# Patient Record
Sex: FEMALE | Race: BLACK OR AFRICAN AMERICAN | NOT HISPANIC OR LATINO | Employment: OTHER | URBAN - METROPOLITAN AREA
[De-identification: names, ages, dates, MRNs, and addresses within clinical notes are randomized per-mention and may not be internally consistent; named-entity substitution may affect disease eponyms.]

---

## 2017-05-08 ENCOUNTER — HOSPITAL ENCOUNTER (EMERGENCY)
Facility: HOSPITAL | Age: 57
Discharge: HOME | End: 2017-05-08
Attending: EMERGENCY MEDICINE
Payer: MEDICARE

## 2017-05-08 VITALS
WEIGHT: 152 LBS | RESPIRATION RATE: 18 BRPM | HEART RATE: 80 BPM | TEMPERATURE: 98 F | OXYGEN SATURATION: 100 % | SYSTOLIC BLOOD PRESSURE: 145 MMHG | BODY MASS INDEX: 23.86 KG/M2 | DIASTOLIC BLOOD PRESSURE: 79 MMHG | HEIGHT: 67 IN

## 2017-05-08 DIAGNOSIS — H61.21 HEARING LOSS DUE TO CERUMEN IMPACTION, RIGHT: Primary | ICD-10-CM

## 2017-05-08 PROCEDURE — 99283 EMERGENCY DEPT VISIT LOW MDM: CPT | Mod: 25

## 2017-05-08 PROCEDURE — 69210 REMOVE IMPACTED EAR WAX UNI: CPT | Mod: RT

## 2017-05-08 PROCEDURE — 25000003 PHARM REV CODE 250: Performed by: EMERGENCY MEDICINE

## 2017-05-08 RX ORDER — DOCUSATE SODIUM 50 MG/5ML
50 LIQUID ORAL
Status: COMPLETED | OUTPATIENT
Start: 2017-05-08 | End: 2017-05-08

## 2017-05-08 RX ADMIN — DOCUSATE SODIUM 50 MG: 50 LIQUID ORAL at 01:05

## 2017-05-08 NOTE — ED AVS SNAPSHOT
[facility]  [place] [address]               Jossie Crowley   2017 12:59 PM   ED    Description:  Female : 1960   Department:  [facility]           Your Care was Coordinated By:     Provider Role From To    Ricardo Perales Jr., MD Attending Provider 17 0832 --      Reason for Visit     Otalgia           Diagnoses this Visit        Comments    Hearing loss due to cerumen impaction, right    -  Primary       ED Disposition     None           To Do List           Follow-up Information     Follow up with Jordyn Owen MD. Schedule an appointment as soon as possible for a visit in 1 week.    Specialty:  Hematology and Oncology    Why:  Please follow-up the PCP this week.  Return for new or worsening symptoms such as fever.  Please use mineral oil 1-2 times daily in your ear for 10 minutes and then flush with bulb syringe.    Contact information:    [address]  [place] [address]        [facility]     [facility] -  Assistance  Unless otherwise directed by your provider, please contact [facility], our nurse care line that is available for  assistance.     Registered nurses in the [facility] provide: appointment scheduling, clinical advisement, health education, and other advisory services.  Call: [phone] (toll free)               Medications           Message regarding Medications     Verify the changes and/or additions to your medication regime listed below are the same as discussed with your clinician today.  If any of these changes or additions are incorrect, please notify your healthcare provider.        These medications were administered today        Dose Freq    docusate 50 mg/5 mL liquid 50 mg 50 mg ED 1 Time    Sig: Place 5 mLs (50 mg total) in ear(s) ED 1 Time.    Class: Normal    Route: Otic      STOP taking these medications     aspirin (ECOTRIN) 81 MG EC  "tablet Take 81 mg by mouth once daily.             Verify that the below list of medications is an accurate representation of the medications you are currently taking.  If none reported, the list may be blank. If incorrect, please contact your healthcare provider. Carry this list with you in case of emergency.           Current Medications     albuterol 90 mcg/actuation HFAA Inhale 2 puffs into the lungs every 4 (four) hours as needed (shortness of breath).    divalproex (DEPAKOTE) 250 MG 24 hr tablet Take 250 mg by mouth once daily. Take 2 tabs in morning and 3 in evening     docusate sodium (COLACE) 100 MG capsule Take 100 mg by mouth. Once daily     predniSONE (DELTASONE) 10 MG tablet 4 tabs po x 2 days, 2tabs po x 2days, 1tab  Po x 3 days and stop    trazodone (DESYREL) 150 MG tablet Take 150 mg by mouth every evening.            Clinical Reference Information           Your Vitals Were     BP Pulse Temp Resp Height Weight    145/79 (BP Location: Right arm, Patient Position: Sitting, BP Method: Automatic) 80 97.9 °F (36.6 °C) (Oral) 18 5' 7" (1.702 m) 68.9 kg (152 lb)    SpO2 BMI             100% 23.81 kg/m2         Allergies as of 5/8/2017     No Known Allergies      Immunizations Administered on Date of Encounter - 5/8/2017     None      ED Micro, Lab, POCT     None      ED Imaging Orders     None        Discharge Instructions         Earwax Removal    The ear canal makes earwax from the canals lining. The ears make wax to lubricate and protect the ear canal. The ear canal is the tube that connects the middle ear to the outside of the ear. The wax protects the ear from bacteria, infection, and damage from water or trauma.  The wax that forms in the canal naturally moves toward the outside of the ear and falls out. In some cases, the ear may make too much wax. If the wax causes problems or keeps the healthcare provider from seeing into the ear, the extra wax may be removed.  Too much wax can affect your " hearing. It can cause itching. In rare cases, it can be painful. Earwax should not be removed unless it is causing a problem. You should not stick objects into your ear to remove wax unless told to do so by your healthcare provider.  Healthcare providers can remove earwax safely. It is important to stay still during the procedure to avoid damage to the ear canal. But removing earwax generally doesnt hurt. You will not usually need anesthesia or pain medicine when the provider removes the earwax.  A number of conditions lead to earwax buildup. These include some skin problems, a narrow ear canal, or ears that make too much earwax. Using cotton swabs in the canal pushes earwax deeper into the ear and contributes to the buildup of earwax.  Home care  · The healthcare provider may recommend mineral oil or an over-the-counter eardrop to use at home to soften the earwax. Use these products only if the provider recommends them. Use these products only if the provider recommends them. Carefully follow the instructions given.  · Dont use mineral oil or OTC eardrops if you might have an ear infection or a ruptured eardrum. Tell your healthcare provider right away if you have diabetes or an immune disorder.  · Dont use cotton swabs in your ears. Cotton swabs may push wax deeper into the ear canal or damage the eardrum. Use cotton gauze or a wet washcloth  to gently remove wax on the outside of the ear and around the opening to the ear canal.  · Don't use any probing device or object such as cotton-tipped swabs or john pins to clean the inside of your ears.  · Dont use ear candles to clean your ears. Candling can be dangerous. It can burn the ear canal. It can also make the condition worse instead of better.  · Dont use cold water to rinse the ear. This will make you dizzy. If your provider tells you to rinse your ear, use only warm water or follow his or her instructions.  · Check the ear for signs of infection or  irritation listed below under When to seek medical advice.  Steps for using eardrops  1. Warm the medicine bottle by rubbing it between your hands for a few minutes.  2. Lie down on your side, with the affected ear up.  3. Place the recommended number of drops in the ear. Wet a cotton ball with the medicine. Gently put the cotton ball into the ear opening.  Follow-up care  Follow up with your healthcare provider, or as directed.  When to seek medical advice  Call the provider right away if you have:  · Ear pain that gets worse  · Fever of 100.4F°F (38°C) or higher, or as directed by your healthcare provider  · Worsening wax buildup  · Severe pain, dizziness, or nausea  · Bleeding from the ear  · Hearing problems  · Signs of irritation from the eardrops, such as burning, stinging, or swelling and tenderness  · Foul-smelling fluid draining from the ear  · Swelling, redness, or tenderness of the outer ear  · Headache, neck pain, or stiff neck  Date Last Reviewed: 3/22/2015  © 0913-6000 [employer]. [address] [place] [address]. All rights reserved. This information is not intended as a substitute for professional medical care. Always follow your healthcare professional's instructions.          [facility] Sign-Up     Activating your [facility] account is as easy as 1-2-3!     1) Visit [email or web address], select Sign Up Now, enter this activation code and your date of birth, then select Next.  [email or web address]  Expires: 6/22/2017  2:58 PM      2) Create a username and password to use when you visit [facility] in the future and select a security question in case you lose your password and select Next.    3) Enter your e-mail address and click Sign Up!    Additional Information  If you have questions, please e-mail [email or web address] or call [phone] to talk to our [facility] staff. Remember, [facility] is NOT to be used for urgent needs. For medical emergencies, dial 911.         Smoking  Cessation     If you would like to quit smoking:   You may be eligible for free services if you are a [place] resident and started smoking cigarettes before September 1, 1988.  Call the Smoking Cessation Trust (SCT) toll free at (680) 878-6840 or (416) 086-8232.   Call 1-800-QUIT-NOW if you do not meet the above criteria.   Contact us via email: [email or web address]   View our website for more information: [email or web address]         [facility] complies with applicable Federal civil rights laws and does not discriminate on the basis of race, color, national origin, age, disability, or sex.        Language Assistance Services     ATTENTION: Language assistance services are available, free of charge. Please call [phone].      ATENCIÓN: Si habla español, tiene a tobar disposición servicios gratuitos de asistencia lingüística. Llame al [phone].     CHÚ Ý: N?u b?n nói Ti?ng Vi?t, có các d?ch v? h? tr? ngôn ng? mi?n phí dành cho b?n. G?i s? [phone].

## 2017-05-08 NOTE — DISCHARGE INSTRUCTIONS
Earwax Removal    The ear canal makes earwax from the canals lining. The ears make wax to lubricate and protect the ear canal. The ear canal is the tube that connects the middle ear to the outside of the ear. The wax protects the ear from bacteria, infection, and damage from water or trauma.  The wax that forms in the canal naturally moves toward the outside of the ear and falls out. In some cases, the ear may make too much wax. If the wax causes problems or keeps the healthcare provider from seeing into the ear, the extra wax may be removed.  Too much wax can affect your hearing. It can cause itching. In rare cases, it can be painful. Earwax should not be removed unless it is causing a problem. You should not stick objects into your ear to remove wax unless told to do so by your healthcare provider.  Healthcare providers can remove earwax safely. It is important to stay still during the procedure to avoid damage to the ear canal. But removing earwax generally doesnt hurt. You will not usually need anesthesia or pain medicine when the provider removes the earwax.  A number of conditions lead to earwax buildup. These include some skin problems, a narrow ear canal, or ears that make too much earwax. Using cotton swabs in the canal pushes earwax deeper into the ear and contributes to the buildup of earwax.  Home care  · The healthcare provider may recommend mineral oil or an over-the-counter eardrop to use at home to soften the earwax. Use these products only if the provider recommends them. Use these products only if the provider recommends them. Carefully follow the instructions given.  · Dont use mineral oil or OTC eardrops if you might have an ear infection or a ruptured eardrum. Tell your healthcare provider right away if you have diabetes or an immune disorder.  · Dont use cotton swabs in your ears. Cotton swabs may push wax deeper into the ear canal or damage the eardrum. Use cotton gauze or a wet  washcloth  to gently remove wax on the outside of the ear and around the opening to the ear canal.  · Don't use any probing device or object such as cotton-tipped swabs or john pins to clean the inside of your ears.  · Dont use ear candles to clean your ears. Candling can be dangerous. It can burn the ear canal. It can also make the condition worse instead of better.  · Dont use cold water to rinse the ear. This will make you dizzy. If your provider tells you to rinse your ear, use only warm water or follow his or her instructions.  · Check the ear for signs of infection or irritation listed below under When to seek medical advice.  Steps for using eardrops  1. Warm the medicine bottle by rubbing it between your hands for a few minutes.  2. Lie down on your side, with the affected ear up.  3. Place the recommended number of drops in the ear. Wet a cotton ball with the medicine. Gently put the cotton ball into the ear opening.  Follow-up care  Follow up with your healthcare provider, or as directed.  When to seek medical advice  Call the provider right away if you have:  · Ear pain that gets worse  · Fever of 100.4F°F (38°C) or higher, or as directed by your healthcare provider  · Worsening wax buildup  · Severe pain, dizziness, or nausea  · Bleeding from the ear  · Hearing problems  · Signs of irritation from the eardrops, such as burning, stinging, or swelling and tenderness  · Foul-smelling fluid draining from the ear  · Swelling, redness, or tenderness of the outer ear  · Headache, neck pain, or stiff neck  Date Last Reviewed: 3/22/2015  © 5637-3601 [employer]. [address] [place] [address], [place] [address]. All rights reserved. This information is not intended as a substitute for professional medical care. Always follow your healthcare professional's instructions.

## 2017-05-08 NOTE — ED PROVIDER NOTES
"Encounter Date: 5/8/2017       History     Chief Complaint   Patient presents with    Otalgia     pt states " I cant hear out my right ear" times 1 month     Review of patient's allergies indicates:  No Known Allergies  HPI Comments: Chief complaint: Decreased hearing in right ear    History of present illness: 56-year-old female presents with several weeks of inability to hear out of her right ear.  She denies any tinnitus or difficulty with balance or ataxia.  No history of prior symptoms.  No trauma.  No fevers or chills.  No other symptoms.    Past Medical History:   Diagnosis Date    Breast cancer     Hyperlipidemia     Schizophrenia     Thyroid disease     thyroid surgery     Past Surgical History:   Procedure Laterality Date    BREAST BIOPSY      BREAST LUMPECTOMY      BREAST SURGERY      THYROIDECTOMY  2005     History reviewed. No pertinent family history.  Social History   Substance Use Topics    Smoking status: Current Every Day Smoker     Packs/day: 0.50     Years: 37.00     Types: Cigarettes    Smokeless tobacco: Former User     Quit date: 12/27/2014    Alcohol use No     Review of Systems   Constitutional: Negative for fever.   HENT: Negative for ear discharge, ear pain, sore throat, tinnitus and trouble swallowing.    Eyes: Negative for pain and redness.   Respiratory: Negative for shortness of breath.    Cardiovascular: Negative for chest pain.   Gastrointestinal: Negative for nausea.   Genitourinary: Negative for dysuria.   Musculoskeletal: Negative for back pain.   Skin: Negative for rash.   Neurological: Negative for dizziness, weakness and light-headedness.   Hematological: Does not bruise/bleed easily.       Physical Exam   Initial Vitals   BP Pulse Resp Temp SpO2   05/08/17 1210 05/08/17 1210 05/08/17 1210 05/08/17 1210 05/08/17 1210   125/74 99 19 98.3 °F (36.8 °C) 100 %     Physical Exam    Nursing note and vitals reviewed.  Constitutional: She appears well-developed and " well-nourished. She is not diaphoretic. No distress.   HENT:   Head: Normocephalic and atraumatic.   Left Ear: External ear normal.   Nose: Nose normal.   Mouth/Throat: Oropharynx is clear and moist.   Right external ear canal reveals significant cerumen impaction.   Eyes: Conjunctivae and EOM are normal. Pupils are equal, round, and reactive to light. Right eye exhibits no discharge. Left eye exhibits no discharge. No scleral icterus.   Neck: Normal range of motion. Neck supple.   Cardiovascular: Normal rate, regular rhythm, normal heart sounds and intact distal pulses.   No murmur heard.  Pulmonary/Chest: Breath sounds normal. No respiratory distress. She has no wheezes. She has no rhonchi. She has no rales.   Musculoskeletal: Normal range of motion. She exhibits no edema or tenderness.   Neurological: She is alert and oriented to person, place, and time. She has normal strength. No cranial nerve deficit or sensory deficit.   Skin: Skin is warm and dry. No rash noted. No erythema. No pallor.   Psychiatric: She has a normal mood and affect. Her behavior is normal. Judgment and thought content normal.         ED Course   Ear Wax Removal  Date/Time: 5/8/2017 3:02 PM  Performed by: HAILEE STALLWORTH JR  Authorized by: HAILEE STALLWORTH JR     Anesthesia:  Local Anesthetic: none   Ceruminolytics applied prior to the procedure.  Medication Used: Other (colace x 2).  Location details: right ear  Procedure type: curette and irrigation  Cerumen Removal Results: Cerumen partially removed.  Patient tolerance: Patient tolerated the procedure well with no immediate complications        Labs Reviewed - No data to display          Medical Decision Making:   ED Management:   This is the emergent evaluation of a 56-year-old female presents forright-sided hearing loss for the past 3 weeks.  Differential diagnoses included: Cerumen impaction, other conductive hearing loss problem, sensorineural hearing loss.  Evaluation of the  patient's right ear canal reveals significant cerumen impaction.  This was removed with a combination of curette as well as to infusions of Colace to dissolve cerumen followed by irrigation.  Able to see tympanic membranes.  Patient is hearing much better.  Unable to completely remove cerumen at this time.  I've advised the patient to continue using mineral oil with bulb syringe before flushing.  Patient was advised to return for any new or worsening symptoms such as tinnitus or ataxia.                     ED Course     Clinical Impression:   Cerumen impaction, right ear          Ricardo Perales Jr., MD  05/08/17 1698

## 2017-05-08 NOTE — ED TRIAGE NOTES
Patient comes to the ER with decreased hearing in right ear. Patient states its been going on for a month now.

## 2017-12-01 ENCOUNTER — HOSPITAL ENCOUNTER (INPATIENT)
Facility: HOSPITAL | Age: 57
LOS: 14 days | Discharge: HOME | DRG: 871 | End: 2017-12-15
Attending: EMERGENCY MEDICINE | Admitting: HOSPITALIST
Payer: MEDICARE

## 2017-12-01 DIAGNOSIS — R50.9 ACUTE FEBRILE ILLNESS: Primary | ICD-10-CM

## 2017-12-01 DIAGNOSIS — A41.9 SEPSIS: ICD-10-CM

## 2017-12-01 DIAGNOSIS — J18.9 PNEUMONIA OF RIGHT UPPER LOBE DUE TO INFECTIOUS ORGANISM: ICD-10-CM

## 2017-12-01 DIAGNOSIS — R00.0 TACHYCARDIA: ICD-10-CM

## 2017-12-01 DIAGNOSIS — R05.9 COUGH: ICD-10-CM

## 2017-12-01 DIAGNOSIS — I50.9 CHF (CONGESTIVE HEART FAILURE): ICD-10-CM

## 2017-12-01 DIAGNOSIS — J80 ARDS (ADULT RESPIRATORY DISTRESS SYNDROME): ICD-10-CM

## 2017-12-01 LAB
ALBUMIN SERPL BCP-MCNC: 3.7 G/DL
ALP SERPL-CCNC: 55 U/L
ALT SERPL W/O P-5'-P-CCNC: 22 U/L
AMPHET+METHAMPHET UR QL: NEGATIVE
ANION GAP SERPL CALC-SCNC: 12 MMOL/L
AST SERPL-CCNC: 33 U/L
BACTERIA #/AREA URNS HPF: NORMAL /HPF
BARBITURATES UR QL SCN>200 NG/ML: NEGATIVE
BASOPHILS # BLD AUTO: 0.01 K/UL
BASOPHILS NFR BLD: 0.1 %
BENZODIAZ UR QL SCN>200 NG/ML: NEGATIVE
BILIRUB SERPL-MCNC: 0.2 MG/DL
BILIRUB UR QL STRIP: NEGATIVE
BNP SERPL-MCNC: 24 PG/ML
BUN SERPL-MCNC: 12 MG/DL
BZE UR QL SCN: NEGATIVE
CALCIUM SERPL-MCNC: 9.7 MG/DL
CANNABINOIDS UR QL SCN: NEGATIVE
CHLORIDE SERPL-SCNC: 104 MMOL/L
CLARITY UR: ABNORMAL
CO2 SERPL-SCNC: 23 MMOL/L
COLOR UR: ABNORMAL
CREAT SERPL-MCNC: 0.8 MG/DL
CREAT UR-MCNC: 201.7 MG/DL
DIFFERENTIAL METHOD: ABNORMAL
EOSINOPHIL # BLD AUTO: 0 K/UL
EOSINOPHIL NFR BLD: 0 %
ERYTHROCYTE [DISTWIDTH] IN BLOOD BY AUTOMATED COUNT: 15.6 %
EST. GFR  (AFRICAN AMERICAN): >60 ML/MIN/1.73 M^2
EST. GFR  (NON AFRICAN AMERICAN): >60 ML/MIN/1.73 M^2
FLUAV AG SPEC QL IA: NEGATIVE
FLUBV AG SPEC QL IA: NEGATIVE
GLUCOSE SERPL-MCNC: 107 MG/DL (ref 70–110)
GLUCOSE SERPL-MCNC: 115 MG/DL
GLUCOSE UR QL STRIP: NEGATIVE
HCT VFR BLD AUTO: 36.2 %
HGB BLD-MCNC: 12.7 G/DL
HGB UR QL STRIP: NEGATIVE
KETONES UR QL STRIP: ABNORMAL
LACTATE SERPL-SCNC: 0.9 MMOL/L
LACTATE SERPL-SCNC: 1.1 MMOL/L
LEUKOCYTE ESTERASE UR QL STRIP: ABNORMAL
LIPASE SERPL-CCNC: 17 U/L
LYMPHOCYTES # BLD AUTO: 1.3 K/UL
LYMPHOCYTES NFR BLD: 13.9 %
MAGNESIUM SERPL-MCNC: 1.9 MG/DL
MCH RBC QN AUTO: 29.5 PG
MCHC RBC AUTO-ENTMCNC: 35.1 G/DL
MCV RBC AUTO: 84 FL
METHADONE UR QL SCN>300 NG/ML: NEGATIVE
MICROSCOPIC COMMENT: NORMAL
MONOCYTES # BLD AUTO: 1.5 K/UL
MONOCYTES NFR BLD: 16 %
NEUTROPHILS # BLD AUTO: 6.7 K/UL
NEUTROPHILS NFR BLD: 70 %
NITRITE UR QL STRIP: NEGATIVE
OPIATES UR QL SCN: NEGATIVE
PCP UR QL SCN>25 NG/ML: NEGATIVE
PH UR STRIP: 6 [PH] (ref 5–8)
PHOSPHATE SERPL-MCNC: 3 MG/DL
PLATELET # BLD AUTO: 192 K/UL
PMV BLD AUTO: 10.2 FL
POCT GLUCOSE: 107 MG/DL (ref 70–110)
POTASSIUM SERPL-SCNC: 4 MMOL/L
PROT SERPL-MCNC: 7.7 G/DL
PROT UR QL STRIP: NEGATIVE
RBC # BLD AUTO: 4.3 M/UL
RBC #/AREA URNS HPF: 3 /HPF (ref 0–4)
SODIUM SERPL-SCNC: 139 MMOL/L
SP GR UR STRIP: 1.02 (ref 1–1.03)
SPECIMEN SOURCE: NORMAL
SQUAMOUS #/AREA URNS HPF: 1 /HPF
TOXICOLOGY INFORMATION: NORMAL
TROPONIN I SERPL DL<=0.01 NG/ML-MCNC: 0.03 NG/ML
TROPONIN I SERPL DL<=0.01 NG/ML-MCNC: 0.04 NG/ML
URN SPEC COLLECT METH UR: ABNORMAL
UROBILINOGEN UR STRIP-ACNC: NEGATIVE EU/DL
VALPROATE SERPL-MCNC: 74.3 UG/ML
WBC # BLD AUTO: 9.5 K/UL
WBC #/AREA URNS HPF: 2 /HPF (ref 0–5)

## 2017-12-01 PROCEDURE — 27100107 HC POCKET PEAK FLOW METER

## 2017-12-01 PROCEDURE — 83690 ASSAY OF LIPASE: CPT

## 2017-12-01 PROCEDURE — 99285 EMERGENCY DEPT VISIT HI MDM: CPT | Mod: 25

## 2017-12-01 PROCEDURE — 85025 COMPLETE CBC W/AUTO DIFF WBC: CPT

## 2017-12-01 PROCEDURE — 93005 ELECTROCARDIOGRAM TRACING: CPT

## 2017-12-01 PROCEDURE — 87086 URINE CULTURE/COLONY COUNT: CPT

## 2017-12-01 PROCEDURE — 96367 TX/PROPH/DG ADDL SEQ IV INF: CPT

## 2017-12-01 PROCEDURE — 99900035 HC TECH TIME PER 15 MIN (STAT)

## 2017-12-01 PROCEDURE — 25500020 PHARM REV CODE 255: Performed by: EMERGENCY MEDICINE

## 2017-12-01 PROCEDURE — 63600175 PHARM REV CODE 636 W HCPCS: Performed by: EMERGENCY MEDICINE

## 2017-12-01 PROCEDURE — 80307 DRUG TEST PRSMV CHEM ANLYZR: CPT

## 2017-12-01 PROCEDURE — 25000003 PHARM REV CODE 250: Performed by: EMERGENCY MEDICINE

## 2017-12-01 PROCEDURE — 94640 AIRWAY INHALATION TREATMENT: CPT

## 2017-12-01 PROCEDURE — 21400001 HC TELEMETRY ROOM

## 2017-12-01 PROCEDURE — 84100 ASSAY OF PHOSPHORUS: CPT

## 2017-12-01 PROCEDURE — 83735 ASSAY OF MAGNESIUM: CPT

## 2017-12-01 PROCEDURE — 96365 THER/PROPH/DIAG IV INF INIT: CPT | Mod: 59

## 2017-12-01 PROCEDURE — 84484 ASSAY OF TROPONIN QUANT: CPT

## 2017-12-01 PROCEDURE — 96366 THER/PROPH/DIAG IV INF ADDON: CPT

## 2017-12-01 PROCEDURE — 93010 ELECTROCARDIOGRAM REPORT: CPT | Mod: ,,, | Performed by: INTERNAL MEDICINE

## 2017-12-01 PROCEDURE — 83605 ASSAY OF LACTIC ACID: CPT | Mod: 91

## 2017-12-01 PROCEDURE — 80164 ASSAY DIPROPYLACETIC ACD TOT: CPT

## 2017-12-01 PROCEDURE — 25000242 PHARM REV CODE 250 ALT 637 W/ HCPCS: Performed by: EMERGENCY MEDICINE

## 2017-12-01 PROCEDURE — 83880 ASSAY OF NATRIURETIC PEPTIDE: CPT

## 2017-12-01 PROCEDURE — 94761 N-INVAS EAR/PLS OXIMETRY MLT: CPT

## 2017-12-01 PROCEDURE — 80053 COMPREHEN METABOLIC PANEL: CPT

## 2017-12-01 PROCEDURE — 87040 BLOOD CULTURE FOR BACTERIA: CPT

## 2017-12-01 PROCEDURE — 87400 INFLUENZA A/B EACH AG IA: CPT

## 2017-12-01 PROCEDURE — 81000 URINALYSIS NONAUTO W/SCOPE: CPT

## 2017-12-01 PROCEDURE — 87449 NOS EACH ORGANISM AG IA: CPT

## 2017-12-01 PROCEDURE — 96361 HYDRATE IV INFUSION ADD-ON: CPT

## 2017-12-01 RX ORDER — SODIUM CHLORIDE 9 MG/ML
INJECTION, SOLUTION INTRAVENOUS CONTINUOUS
Status: DISCONTINUED | OUTPATIENT
Start: 2017-12-01 | End: 2017-12-02

## 2017-12-01 RX ORDER — CIPROFLOXACIN 2 MG/ML
400 INJECTION, SOLUTION INTRAVENOUS
Status: DISCONTINUED | OUTPATIENT
Start: 2017-12-01 | End: 2017-12-06

## 2017-12-01 RX ORDER — IPRATROPIUM BROMIDE 0.5 MG/2.5ML
0.5 SOLUTION RESPIRATORY (INHALATION)
Status: COMPLETED | OUTPATIENT
Start: 2017-12-01 | End: 2017-12-01

## 2017-12-01 RX ORDER — LEVALBUTEROL 1.25 MG/.5ML
1.25 SOLUTION, CONCENTRATE RESPIRATORY (INHALATION)
Status: COMPLETED | OUTPATIENT
Start: 2017-12-01 | End: 2017-12-01

## 2017-12-01 RX ORDER — ALBUTEROL SULFATE 2.5 MG/.5ML
2.5 SOLUTION RESPIRATORY (INHALATION)
Status: DISCONTINUED | OUTPATIENT
Start: 2017-12-02 | End: 2017-12-02

## 2017-12-01 RX ORDER — DOCUSATE SODIUM 100 MG/1
100 CAPSULE, LIQUID FILLED ORAL DAILY
Status: DISCONTINUED | OUTPATIENT
Start: 2017-12-02 | End: 2017-12-03

## 2017-12-01 RX ORDER — ACETAMINOPHEN 500 MG
1000 TABLET ORAL
Status: COMPLETED | OUTPATIENT
Start: 2017-12-01 | End: 2017-12-01

## 2017-12-01 RX ORDER — ACETAMINOPHEN 325 MG/1
650 TABLET ORAL EVERY 6 HOURS PRN
Status: DISCONTINUED | OUTPATIENT
Start: 2017-12-01 | End: 2017-12-04

## 2017-12-01 RX ORDER — ONDANSETRON 2 MG/ML
8 INJECTION INTRAMUSCULAR; INTRAVENOUS EVERY 8 HOURS PRN
Status: DISCONTINUED | OUTPATIENT
Start: 2017-12-01 | End: 2017-12-15 | Stop reason: HOSPADM

## 2017-12-01 RX ORDER — DIVALPROEX SODIUM 250 MG/1
250 TABLET, FILM COATED, EXTENDED RELEASE ORAL DAILY
Status: DISCONTINUED | OUTPATIENT
Start: 2017-12-02 | End: 2017-12-02

## 2017-12-01 RX ORDER — SODIUM CHLORIDE 0.9 % (FLUSH) 0.9 %
3 SYRINGE (ML) INJECTION EVERY 6 HOURS PRN
Status: DISCONTINUED | OUTPATIENT
Start: 2017-12-01 | End: 2017-12-15 | Stop reason: HOSPADM

## 2017-12-01 RX ADMIN — SODIUM CHLORIDE 1000 ML: 0.9 INJECTION, SOLUTION INTRAVENOUS at 02:12

## 2017-12-01 RX ADMIN — LEVALBUTEROL 1.25 MG: 1.25 SOLUTION, CONCENTRATE RESPIRATORY (INHALATION) at 07:12

## 2017-12-01 RX ADMIN — IPRATROPIUM BROMIDE 0.5 MG: 0.5 SOLUTION RESPIRATORY (INHALATION) at 07:12

## 2017-12-01 RX ADMIN — IOHEXOL 70 ML: 350 INJECTION, SOLUTION INTRAVENOUS at 05:12

## 2017-12-01 RX ADMIN — VANCOMYCIN HYDROCHLORIDE 1000 MG: 1 INJECTION, POWDER, LYOPHILIZED, FOR SOLUTION INTRAVENOUS at 07:12

## 2017-12-01 RX ADMIN — CIPROFLOXACIN 400 MG: 2 INJECTION, SOLUTION INTRAVENOUS at 06:12

## 2017-12-01 RX ADMIN — IPRATROPIUM BROMIDE 0.5 MG: 0.5 SOLUTION RESPIRATORY (INHALATION) at 02:12

## 2017-12-01 RX ADMIN — ACETAMINOPHEN 1000 MG: 500 TABLET ORAL at 02:12

## 2017-12-01 RX ADMIN — SODIUM CHLORIDE: 9 INJECTION, SOLUTION INTRAVENOUS at 07:12

## 2017-12-01 RX ADMIN — LEVALBUTEROL 1.25 MG: 1.25 SOLUTION, CONCENTRATE RESPIRATORY (INHALATION) at 02:12

## 2017-12-01 RX ADMIN — SODIUM CHLORIDE: 0.9 INJECTION, SOLUTION INTRAVENOUS at 10:12

## 2017-12-01 NOTE — ED PROVIDER NOTES
"Encounter Date: 12/1/2017    SCRIBE #1 NOTE: I, Asher Small, am scribing for, and in the presence of,  Sunny Nails MD. I have scribed the following portions of the note - Other sections scribed: HPI and ROS.       History     Chief Complaint   Patient presents with    Altered Mental Status     family reports AMS today, "shes just doing things out of the ordinary and coughing a lot"     CC: Altered Mental Status     HPI: This 57 y.o F smoker with breast cancer, HLD, schizophrenia and thyroid disease presents to the ED accompanied by her daughter for emergent evaluation of altered mental status which began today. The pt's daughter suspects she may have taken more Depakote than Rx. Additionally, the pt reports cough and SOB x1 week. The pt states "I've been taking a lot of Goody's (powder)" which she suspects made her nauseous. The pt denies fever, headache, emesis, abdominal pain, Hx of COPD, emphysema and cardiac disease.      The history is provided by the patient and a relative. No  was used.     Review of patient's allergies indicates:  No Known Allergies  Past Medical History:   Diagnosis Date    Breast cancer     Hyperlipidemia     Schizophrenia     Thyroid disease     thyroid surgery     Past Surgical History:   Procedure Laterality Date    BREAST BIOPSY      BREAST LUMPECTOMY      BREAST SURGERY      THYROIDECTOMY  2005     History reviewed. No pertinent family history.  Social History   Substance Use Topics    Smoking status: Current Every Day Smoker     Packs/day: 0.50     Years: 37.00     Types: Cigarettes    Smokeless tobacco: Former User     Quit date: 12/27/2014    Alcohol use No     Review of Systems   Constitutional: Negative for fever.   HENT: Negative for sore throat.    Respiratory: Positive for cough and shortness of breath.    Cardiovascular: Negative for chest pain.   Gastrointestinal: Positive for nausea. Negative for abdominal pain and vomiting. "   Genitourinary: Negative for dysuria.   Musculoskeletal: Negative for back pain.   Skin: Negative for rash.   Neurological: Negative for weakness and headaches.   Hematological: Does not bruise/bleed easily.   Psychiatric/Behavioral:        (+) Altered Mental Status       Physical Exam     Initial Vitals [12/01/17 1332]   BP Pulse Resp Temp SpO2   138/79 (!) 145 20 (!) 101.4 °F (38.6 °C) 100 %      MAP       98.67         Physical Exam    Nursing note and vitals reviewed.  Constitutional: She appears well-developed and well-nourished. She is not diaphoretic.   Uncomfortable appearing   HENT:   Head: Normocephalic and atraumatic.   Dry mucosal membranes.   Eyes: Conjunctivae are normal. No scleral icterus.   Neck: Normal range of motion. Neck supple.   Cardiovascular:   Tachycardic, regular rhythm   Pulmonary/Chest: No stridor.   Tachypneic - Diminished breath sounds bilaterally and faint expiratory wheezing.   Abdominal: Soft. There is no tenderness.   Musculoskeletal: Normal range of motion. She exhibits no edema or tenderness.   Lymphadenopathy:     She has no cervical adenopathy.   Neurological: She is alert and oriented to person, place, and time. She has normal strength.   Skin: Skin is warm and dry. No rash noted.   Psychiatric: She has a normal mood and affect. Her behavior is normal. Thought content normal.   (baseline)         ED Course   Critical Care  Date/Time: 12/1/2017 4:09 PM  Performed by: JENNIFER WELLS.  Authorized by: JENNIFER WELLS   Direct patient critical care time: 20 minutes  Additional history critical care time: 10 minutes  Ordering / reviewing critical care time: 10 minutes  Documentation critical care time: 10 minutes  Consulting other physicians critical care time: 5 minutes  Total critical care time (exclusive of procedural time) : 55 minutes  Critical care time was exclusive of separately billable procedures and treating other patients and teaching time.  Critical care was  necessary to treat or prevent imminent or life-threatening deterioration of the following conditions: sepsis.  Critical care was time spent personally by me on the following activities: development of treatment plan with patient or surrogate, evaluation of patient's response to treatment, examination of patient, obtaining history from patient or surrogate, ordering and performing treatments and interventions, ordering and review of laboratory studies, ordering and review of radiographic studies, pulse oximetry, re-evaluation of patient's condition and review of old charts.        Labs Reviewed   URINALYSIS - Abnormal; Notable for the following:        Result Value    Appearance, UA Cloudy (*)     Ketones, UA Trace (*)     Leukocytes, UA Trace (*)     All other components within normal limits   CBC W/ AUTO DIFFERENTIAL - Abnormal; Notable for the following:     Hematocrit 36.2 (*)     RDW 15.6 (*)     Mono # 1.5 (*)     Lymph% 13.9 (*)     Mono% 16.0 (*)     All other components within normal limits   COMPREHENSIVE METABOLIC PANEL - Abnormal; Notable for the following:     Glucose 115 (*)     All other components within normal limits   TROPONIN I - Abnormal; Notable for the following:     Troponin I 0.032 (*)     All other components within normal limits   CULTURE, BLOOD   CULTURE, BLOOD   CULTURE, URINE   B-TYPE NATRIURETIC PEPTIDE   LACTIC ACID, PLASMA   MAGNESIUM   PHOSPHORUS   LIPASE   URINALYSIS MICROSCOPIC   DRUG SCREEN PANEL, URINE EMERGENCY   VALPROIC ACID   INFLUENZA A AND B ANTIGEN   LACTIC ACID, PLASMA   TROPONIN I   POCT GLUCOSE     EKG Readings: (Independently Interpreted)   Initial Reading: No STEMI.   - sinus tachycardia, rate 135, LAFB, nonspecific ST/T-wave abnormalities.    X-Rays:   Independently Interpreted Readings:   Chest X-Ray: No acute abnormalities.  Normal heart size. Discoid atelectasis or scarring noted in the left midlung zone. There is no consolidation, effusion, or pneumothorax.   Cardiac silhouette is unremarkable.      Medical Decision Making:   History:   I obtained history from: someone other than patient.  Old Medical Records: I decided to obtain old medical records.  Old Records Summarized: records from clinic visits and other records.  Independently Interpreted Test(s):   I have ordered and independently interpreted X-rays - see prior notes.  I have ordered and independently interpreted EKG Reading(s) - see prior notes  Clinical Tests:   Lab Tests: Ordered and Reviewed  Radiological Study: Ordered and Reviewed  Medical Tests: Ordered and Reviewed      Additional Medical Decision Making:   Emergent evaluation a 57-year-old female with multiple comorbidities including schizophrenia, dyslipidemia, and hypertension as well as a past history of thrombocytopenia, bilateral pneumonia resulting in respiratory failure as well as a baseline troponin elevation who presents the emergency department for evaluation of worsening dyspnea, productive cough and fever for the past week - see hpi for additional details.  Initial temp 101.7°F.  She is normotensive with heart rate of 145 and SPO2 of 94% 2 L nasal cannula.  On exam, breath sounds are diminished with faint expiratory wheezing.  Aside from tachycardia, cardiac exam benign.  Abdomen is soft and nontender.  She is currently alert and oriented x3 - no evidence of clinical intoxication.  Neuro exam without focal motor and/or sensory deficits.   Although she has a history of paranoid schizophrenia, no evidence of acute psychosis at this time.  EKG demonstrates sinus tachycardia otherwise without evidence of acute ischemia or dysrhythmia.  Chest x-ray normal.  Basic labs unremarkable.  She's been pancultured and has been given IV fluids, 2 L normal saline IV bolus, along with Tylenol.  CTA chest reveals multiple bilateral lung opacities concerning for possible pneumonia.      - Findings at this time are most consistent with sepsis secondary to  suspected pneumonia.  She has been pancultured and started on broad-spectrum IV antibiotics.  She is to be admitted to medicine for further evaluation and management including continued cardiopulmonary support including scheduled albuterol/Atrovent nebs, serial cardiac enzymes and IV antibiotics.                Scribe Attestation:   Scribe #1: I performed the above scribed service and the documentation accurately describes the services I performed. I attest to the accuracy of the note.    Attending Attestation:           Physician Attestation for Scribe:  Physician Attestation Statement for Scribe #1: I, Sunny Nails MD, reviewed documentation, as scribed by Asher Small in my presence, and it is both accurate and complete.                 ED Course      Clinical Impression:   The primary encounter diagnosis was Acute febrile illness. Diagnoses of Tachycardia, Cough, Pneumonia of right upper lobe due to infectious organism, and Sepsis were also pertinent to this visit.    Disposition:   Disposition: Admitted  Condition: Fair                        Sunny Nails MD  12/01/17 1952       Sunny Nails MD  12/01/17 1954

## 2017-12-01 NOTE — ED TRIAGE NOTES
Arrived via personal transportation. Altered mental status. Daughter reports pt is not herself. Reports pt may have taken too many of her medications for schizophrenia.Also states pt was unable toa nswer questions. Cough. Pt oriented x 3 but lethargic and slow to respond

## 2017-12-02 PROBLEM — R50.9 ACUTE FEBRILE ILLNESS: Status: ACTIVE | Noted: 2017-12-02

## 2017-12-02 LAB
ALBUMIN SERPL BCP-MCNC: 3.3 G/DL
ALLENS TEST: ABNORMAL
ALP SERPL-CCNC: 52 U/L
ALT SERPL W/O P-5'-P-CCNC: 19 U/L
ANION GAP SERPL CALC-SCNC: 10 MMOL/L
AST SERPL-CCNC: 41 U/L
BASOPHILS # BLD AUTO: 0.01 K/UL
BASOPHILS NFR BLD: 0.2 %
BILIRUB SERPL-MCNC: 0.3 MG/DL
BUN SERPL-MCNC: 9 MG/DL
CALCIUM SERPL-MCNC: 9 MG/DL
CHLORIDE SERPL-SCNC: 106 MMOL/L
CO2 SERPL-SCNC: 22 MMOL/L
CREAT SERPL-MCNC: 0.7 MG/DL
DELSYS: ABNORMAL
DIFFERENTIAL METHOD: ABNORMAL
EOSINOPHIL # BLD AUTO: 0 K/UL
EOSINOPHIL NFR BLD: 0 %
ERYTHROCYTE [DISTWIDTH] IN BLOOD BY AUTOMATED COUNT: 15.7 %
EST. GFR  (AFRICAN AMERICAN): >60 ML/MIN/1.73 M^2
EST. GFR  (NON AFRICAN AMERICAN): >60 ML/MIN/1.73 M^2
GLUCOSE SERPL-MCNC: 119 MG/DL
HCO3 UR-SCNC: 23.4 MMOL/L (ref 24–28)
HCT VFR BLD AUTO: 34.5 %
HGB BLD-MCNC: 11.9 G/DL
HIV1+2 IGG SERPL QL IA.RAPID: NEGATIVE
LYMPHOCYTES # BLD AUTO: 0.5 K/UL
LYMPHOCYTES NFR BLD: 8.1 %
MAGNESIUM SERPL-MCNC: 1.5 MG/DL
MCH RBC QN AUTO: 29 PG
MCHC RBC AUTO-ENTMCNC: 34.5 G/DL
MCV RBC AUTO: 84 FL
MONOCYTES # BLD AUTO: 0.5 K/UL
MONOCYTES NFR BLD: 7.4 %
NEUTROPHILS # BLD AUTO: 5.6 K/UL
NEUTROPHILS NFR BLD: 84.3 %
PCO2 BLDA: 35.8 MMHG (ref 35–45)
PH SMN: 7.42 [PH] (ref 7.35–7.45)
PHOSPHATE SERPL-MCNC: 1.7 MG/DL
PLATELET # BLD AUTO: 182 K/UL
PMV BLD AUTO: 10.4 FL
PO2 BLDA: 46 MMHG (ref 80–100)
POC BE: -1 MMOL/L
POC SATURATED O2: 83 % (ref 95–100)
POC TCO2: 25 MMOL/L (ref 23–27)
POCT GLUCOSE: 130 MG/DL (ref 70–110)
POTASSIUM SERPL-SCNC: 3.5 MMOL/L
PROT SERPL-MCNC: 6.9 G/DL
RBC # BLD AUTO: 4.11 M/UL
SAMPLE: ABNORMAL
SITE: ABNORMAL
SODIUM SERPL-SCNC: 138 MMOL/L
TROPONIN I SERPL DL<=0.01 NG/ML-MCNC: 0.06 NG/ML
WBC # BLD AUTO: 6.64 K/UL

## 2017-12-02 PROCEDURE — 85025 COMPLETE CBC W/AUTO DIFF WBC: CPT

## 2017-12-02 PROCEDURE — 25000003 PHARM REV CODE 250: Performed by: INTERNAL MEDICINE

## 2017-12-02 PROCEDURE — 83735 ASSAY OF MAGNESIUM: CPT

## 2017-12-02 PROCEDURE — 63600175 PHARM REV CODE 636 W HCPCS: Performed by: EMERGENCY MEDICINE

## 2017-12-02 PROCEDURE — 63600175 PHARM REV CODE 636 W HCPCS: Performed by: HOSPITALIST

## 2017-12-02 PROCEDURE — 94640 AIRWAY INHALATION TREATMENT: CPT

## 2017-12-02 PROCEDURE — 94761 N-INVAS EAR/PLS OXIMETRY MLT: CPT

## 2017-12-02 PROCEDURE — 36600 WITHDRAWAL OF ARTERIAL BLOOD: CPT

## 2017-12-02 PROCEDURE — 25000003 PHARM REV CODE 250: Performed by: EMERGENCY MEDICINE

## 2017-12-02 PROCEDURE — 84100 ASSAY OF PHOSPHORUS: CPT

## 2017-12-02 PROCEDURE — 63600175 PHARM REV CODE 636 W HCPCS: Performed by: INTERNAL MEDICINE

## 2017-12-02 PROCEDURE — 27100171 HC OXYGEN HIGH FLOW UP TO 24 HOURS

## 2017-12-02 PROCEDURE — 25000003 PHARM REV CODE 250: Performed by: HOSPITALIST

## 2017-12-02 PROCEDURE — 20000000 HC ICU ROOM

## 2017-12-02 PROCEDURE — 87632 RESP VIRUS 6-11 TARGETS: CPT

## 2017-12-02 PROCEDURE — 36415 COLL VENOUS BLD VENIPUNCTURE: CPT

## 2017-12-02 PROCEDURE — 84484 ASSAY OF TROPONIN QUANT: CPT

## 2017-12-02 PROCEDURE — 94660 CPAP INITIATION&MGMT: CPT

## 2017-12-02 PROCEDURE — 86703 HIV-1/HIV-2 1 RESULT ANTBDY: CPT

## 2017-12-02 PROCEDURE — 82803 BLOOD GASES ANY COMBINATION: CPT

## 2017-12-02 PROCEDURE — 25000242 PHARM REV CODE 250 ALT 637 W/ HCPCS: Performed by: EMERGENCY MEDICINE

## 2017-12-02 PROCEDURE — 25000242 PHARM REV CODE 250 ALT 637 W/ HCPCS: Performed by: INTERNAL MEDICINE

## 2017-12-02 PROCEDURE — 27000221 HC OXYGEN, UP TO 24 HOURS

## 2017-12-02 PROCEDURE — 27000190 HC CPAP FULL FACE MASK W/VALVE

## 2017-12-02 PROCEDURE — 80053 COMPREHEN METABOLIC PANEL: CPT

## 2017-12-02 PROCEDURE — 99900035 HC TECH TIME PER 15 MIN (STAT)

## 2017-12-02 RX ORDER — LEVALBUTEROL 1.25 MG/.5ML
1.25 SOLUTION, CONCENTRATE RESPIRATORY (INHALATION) EVERY 8 HOURS
Status: DISCONTINUED | OUTPATIENT
Start: 2017-12-02 | End: 2017-12-02

## 2017-12-02 RX ORDER — METOPROLOL TARTRATE 1 MG/ML
5 INJECTION, SOLUTION INTRAVENOUS ONCE
Status: COMPLETED | OUTPATIENT
Start: 2017-12-02 | End: 2017-12-02

## 2017-12-02 RX ORDER — ACETAMINOPHEN 650 MG/1
650 SUPPOSITORY RECTAL EVERY 6 HOURS PRN
Status: DISCONTINUED | OUTPATIENT
Start: 2017-12-02 | End: 2017-12-13

## 2017-12-02 RX ORDER — BENZONATATE 100 MG/1
100 CAPSULE ORAL 3 TIMES DAILY PRN
Status: DISCONTINUED | OUTPATIENT
Start: 2017-12-02 | End: 2017-12-15 | Stop reason: HOSPADM

## 2017-12-02 RX ORDER — MAGNESIUM SULFATE HEPTAHYDRATE 40 MG/ML
2 INJECTION, SOLUTION INTRAVENOUS ONCE
Status: COMPLETED | OUTPATIENT
Start: 2017-12-02 | End: 2017-12-02

## 2017-12-02 RX ORDER — TRAZODONE HYDROCHLORIDE 50 MG/1
150 TABLET ORAL NIGHTLY
Status: DISCONTINUED | OUTPATIENT
Start: 2017-12-02 | End: 2017-12-02

## 2017-12-02 RX ORDER — METHYLPREDNISOLONE SOD SUCC 125 MG
125 VIAL (EA) INJECTION EVERY 8 HOURS
Status: DISCONTINUED | OUTPATIENT
Start: 2017-12-02 | End: 2017-12-03

## 2017-12-02 RX ORDER — SODIUM CHLORIDE 9 MG/ML
INJECTION, SOLUTION INTRAVENOUS CONTINUOUS
Status: DISCONTINUED | OUTPATIENT
Start: 2017-12-02 | End: 2017-12-03

## 2017-12-02 RX ORDER — LEVALBUTEROL 1.25 MG/.5ML
1.25 SOLUTION, CONCENTRATE RESPIRATORY (INHALATION) EVERY 6 HOURS
Status: DISCONTINUED | OUTPATIENT
Start: 2017-12-02 | End: 2017-12-15 | Stop reason: HOSPADM

## 2017-12-02 RX ORDER — ENOXAPARIN SODIUM 100 MG/ML
40 INJECTION SUBCUTANEOUS EVERY 24 HOURS
Status: DISCONTINUED | OUTPATIENT
Start: 2017-12-02 | End: 2017-12-15 | Stop reason: HOSPADM

## 2017-12-02 RX ORDER — SODIUM CHLORIDE 9 MG/ML
INJECTION, SOLUTION INTRAVENOUS ONCE
Status: COMPLETED | OUTPATIENT
Start: 2017-12-02 | End: 2017-12-02

## 2017-12-02 RX ADMIN — SODIUM CHLORIDE: 0.9 INJECTION, SOLUTION INTRAVENOUS at 04:12

## 2017-12-02 RX ADMIN — VANCOMYCIN HYDROCHLORIDE 1000 MG: 1 INJECTION, POWDER, LYOPHILIZED, FOR SOLUTION INTRAVENOUS at 08:12

## 2017-12-02 RX ADMIN — PIPERACILLIN AND TAZOBACTAM 4.5 G: 4; .5 INJECTION, POWDER, LYOPHILIZED, FOR SOLUTION INTRAVENOUS; PARENTERAL at 01:12

## 2017-12-02 RX ADMIN — PIPERACILLIN AND TAZOBACTAM 4.5 G: 4; .5 INJECTION, POWDER, LYOPHILIZED, FOR SOLUTION INTRAVENOUS; PARENTERAL at 09:12

## 2017-12-02 RX ADMIN — METHYLPREDNISOLONE SODIUM SUCCINATE 125 MG: 125 INJECTION, POWDER, FOR SOLUTION INTRAMUSCULAR; INTRAVENOUS at 09:12

## 2017-12-02 RX ADMIN — DOCUSATE SODIUM 100 MG: 100 CAPSULE, LIQUID FILLED ORAL at 08:12

## 2017-12-02 RX ADMIN — PIPERACILLIN AND TAZOBACTAM 4.5 G: 4; .5 INJECTION, POWDER, LYOPHILIZED, FOR SOLUTION INTRAVENOUS; PARENTERAL at 05:12

## 2017-12-02 RX ADMIN — SODIUM CHLORIDE: 0.9 INJECTION, SOLUTION INTRAVENOUS at 03:12

## 2017-12-02 RX ADMIN — ALBUTEROL SULFATE 2.5 MG: 2.5 SOLUTION RESPIRATORY (INHALATION) at 07:12

## 2017-12-02 RX ADMIN — DIVALPROEX SODIUM 250 MG: 250 TABLET, EXTENDED RELEASE ORAL at 08:12

## 2017-12-02 RX ADMIN — ALBUTEROL SULFATE 2.5 MG: 2.5 SOLUTION RESPIRATORY (INHALATION) at 12:12

## 2017-12-02 RX ADMIN — POTASSIUM PHOSPHATE, MONOBASIC AND POTASSIUM PHOSPHATE, DIBASIC 30 MMOL: 224; 236 INJECTION, SOLUTION INTRAVENOUS at 11:12

## 2017-12-02 RX ADMIN — MAGNESIUM SULFATE IN WATER 2 G: 40 INJECTION, SOLUTION INTRAVENOUS at 10:12

## 2017-12-02 RX ADMIN — BENZONATATE 100 MG: 100 CAPSULE ORAL at 01:12

## 2017-12-02 RX ADMIN — CIPROFLOXACIN 400 MG: 2 INJECTION, SOLUTION INTRAVENOUS at 06:12

## 2017-12-02 RX ADMIN — METHYLPREDNISOLONE SODIUM SUCCINATE 125 MG: 125 INJECTION, POWDER, FOR SOLUTION INTRAMUSCULAR; INTRAVENOUS at 04:12

## 2017-12-02 RX ADMIN — METOROPROLOL TARTRATE 5 MG: 5 INJECTION, SOLUTION INTRAVENOUS at 01:12

## 2017-12-02 RX ADMIN — ENOXAPARIN SODIUM 40 MG: 100 INJECTION SUBCUTANEOUS at 04:12

## 2017-12-02 RX ADMIN — ACETAMINOPHEN 650 MG: 325 TABLET ORAL at 05:12

## 2017-12-02 RX ADMIN — ACETAMINOPHEN 650 MG: 650 SUPPOSITORY RECTAL at 03:12

## 2017-12-02 RX ADMIN — VANCOMYCIN HYDROCHLORIDE 1000 MG: 1 INJECTION, POWDER, LYOPHILIZED, FOR SOLUTION INTRAVENOUS at 07:12

## 2017-12-02 RX ADMIN — ONDANSETRON 8 MG: 2 INJECTION INTRAMUSCULAR; INTRAVENOUS at 01:12

## 2017-12-02 RX ADMIN — LEVALBUTEROL 1.25 MG: 1.25 SOLUTION, CONCENTRATE RESPIRATORY (INHALATION) at 08:12

## 2017-12-02 RX ADMIN — TRAZODONE HYDROCHLORIDE 150 MG: 50 TABLET ORAL at 02:12

## 2017-12-02 NOTE — ASSESSMENT & PLAN NOTE
Likely secondary to strain with sepsis and not ACS, but will trend out markers and monitor on telemetry.

## 2017-12-02 NOTE — NURSING
Tolerating breakfast meal. Pleasant. Denies pain when asked. No adverse side effects to antibiotic therapy. Bed alarm on call light within reach. Head of bed elevated. Room air sats % noted on continuous pulse ox monitor.

## 2017-12-02 NOTE — ASSESSMENT & PLAN NOTE
Continue home medication regimen; patient's baseline unclear at this time. She is overall calm and can cooperate but affect is blunted.

## 2017-12-02 NOTE — NURSING
1200- patient ate her lunch meal tolerating well. Denies nausea symptoms. Iv fluids infusing without adverse side effects. Head of bed elevated and side rails up x 2 bed alarm is on bedside commode also provided. Continue to monitor.

## 2017-12-02 NOTE — SUBJECTIVE & OBJECTIVE
Past Medical History:   Diagnosis Date    Breast cancer     Hyperlipidemia     Schizophrenia     Thyroid disease     thyroid surgery       Past Surgical History:   Procedure Laterality Date    BREAST BIOPSY      BREAST LUMPECTOMY      BREAST SURGERY      THYROIDECTOMY  2005       Review of patient's allergies indicates:  No Known Allergies    No current facility-administered medications on file prior to encounter.      Current Outpatient Prescriptions on File Prior to Encounter   Medication Sig    divalproex (DEPAKOTE) 250 MG 24 hr tablet Take 250 mg by mouth once daily. Take 2 tabs in morning and 3 in evening     albuterol 90 mcg/actuation HFAA Inhale 2 puffs into the lungs every 4 (four) hours as needed (shortness of breath).    docusate sodium (COLACE) 100 MG capsule Take 100 mg by mouth. Once daily     predniSONE (DELTASONE) 10 MG tablet 4 tabs po x 2 days, 2tabs po x 2days, 1tab  Po x 3 days and stop    trazodone (DESYREL) 150 MG tablet Take 150 mg by mouth every evening.      Family History     None        Social History Main Topics    Smoking status: Current Every Day Smoker     Packs/day: 0.50     Years: 37.00     Types: Cigarettes    Smokeless tobacco: Former User     Quit date: 12/27/2014    Alcohol use No    Drug use: No    Sexual activity: Not on file     Review of Systems   Unable to perform ROS: Psychiatric disorder     Objective:     Vital Signs (Most Recent):  Temp: 98.4 °F (36.9 °C) (12/01/17 2339)  Pulse: (!) 127 (12/01/17 2339)  Resp: (!) 22 (12/01/17 2339)  BP: (!) 168/72 (12/01/17 2339)  SpO2: (!) 91 % (12/01/17 2339) Vital Signs (24h Range):  Temp:  [98.1 °F (36.7 °C)-101.4 °F (38.6 °C)] 98.4 °F (36.9 °C)  Pulse:  [] 127  Resp:  [16-23] 22  SpO2:  [91 %-100 %] 91 %  BP: (123-169)/(64-92) 168/72     Weight: 71.4 kg (157 lb 6.5 oz)  Body mass index is 24.65 kg/m².    Physical Exam   Constitutional: She appears well-developed. No distress.   HENT:   Head: Normocephalic and  atraumatic.   Eyes: Pupils are equal, round, and reactive to light.   Neck: Normal range of motion. Neck supple.   Cardiovascular:   Tachycardic   Pulmonary/Chest:   Course bilateral breath sounds with diffuse rhonchi and mild expiratory wheeze, diminished.   Abdominal: Soft. Bowel sounds are normal.   Musculoskeletal: Normal range of motion. She exhibits no edema.   Neurological: She is alert.   Uncooperative with exam, but does follow some simple commands and does not appear to have a focal deficit.   Skin: Skin is warm and dry.   Psychiatric: Her affect is blunt. Her speech is delayed. She is slowed and withdrawn. Cognition and memory are impaired. She is noncommunicative.   Cannot assess thought content or judgment given patient will not answer at times She is inattentive.         CRANIAL NERVES     CN III, IV, VI   Pupils are equal, round, and reactive to light.       Significant Labs: All pertinent labs within the past 24 hours have been reviewed.    Significant Imaging: I have reviewed and interpreted all pertinent imaging results/findings within the past 24 hours.

## 2017-12-02 NOTE — PLAN OF CARE
Problem: Fall Risk (Adult)  Goal: Absence of Falls  Patient will demonstrate the desired outcomes by discharge/transition of care.   Outcome: Ongoing (interventions implemented as appropriate)   12/02/17 0443   Fall Risk (Adult)   Absence of Falls making progress toward outcome       Problem: Patient Care Overview  Goal: Plan of Care Review  Outcome: Ongoing (interventions implemented as appropriate)   12/02/17 0443   Coping/Psychosocial   Plan Of Care Reviewed With patient       Problem: Infection, Risk/Actual (Adult)  Goal: Infection Prevention/Resolution  Patient will demonstrate the desired outcomes by discharge/transition of care.  Outcome: Ongoing (interventions implemented as appropriate)   12/02/17 0443   Infection, Risk/Actual (Adult)   Infection Prevention/Resolution making progress toward outcome       Problem: Hyperglycemia, Persistent (Adult)  Goal: Signs and Symptoms of Listed Potential Problems Will be Absent, Minimized or Managed (Hyperglycemia, Persistent)  Signs and symptoms of listed potential problems will be absent, minimized or managed by discharge/transition of care (reference Hyperglycemia, Persistent (Adult) CPG).  Outcome: Ongoing (interventions implemented as appropriate)   12/02/17 0443   Hyperglycemia, Persistent   Problems Assessed (Hyperglycemia) hyperglycemia   Problems Present (Hyperglycemia) hyperglycemia

## 2017-12-02 NOTE — PLAN OF CARE
Reviewed H and P by my colleague and agree with A and P. Patient presenting with sepsis and pneumonia. On broad spectrum Abx. ID consulted. Blood cultures negative. Replaced Mag and Phos. All labs/vitals reviewed.

## 2017-12-02 NOTE — ASSESSMENT & PLAN NOTE
Smoking cessation counseling will need to be addressed prior to discharge. Pt's mental state does not allow for education/discussion at this time.

## 2017-12-02 NOTE — ASSESSMENT & PLAN NOTE
CTA remarkable for diffuse bilateral patchy airspace opacities treated with antibiotics as discussed above. Will consult ID given the unusual appearance of findings and for further fine tuning of antibiotics regimen.

## 2017-12-02 NOTE — ASSESSMENT & PLAN NOTE
Secondary to sepsis and expected physiologic response, responded to IVF and antibiotics; monitor on telemetry.

## 2017-12-02 NOTE — NURSING
Paged dr chairez he called right back informed of patients sustained sinus tachycardia in 140's . New order received to given lopressor 5 mg ivsp x one dose.

## 2017-12-02 NOTE — NURSING
0720-bedside rounding report received from aiden collins rn on patients progress and updated hand off report sheet given to me. Denies pain continuous pulse ox in use. Head of bed elevated. Bed alarm on call light within reach bedside commode within reach.     0725-assessment completed and plan of care reviewed with the patient. Denies pain resting in bed. Turns self in bed. Pulse ox in use . Bed alarm on call light within reach head of bed elevated. Side rails up x 2. No acute distress.

## 2017-12-02 NOTE — CARE UPDATE
Patient transferred to ICU secondary to worsening respiratory status.  Patient currently on broad spectrum ABx's.  Very similar presentation in 2015.  She presented with altered mental status probably secondary to polypharmacy.  Treated at that time for possible aspiration pneumonia.  Hold Trazodone and Depakote.  Continue ABx's.  Emphysema on CT.  Will add IV Solumedrol.  Will place on Bipap and monitor.

## 2017-12-02 NOTE — PLAN OF CARE
Problem: Fall Risk (Adult)  Goal: Identify Related Risk Factors and Signs and Symptoms  Related risk factors and signs and symptoms are identified upon initiation of Human Response Clinical Practice Guideline (CPG)   Outcome: Ongoing (interventions implemented as appropriate)  Fall prevention interventions on-going.    Problem: Pressure Ulcer Risk (Charlie Scale) (Adult,Obstetrics,Pediatric)  Goal: Identify Related Risk Factors and Signs and Symptoms  Related risk factors and signs and symptoms are identified upon initiation of Human Response Clinical Practice Guideline (CPG)   Outcome: Ongoing (interventions implemented as appropriate)  Pressure ulcer prevention interventions on-going.    Problem: Pneumonia (Adult)  Goal: Signs and Symptoms of Listed Potential Problems Will be Absent, Minimized or Managed (Pneumonia)  Signs and symptoms of listed potential problems will be absent, minimized or managed by discharge/transition of care (reference Pneumonia (Adult) CPG).  Outcome: Ongoing (interventions implemented as appropriate)  1500 Received from tele unit in respiratory distress, tachycardic, febrile.  Moaning, lethargic on 100 NRB.  Dr. Rodriguez updated, orders received.   Abg's done.  Tylenol given along with fluid bolus.  1552  Placed on BIPAP 100%, 10/5 per RT.  1615  More responsive.  Reoriented.  NS @ 150cc/hr.  Abx.

## 2017-12-02 NOTE — NURSING
1450- called dr chairez back informed him patients condition is deteriorating quickly . New orders received to transfer to ICU . Spoke to mckenna troncoso informed of new orders.     1454- called a verbal report to vidhya forman rn on patients condition and admit diagnosis . Sister remains at bedside and updated as well. Patient transported to icu level of care at the time of this note. All personnel belongings given to patients sister to take home. Heart rate sustaining in 130's after lopressor ivsp given and nurse vidhya informed of this and patient unable to maintain her oxygen saturation levels as observed on continuous pulse ox device coloring dusky.

## 2017-12-02 NOTE — ASSESSMENT & PLAN NOTE
Pt met criteria for sepsis with fever, tachycardia and infection source of multilobar pneumonia. Pt was treated empirically with Zosyn/Cipro/Vancomycin and IVF, and cultures were drawn. Will give NEBS and O2 as needed and f/u on studies.

## 2017-12-02 NOTE — ASSESSMENT & PLAN NOTE
No acute issues; s/p right breast lumpectomy followed by chemotherapy in 2005. Will need to refer to Hem/Onc as outpatient for follow up.

## 2017-12-02 NOTE — CONSULTS
.ID CONSULT     FULL CONSULT DICTATED#069045    IMPRESSION;   1) Bilateral PNA.  2) Hx of breast CA.  3) Schizophrenia.    RECOMMENDATIONS;   1) Cont with the pip-tazo/cipro/vanc.  2) Will adjust as cx's finalize.  3) Will check;  - HIV.  - Urine antigens for Legionella and Strep.  - Procalcitonin  - Flu.  - Resp panel PCR.  4) We will cont to follow.    ADDENDUM;  - I could not find the urine antigen for Pneumococcus and a rapid flu.

## 2017-12-02 NOTE — H&P
"[facility] Medicine  History & Physical    Patient Name: Jossie Crowley  MRN: [id number]  Admission Date: 12/1/2017  Attending Physician: Barbara Marshall MD   Primary Care Provider: Jordyn Owen MD         Patient information was obtained from patient, past medical records and ER records.     Subjective:     Principal Problem:Sepsis    Chief Complaint:   Chief Complaint   Patient presents with    Altered Mental Status     family reports AMS today, "shes just doing things out of the ordinary and coughing a lot"        HPI: 56 y/o female with schizophrenia, HLP, hypothyroid, and h/o breast CA who was brought in by daughter for change in mental status. Daughter reported that she suspected the patient took more Depakote than normal. Pt reported to the ER physician that she has been taking Goody's powder and it made her nauseous. She stated that she has been more SOB and had coughing for about a week. This was mainly reported to the ER physician. During my interview, patient only answered few questions, and then was mostly silent and did not want to engage in conversation. All she answered to me was that she had a cough and SOB, and did not answer any other questions. Rest of the hx was via review of medical records and no family was available at the bedside. In the ER, patient had pulse of 145 on presentation and fever of 101.4F. Workup with CTA of chest was negative for PE but was remarkable for diffuse opacities. She treated with IVF and antibiotics, and cultures were drawn.    Past Medical History:   Diagnosis Date    Breast cancer     Hyperlipidemia     Schizophrenia     Thyroid disease     thyroid surgery       Past Surgical History:   Procedure Laterality Date    BREAST BIOPSY      BREAST LUMPECTOMY      BREAST SURGERY      THYROIDECTOMY  2005       Review of patient's allergies indicates:  No Known Allergies    No current facility-administered medications on file prior to " encounter.      Current Outpatient Prescriptions on File Prior to Encounter   Medication Sig    divalproex (DEPAKOTE) 250 MG 24 hr tablet Take 250 mg by mouth once daily. Take 2 tabs in morning and 3 in evening     albuterol 90 mcg/actuation HFAA Inhale 2 puffs into the lungs every 4 (four) hours as needed (shortness of breath).    docusate sodium (COLACE) 100 MG capsule Take 100 mg by mouth. Once daily     predniSONE (DELTASONE) 10 MG tablet 4 tabs po x 2 days, 2tabs po x 2days, 1tab  Po x 3 days and stop    trazodone (DESYREL) 150 MG tablet Take 150 mg by mouth every evening.      Family History     None        Social History Main Topics    Smoking status: Current Every Day Smoker     Packs/day: 0.50     Years: 37.00     Types: Cigarettes    Smokeless tobacco: Former User     Quit date: 12/27/2014    Alcohol use No    Drug use: No    Sexual activity: Not on file     Review of Systems   Unable to perform ROS: Psychiatric disorder     Objective:     Vital Signs (Most Recent):  Temp: 98.4 °F (36.9 °C) (12/01/17 2339)  Pulse: (!) 127 (12/01/17 2339)  Resp: (!) 22 (12/01/17 2339)  BP: (!) 168/72 (12/01/17 2339)  SpO2: (!) 91 % (12/01/17 2339) Vital Signs (24h Range):  Temp:  [98.1 °F (36.7 °C)-101.4 °F (38.6 °C)] 98.4 °F (36.9 °C)  Pulse:  [] 127  Resp:  [16-23] 22  SpO2:  [91 %-100 %] 91 %  BP: (123-169)/(64-92) 168/72     Weight: 71.4 kg (157 lb 6.5 oz)  Body mass index is 24.65 kg/m².    Physical Exam   Constitutional: She appears well-developed. No distress.   HENT:   Head: Normocephalic and atraumatic.   Eyes: Pupils are equal, round, and reactive to light.   Neck: Normal range of motion. Neck supple.   Cardiovascular:   Tachycardic   Pulmonary/Chest:   Course bilateral breath sounds with diffuse rhonchi and mild expiratory wheeze, diminished.   Abdominal: Soft. Bowel sounds are normal.   Musculoskeletal: Normal range of motion. She exhibits no edema.   Neurological: She is alert.   Uncooperative  with exam, but does follow some simple commands and does not appear to have a focal deficit.   Skin: Skin is warm and dry.   Psychiatric: Her affect is blunt. Her speech is delayed. She is slowed and withdrawn. Cognition and memory are impaired. She is noncommunicative.   Cannot assess thought content or judgment given patient will not answer at times She is inattentive.         CRANIAL NERVES     CN III, IV, VI   Pupils are equal, round, and reactive to light.       Significant Labs: All pertinent labs within the past 24 hours have been reviewed.    Significant Imaging: I have reviewed and interpreted all pertinent imaging results/findings within the past 24 hours.    Assessment/Plan:     * Sepsis    Pt met criteria for sepsis with fever, tachycardia and infection source of multilobar pneumonia. Pt was treated empirically with Zosyn/Cipro/Vancomycin and IVF, and cultures were drawn. Will give NEBS and O2 as needed and f/u on studies.         Pneumonia    CTA remarkable for diffuse bilateral patchy airspace opacities treated with antibiotics as discussed above. Will consult ID given the unusual appearance of findings and for further fine tuning of antibiotics regimen.        Tachycardia    Secondary to sepsis and expected physiologic response, responded to IVF and antibiotics; monitor on telemetry.        Schizophrenia    Continue home medication regimen; patient's baseline unclear at this time. She is overall calm and can cooperate but affect is blunted.        Troponin level elevated    Likely secondary to strain with sepsis and not ACS, but will trend out markers and monitor on telemetry.          History of breast cancer in female    No acute issues; s/p right breast lumpectomy followed by chemotherapy in 2005. Will need to refer to Hem/Onc as outpatient for follow up.        Tobacco abuse    Smoking cessation counseling will need to be addressed prior to discharge. Pt's mental state does not allow for  education/discussion at this time.          VTE Risk Mitigation         Ordered     Medium Risk of VTE  Once      12/01/17 2146     Place sequential compression device  Until discontinued      12/01/17 2146     Place OMAR hose  Until discontinued      12/01/17 2146             Barbara Marshall MD  Department of Hospital Medicine   [facility]

## 2017-12-02 NOTE — PLAN OF CARE
Problem: Pressure Ulcer Risk (Charlie Scale) (Adult,Obstetrics,Pediatric)  Intervention: Turn/Reposition Often   12/02/17 1630   Skin Interventions   Pressure Reduction Techniques frequent weight shift encouraged         Comments: No skin breakdown

## 2017-12-02 NOTE — PLAN OF CARE
12/02/17 1504   Discharge Assessment   Assessment Type Discharge Planning Assessment   Confirmed/corrected address and phone number on facesheet? Yes   Assessment information obtained from? Medical Record     Discharge assessment attempted, but patient was moved to ICU due to decline.

## 2017-12-02 NOTE — NURSING
Lopressor ivsp 5 mg given over 5 minutes heart rate slowly decreasing but still elevated. Patient with hacking spastic cough unable to control it. Placed on non- rebreather mask patient unable to maintain her own oxygen saturation levels. Paged dr chairez informed of patients condition and that I placed her on nonrebreather to maintain her saturation levels.  new orders received to give tessalon perles for cough and start bolus of normal saline then given continuous fluid of normal saline at 150 ml per hour . Patients sister at bedside. Patient quiet and unable to stop coughing its quit frequent and at times she chokes and gasp.

## 2017-12-02 NOTE — HPI
56 y/o female with schizophrenia, HLP, hypothyroid, and h/o breast CA who was brought in by daughter for change in mental status. Daughter reported that she suspected the patient took more Depakote than normal. Pt reported to the ER physician that she has been taking Goody's powder and it made her nauseous. She stated that she has been more SOB and had coughing for about a week. This was mainly reported to the ER physician. During my interview, patient only answered few questions, and then was mostly silent and did not want to engage in conversation. All she answered to me was that she had a cough and SOB, and did not answer any other questions. Rest of the hx was via review of medical records and no family was available at the bedside. In the ER, patient had pulse of 145 on presentation and fever of 101.4F. Workup with CTA of chest was negative for PE but was remarkable for diffuse opacities. She treated with IVF and antibiotics, and cultures were drawn.

## 2017-12-03 PROBLEM — T50.901A ACCIDENTAL DRUG OVERDOSE: Status: ACTIVE | Noted: 2017-12-03

## 2017-12-03 LAB
ALBUMIN SERPL BCP-MCNC: 2.5 G/DL
ALP SERPL-CCNC: 57 U/L
ALT SERPL W/O P-5'-P-CCNC: 19 U/L
ANION GAP SERPL CALC-SCNC: 10 MMOL/L
AST SERPL-CCNC: 109 U/L
BACTERIA UR CULT: NORMAL
BASOPHILS # BLD AUTO: 0.01 K/UL
BASOPHILS NFR BLD: 0.1 %
BILIRUB SERPL-MCNC: 0.3 MG/DL
BUN SERPL-MCNC: 7 MG/DL
CALCIUM SERPL-MCNC: 8.6 MG/DL
CHLORIDE SERPL-SCNC: 107 MMOL/L
CO2 SERPL-SCNC: 22 MMOL/L
CREAT SERPL-MCNC: 0.7 MG/DL
DIFFERENTIAL METHOD: ABNORMAL
EOSINOPHIL # BLD AUTO: 0 K/UL
EOSINOPHIL NFR BLD: 0 %
ERYTHROCYTE [DISTWIDTH] IN BLOOD BY AUTOMATED COUNT: 15.7 %
EST. GFR  (AFRICAN AMERICAN): >60 ML/MIN/1.73 M^2
EST. GFR  (NON AFRICAN AMERICAN): >60 ML/MIN/1.73 M^2
GLUCOSE SERPL-MCNC: 175 MG/DL
HCT VFR BLD AUTO: 38.3 %
HGB BLD-MCNC: 13.2 G/DL
LYMPHOCYTES # BLD AUTO: 0.6 K/UL
LYMPHOCYTES NFR BLD: 7.3 %
MAGNESIUM SERPL-MCNC: 2 MG/DL
MCH RBC QN AUTO: 28.9 PG
MCHC RBC AUTO-ENTMCNC: 34.5 G/DL
MCV RBC AUTO: 84 FL
MONOCYTES # BLD AUTO: 0.3 K/UL
MONOCYTES NFR BLD: 3 %
NEUTROPHILS # BLD AUTO: 7.6 K/UL
NEUTROPHILS NFR BLD: 89.6 %
PHOSPHATE SERPL-MCNC: 3.2 MG/DL
PLATELET # BLD AUTO: 171 K/UL
PMV BLD AUTO: 10.7 FL
POTASSIUM SERPL-SCNC: 4.4 MMOL/L
PROCALCITONIN SERPL IA-MCNC: 18.64 NG/ML
PROT SERPL-MCNC: 6.3 G/DL
RBC # BLD AUTO: 4.57 M/UL
SODIUM SERPL-SCNC: 139 MMOL/L
VANCOMYCIN TROUGH SERPL-MCNC: 4.5 UG/ML
WBC # BLD AUTO: 8.46 K/UL

## 2017-12-03 PROCEDURE — 84100 ASSAY OF PHOSPHORUS: CPT

## 2017-12-03 PROCEDURE — 20000000 HC ICU ROOM

## 2017-12-03 PROCEDURE — 36415 COLL VENOUS BLD VENIPUNCTURE: CPT

## 2017-12-03 PROCEDURE — 83735 ASSAY OF MAGNESIUM: CPT

## 2017-12-03 PROCEDURE — 85025 COMPLETE CBC W/AUTO DIFF WBC: CPT

## 2017-12-03 PROCEDURE — 63600175 PHARM REV CODE 636 W HCPCS: Performed by: HOSPITALIST

## 2017-12-03 PROCEDURE — 63600175 PHARM REV CODE 636 W HCPCS: Performed by: INTERNAL MEDICINE

## 2017-12-03 PROCEDURE — 25000003 PHARM REV CODE 250: Performed by: INTERNAL MEDICINE

## 2017-12-03 PROCEDURE — 94640 AIRWAY INHALATION TREATMENT: CPT

## 2017-12-03 PROCEDURE — 84145 PROCALCITONIN (PCT): CPT

## 2017-12-03 PROCEDURE — 80202 ASSAY OF VANCOMYCIN: CPT

## 2017-12-03 PROCEDURE — 25000242 PHARM REV CODE 250 ALT 637 W/ HCPCS: Performed by: INTERNAL MEDICINE

## 2017-12-03 PROCEDURE — 27000221 HC OXYGEN, UP TO 24 HOURS

## 2017-12-03 PROCEDURE — 25000003 PHARM REV CODE 250: Performed by: EMERGENCY MEDICINE

## 2017-12-03 PROCEDURE — 99900035 HC TECH TIME PER 15 MIN (STAT)

## 2017-12-03 PROCEDURE — 80053 COMPREHEN METABOLIC PANEL: CPT

## 2017-12-03 PROCEDURE — 63600175 PHARM REV CODE 636 W HCPCS: Performed by: EMERGENCY MEDICINE

## 2017-12-03 PROCEDURE — 94660 CPAP INITIATION&MGMT: CPT

## 2017-12-03 RX ORDER — METHYLPREDNISOLONE SOD SUCC 125 MG
80 VIAL (EA) INJECTION EVERY 8 HOURS
Status: DISCONTINUED | OUTPATIENT
Start: 2017-12-03 | End: 2017-12-05

## 2017-12-03 RX ORDER — FUROSEMIDE 10 MG/ML
40 INJECTION INTRAMUSCULAR; INTRAVENOUS ONCE
Status: COMPLETED | OUTPATIENT
Start: 2017-12-03 | End: 2017-12-03

## 2017-12-03 RX ADMIN — METHYLPREDNISOLONE SODIUM SUCCINATE 80 MG: 125 INJECTION, POWDER, FOR SOLUTION INTRAMUSCULAR; INTRAVENOUS at 02:12

## 2017-12-03 RX ADMIN — PIPERACILLIN AND TAZOBACTAM 4.5 G: 4; .5 INJECTION, POWDER, LYOPHILIZED, FOR SOLUTION INTRAVENOUS; PARENTERAL at 02:12

## 2017-12-03 RX ADMIN — LEVALBUTEROL 1.25 MG: 1.25 SOLUTION, CONCENTRATE RESPIRATORY (INHALATION) at 12:12

## 2017-12-03 RX ADMIN — LEVALBUTEROL 1.25 MG: 1.25 SOLUTION, CONCENTRATE RESPIRATORY (INHALATION) at 07:12

## 2017-12-03 RX ADMIN — FUROSEMIDE 40 MG: 10 INJECTION, SOLUTION INTRAMUSCULAR; INTRAVENOUS at 10:12

## 2017-12-03 RX ADMIN — LEVALBUTEROL 1.25 MG: 1.25 SOLUTION, CONCENTRATE RESPIRATORY (INHALATION) at 01:12

## 2017-12-03 RX ADMIN — CIPROFLOXACIN 400 MG: 2 INJECTION, SOLUTION INTRAVENOUS at 06:12

## 2017-12-03 RX ADMIN — PIPERACILLIN AND TAZOBACTAM 4.5 G: 4; .5 INJECTION, POWDER, LYOPHILIZED, FOR SOLUTION INTRAVENOUS; PARENTERAL at 05:12

## 2017-12-03 RX ADMIN — CIPROFLOXACIN 400 MG: 2 INJECTION, SOLUTION INTRAVENOUS at 05:12

## 2017-12-03 RX ADMIN — VANCOMYCIN HYDROCHLORIDE 1500 MG: 1 INJECTION, POWDER, LYOPHILIZED, FOR SOLUTION INTRAVENOUS at 07:12

## 2017-12-03 RX ADMIN — METHYLPREDNISOLONE SODIUM SUCCINATE 80 MG: 125 INJECTION, POWDER, FOR SOLUTION INTRAMUSCULAR; INTRAVENOUS at 09:12

## 2017-12-03 RX ADMIN — VANCOMYCIN HYDROCHLORIDE 1000 MG: 1 INJECTION, POWDER, LYOPHILIZED, FOR SOLUTION INTRAVENOUS at 08:12

## 2017-12-03 RX ADMIN — ACETAMINOPHEN 650 MG: 325 TABLET ORAL at 10:12

## 2017-12-03 RX ADMIN — ENOXAPARIN SODIUM 40 MG: 100 INJECTION SUBCUTANEOUS at 06:12

## 2017-12-03 RX ADMIN — PIPERACILLIN AND TAZOBACTAM 4.5 G: 4; .5 INJECTION, POWDER, LYOPHILIZED, FOR SOLUTION INTRAVENOUS; PARENTERAL at 09:12

## 2017-12-03 RX ADMIN — METHYLPREDNISOLONE SODIUM SUCCINATE 125 MG: 125 INJECTION, POWDER, FOR SOLUTION INTRAMUSCULAR; INTRAVENOUS at 05:12

## 2017-12-03 RX ADMIN — SODIUM CHLORIDE: 0.9 INJECTION, SOLUTION INTRAVENOUS at 05:12

## 2017-12-03 NOTE — PLAN OF CARE
Problem: Fall Risk (Adult)  Goal: Identify Related Risk Factors and Signs and Symptoms  Related risk factors and signs and symptoms are identified upon initiation of Human Response Clinical Practice Guideline (CPG)   Outcome: Ongoing (interventions implemented as appropriate)  Fall prevention interventions on-going.    Problem: Infection, Risk/Actual (Adult)  Goal: Identify Related Risk Factors and Signs and Symptoms  Related risk factors and signs and symptoms are identified upon initiation of Human Response Clinical Practice Guideline (CPG)   Outcome: Ongoing (interventions implemented as appropriate)  Low grade temps.  On Antibiotics.    Problem: Pressure Ulcer Risk (Charlie Scale) (Adult,Obstetrics,Pediatric)  Goal: Identify Related Risk Factors and Signs and Symptoms  Related risk factors and signs and symptoms are identified upon initiation of Human Response Clinical Practice Guideline (CPG)   Outcome: Ongoing (interventions implemented as appropriate)  Pressure ulcer prevention interventions on-going.    Problem: Pneumonia (Adult)  Goal: Signs and Symptoms of Listed Potential Problems Will be Absent, Minimized or Managed (Pneumonia)  Signs and symptoms of listed potential problems will be absent, minimized or managed by discharge/transition of care (reference Pneumonia (Adult) CPG).   Outcome: Ongoing (interventions implemented as appropriate)  Intermittent periods of SOB especially with exertion. BIPAP settings adjusted accordingly 80%, 15/8.  Respiratory treatments & Abx.

## 2017-12-03 NOTE — CONSULTS
REQUESTING PHYSICIAN:  Ryne Rodriguez M.D.    REASON FOR CONSULT:  Bilateral pneumonia.    HISTORY OF PRESENT ILLNESS:  This is a 57-year-old female has been seen by the   ID team previously here in the hospital, admitted yesterday to the ER by her   family who state that she has been having some acute mental status changes and   doing things out of the ordinary as well as coughing a lot.  The patient has   baseline history of previous breast cancer and according to the ER notes, the   patient had been taking possibly more Depakote than necessary or that she was   supposed to.  She has been taking lot of Goody's powder as well.  Evaluation in   the ER revealed a fever of 101.4 as well as a CT scan that showed no evidence of   a central pulmonary embolism, but there was diffuse bilateral patchy airspace   opacities.  The patient was started on broad-spectrum antibiotics.    Consequently, we have been asked to see the patient for further antibiotic   recommendations.    REVIEW OF SYSTEMS:  Unable to be done given the patient's clinical condition.    PAST MEDICAL HISTORY:  Breast cancer, hyperlipidemia, schizophrenia, thyroid   disease.    PAST SURGICAL HISTORY:  Breast biopsy, breast lumpectomy, breast surgery, and   thyroidectomy.    ALLERGIES:  No known drug allergies.    FAMILY HISTORY:  Unknown.    SOCIAL HISTORY:  She is an everyday smoker.  No alcohol or drugs noted.    MEDICATIONS:  At this time, the patient is currently on Cipro, levalbuterol,   methylprednisolone, piperacillin and tazobactam, and vancomycin.    PHYSICAL EXAMINATION:  GENERAL:  This is a chronically ill-appearing female.  VITAL SIGNS:  Temperature of 38.4, pulse 124, respiratory rate 36, blood   pressure 159/70.  HEENT:  Oral mucosa moist.  No thrush or leukoplakia.  NECK:  Supple.  There is no lymphadenopathy.  LUNGS:  Rhonchorous and wheezy bilaterally.  HEART:  Regular.  Positive S1, S2, tachycardic.  ABDOMEN:  Soft,  nontender.  EXTREMITIES:  No edema.  CARDIOVASCULAR:  2+ radial pulse bilaterally.  Cap refill less than 2 seconds.    No carotid bruits.  NEUROLOGIC:  She is acutely ill at this time and it is difficult to determine.  SKIN:  There is no lesion.    LABORATORY DATA:  Most recent set of labs show a white blood cell count 6.64,   hemoglobin 11.9 and platelets of 182, BUN 9, creatinine 0.7, bicarbonate 22.  UA   essentially negative.    ABG just recently done shows a pH of 7.4/35.8/pCO2 of 46.    MICROBIOLOGICAL DATA:  At this time, her blood cultures and UA are currently   negative.    IMAGING:  Her imaging was reviewed in the narrative.    IMPRESSION:  This is a 57-year-old female with bilateral pneumonia in the   setting with a history of breast cancer, schizophrenia.    RECOMMENDATIONS:  At this time, we will continue with  piperacillin and   tazobactam, Cipro and vancomycin and will adjust as cultures finalize.    Meanwhile, we will check an HIV, urine antigens for Legionella and strep,   procalcitonin, flu and respiratory panel for PCR.    In the meanwhile, we will continue to follow with you.    Thank you kindly for allowing me to participate in care of this patient.      PUSHPA  dd: 12/02/2017 15:59:12 (CST)  td: 12/03/2017 02:20:35 (CST)  Doc ID   #[id number]  Job ID #[id number]    CC: Christen Mata M.D.

## 2017-12-03 NOTE — PROGRESS NOTES
.  Progress Note  Infectious Disease    Admit Date: 12/1/2017   LOS: 2 days     SUBJECTIVE:     Follow-up For:   Bilateral PNA.    Antibiotics     Start     Stop Route Frequency Ordered    12/03/17 1945  vancomycin (VANCOCIN) 1,500 mg in dextrose 5 % 250 mL IVPB      -- IV Every 12 hours (non-standard times) 12/03/17 1033    12/02/17 0600  piperacillin-tazobactam 4.5 g in sodium chloride 0.9% 100 mL IVPB (ready to mix system)      -- IV Every 8 hours (non-standard times) 12/01/17 2146    12/01/17 1832  ciprofloxacin (CIPRO)400mg/200ml D5W IVPB 400 mg      -- IV Every 12 hours (non-standard times) 12/01/17 1832              OBJECTIVE:     Vital Signs (Most Recent)  Temp: 99 °F (37.2 °C) (12/03/17 1130)  Pulse: (!) 121 (12/03/17 1403)  Resp: (!) 40 (12/03/17 1403)  BP: (!) 144/69 (12/03/17 1402)  SpO2: (!) 94 % (12/03/17 1403)    Temperature Range Min/Max (Last 24H):  Temp:  [98.3 °F (36.8 °C)-102.9 °F (39.4 °C)]     I & O (Last 24H):  Intake/Output Summary (Last 24 hours) at 12/03/17 1515  Last data filed at 12/03/17 1400   Gross per 24 hour   Intake             5250 ml   Output             3605 ml   Net             1645 ml       Lines/Drains:       Peripheral IV - Single Lumen 12/02/17 1520 Right Hand (Active)   Site Assessment Clean;Dry;Intact;No redness;No swelling 12/3/2017 11:00 AM   Line Status Infusing 12/3/2017 11:00 AM   Dressing Status Clean;Dry;Intact 12/3/2017 11:00 AM   Dressing Change Due 12/06/17 12/3/2017  3:01 AM   Site Change Due 12/06/17 12/3/2017  7:00 AM   Reason Not Rotated Not due 12/3/2017 11:00 AM            Peripheral IV - Single Lumen 12/02/17 1600 Left Forearm (Active)   Site Assessment Clean;Dry;Intact;No redness;No swelling 12/3/2017 11:00 AM   Line Status Infusing 12/3/2017 11:00 AM   Dressing Status Clean;Dry;Intact 12/3/2017 11:00 AM   Dressing Change Due 12/06/17 12/3/2017  3:01 AM   Site Change Due 12/06/17 12/3/2017  7:00 AM   Reason Not Rotated Not due 12/3/2017 11:00 AM        Laboratory:  Recent Results (from the past 24 hour(s))   ISTAT PROCEDURE    Collection Time: 12/02/17  3:36 PM   Result Value Ref Range    POC PH 7.425 7.35 - 7.45    POC PCO2 35.8 35 - 45 mmHg    POC PO2 46 (LL) 80 - 100 mmHg    POC HCO3 23.4 (L) 24 - 28 mmol/L    POC BE -1 -2 to 2 mmol/L    POC SATURATED O2 83 (L) 95 - 100 %    POC TCO2 25 23 - 27 mmol/L    Sample ARTERIAL     Site LR     Allens Test Pass     DelSys NRB    Procalcitonin    Collection Time: 12/03/17  2:24 AM   Result Value Ref Range    Procalcitonin 18.64 (H) <0.25 ng/mL   Comprehensive Metabolic Panel (CMP)    Collection Time: 12/03/17  2:24 AM   Result Value Ref Range    Sodium 139 136 - 145 mmol/L    Potassium 4.4 3.5 - 5.1 mmol/L    Chloride 107 95 - 110 mmol/L    CO2 22 (L) 23 - 29 mmol/L    Glucose 175 (H) 70 - 110 mg/dL    BUN, Bld 7 6 - 20 mg/dL    Creatinine 0.7 0.5 - 1.4 mg/dL    Calcium 8.6 (L) 8.7 - 10.5 mg/dL    Total Protein 6.3 6.0 - 8.4 g/dL    Albumin 2.5 (L) 3.5 - 5.2 g/dL    Total Bilirubin 0.3 0.1 - 1.0 mg/dL    Alkaline Phosphatase 57 55 - 135 U/L     (H) 10 - 40 U/L    ALT 19 10 - 44 U/L    Anion Gap 10 8 - 16 mmol/L    eGFR if African American >60 >60 mL/min/1.73 m^2    eGFR if non African American >60 >60 mL/min/1.73 m^2   CBC auto differential    Collection Time: 12/03/17  2:24 AM   Result Value Ref Range    WBC 8.46 3.90 - 12.70 K/uL    RBC 4.57 4.00 - 5.40 M/uL    Hemoglobin 13.2 12.0 - 16.0 g/dL    Hematocrit 38.3 37.0 - 48.5 %    MCV 84 82 - 98 fL    MCH 28.9 27.0 - 31.0 pg    MCHC 34.5 32.0 - 36.0 g/dL    RDW 15.7 (H) 11.5 - 14.5 %    Platelets 171 150 - 350 K/uL    MPV 10.7 9.2 - 12.9 fL    Gran # 7.6 1.8 - 7.7 K/uL    Lymph # 0.6 (L) 1.0 - 4.8 K/uL    Mono # 0.3 0.3 - 1.0 K/uL    Eos # 0.0 0.0 - 0.5 K/uL    Baso # 0.01 0.00 - 0.20 K/uL    Gran% 89.6 (H) 38.0 - 73.0 %    Lymph% 7.3 (L) 18.0 - 48.0 %    Mono% 3.0 (L) 4.0 - 15.0 %    Eosinophil% 0.0 0.0 - 8.0 %    Basophil% 0.1 0.0 - 1.9 %    Differential  Method Automated    Magnesium    Collection Time: 12/03/17  2:24 AM   Result Value Ref Range    Magnesium 2.0 1.6 - 2.6 mg/dL   Phosphorus    Collection Time: 12/03/17  2:24 AM   Result Value Ref Range    Phosphorus 3.2 2.7 - 4.5 mg/dL   VANCOMYCIN, TROUGH before 4th dose    Collection Time: 12/03/17  7:31 AM   Result Value Ref Range    Vancomycin-Trough 4.5 (L) 10.0 - 22.0 ug/mL       Micro:  Microbiology Results (last 7 days)     Procedure Component Value Units Date/Time    Blood culture x two cultures. Draw prior to antibiotics. [[id number]] Collected:  12/01/17 1412    Order Status:  Completed Specimen:  Blood from Peripheral, Antecubital, Right Updated:  12/03/17 1503     Blood Culture, Routine No Growth to date     Blood Culture, Routine No Growth to date     Blood Culture, Routine No Growth to date    Narrative:       Draw one set from central line if present, draw second  culture from another site.    Blood culture x two cultures. Draw prior to antibiotics. [[id number]] Collected:  12/01/17 1418    Order Status:  Completed Specimen:  Blood from Peripheral, Hand, Left Updated:  12/03/17 1503     Blood Culture, Routine No Growth to date     Blood Culture, Routine No Growth to date     Blood Culture, Routine No Growth to date    Narrative:       Draw one set from central line if present, draw second  culture from another site.    Urine culture [[id number]] Collected:  12/01/17 1336    Order Status:  Completed Specimen:  Urine from Urine, Clean Catch Updated:  12/03/17 0822     Urine Culture, Routine No significant growth    Respiratory Viral Panel by PCR [facility] ( ); Nasal Swab [[id number]] Collected:  12/02/17 1845    Order Status:  Sent Specimen:  Respiratory Updated:  12/02/17 1851              ASSESSMENT/PLAN:     Active Hospital Problems    Diagnosis  POA    *Sepsis [A41.9]  Yes    Acute febrile illness [R50.9]  Yes    Tachycardia [R00.0]  Yes    Tobacco abuse [Z72.0]  Yes     Chronic    History of breast  cancer in female [Z85.3]  Not Applicable     Chronic     S/P right breast lumpectomy and chemo,2005      Schizophrenia [F20.9]  Yes     Chronic    Troponin level elevated [R74.8]  Yes    Pneumonia [J18.9]  Yes      Resolved Hospital Problems    Diagnosis Date Resolved POA   No resolved problems to display.           Physical Exam:  Gen: ill appearing  Pul: rhonci  Cardio:  +S1+S2  Abd: Soft, NT  Ext: No edema  Skin: No active lesions        IMPRESSION;   1) Bilateral PNA.  2) Hx of breast CA.  3) Schizophrenia.     RECOMMENDATIONS;   1) Cont with the pip-tazo/cipro/vanc.  2) Will adjust as cx's finalize.  3) HIV; negative.  4) Urine antigens for Legionella and Strep; pending.   5) Procalcitonin; strongly positive.   6) Flu; negative.   7) Resp panel PCR; pending.  8) Cx's and imaging reviewed.  9) We will cont to follow.

## 2017-12-03 NOTE — ASSESSMENT & PLAN NOTE
CTA remarkable for diffuse bilateral patchy airspace opacities treated with antibiotics as discussed above. I am concerned she aspirated. ID consulted given the unusual appearance of findings and for further fine tuning of antibiotics regimen. Is to continue current broad regimen.

## 2017-12-03 NOTE — CARE UPDATE
Pt on continuous bipap 10/5 breathing elevatined to upper 20's low 30's.  bipap site on the face is without bruise

## 2017-12-03 NOTE — SUBJECTIVE & OBJECTIVE
Interval History: failed high flow NC. Remains on BiPAP. Tachycardic when with dyspnea.     Review of Systems   Constitutional: Negative.    Respiratory: Positive for shortness of breath.    Cardiovascular: Negative.    Gastrointestinal: Negative.    Genitourinary: Negative.    Musculoskeletal: Negative.    Skin: Negative.    Neurological: Negative.    Psychiatric/Behavioral: Negative.      Objective:     Vital Signs (Most Recent):  Temp: 99 °F (37.2 °C) (12/03/17 1130)  Pulse: (!) 120 (12/03/17 1525)  Resp: (!) 40 (12/03/17 1525)  BP: (!) 147/73 (12/03/17 1502)  SpO2: 96 % (12/03/17 1525) Vital Signs (24h Range):  Temp:  [98.3 °F (36.8 °C)-102.9 °F (39.4 °C)] 99 °F (37.2 °C)  Pulse:  [] 120  Resp:  [28-59] 40  SpO2:  [91 %-100 %] 96 %  BP: (127-174)/(62-83) 147/73     Weight: 70.3 kg (154 lb 15.7 oz)  Body mass index is 24.27 kg/m².    Intake/Output Summary (Last 24 hours) at 12/03/17 1538  Last data filed at 12/03/17 1500   Gross per 24 hour   Intake             4310 ml   Output             3805 ml   Net              505 ml      Physical Exam   Constitutional: She is oriented to person, place, and time. She appears well-developed. No distress.   Eyes: Conjunctivae and EOM are normal.   Neck: Normal range of motion. No thyromegaly present.   Cardiovascular: Normal heart sounds and intact distal pulses.    Sinus tach   Pulmonary/Chest: Effort normal. She has rales (faint).   On BiPAP   Abdominal: Soft. Bowel sounds are normal.   Musculoskeletal: Normal range of motion. She exhibits no edema.   Neurological: She is alert and oriented to person, place, and time.   Skin: Skin is warm and dry. She is not diaphoretic.   Psychiatric: She has a normal mood and affect. Her behavior is normal. Judgment and thought content normal.   Nursing note and vitals reviewed.      Significant Labs: All pertinent labs within the past 24 hours have been reviewed.    Significant Imaging: I have reviewed all pertinent imaging  results/findings within the past 24 hours.  I have reviewed and interpreted all pertinent imaging results/findings within the past 24 hours.

## 2017-12-03 NOTE — PLAN OF CARE
Problem: Patient Care Overview  Goal: Plan of Care Review  Outcome: Ongoing (interventions implemented as appropriate)  Pt VSS, NAD. Pt tolerating BIPAP well, RR in high 20s, 02 sats high 90s. Attempting to wean. NS infusing at 150 ml/h4. Pt AAOx4. Afebrile. WBC normal this AM. Antibiotics given. Pt with 2 BM. No injuries noted. No new skin breakdown. Pt turns self.

## 2017-12-03 NOTE — ASSESSMENT & PLAN NOTE
Pt met criteria for sepsis with fever, tachycardia and infection source of multilobar pneumonia. Pt was treated empirically with Zosyn/Cipro/Vancomycin and IVF, and cultures were drawn. Will give NEBS and O2 via BiPAP. Increase IPAP and EPAP and maintain FiO2 60-70% for goal > 92%. Stop fluids. Trial of lasix. Goal is to maintain negative fluids balance

## 2017-12-03 NOTE — PLAN OF CARE
SW met with pt at bedside. SW explained role in . SW inquired about HELP AT HOME. Pt stated that pt will have help at home with Sister Ld. SW reviewed HELP AT HOME and DISCHARGE PLANNING brochure of questions to think about while in the hospital. Pt voiced understanding. SW inquired about what pt feels she are RESPONSIBLE for to MANAGE HEALTH AT HOME. Pt stated going to MD appointments and medications. SW voiced that taking medications at the appropriate time is important with managing care. SW informed pt that a DISCHARGE EDUCATION SHEET will be provided during stay. Pt voiced understanding.       [facility] [address], [place] - [address]  [address] [place] [address]  Phone: [phone] Fax: [phone]       12/03/17 1539   Discharge Assessment   Assessment Type Discharge Planning Assessment   Confirmed/corrected address and phone number on facesheet? Yes   Assessment information obtained from? Patient   Prior to hospitilization cognitive status: Alert/Oriented   Prior to hospitalization functional status: Independent   Current cognitive status: Alert/Oriented   Current Functional Status: Independent   Lives With sibling(s)  (Sister Ld)   Able to Return to Prior Arrangements yes   Is patient able to care for self after discharge? Unable to determine at this time (comments)   Patient's perception of discharge disposition home or selfcare   Readmission Within The Last 30 Days no previous admission in last 30 days   Equipment Currently Used at Home none   Do you have any problems affording any of your prescribed medications? No   Is the patient taking medications as prescribed? yes   Does the patient have transportation home? Yes   Transportation Available family or friend will provide   Discharge Plan A Other  (TBD)   Discharge Plan B Home with family   Does the patient have transportation to healthcare appointments? Yes

## 2017-12-03 NOTE — PROGRESS NOTES
[facility] Hospital Medicine  Progress Note    Patient Name: Jossie Crowley  MRN: [id number]  Patient Class: IP- Inpatient   Admission Date: 12/1/2017  Length of Stay: 2 days  Attending Physician: Estephanie Murguia MD  Primary Care Provider: Jordyn Owen MD        Subjective:     Principal Problem:ARDS (adult respiratory distress syndrome)    HPI:  56 y/o female with schizophrenia, HLP, hypothyroid, and h/o breast CA who was brought in by daughter for change in mental status. Daughter reported that she suspected the patient took more Depakote than normal. Pt reported to the ER physician that she has been taking Goody's powder and it made her nauseous. She stated that she has been more SOB and had coughing for about a week. This was mainly reported to the ER physician. During my interview, patient only answered few questions, and then was mostly silent and did not want to engage in conversation. All she answered to me was that she had a cough and SOB, and did not answer any other questions. Rest of the hx was via review of medical records and no family was available at the bedside. In the ER, patient had pulse of 145 on presentation and fever of 101.4F. Workup with CTA of chest was negative for PE but was remarkable for diffuse opacities. She treated with IVF and antibiotics, and cultures were drawn.    Hospital Course:  No notes on file    Interval History: failed high flow NC. Remains on BiPAP. Tachycardic when with dyspnea.     Review of Systems   Constitutional: Negative.    Respiratory: Positive for shortness of breath.    Cardiovascular: Negative.    Gastrointestinal: Negative.    Genitourinary: Negative.    Musculoskeletal: Negative.    Skin: Negative.    Neurological: Negative.    Psychiatric/Behavioral: Negative.      Objective:     Vital Signs (Most Recent):  Temp: 99 °F (37.2 °C) (12/03/17 1130)  Pulse: (!) 120 (12/03/17 1525)  Resp: (!) 40 (12/03/17 1525)  BP: (!) 147/73 (12/03/17  1502)  SpO2: 96 % (12/03/17 1525) Vital Signs (24h Range):  Temp:  [98.3 °F (36.8 °C)-102.9 °F (39.4 °C)] 99 °F (37.2 °C)  Pulse:  [] 120  Resp:  [28-59] 40  SpO2:  [91 %-100 %] 96 %  BP: (127-174)/(62-83) 147/73     Weight: 70.3 kg (154 lb 15.7 oz)  Body mass index is 24.27 kg/m².    Intake/Output Summary (Last 24 hours) at 12/03/17 1538  Last data filed at 12/03/17 1500   Gross per 24 hour   Intake             4310 ml   Output             3805 ml   Net              505 ml      Physical Exam   Constitutional: She is oriented to person, place, and time. She appears well-developed. No distress.   Eyes: Conjunctivae and EOM are normal.   Neck: Normal range of motion. No thyromegaly present.   Cardiovascular: Normal heart sounds and intact distal pulses.    Sinus tach   Pulmonary/Chest: Effort normal. She has rales (faint).   On BiPAP   Abdominal: Soft. Bowel sounds are normal.   Musculoskeletal: Normal range of motion. She exhibits no edema.   Neurological: She is alert and oriented to person, place, and time.   Skin: Skin is warm and dry. She is not diaphoretic.   Psychiatric: She has a normal mood and affect. Her behavior is normal. Judgment and thought content normal.   Nursing note and vitals reviewed.      Significant Labs: All pertinent labs within the past 24 hours have been reviewed.    Significant Imaging: I have reviewed all pertinent imaging results/findings within the past 24 hours.  I have reviewed and interpreted all pertinent imaging results/findings within the past 24 hours.    Assessment/Plan:      * ARDS (adult respiratory distress syndrome)    Pt met criteria for sepsis with fever, tachycardia and infection source of multilobar pneumonia. Pt was treated empirically with Zosyn/Cipro/Vancomycin and IVF, and cultures were drawn. Will give NEBS and O2 via BiPAP. Increase IPAP and EPAP and maintain FiO2 60-70% for goal > 92%. Stop fluids. Trial of lasix. Goal is to maintain negative fluids  balance        Aspiration pneumonia    CTA remarkable for diffuse bilateral patchy airspace opacities treated with antibiotics as discussed above. I am concerned she aspirated. ID consulted given the unusual appearance of findings and for further fine tuning of antibiotics regimen. Is to continue current broad regimen.           Accidental drug overdose    Suspected over use of depakote.           Acute febrile illness              Tachycardia    Secondary to sepsis, hypoxia and expected physiologic response        Schizophrenia    Continue home medication regimen; patient's baseline unclear at this time. She is overall calm and can cooperate but affect is blunted.        Troponin level elevated    Likely secondary to strain with sepsis and not ACS, but will trend out markers and monitor on telemetry.          History of breast cancer in female    No acute issues; s/p right breast lumpectomy followed by chemotherapy in 2005. Will need to refer to Hem/Onc as outpatient for follow up.        Tobacco abuse    Smoking cessation counseling will need to be addressed prior to discharge. Pt's mental state does not allow for education/discussion at this time.          VTE Risk Mitigation         Ordered     enoxaparin injection 40 mg  Daily     Route:  Subcutaneous        12/02/17 0213     Medium Risk of VTE  Once      12/01/17 2146     Place sequential compression device  Until discontinued      12/01/17 2146     Place OMAR hose  Until discontinued      12/01/17 2146          Critical care time spent on the evaluation and treatment of severe organ dysfunction, review of pertinent labs and imaging studies, discussions with consulting providers and discussions with patient/family: > 45 minutes.    Estephanie Bartlett MD  Department of Hospital Medicine   [facility]

## 2017-12-04 LAB
ALBUMIN SERPL BCP-MCNC: 2.4 G/DL
ALP SERPL-CCNC: 63 U/L
ALT SERPL W/O P-5'-P-CCNC: 30 U/L
ANION GAP SERPL CALC-SCNC: 11 MMOL/L
AST SERPL-CCNC: 79 U/L
BASOPHILS # BLD AUTO: 0.03 K/UL
BASOPHILS NFR BLD: 0.3 %
BILIRUB SERPL-MCNC: 0.3 MG/DL
BUN SERPL-MCNC: 12 MG/DL
CALCIUM SERPL-MCNC: 8.4 MG/DL
CHLORIDE SERPL-SCNC: 105 MMOL/L
CO2 SERPL-SCNC: 26 MMOL/L
CREAT SERPL-MCNC: 0.8 MG/DL
DIFFERENTIAL METHOD: ABNORMAL
EOSINOPHIL # BLD AUTO: 0 K/UL
EOSINOPHIL NFR BLD: 0.3 %
ERYTHROCYTE [DISTWIDTH] IN BLOOD BY AUTOMATED COUNT: 15.8 %
EST. GFR  (AFRICAN AMERICAN): >60 ML/MIN/1.73 M^2
EST. GFR  (NON AFRICAN AMERICAN): >60 ML/MIN/1.73 M^2
GLUCOSE SERPL-MCNC: 146 MG/DL
HCT VFR BLD AUTO: 32.6 %
HGB BLD-MCNC: 11.3 G/DL
LYMPHOCYTES # BLD AUTO: 0.5 K/UL
LYMPHOCYTES NFR BLD: 6 %
MAGNESIUM SERPL-MCNC: 2.1 MG/DL
MCH RBC QN AUTO: 28.5 PG
MCHC RBC AUTO-ENTMCNC: 34.7 G/DL
MCV RBC AUTO: 82 FL
MONOCYTES # BLD AUTO: 0.4 K/UL
MONOCYTES NFR BLD: 4 %
NEUTROPHILS # BLD AUTO: 8 K/UL
NEUTROPHILS NFR BLD: 89.4 %
PHOSPHATE SERPL-MCNC: 3.1 MG/DL
PLATELET # BLD AUTO: 171 K/UL
PMV BLD AUTO: 11 FL
POTASSIUM SERPL-SCNC: 4 MMOL/L
PROT SERPL-MCNC: 5.4 G/DL
RBC # BLD AUTO: 3.96 M/UL
SODIUM SERPL-SCNC: 142 MMOL/L
WBC # BLD AUTO: 8.93 K/UL

## 2017-12-04 PROCEDURE — 63600175 PHARM REV CODE 636 W HCPCS: Performed by: EMERGENCY MEDICINE

## 2017-12-04 PROCEDURE — 83735 ASSAY OF MAGNESIUM: CPT

## 2017-12-04 PROCEDURE — 25000003 PHARM REV CODE 250: Performed by: INTERNAL MEDICINE

## 2017-12-04 PROCEDURE — 80053 COMPREHEN METABOLIC PANEL: CPT

## 2017-12-04 PROCEDURE — 25000003 PHARM REV CODE 250: Performed by: EMERGENCY MEDICINE

## 2017-12-04 PROCEDURE — 63600175 PHARM REV CODE 636 W HCPCS: Performed by: INTERNAL MEDICINE

## 2017-12-04 PROCEDURE — 84100 ASSAY OF PHOSPHORUS: CPT

## 2017-12-04 PROCEDURE — 20000000 HC ICU ROOM

## 2017-12-04 PROCEDURE — 99900035 HC TECH TIME PER 15 MIN (STAT)

## 2017-12-04 PROCEDURE — 94761 N-INVAS EAR/PLS OXIMETRY MLT: CPT

## 2017-12-04 PROCEDURE — 63600175 PHARM REV CODE 636 W HCPCS: Performed by: HOSPITALIST

## 2017-12-04 PROCEDURE — 94660 CPAP INITIATION&MGMT: CPT

## 2017-12-04 PROCEDURE — 36415 COLL VENOUS BLD VENIPUNCTURE: CPT

## 2017-12-04 PROCEDURE — 94640 AIRWAY INHALATION TREATMENT: CPT

## 2017-12-04 PROCEDURE — 25000242 PHARM REV CODE 250 ALT 637 W/ HCPCS: Performed by: INTERNAL MEDICINE

## 2017-12-04 PROCEDURE — 87040 BLOOD CULTURE FOR BACTERIA: CPT | Mod: 59

## 2017-12-04 PROCEDURE — 85025 COMPLETE CBC W/AUTO DIFF WBC: CPT

## 2017-12-04 RX ORDER — ACETAMINOPHEN 325 MG/1
650 TABLET ORAL EVERY 6 HOURS PRN
Status: DISCONTINUED | OUTPATIENT
Start: 2017-12-04 | End: 2017-12-13

## 2017-12-04 RX ORDER — FUROSEMIDE 10 MG/ML
40 INJECTION INTRAMUSCULAR; INTRAVENOUS ONCE
Status: COMPLETED | OUTPATIENT
Start: 2017-12-04 | End: 2017-12-04

## 2017-12-04 RX ADMIN — VANCOMYCIN HYDROCHLORIDE 1500 MG: 1 INJECTION, POWDER, LYOPHILIZED, FOR SOLUTION INTRAVENOUS at 07:12

## 2017-12-04 RX ADMIN — FUROSEMIDE 40 MG: 10 INJECTION, SOLUTION INTRAMUSCULAR; INTRAVENOUS at 08:12

## 2017-12-04 RX ADMIN — LEVALBUTEROL 1.25 MG: 1.25 SOLUTION, CONCENTRATE RESPIRATORY (INHALATION) at 12:12

## 2017-12-04 RX ADMIN — METHYLPREDNISOLONE SODIUM SUCCINATE 80 MG: 125 INJECTION, POWDER, FOR SOLUTION INTRAMUSCULAR; INTRAVENOUS at 01:12

## 2017-12-04 RX ADMIN — PIPERACILLIN AND TAZOBACTAM 4.5 G: 4; .5 INJECTION, POWDER, LYOPHILIZED, FOR SOLUTION INTRAVENOUS; PARENTERAL at 05:12

## 2017-12-04 RX ADMIN — LEVALBUTEROL 1.25 MG: 1.25 SOLUTION, CONCENTRATE RESPIRATORY (INHALATION) at 07:12

## 2017-12-04 RX ADMIN — METHYLPREDNISOLONE SODIUM SUCCINATE 80 MG: 125 INJECTION, POWDER, FOR SOLUTION INTRAMUSCULAR; INTRAVENOUS at 09:12

## 2017-12-04 RX ADMIN — ACETAMINOPHEN 650 MG: 325 TABLET ORAL at 10:12

## 2017-12-04 RX ADMIN — METHYLPREDNISOLONE SODIUM SUCCINATE 80 MG: 125 INJECTION, POWDER, FOR SOLUTION INTRAMUSCULAR; INTRAVENOUS at 06:12

## 2017-12-04 RX ADMIN — ACETAMINOPHEN 650 MG: 325 TABLET ORAL at 12:12

## 2017-12-04 RX ADMIN — PIPERACILLIN AND TAZOBACTAM 4.5 G: 4; .5 INJECTION, POWDER, LYOPHILIZED, FOR SOLUTION INTRAVENOUS; PARENTERAL at 01:12

## 2017-12-04 RX ADMIN — PIPERACILLIN AND TAZOBACTAM 4.5 G: 4; .5 INJECTION, POWDER, LYOPHILIZED, FOR SOLUTION INTRAVENOUS; PARENTERAL at 09:12

## 2017-12-04 RX ADMIN — CIPROFLOXACIN 400 MG: 2 INJECTION, SOLUTION INTRAVENOUS at 05:12

## 2017-12-04 RX ADMIN — LEVALBUTEROL 1.25 MG: 1.25 SOLUTION, CONCENTRATE RESPIRATORY (INHALATION) at 02:12

## 2017-12-04 RX ADMIN — ACETAMINOPHEN 650 MG: 325 TABLET ORAL at 07:12

## 2017-12-04 RX ADMIN — ENOXAPARIN SODIUM 40 MG: 100 INJECTION SUBCUTANEOUS at 05:12

## 2017-12-04 NOTE — PROGRESS NOTES
[facility] Hospital Medicine  Progress Note    Patient Name: Jossie Crowley  MRN: [id number]  Patient Class: IP- Inpatient   Admission Date: 12/1/2017  Length of Stay: 3 days  Attending Physician: Estephanie Mugruia MD  Primary Care Provider: Jordyn Owen MD        Subjective:     Principal Problem:ARDS (adult respiratory distress syndrome)    HPI:  58 y/o female with schizophrenia, HLP, hypothyroid, and h/o breast CA who was brought in by daughter for change in mental status. Daughter reported that she suspected the patient took more Depakote than normal. Pt reported to the ER physician that she has been taking Goody's powder and it made her nauseous. She stated that she has been more SOB and had coughing for about a week. This was mainly reported to the ER physician. During my interview, patient only answered few questions, and then was mostly silent and did not want to engage in conversation. All she answered to me was that she had a cough and SOB, and did not answer any other questions. Rest of the hx was via review of medical records and no family was available at the bedside. In the ER, patient had pulse of 145 on presentation and fever of 101.4F. Workup with CTA of chest was negative for PE but was remarkable for diffuse opacities. She treated with IVF and antibiotics, and cultures were drawn.    Hospital Course:  Ms Crowley presented with unintentional overdose with depakote. This is a recurrent issue. Workup significant for aspiration pneumonia that progressed to ARDS. Provided with broad spectrum antibiotics, IVFs and supplemental O2. Upgraded to ICU for increased O2 requirements and placed on BiPAP. Abx and BiPAP continued. Added duo-nebs, diuresis and solumedrol IV.      Interval History: requiring less FiO2 but still significant. Hypoxic on high flow. Good UOP with IV lasix    Review of Systems   Constitutional: Negative.    Respiratory: Positive for shortness of breath.    Cardiovascular:  Negative.    Gastrointestinal: Negative.    Genitourinary: Negative.    Musculoskeletal: Negative.    Skin: Negative.    Neurological: Negative.    Psychiatric/Behavioral: Negative.      Objective:     Vital Signs (Most Recent):  Temp: 98.7 °F (37.1 °C) (12/04/17 1615)  Pulse: (!) 119 (12/04/17 1700)  Resp: (!) 33 (12/04/17 1700)  BP: (!) 169/72 (12/04/17 1700)  SpO2: 98 % (12/04/17 1700) Vital Signs (24h Range):  Temp:  [98.2 °F (36.8 °C)-102.8 °F (39.3 °C)] 98.7 °F (37.1 °C)  Pulse:  [] 119  Resp:  [24-57] 33  SpO2:  [78 %-99 %] 98 %  BP: (124-170)/(60-79) 169/72     Weight: 67.8 kg (149 lb 7.6 oz)  Body mass index is 23.41 kg/m².    Intake/Output Summary (Last 24 hours) at 12/04/17 1736  Last data filed at 12/04/17 1700   Gross per 24 hour   Intake             1730 ml   Output             2201 ml   Net             -471 ml      Physical Exam   Constitutional: She is oriented to person, place, and time. She appears well-developed. No distress.   Eyes: Conjunctivae and EOM are normal.   Neck: Normal range of motion. No thyromegaly present.   Cardiovascular: Normal heart sounds and intact distal pulses.    Sinus tach   Pulmonary/Chest: Effort normal. She has rales (faint).   On BiPAP   Abdominal: Soft. Bowel sounds are normal.   Musculoskeletal: Normal range of motion. She exhibits no edema.   Neurological: She is alert and oriented to person, place, and time.   Skin: Skin is warm and dry. She is not diaphoretic.   Psychiatric: She has a normal mood and affect. Her behavior is normal. Judgment and thought content normal.   Nursing note and vitals reviewed.      Significant Labs: All pertinent labs within the past 24 hours have been reviewed.    Significant Imaging: I have reviewed all pertinent imaging results/findings within the past 24 hours.  I have reviewed and interpreted all pertinent imaging results/findings within the past 24 hours.    Assessment/Plan:      * ARDS (adult respiratory distress syndrome)     Pt met criteria for sepsis with fever, tachycardia and infection source of multilobar pneumonia. Pt was treated empirically with Zosyn/Cipro/Vancomycin and IVF, and cultures were drawn. Will give NEBS and O2 via BiPAP. Increased IPAP and EPAP and slowly weaning FiO2 for goal > 92%. Stopped fluids and diuresing. Goal is to maintain negative fluids balance        Aspiration pneumonia    CTA remarkable for diffuse bilateral patchy airspace opacities treated with antibiotics as discussed above. I am concerned she aspirated. ID consulted given the unusual appearance of findings and for further fine tuning of antibiotics regimen. Is to continue current broad regimen.           Accidental drug overdose    Suspected over use of depakote.           Acute febrile illness              Tachycardia    Secondary to sepsis, hypoxia and expected physiologic response        Schizophrenia    Continue home medication regimen; patient's baseline unclear at this time. She is overall calm and can cooperate but affect is blunted.        Troponin level elevated    Likely secondary to strain with sepsis and not ACS, but will trend out markers and monitor on telemetry.          History of breast cancer in female    No acute issues; s/p right breast lumpectomy followed by chemotherapy in 2005. Will need to refer to Hem/Onc as outpatient for follow up.        Tobacco abuse    Smoking cessation counseling will need to be addressed prior to discharge. Pt's mental state does not allow for education/discussion at this time.          VTE Risk Mitigation         Ordered     enoxaparin injection 40 mg  Daily     Route:  Subcutaneous        12/02/17 0213     Medium Risk of VTE  Once      12/01/17 2146     Place sequential compression device  Until discontinued      12/01/17 2146     Place OMAR hose  Until discontinued      12/01/17 2146          Critical care time spent on the evaluation and treatment of severe organ dysfunction, review of pertinent  labs and imaging studies, discussions with consulting providers and discussions with patient/family: > 45 minutes.    Estephanie Bartlett MD  Department of Hospital Medicine   [facility]

## 2017-12-04 NOTE — HOSPITAL COURSE
Ms Crowley presented with possible unintentional overdose with depakote. This is a recurrent issue. Workup significant for presumed aspiration pneumonia that progressed to ARDS. Provided with broad spectrum antibiotics, IVFs and supplemental O2. Upgraded to ICU for increased O2 requirements and placed on BiPAP. She was not improving and duo-nebs and solumedrol IV were added. Difficult to wean O2. CXR with possible edema. Tried diuresis with no improvement. Echo w/ EF 55%, +DD. BNP not elevated. Speech evaluated and has high concern for aspiration and recommended MBSS. She continued to require to much supplemental O2 to have MBSS and CXR remained unchanged. Pulmonology consulted.  Noted to be flu A positive. ID started Tamiflu 12/6.  Able to wean to high flow NC. Passed speech bedside swallow 12/8. Depakote held as potentially associated with ARDS. Appreciate psych recs. Haldol and Cogentin added back. Continued to wean oxygen. Restarted depakote at a lower dose.  Patient slowly improved during hospital stay.  She completed course of ABx's for aspiration pneumonia and course of Tamiflu for Influenza.  She was able to be weaned from Bipap to high flow NC to low dose NC.  She is currently doing well on RA.  Afebrile and hemodynamically stable.  Patient will be discharged home.

## 2017-12-04 NOTE — PLAN OF CARE
Problem: Patient Care Overview  Goal: Plan of Care Review  Outcome: Ongoing (interventions implemented as appropriate)  Pt VSS, NAD. Pt tolerating BIPAP well, RR in high 20s, 02 sats high 90s. Attempting to wean. Pt AAOx4. Tylenol given for fever now afebrile. WBC normal this AM. Antibiotics given. No injuries noted. No new skin breakdown. Pt turns self.

## 2017-12-04 NOTE — ASSESSMENT & PLAN NOTE
Pt met criteria for sepsis with fever, tachycardia and infection source of multilobar pneumonia. Pt was treated empirically with Zosyn/Cipro/Vancomycin and IVF, and cultures were drawn. Will give NEBS and O2 via BiPAP. Increased IPAP and EPAP and slowly weaning FiO2 for goal > 92%. Stopped fluids and diuresing. Goal is to maintain negative fluids balance

## 2017-12-04 NOTE — SUBJECTIVE & OBJECTIVE
Interval History: requiring less FiO2 but still significant. Hypoxic on high flow. Good UOP with IV lasix    Review of Systems   Constitutional: Negative.    Respiratory: Positive for shortness of breath.    Cardiovascular: Negative.    Gastrointestinal: Negative.    Genitourinary: Negative.    Musculoskeletal: Negative.    Skin: Negative.    Neurological: Negative.    Psychiatric/Behavioral: Negative.      Objective:     Vital Signs (Most Recent):  Temp: 98.7 °F (37.1 °C) (12/04/17 1615)  Pulse: (!) 119 (12/04/17 1700)  Resp: (!) 33 (12/04/17 1700)  BP: (!) 169/72 (12/04/17 1700)  SpO2: 98 % (12/04/17 1700) Vital Signs (24h Range):  Temp:  [98.2 °F (36.8 °C)-102.8 °F (39.3 °C)] 98.7 °F (37.1 °C)  Pulse:  [] 119  Resp:  [24-57] 33  SpO2:  [78 %-99 %] 98 %  BP: (124-170)/(60-79) 169/72     Weight: 67.8 kg (149 lb 7.6 oz)  Body mass index is 23.41 kg/m².    Intake/Output Summary (Last 24 hours) at 12/04/17 1736  Last data filed at 12/04/17 1700   Gross per 24 hour   Intake             1730 ml   Output             2201 ml   Net             -471 ml      Physical Exam   Constitutional: She is oriented to person, place, and time. She appears well-developed. No distress.   Eyes: Conjunctivae and EOM are normal.   Neck: Normal range of motion. No thyromegaly present.   Cardiovascular: Normal heart sounds and intact distal pulses.    Sinus tach   Pulmonary/Chest: Effort normal. She has rales (faint).   On BiPAP   Abdominal: Soft. Bowel sounds are normal.   Musculoskeletal: Normal range of motion. She exhibits no edema.   Neurological: She is alert and oriented to person, place, and time.   Skin: Skin is warm and dry. She is not diaphoretic.   Psychiatric: She has a normal mood and affect. Her behavior is normal. Judgment and thought content normal.   Nursing note and vitals reviewed.      Significant Labs: All pertinent labs within the past 24 hours have been reviewed.    Significant Imaging: I have reviewed all  pertinent imaging results/findings within the past 24 hours.  I have reviewed and interpreted all pertinent imaging results/findings within the past 24 hours.

## 2017-12-04 NOTE — PLAN OF CARE
Problem: Patient Care Overview  Goal: Plan of Care Review  Outcome: Ongoing (interventions implemented as appropriate)  Plan of care reviewed. No falls/injuries this shift. Skin intact. Diet adjusted to npo currently. Remains on bipap, unable to wean further. Temp max today 102.8 ax. Blood cultures repeated and U/s lower ext for dvt completed.

## 2017-12-04 NOTE — PROGRESS NOTES
Pt currently on documented settings, without distress resting and stable.  RT will continue to monitor.

## 2017-12-04 NOTE — PROGRESS NOTES
1040 pt's temp 100.6 Ax. Informed MD Bartlett, of temp last pm 100.3, this am 99.8, and current temp 100.6. No new orders monitor.  1145 temp 101.5 ax notified md Bartlett. New orders received. Ok to give tylenol dose now, md aware of tylenol at 0716.  1244 notified MD Mata of temp's last night and this am, with tmax 101.6 currently. Medications zosyn,vanc,and cipro. WBC this am 8.93. Md will come to see pt.  1330 Pt with temp 102.8 ax. MD Bartlett notified. New orders received.  1435 Md Bartlett updated on u/s of lower ext. No new orders. Temp improving 101.3 ax.

## 2017-12-05 LAB
ALLENS TEST: ABNORMAL
DELSYS: ABNORMAL
EP: 8
ERYTHROCYTE [SEDIMENTATION RATE] IN BLOOD BY WESTERGREN METHOD: 18 MM/H
FIO2: 70
HCO3 UR-SCNC: 33.5 MMOL/L (ref 24–28)
IP: 15
MAGNESIUM SERPL-MCNC: 2 MG/DL
MIN VOL: 13
MODE: ABNORMAL
PCO2 BLDA: 52.5 MMHG (ref 35–45)
PH SMN: 7.41 [PH] (ref 7.35–7.45)
PHOSPHATE SERPL-MCNC: 4 MG/DL
PO2 BLDA: 103 MMHG (ref 80–100)
POC BE: 8 MMOL/L
POC SATURATED O2: 98 % (ref 95–100)
POC TCO2: 35 MMOL/L (ref 23–27)
SAMPLE: ABNORMAL
SITE: ABNORMAL
SP02: 100
SPONT RATE: 19
VANCOMYCIN TROUGH SERPL-MCNC: 15.3 UG/ML

## 2017-12-05 PROCEDURE — 94640 AIRWAY INHALATION TREATMENT: CPT

## 2017-12-05 PROCEDURE — 63600175 PHARM REV CODE 636 W HCPCS: Performed by: INTERNAL MEDICINE

## 2017-12-05 PROCEDURE — 20000000 HC ICU ROOM

## 2017-12-05 PROCEDURE — 36415 COLL VENOUS BLD VENIPUNCTURE: CPT

## 2017-12-05 PROCEDURE — 25000242 PHARM REV CODE 250 ALT 637 W/ HCPCS: Performed by: INTERNAL MEDICINE

## 2017-12-05 PROCEDURE — 25000003 PHARM REV CODE 250: Performed by: INTERNAL MEDICINE

## 2017-12-05 PROCEDURE — 83735 ASSAY OF MAGNESIUM: CPT

## 2017-12-05 PROCEDURE — 63600175 PHARM REV CODE 636 W HCPCS: Performed by: HOSPITALIST

## 2017-12-05 PROCEDURE — 94660 CPAP INITIATION&MGMT: CPT

## 2017-12-05 PROCEDURE — G8997 SWALLOW GOAL STATUS: HCPCS | Mod: CJ

## 2017-12-05 PROCEDURE — 92610 EVALUATE SWALLOWING FUNCTION: CPT

## 2017-12-05 PROCEDURE — 27000221 HC OXYGEN, UP TO 24 HOURS

## 2017-12-05 PROCEDURE — 94761 N-INVAS EAR/PLS OXIMETRY MLT: CPT

## 2017-12-05 PROCEDURE — 80202 ASSAY OF VANCOMYCIN: CPT

## 2017-12-05 PROCEDURE — 63600175 PHARM REV CODE 636 W HCPCS: Performed by: EMERGENCY MEDICINE

## 2017-12-05 PROCEDURE — 84100 ASSAY OF PHOSPHORUS: CPT

## 2017-12-05 PROCEDURE — 99900035 HC TECH TIME PER 15 MIN (STAT)

## 2017-12-05 PROCEDURE — G8996 SWALLOW CURRENT STATUS: HCPCS | Mod: CM

## 2017-12-05 RX ORDER — METHYLPREDNISOLONE SOD SUCC 125 MG
40 VIAL (EA) INJECTION EVERY 8 HOURS
Status: DISCONTINUED | OUTPATIENT
Start: 2017-12-05 | End: 2017-12-08

## 2017-12-05 RX ORDER — LOPERAMIDE HYDROCHLORIDE 2 MG/1
2 CAPSULE ORAL 3 TIMES DAILY PRN
Status: DISCONTINUED | OUTPATIENT
Start: 2017-12-05 | End: 2017-12-15 | Stop reason: HOSPADM

## 2017-12-05 RX ORDER — FUROSEMIDE 10 MG/ML
20 INJECTION INTRAMUSCULAR; INTRAVENOUS ONCE
Status: COMPLETED | OUTPATIENT
Start: 2017-12-05 | End: 2017-12-05

## 2017-12-05 RX ADMIN — LEVALBUTEROL 1.25 MG: 1.25 SOLUTION, CONCENTRATE RESPIRATORY (INHALATION) at 07:12

## 2017-12-05 RX ADMIN — ENOXAPARIN SODIUM 40 MG: 100 INJECTION SUBCUTANEOUS at 05:12

## 2017-12-05 RX ADMIN — CIPROFLOXACIN 400 MG: 2 INJECTION, SOLUTION INTRAVENOUS at 05:12

## 2017-12-05 RX ADMIN — LEVALBUTEROL 1.25 MG: 1.25 SOLUTION, CONCENTRATE RESPIRATORY (INHALATION) at 01:12

## 2017-12-05 RX ADMIN — PIPERACILLIN AND TAZOBACTAM 4.5 G: 4; .5 INJECTION, POWDER, LYOPHILIZED, FOR SOLUTION INTRAVENOUS; PARENTERAL at 02:12

## 2017-12-05 RX ADMIN — ACETAMINOPHEN 650 MG: 325 TABLET ORAL at 10:12

## 2017-12-05 RX ADMIN — VANCOMYCIN HYDROCHLORIDE 1500 MG: 1 INJECTION, POWDER, LYOPHILIZED, FOR SOLUTION INTRAVENOUS at 07:12

## 2017-12-05 RX ADMIN — METHYLPREDNISOLONE SODIUM SUCCINATE 40 MG: 125 INJECTION, POWDER, FOR SOLUTION INTRAMUSCULAR; INTRAVENOUS at 10:12

## 2017-12-05 RX ADMIN — PIPERACILLIN AND TAZOBACTAM 4.5 G: 4; .5 INJECTION, POWDER, LYOPHILIZED, FOR SOLUTION INTRAVENOUS; PARENTERAL at 10:12

## 2017-12-05 RX ADMIN — LEVALBUTEROL 1.25 MG: 1.25 SOLUTION, CONCENTRATE RESPIRATORY (INHALATION) at 08:12

## 2017-12-05 RX ADMIN — VANCOMYCIN HYDROCHLORIDE 1500 MG: 1 INJECTION, POWDER, LYOPHILIZED, FOR SOLUTION INTRAVENOUS at 10:12

## 2017-12-05 RX ADMIN — METHYLPREDNISOLONE SODIUM SUCCINATE 80 MG: 125 INJECTION, POWDER, FOR SOLUTION INTRAMUSCULAR; INTRAVENOUS at 05:12

## 2017-12-05 RX ADMIN — PIPERACILLIN AND TAZOBACTAM 4.5 G: 4; .5 INJECTION, POWDER, LYOPHILIZED, FOR SOLUTION INTRAVENOUS; PARENTERAL at 05:12

## 2017-12-05 RX ADMIN — FUROSEMIDE 20 MG: 10 INJECTION, SOLUTION INTRAMUSCULAR; INTRAVENOUS at 05:12

## 2017-12-05 RX ADMIN — METHYLPREDNISOLONE SODIUM SUCCINATE 80 MG: 125 INJECTION, POWDER, FOR SOLUTION INTRAMUSCULAR; INTRAVENOUS at 02:12

## 2017-12-05 NOTE — ASSESSMENT & PLAN NOTE
CTA remarkable for diffuse bilateral patchy airspace opacities treated with antibiotics as discussed above. I am concerned she aspirated. ID consulted given the unusual appearance of findings and for further fine tuning of antibiotics regimen. Continue current broad regimen. BCx and RCx NGTD.

## 2017-12-05 NOTE — ASSESSMENT & PLAN NOTE
Suspected over use of depakote. Unintentional. This was the reason for her similar presentation one year ago.

## 2017-12-05 NOTE — ASSESSMENT & PLAN NOTE
Likely secondary to strain with sepsis and not ACS, but will trend out markers and monitor on telemetry. Echo.

## 2017-12-05 NOTE — PROGRESS NOTES
[facility] Hospital Medicine  Progress Note    Patient Name: Jossie Crowley  MRN: [id number]  Patient Class: IP- Inpatient   Admission Date: 12/1/2017  Length of Stay: 4 days  Attending Physician: Andra Cifuentes MD  Primary Care Provider: Jordyn Owen MD        Subjective:     Principal Problem:ARDS (adult respiratory distress syndrome)    HPI:  58 y/o female with schizophrenia, HLP, hypothyroid, and h/o breast CA who was brought in by daughter for change in mental status. Daughter reported that she suspected the patient took more Depakote than normal. Pt reported to the ER physician that she has been taking Goody's powder and it made her nauseous. She stated that she has been more SOB and had coughing for about a week. This was mainly reported to the ER physician. During my interview, patient only answered few questions, and then was mostly silent and did not want to engage in conversation. All she answered to me was that she had a cough and SOB, and did not answer any other questions. Rest of the hx was via review of medical records and no family was available at the bedside. In the ER, patient had pulse of 145 on presentation and fever of 101.4F. Workup with CTA of chest was negative for PE but was remarkable for diffuse opacities. She treated with IVF and antibiotics, and cultures were drawn.    Hospital Course:  Ms Crowley presented with unintentional overdose with depakote. This is a recurrent issue. Workup significant for aspiration pneumonia that progressed to ARDS. Provided with broad spectrum antibiotics, IVFs and supplemental O2. Upgraded to ICU for increased O2 requirements and placed on BiPAP. Abx and BiPAP continued. Added duo-nebs, diuresis and solumedrol IV. Difficult to wean O2. CXR with edema. Check echo and repeat CXR.    Interval History: Unable to wean off BiPAP. Hypoxia with HF.     Review of Systems   Constitutional: Negative.    Respiratory: Positive for shortness of  breath. Negative for wheezing.    Cardiovascular: Negative.    Gastrointestinal: Negative.    Genitourinary: Negative.    Musculoskeletal: Negative.    Skin: Negative.    Neurological: Negative.    Psychiatric/Behavioral: Negative.      Objective:     Vital Signs (Most Recent):  Temp: 98.5 °F (36.9 °C) (12/05/17 1514)  Pulse: 103 (12/05/17 1506)  Resp: (!) 29 (12/05/17 1506)  BP: (!) 148/72 (12/05/17 1500)  SpO2: (!) 89 % (12/05/17 1506) Vital Signs (24h Range):  Temp:  [97.5 °F (36.4 °C)-100.3 °F (37.9 °C)] 98.5 °F (36.9 °C)  Pulse:  [] 103  Resp:  [0-49] 29  SpO2:  [89 %-100 %] 89 %  BP: (130-169)/() 148/72     Weight: 64.2 kg (141 lb 8.6 oz)  Body mass index is 22.17 kg/m².    Intake/Output Summary (Last 24 hours) at 12/05/17 1635  Last data filed at 12/05/17 1427   Gross per 24 hour   Intake             1200 ml   Output             1175 ml   Net               25 ml      Physical Exam   Constitutional: She is oriented to person, place, and time. She appears well-developed. No distress.   Eyes: Conjunctivae and EOM are normal.   Neck: Normal range of motion. No thyromegaly present.   Cardiovascular: Normal heart sounds and intact distal pulses.    Sinus tach   Pulmonary/Chest: Effort normal. She has rales (faint).   On BiPAP   Abdominal: Soft. Bowel sounds are normal.   Musculoskeletal: Normal range of motion. She exhibits no edema.   Neurological: She is alert and oriented to person, place, and time.   Skin: Skin is warm and dry. She is not diaphoretic.   Psychiatric: She has a normal mood and affect. Her behavior is normal. Judgment and thought content normal.   Nursing note and vitals reviewed.      Significant Labs: All pertinent labs within the past 24 hours have been reviewed.    Significant Imaging: I have reviewed and interpreted all pertinent imaging results/findings within the past 24 hours.    Assessment/Plan:      * ARDS (adult respiratory distress syndrome)    Pt met criteria for sepsis  with fever, tachycardia and infection source of multilobar pneumonia. Pt was treated empirically with Zosyn/Cipro/Vancomycin and IVF, and cultures were drawn. Will give NEBS and O2 via BiPAP. Increased IPAP and EPAP and slowly weaning FiO2 for goal > 92%. Stopped fluids 12/3 and diuresing. Goal is to maintain negative fluids balance.        Aspiration pneumonia    CTA remarkable for diffuse bilateral patchy airspace opacities treated with antibiotics as discussed above. I am concerned she aspirated. ID consulted given the unusual appearance of findings and for further fine tuning of antibiotics regimen. Continue current broad regimen. BCx and RCx NGTD.        Acute febrile illness    As above        Accidental drug overdose    Suspected over use of depakote. Unintentional. This was the reason for her similar presentation one year ago.         Schizophrenia    Continue home medication regimen.        Tachycardia    Secondary to sepsis, hypoxia and expected physiologic response        Troponin level elevated    Likely secondary to strain with sepsis and not ACS, but will trend out markers and monitor on telemetry. Echo.        History of breast cancer in female    No acute issues; s/p right breast lumpectomy followed by chemotherapy in 2005. Will need to refer to Hem/Onc as outpatient for follow up.        Tobacco abuse    Smoking cessation counseling will be addressed prior to discharge.           VTE Risk Mitigation         Ordered     enoxaparin injection 40 mg  Daily     Route:  Subcutaneous        12/02/17 0213     Medium Risk of VTE  Once      12/01/17 2146     Place sequential compression device  Until discontinued      12/01/17 2146     Place OMAR hose  Until discontinued      12/01/17 2146          Critical care time spent on the evaluation and treatment of severe organ dysfunction, review of pertinent labs and imaging studies, discussions with consulting providers and discussions with patient/family: 45  minutes.    Andra Cifuentes MD  Department of Hospital Medicine   [facility]

## 2017-12-05 NOTE — PLAN OF CARE
Problem: SLP Goal  Goal: SLP Goal  Short Term Goals:  1. Pt will participate in BSS to determine least restrictive diet.  2. Pt will participate in MBSS to objectively r/o aspiration and to determine least restrictive po diet.  Outcome: Ongoing (interventions implemented as appropriate)  12/5: Pt participated in BSS this PM. Pt appeared impulsive during session c/b grabbing at cup and requesting further trials of liquids despite SLP educating pt that she is not safe at this time. Pt demonstrated immediate coughing and choking s/p swallow thin liquids via cup x1 and wet gurgly voice s/p swallow puree textures Pt appeared to tolerate thin liquids via cup sips x3. However, SLP cannot objectively r/o silent aspiration. Per MD Mata's note, pt positive for aspiration pneumonia. SLP recs: con't NPO until pt participates in MBSS. Pt would benefit from MBSS to objectively r/o aspiration and to determine least restrictive po diet. SLP reported recs/results to ALEX Frye.  YAZAN Simon., CF-SLP  Speech-Language Pathologist

## 2017-12-05 NOTE — PROGRESS NOTES
Progress Note  Infectious Disease    Admit Date: 12/1/2017   LOS: 3 days     SUBJECTIVE:     Follow-up For:  Aspiration pneumonia    Antibiotics     Start     Stop Route Frequency Ordered    12/03/17 1945  vancomycin (VANCOCIN) 1,500 mg in dextrose 5 % 250 mL IVPB      -- IV Every 12 hours (non-standard times) 12/03/17 1033    12/02/17 0600  piperacillin-tazobactam 4.5 g in sodium chloride 0.9% 100 mL IVPB (ready to mix system)      -- IV Every 8 hours (non-standard times) 12/01/17 2146    12/01/17 1832  ciprofloxacin (CIPRO)400mg/200ml D5W IVPB 400 mg      -- IV Every 12 hours (non-standard times) 12/01/17 1832          Review of Systems:  Review of systems not obtained due to patient factors on bipap.    OBJECTIVE:     Vital Signs (Most Recent)  Temp: 98.7 °F (37.1 °C) (12/04/17 1615)  Pulse: (!) 111 (12/04/17 1815)  Resp: (!) 32 (12/04/17 1800)  BP: (!) 161/77 (12/04/17 1800)  SpO2: 96 % (12/04/17 1800)   heent bipap  Tachycardic  tachypneic  Coarse bibasilar creps  abdo soft  No peripheral edema    Temperature Range Min/Max (Last 24H):  Temp:  [98.2 °F (36.8 °C)-102.8 °F (39.3 °C)]     I & O (Last 24H):  Intake/Output Summary (Last 24 hours) at 12/04/17 1838  Last data filed at 12/04/17 1800   Gross per 24 hour   Intake             1550 ml   Output             2141 ml   Net             -591 ml       Physical Exam:  General: well developed, well nourished  Eyes: conjunctivae/corneas clear. PERRL..  HENT: Head:normocephalic, atraumatic. Ears:not examined. Nose: Nares normal. Septum midline. Mucosa normal. No drainage or sinus tenderness., no discharge. Throat: lips, mucosa, and tongue normal; teeth and gums normal and no throat erythema.  Neck: supple, symmetrical, trachea midline, no JVD and thyroid not enlarged, symmetric, no tenderness/mass/nodules  Lungs:  labored breathing, diminished breath sounds bilaterally and rales bilaterally  Cardiovascular: Heart: regular rate and rhythm, S1, S2 normal, no murmur,  click, rub or gallop. Chest Wall: no tenderness. Extremities: no cyanosis or edema, or clubbing. Pulses: 2+ and symmetric.  Abdomen/Rectal: Abdomen: soft, non-tender non-distented; bowel sounds normal; no masses,  no organomegaly. Rectal: not examined  Skin: Skin color, texture, turgor normal. No rashes or lesions  Musculoskeletal:no clubbing, cyanosis    Lines/Drains:       Peripheral IV - Single Lumen 12/02/17 1600 Left Forearm (Active)   Site Assessment Clean;Dry;Intact;No redness;No swelling 12/4/2017  3:30 PM   Line Status Blood return noted;Flushed;Infusing 12/4/2017  3:30 PM   Dressing Status Clean;Intact;Dry 12/4/2017  3:30 PM   Dressing Change Due 12/06/17 12/4/2017  3:30 PM   Site Change Due 12/06/17 12/4/2017  7:52 AM   Reason Not Rotated Not due 12/4/2017  3:30 PM            Peripheral IV - Single Lumen 12/03/17 2018 Left Forearm (Active)   Site Assessment Clean;Dry;Intact;No redness;No swelling 12/4/2017  3:30 PM   Line Status Blood return noted;Flushed;Saline locked 12/4/2017  3:30 PM   Dressing Status Clean;Dry;Intact 12/4/2017  3:30 PM   Dressing Change Due 12/06/17 12/4/2017  3:30 PM   Site Change Due 12/06/17 12/4/2017  7:52 AM   Reason Not Rotated Not due 12/4/2017  3:30 PM       Laboratory:  CBC    Recent Labs  Lab 12/04/17  0204   WBC 8.93   RBC 3.96*   HGB 11.3*   HCT 32.6*      MCV 82   MCH 28.5   MCHC 34.7     BMP    Recent Labs  Lab 12/04/17  0204      K 4.0      CO2 26   BUN 12   CREATININE 0.8   CALCIUM 8.4*     Microbiology Results (last 7 days)     Procedure Component Value Units Date/Time    Blood culture [[id number]] Collected:  12/04/17 1610    Order Status:  Sent Specimen:  Blood Updated:  12/04/17 0516    Narrative:       Collection has been rescheduled by CMO at 12/4/2017 14:21 Reason:   Patient unavailable  Collection has been rescheduled by CMO at 12/4/2017 14:21 Reason:   Patient unavailable    Blood culture [[id number]] Collected:  12/04/17 1610    Order  Status:  Sent Specimen:  Blood Updated:  12/04/17 1724    Narrative:       Collection has been rescheduled by CMO at 12/4/2017 14:21 Reason:   Patient unavailable  Collection has been rescheduled by CMO at 12/4/2017 14:21 Reason:   Patient unavailable    Blood culture x two cultures. Draw prior to antibiotics. [[id number]] Collected:  12/01/17 1412    Order Status:  Completed Specimen:  Blood from Peripheral, Antecubital, Right Updated:  12/04/17 1503     Blood Culture, Routine No Growth to date     Blood Culture, Routine No Growth to date     Blood Culture, Routine No Growth to date     Blood Culture, Routine No Growth to date    Narrative:       Draw one set from central line if present, draw second  culture from another site.    Blood culture x two cultures. Draw prior to antibiotics. [[id number]] Collected:  12/01/17 1418    Order Status:  Completed Specimen:  Blood from Peripheral, Hand, Left Updated:  12/04/17 1503     Blood Culture, Routine No Growth to date     Blood Culture, Routine No Growth to date     Blood Culture, Routine No Growth to date     Blood Culture, Routine No Growth to date    Narrative:       Draw one set from central line if present, draw second  culture from another site.    Urine culture [[id number]] Collected:  12/01/17 1336    Order Status:  Completed Specimen:  Urine from Urine, Clean Catch Updated:  12/03/17 0822     Urine Culture, Routine No significant growth    Respiratory Viral Panel by PCR [facility] ( ); Nasal Swab [[id number]] Collected:  12/02/17 1845    Order Status:  Sent Specimen:  Respiratory Updated:  12/02/17 1851          Recent Labs  Lab 12/01/17  1336   COLORU Sri   SPECGRAV 1.025   PHUR 6.0   PROTEINUA Negative   BACTERIA Occasional   NITRITE Negative   LEUKOCYTESUR Trace*   UROBILINOGEN Negative       Diagnostic Results:  Labs: Reviewed  X-Ray: Reviewed  CT: Reviewed    ASSESSMENT/PLAN:     Active Hospital Problems    Diagnosis  POA    *ARDS (adult respiratory distress  syndrome) [J80]  Yes    Accidental drug overdose [T50.901A]  Yes    Acute febrile illness [R50.9]  Yes    Tachycardia [R00.0]  Yes    Tobacco abuse [Z72.0]  Yes     Chronic    History of breast cancer in female [Z85.3]  Not Applicable     Chronic     S/P right breast lumpectomy and chemo,2005      Schizophrenia [F20.9]  Yes     Chronic    Troponin level elevated [R74.8]  Yes    Aspiration pneumonia [J69.0]  Yes      Resolved Hospital Problems    Diagnosis Date Resolved POA   No resolved problems to display.       1. Aspiration pneumonia  2. Schizophrenia  3. ards  continue empiric abx- no sputum available  hiv negative

## 2017-12-05 NOTE — PT/OT/SLP EVAL
"Speech Language Pathology Evaluation  Bedside Swallow    Patient Name:  Jossie Crowley   MRN:  [id number]  Admitting Diagnosis: ARDS (adult respiratory distress syndrome)    Recommendations:                 General Recommendations:  Dysphagia therapy and Modified barium swallow study  Diet recommendations:  NPO, NPO   Aspiration Precautions: Strict aspiration precautions   General Precautions: Standard, aspiration, fall  Communication strategies:  none    History:     Past Medical History:   Diagnosis Date    Breast cancer     Hyperlipidemia     Schizophrenia     Thyroid disease     thyroid surgery       Past Surgical History:   Procedure Laterality Date    BREAST BIOPSY      BREAST LUMPECTOMY      BREAST SURGERY      THYROIDECTOMY         Social History: Patient lives with sister.    Prior Intubation HX:  N/A    Modified Barium Swallow: N/A    Chest X-Rays:   Results from 17: There is trace pleural thickening bilaterally.  No definite pleural effusion is identified.  There is no evidence of a pneumothorax.  There is no evidence of pneumomediastinum.  There are patchy groundglass airspace opacities in the right upper lobe, right middle lobe, and right lower lobe.  Similar patchy airspace opacities identified in the left upper lobe and also in the left lower lobe.  There is suggestion of honeycombing in the left lung base.    Prior diet: Per pt, dental soft textures/thin liquids.      Subjective   SLP consulted for clinical swallow eval. SLP confirmed with RN prior to entry.   Pt was awake, alert and oriented to name and . Pt appeared impulsive during session c/b grabbing at cup and requesting further trials of liquids despite SLP educating pt that she is not safe at this time.     Patient goals: "Gimme the cup. Gimme the water" per pt     Objective:     Oral Musculature Evaluation  · Oral Musculature: general weakness  · Dentition: scattered dentition  · Secretion Management: coughing on " secretions  · Mucosal Quality: dry, sticky  · Mandibular Strength and Mobility:  (general weakness)  · Oral Labial Strength and Mobility: WFL  · Lingual Strength and Mobility:  (reduced strength)  · Buccal Strength and Mobility: WFL  · Volitional Swallow:  (timely upon palpation)  · Voice Prior to PO Intake:  (low volume, clear)    Bedside Swallow Eval:   Pt participated in BSS this PM. Pt demonstrated immediate coughing and choking s/p swallow thin liquids via cup x1 and wet gurgly voice s/p swallow puree textures Pt appeared to tolerate thin liquids via cup sips x3. However, SLP cannot objectively r/o silent aspiration. Per MD Mata's note, pt positive for aspiration pneumonia.     Consistencies Assessed:  · Thin liquids via cup sips x4  · Puree tsp x2  · Soft solids (softened josh cracker) x1     Oral Phase:   · WFL    Pharyngeal Phase:   · coughing/choking  · decreased hyolaryngeal excursion to palpation  · wet vocal quality after swallow    Compensatory Strategies  · Multiple swallows-- cued by SLP  · Volitional cough/throat clear    Treatment: SLP educated pt on role of SLP, BSS, diet recs/swallow precautions and POC. Pt acknowledged and confirmed understanding. However, SLP requires reinforcement.        Assessment:     Jossie Crowley is a 57 y.o. female with an SLP diagnosis of Dysphagia.  She presents with pharyngeal dysphagia c/b immediate coughing/choking s/p swallow thin liquids. SLP also cannot objectively r/o silent aspiration at bedside. Pt presents with aspiration pneumonia, per MD Mata.   SLP recs: con't NPO until pt participates in MBSS. Pt would benefit from MBSS to objectively r/o aspiration and to determine least restrictive po diet. SLP reported recs/results to ALEX Frye.    Goals:    SLP Goals        Problem: SLP Goal    Goal Priority Disciplines Outcome   SLP Goal     SLP Ongoing (interventions implemented as appropriate)   Description:  Short Term Goals:  1. Pt will participate in BSS to  determine least restrictive diet.  2. Pt will participate in MBSS to objectively r/o aspiration and to determine least restrictive po diet.                    Plan:     · Patient to be seen:  3 x/week   · Plan of Care expires:  01/04/18  · Plan of Care reviewed with:  patient (ALEX Marti)   · SLP Follow-Up:  Yes (12/6/17)       Discharge recommendations:   (TBD)   Barriers to Discharge:  None    Time Tracking:     SLP Treatment Date:   12/05/17  Speech Start Time:  1319  Speech Stop Time:  1329     Speech Total Time (min):  10 min    Billable Minutes: Eval Swallow and Oral Function 10      YAZAN Simon., CF-SLP  Speech-Language Pathologist   12/5/2017

## 2017-12-05 NOTE — ASSESSMENT & PLAN NOTE
Pt met criteria for sepsis with fever, tachycardia and infection source of multilobar pneumonia. Pt was treated empirically with Zosyn/Cipro/Vancomycin and IVF, and cultures were drawn. Will give NEBS and O2 via BiPAP. Increased IPAP and EPAP and slowly weaning FiO2 for goal > 92%. Stopped fluids 12/3 and diuresing. Goal is to maintain negative fluids balance.

## 2017-12-05 NOTE — SUBJECTIVE & OBJECTIVE
Interval History: Unable to wean off BiPAP. Hypoxia with HF.     Review of Systems   Constitutional: Negative.    Respiratory: Positive for shortness of breath. Negative for wheezing.    Cardiovascular: Negative.    Gastrointestinal: Negative.    Genitourinary: Negative.    Musculoskeletal: Negative.    Skin: Negative.    Neurological: Negative.    Psychiatric/Behavioral: Negative.      Objective:     Vital Signs (Most Recent):  Temp: 98.5 °F (36.9 °C) (12/05/17 1514)  Pulse: 103 (12/05/17 1506)  Resp: (!) 29 (12/05/17 1506)  BP: (!) 148/72 (12/05/17 1500)  SpO2: (!) 89 % (12/05/17 1506) Vital Signs (24h Range):  Temp:  [97.5 °F (36.4 °C)-100.3 °F (37.9 °C)] 98.5 °F (36.9 °C)  Pulse:  [] 103  Resp:  [0-49] 29  SpO2:  [89 %-100 %] 89 %  BP: (130-169)/() 148/72     Weight: 64.2 kg (141 lb 8.6 oz)  Body mass index is 22.17 kg/m².    Intake/Output Summary (Last 24 hours) at 12/05/17 1635  Last data filed at 12/05/17 1427   Gross per 24 hour   Intake             1200 ml   Output             1175 ml   Net               25 ml      Physical Exam   Constitutional: She is oriented to person, place, and time. She appears well-developed. No distress.   Eyes: Conjunctivae and EOM are normal.   Neck: Normal range of motion. No thyromegaly present.   Cardiovascular: Normal heart sounds and intact distal pulses.    Sinus tach   Pulmonary/Chest: Effort normal. She has rales (faint).   On BiPAP   Abdominal: Soft. Bowel sounds are normal.   Musculoskeletal: Normal range of motion. She exhibits no edema.   Neurological: She is alert and oriented to person, place, and time.   Skin: Skin is warm and dry. She is not diaphoretic.   Psychiatric: She has a normal mood and affect. Her behavior is normal. Judgment and thought content normal.   Nursing note and vitals reviewed.      Significant Labs: All pertinent labs within the past 24 hours have been reviewed.    Significant Imaging: I have reviewed and interpreted all pertinent  imaging results/findings within the past 24 hours.

## 2017-12-06 PROBLEM — J10.1 INFLUENZA A (H1N1): Status: ACTIVE | Noted: 2017-12-06

## 2017-12-06 LAB
ANION GAP SERPL CALC-SCNC: 9 MMOL/L
BACTERIA BLD CULT: NORMAL
BACTERIA BLD CULT: NORMAL
BASOPHILS # BLD AUTO: ABNORMAL K/UL
BASOPHILS NFR BLD: 0 %
BNP SERPL-MCNC: 26 PG/ML
BUN SERPL-MCNC: 24 MG/DL
CALCIUM SERPL-MCNC: 9 MG/DL
CHLORIDE SERPL-SCNC: 104 MMOL/L
CK SERPL-CCNC: 248 U/L
CO2 SERPL-SCNC: 31 MMOL/L
CREAT SERPL-MCNC: 1.2 MG/DL
DIASTOLIC DYSFUNCTION: NO
DIFFERENTIAL METHOD: ABNORMAL
ENTEROVIRUS: NOT DETECTED
EOSINOPHIL # BLD AUTO: ABNORMAL K/UL
EOSINOPHIL NFR BLD: 0 %
ERYTHROCYTE [DISTWIDTH] IN BLOOD BY AUTOMATED COUNT: 15.6 %
ERYTHROCYTE [SEDIMENTATION RATE] IN BLOOD BY WESTERGREN METHOD: 45 MM/HR
EST. GFR  (AFRICAN AMERICAN): 58 ML/MIN/1.73 M^2
EST. GFR  (NON AFRICAN AMERICAN): 50 ML/MIN/1.73 M^2
ESTIMATED PA SYSTOLIC PRESSURE: 29.01
GLOBAL PERICARDIAL EFFUSION: NORMAL
GLUCOSE SERPL-MCNC: 155 MG/DL
HCT VFR BLD AUTO: 32.9 %
HGB BLD-MCNC: 11.5 G/DL
HUMAN BOCAVIRUS: NOT DETECTED
HUMAN CORONAVIRUS, COMMON COLD VIRUS: NOT DETECTED
INFLUENZA A - H1N1-09: POSITIVE
L PNEUMO AG UR QL IA: NOT DETECTED
LYMPHOCYTES # BLD AUTO: ABNORMAL K/UL
LYMPHOCYTES NFR BLD: 27 %
MAGNESIUM SERPL-MCNC: 2.2 MG/DL
MCH RBC QN AUTO: 28.5 PG
MCHC RBC AUTO-ENTMCNC: 35 G/DL
MCV RBC AUTO: 81 FL
METAMYELOCYTES NFR BLD MANUAL: 1 %
MITRAL VALVE REGURGITATION: NORMAL
MONOCYTES # BLD AUTO: ABNORMAL K/UL
MONOCYTES NFR BLD: 3 %
MYELOCYTES NFR BLD MANUAL: 1 %
NEUTROPHILS NFR BLD: 52 %
NEUTS BAND NFR BLD MANUAL: 16 %
PARAINFLUENZA: NOT DETECTED
PLATELET # BLD AUTO: 172 K/UL
PLATELET BLD QL SMEAR: ABNORMAL
PMV BLD AUTO: 11.3 FL
POTASSIUM SERPL-SCNC: 3.5 MMOL/L
RBC # BLD AUTO: 4.04 M/UL
RETIRED EF AND QEF - SEE NOTES: 55 (ref 55–65)
RVP - ADENOVIRUS: NOT DETECTED
RVP - HUMAN METAPNEUMOVIRUS (HMPV): NOT DETECTED
RVP - INFLUENZA A: NOT DETECTED
RVP - INFLUENZA B: NOT DETECTED
RVP - RESPIRATORY SYNCTIAL VIRUS (RSV) A: NOT DETECTED
RVP - RESPIRATORY VIRAL PANEL, SOURCE: ABNORMAL
RVP - RHINOVIRUS: NOT DETECTED
SODIUM SERPL-SCNC: 144 MMOL/L
TRICUSPID VALVE REGURGITATION: NORMAL
WBC # BLD AUTO: 2.44 K/UL

## 2017-12-06 PROCEDURE — 63600175 PHARM REV CODE 636 W HCPCS: Performed by: EMERGENCY MEDICINE

## 2017-12-06 PROCEDURE — 86038 ANTINUCLEAR ANTIBODIES: CPT

## 2017-12-06 PROCEDURE — 27100092 HC HIGH FLOW DELIVERY CANNULA

## 2017-12-06 PROCEDURE — 63600175 PHARM REV CODE 636 W HCPCS: Performed by: INTERNAL MEDICINE

## 2017-12-06 PROCEDURE — 36415 COLL VENOUS BLD VENIPUNCTURE: CPT

## 2017-12-06 PROCEDURE — 99900035 HC TECH TIME PER 15 MIN (STAT)

## 2017-12-06 PROCEDURE — 94761 N-INVAS EAR/PLS OXIMETRY MLT: CPT

## 2017-12-06 PROCEDURE — 80048 BASIC METABOLIC PNL TOTAL CA: CPT

## 2017-12-06 PROCEDURE — 83735 ASSAY OF MAGNESIUM: CPT

## 2017-12-06 PROCEDURE — 85007 BL SMEAR W/DIFF WBC COUNT: CPT

## 2017-12-06 PROCEDURE — 94640 AIRWAY INHALATION TREATMENT: CPT

## 2017-12-06 PROCEDURE — 25000003 PHARM REV CODE 250: Performed by: INTERNAL MEDICINE

## 2017-12-06 PROCEDURE — 86331 IMMUNODIFFUSION OUCHTERLONY: CPT | Mod: 91

## 2017-12-06 PROCEDURE — 27000221 HC OXYGEN, UP TO 24 HOURS

## 2017-12-06 PROCEDURE — 82550 ASSAY OF CK (CPK): CPT

## 2017-12-06 PROCEDURE — 86431 RHEUMATOID FACTOR QUANT: CPT

## 2017-12-06 PROCEDURE — 93306 TTE W/DOPPLER COMPLETE: CPT

## 2017-12-06 PROCEDURE — 99223 1ST HOSP IP/OBS HIGH 75: CPT | Mod: ,,, | Performed by: INTERNAL MEDICINE

## 2017-12-06 PROCEDURE — 85027 COMPLETE CBC AUTOMATED: CPT

## 2017-12-06 PROCEDURE — 83880 ASSAY OF NATRIURETIC PEPTIDE: CPT

## 2017-12-06 PROCEDURE — 25000242 PHARM REV CODE 250 ALT 637 W/ HCPCS: Performed by: INTERNAL MEDICINE

## 2017-12-06 PROCEDURE — 93306 TTE W/DOPPLER COMPLETE: CPT | Mod: 26,,, | Performed by: INTERNAL MEDICINE

## 2017-12-06 PROCEDURE — 63600175 PHARM REV CODE 636 W HCPCS: Performed by: HOSPITALIST

## 2017-12-06 PROCEDURE — 85651 RBC SED RATE NONAUTOMATED: CPT

## 2017-12-06 PROCEDURE — 27100171 HC OXYGEN HIGH FLOW UP TO 24 HOURS

## 2017-12-06 PROCEDURE — 94660 CPAP INITIATION&MGMT: CPT

## 2017-12-06 PROCEDURE — 20000000 HC ICU ROOM

## 2017-12-06 RX ORDER — OSELTAMIVIR PHOSPHATE 75 MG/1
75 CAPSULE ORAL 2 TIMES DAILY
Status: COMPLETED | OUTPATIENT
Start: 2017-12-06 | End: 2017-12-11

## 2017-12-06 RX ADMIN — LEVALBUTEROL 1.25 MG: 1.25 SOLUTION, CONCENTRATE RESPIRATORY (INHALATION) at 07:12

## 2017-12-06 RX ADMIN — ENOXAPARIN SODIUM 40 MG: 100 INJECTION SUBCUTANEOUS at 04:12

## 2017-12-06 RX ADMIN — PIPERACILLIN AND TAZOBACTAM 4.5 G: 4; .5 INJECTION, POWDER, LYOPHILIZED, FOR SOLUTION INTRAVENOUS; PARENTERAL at 01:12

## 2017-12-06 RX ADMIN — LEVALBUTEROL 1.25 MG: 1.25 SOLUTION, CONCENTRATE RESPIRATORY (INHALATION) at 01:12

## 2017-12-06 RX ADMIN — VANCOMYCIN HYDROCHLORIDE 1250 MG: 1 INJECTION, POWDER, LYOPHILIZED, FOR SOLUTION INTRAVENOUS at 08:12

## 2017-12-06 RX ADMIN — METHYLPREDNISOLONE SODIUM SUCCINATE 40 MG: 125 INJECTION, POWDER, FOR SOLUTION INTRAMUSCULAR; INTRAVENOUS at 09:12

## 2017-12-06 RX ADMIN — OSELTAMIVIR PHOSPHATE 75 MG: 75 CAPSULE ORAL at 09:12

## 2017-12-06 RX ADMIN — PIPERACILLIN AND TAZOBACTAM 4.5 G: 4; .5 INJECTION, POWDER, LYOPHILIZED, FOR SOLUTION INTRAVENOUS; PARENTERAL at 09:12

## 2017-12-06 RX ADMIN — VANCOMYCIN HYDROCHLORIDE 1500 MG: 1 INJECTION, POWDER, LYOPHILIZED, FOR SOLUTION INTRAVENOUS at 07:12

## 2017-12-06 RX ADMIN — CIPROFLOXACIN 400 MG: 2 INJECTION, SOLUTION INTRAVENOUS at 05:12

## 2017-12-06 RX ADMIN — ACETAMINOPHEN 650 MG: 325 TABLET ORAL at 08:12

## 2017-12-06 RX ADMIN — METHYLPREDNISOLONE SODIUM SUCCINATE 40 MG: 125 INJECTION, POWDER, FOR SOLUTION INTRAMUSCULAR; INTRAVENOUS at 01:12

## 2017-12-06 RX ADMIN — METHYLPREDNISOLONE SODIUM SUCCINATE 40 MG: 125 INJECTION, POWDER, FOR SOLUTION INTRAMUSCULAR; INTRAVENOUS at 05:12

## 2017-12-06 RX ADMIN — PIPERACILLIN AND TAZOBACTAM 4.5 G: 4; .5 INJECTION, POWDER, LYOPHILIZED, FOR SOLUTION INTRAVENOUS; PARENTERAL at 05:12

## 2017-12-06 NOTE — CONSULTS
[facility]  Pulmonology  Consult Note    Patient Name: Jossie Crowley  MRN: [id number]  Admission Date: 12/1/2017  Hospital Length of Stay: 5 days  Code Status: Full Code  Attending Physician: Andra Cifuentes MD  Primary Care Provider: Jordyn Owen MD   Principal Problem: ARDS (adult respiratory distress syndrome)    Inpatient consult to Pulmonology  Consult performed by: MIKAYLA DOWNING  Consult ordered by: ANDRA CIFUENTES        Subjective:     HPI:  Patient is 57 y.o. female  has a past medical history of Breast cancer; Hyperlipidemia; Schizophrenia; and Thyroid disease. presented to [facility] on 12/2/17 for sob x 1 week.   Patient was admitted to ICU for hypoxic respiratory failure.  Patient was treated with steroid and antibiotic.  In addition, patient was diuresed.  Pulmonary was consulted for persistent hypoxemia.  Patient is currently on bipap.  Labs are significant for leukopenia, elevated procalcitonin and positive influenza nasal swab.      Currently, patient denied fever/chills.  No chest pain.  Patient is comfortable on bipap.  No coughing or wheezing.      Past Medical History:   Diagnosis Date    Breast cancer     Hyperlipidemia     Schizophrenia     Thyroid disease     thyroid surgery       Past Surgical History:   Procedure Laterality Date    BREAST BIOPSY      BREAST LUMPECTOMY      BREAST SURGERY      THYROIDECTOMY  2005       Review of patient's allergies indicates:  No Known Allergies    Family History     None        Social History Main Topics    Smoking status: Current Every Day Smoker     Packs/day: 0.50     Years: 37.00     Types: Cigarettes    Smokeless tobacco: Former User     Quit date: 12/27/2014    Alcohol use No    Drug use: No    Sexual activity: Not on file         Review of Systems   Constitutional: Negative for activity change, chills and fever.   HENT: Negative for congestion, ear discharge, ear pain, sore throat and trouble swallowing.     Eyes: Negative for pain, discharge, itching and visual disturbance.   Respiratory:        Per hpi   Cardiovascular: Negative for chest pain and palpitations.   Gastrointestinal: Negative for abdominal distention and abdominal pain.   Genitourinary: Negative for decreased urine volume, difficulty urinating, dyspareunia, dysuria and vaginal bleeding.   Musculoskeletal: Negative for arthralgias, back pain and gait problem.   Neurological: Negative for dizziness, facial asymmetry, light-headedness, numbness and headaches.   Psychiatric/Behavioral: Negative for agitation, behavioral problems and confusion.     Objective:     Vital Signs (Most Recent):  Temp: 97 °F (36.1 °C) (12/06/17 0701)  Pulse: 69 (12/06/17 1000)  Resp: (!) 26 (12/06/17 1000)  BP: (!) 144/72 (12/06/17 0900)  SpO2: 97 % (12/06/17 1000) Vital Signs (24h Range):  Temp:  [97 °F (36.1 °C)-98.5 °F (36.9 °C)] 97 °F (36.1 °C)  Pulse:  [] 69  Resp:  [18-51] 26  SpO2:  [78 %-100 %] 97 %  BP: (121-183)/(64-88) 144/72     Weight: 63.7 kg (140 lb 6.9 oz)  Body mass index is 21.99 kg/m².      Intake/Output Summary (Last 24 hours) at 12/06/17 1113  Last data filed at 12/06/17 0800   Gross per 24 hour   Intake             1200 ml   Output             1890 ml   Net             -690 ml       Physical Exam   Constitutional: She is oriented to person, place, and time. She appears well-developed. No distress.   HENT:   Head: Normocephalic.   Nose: Nose normal.   Eyes: Conjunctivae and EOM are normal. Pupils are equal, round, and reactive to light.   Neck: Normal range of motion. Neck supple.   Cardiovascular: Normal rate, regular rhythm and normal heart sounds.    Pulmonary/Chest: Effort normal and breath sounds normal. No respiratory distress. She has no wheezes.   Abdominal: Soft. Bowel sounds are normal. She exhibits no distension.   Musculoskeletal: Normal range of motion. She exhibits no edema.   Neurological: She is alert and oriented to person, place, and  time.   Skin: Skin is warm and dry.   Psychiatric: She has a normal mood and affect.       Vents:  Oxygen Concentration (%): 70 (12/06/17 0753)    Lines/Drains/Airways     Drain                 Urethral Catheter 12/02/17 1545 Straight-tip 16 Fr. 3 days          Peripheral Intravenous Line                 Peripheral IV - Single Lumen 12/02/17 1600 Left Forearm 3 days         Peripheral IV - Single Lumen 12/03/17 2018 Left Forearm 2 days                Significant Labs:    CBC/Anemia Profile:    Recent Labs  Lab 12/06/17  0203   WBC 2.44*   HGB 11.5*   HCT 32.9*      MCV 81*   RDW 15.6*        Chemistries:    Recent Labs  Lab 12/05/17  0138 12/06/17  0203   NA  --  144   K  --  3.5   CL  --  104   CO2  --  31*   BUN  --  24*   CREATININE  --  1.2   CALCIUM  --  9.0   MG 2.0 2.2   PHOS 4.0  --        ABGs:   Recent Labs  Lab 12/05/17  1315   PH 7.413   PCO2 52.5*   HCO3 33.5*   POCSATURATED 98   BE 8     respiratory pcr + influenza A    Significant Imaging:   CT: I have reviewed all pertinent results/findings within the past 24 hours and my personal findings are:  12/1/17 bilateral ggo; +lad.  Prior ct in 2015 and 2013 also revealed bilateral patchy ggo.      Assessment/Plan:     * ARDS (adult respiratory distress syndrome)    Per chart reviewed, patient with recurring hypoxic respiratory failure with bilateral infiltrate in 2015 and 2013.  Etiology could be infectious (+influenza) vs non-infectious (i.e.  or hp) vs malignancy (h/o breast cancer).  Currently on broad spectrum antibiotic.  Cultures are negative other than influenza from nasal swab.  Out of window where antiviral would be of benefit.  Agree with broad spectrum antibiotic at this time.  May consider de-escalation if culture remained negative.  Will defer to ID for this decision.  May need Fob+/- open lung biopsy are  but patient is too unstable at this time.  Will send for hp pannel along with work up for occult connective tissue disease.   Continue with negative fluid balance as tolerated.  Await swallow evaluation.  I favor empiric steroid in light of continue hypoxemia.  Will switch to high flow for comfort.                Thank you for your consult. I will follow-up with patient. Please contact us if you have any additional questions.     Selwyn Springer MD  Pulmonology  [facility]

## 2017-12-06 NOTE — ASSESSMENT & PLAN NOTE
Pt met criteria for sepsis with fever, tachycardia and infection source of multilobar pneumonia. Pt was treated empirically with Zosyn/Cipro/Vancomycin and IVF, and cultures were drawn. NEBS and O2 via BiPAP. Difficult to wean from BiPAP. Stopped fluids 12/3 and diuresing. This did not seem to make any improvement and renal function started to elevate so diuresis held. Echo w/ some DD, but BNP not elevated. Appreciate pulm recs.

## 2017-12-06 NOTE — SUBJECTIVE & OBJECTIVE
Past Medical History:   Diagnosis Date    Breast cancer     Hyperlipidemia     Schizophrenia     Thyroid disease     thyroid surgery       Past Surgical History:   Procedure Laterality Date    BREAST BIOPSY      BREAST LUMPECTOMY      BREAST SURGERY      THYROIDECTOMY  2005       Review of patient's allergies indicates:  No Known Allergies    Family History     None        Social History Main Topics    Smoking status: Current Every Day Smoker     Packs/day: 0.50     Years: 37.00     Types: Cigarettes    Smokeless tobacco: Former User     Quit date: 12/27/2014    Alcohol use No    Drug use: No    Sexual activity: Not on file         Review of Systems   Constitutional: Negative for activity change, chills and fever.   HENT: Negative for congestion, ear discharge, ear pain, sore throat and trouble swallowing.    Eyes: Negative for pain, discharge, itching and visual disturbance.   Respiratory:        Per hpi   Cardiovascular: Negative for chest pain and palpitations.   Gastrointestinal: Negative for abdominal distention and abdominal pain.   Genitourinary: Negative for decreased urine volume, difficulty urinating, dyspareunia, dysuria and vaginal bleeding.   Musculoskeletal: Negative for arthralgias, back pain and gait problem.   Neurological: Negative for dizziness, facial asymmetry, light-headedness, numbness and headaches.   Psychiatric/Behavioral: Negative for agitation, behavioral problems and confusion.     Objective:     Vital Signs (Most Recent):  Temp: 97 °F (36.1 °C) (12/06/17 0701)  Pulse: 69 (12/06/17 1000)  Resp: (!) 26 (12/06/17 1000)  BP: (!) 144/72 (12/06/17 0900)  SpO2: 97 % (12/06/17 1000) Vital Signs (24h Range):  Temp:  [97 °F (36.1 °C)-98.5 °F (36.9 °C)] 97 °F (36.1 °C)  Pulse:  [] 69  Resp:  [18-51] 26  SpO2:  [78 %-100 %] 97 %  BP: (121-183)/(64-88) 144/72     Weight: 63.7 kg (140 lb 6.9 oz)  Body mass index is 21.99 kg/m².      Intake/Output Summary (Last 24 hours) at 12/06/17  1113  Last data filed at 12/06/17 0800   Gross per 24 hour   Intake             1200 ml   Output             1890 ml   Net             -690 ml       Physical Exam   Constitutional: She is oriented to person, place, and time. She appears well-developed. No distress.   HENT:   Head: Normocephalic.   Nose: Nose normal.   Eyes: Conjunctivae and EOM are normal. Pupils are equal, round, and reactive to light.   Neck: Normal range of motion. Neck supple.   Cardiovascular: Normal rate, regular rhythm and normal heart sounds.    Pulmonary/Chest: Effort normal and breath sounds normal. No respiratory distress. She has no wheezes.   Abdominal: Soft. Bowel sounds are normal. She exhibits no distension.   Musculoskeletal: Normal range of motion. She exhibits no edema.   Neurological: She is alert and oriented to person, place, and time.   Skin: Skin is warm and dry.   Psychiatric: She has a normal mood and affect.       Vents:  Oxygen Concentration (%): 70 (12/06/17 0753)    Lines/Drains/Airways     Drain                 Urethral Catheter 12/02/17 1545 Straight-tip 16 Fr. 3 days          Peripheral Intravenous Line                 Peripheral IV - Single Lumen 12/02/17 1600 Left Forearm 3 days         Peripheral IV - Single Lumen 12/03/17 2018 Left Forearm 2 days                Significant Labs:    CBC/Anemia Profile:    Recent Labs  Lab 12/06/17  0203   WBC 2.44*   HGB 11.5*   HCT 32.9*      MCV 81*   RDW 15.6*        Chemistries:    Recent Labs  Lab 12/05/17  0138 12/06/17  0203   NA  --  144   K  --  3.5   CL  --  104   CO2  --  31*   BUN  --  24*   CREATININE  --  1.2   CALCIUM  --  9.0   MG 2.0 2.2   PHOS 4.0  --        ABGs:   Recent Labs  Lab 12/05/17  1315   PH 7.413   PCO2 52.5*   HCO3 33.5*   POCSATURATED 98   BE 8     respiratory pcr + influenza A    Significant Imaging:   CT: I have reviewed all pertinent results/findings within the past 24 hours and my personal findings are:  12/1/17 bilateral ggo; +lad.   Prior ct in 2015 and 2013 also revealed bilateral patchy ggo.

## 2017-12-06 NOTE — ASSESSMENT & PLAN NOTE
Per chart reviewed, patient with recurring hypoxic respiratory failure with bilateral infiltrate in 2015 and 2013.  Etiology could be infectious (+influenza) vs non-infectious (i.e.  or hp) vs malignancy (h/o breast cancer).  Currently on broad spectrum antibiotic.  Cultures are negative other than influenza from nasal swab.  Out of window where antiviral would be of benefit.  Agree with broad spectrum antibiotic at this time.  May consider de-escalation if culture remained negative.  Will defer to ID for this decision.  May need Fob+/- open lung biopsy are  but patient is too unstable at this time.  Will send for hp pannel along with work up for occult connective tissue disease.  Continue with negative fluid balance as tolerated.  Await swallow evaluation.  I favor empiric steroid (prednisone 1/2 mg/kg) in light of continue hypoxemia.  Will switch to high flow for comfort.

## 2017-12-06 NOTE — ASSESSMENT & PLAN NOTE
CTA remarkable for diffuse bilateral patchy airspace opacities treated with antibiotics as discussed above. I am concerned she aspirated. ID consulted given the unusual appearance of findings and for further fine tuning of antibiotics regimen. Continue current broad regimen. BCx NGTD. Res panel with Flu A. Out of the window for anti-virals. Droplet isolation precautions.

## 2017-12-06 NOTE — HPI
Patient is 57 y.o. female  has a past medical history of Breast cancer; Hyperlipidemia; Schizophrenia; and Thyroid disease. presented to [facility] on 12/2/17 for sob x 1 week.   Patient was admitted to ICU for hypoxic respiratory failure.  Patient was treated with steroid and antibiotic.  In addition, patient was diuresed.  Pulmonary was consulted for persistent hypoxemia.  Patient is currently on bipap.  Labs are significant for leukopenia, elevated procalcitonin and positive influenza nasal swab.      Currently, patient denied fever/chills.  No chest pain.  Patient is comfortable on bipap.  No coughing or wheezing.

## 2017-12-06 NOTE — PLAN OF CARE
"Problem: Patient Care Overview  Goal: Plan of Care Review  Outcome: Ongoing (interventions implemented as appropriate)  Patient remains on 70% BiPAP. Only able to tolerate 15L nasal cannula for <15 minutes at time. Patient then desats to 86% and respiratory rate increases to 40+. Patient took BiPAP off several time during shift. Patient sats in 70s on room air. Repeat xray and ABGs done. 20mg lasix given. UOP prior to lasix total of 750. Frequently yells as staff about ice and water. Explained aspiration and reasoning for NPO status to patient a minimum of 10 times this shift. Patient did not seem to comprehend. Speech therapy did bedside swallow and patient coughed with thin liquids. They recommend a modified barium swallow study( I relayed this to Dr. Cifuentes). One episode of diarrhea. Patient was able to climb out of bed (over the side rails) and stood next to bed. She stated "I'm going get some fucking water." Patient assisted back in bed and sustained no falls or injuries. BiPAP was off at time, sats dropped to 76%, SBP 180s, HR 150s. Patient's vitals returned to baseline after repositioned in bed. Bed alarm placed back on the patient for safety. It was on throughout shift. Reeducated patient on fall risk and assistance with activities. Around 1430, a patient's friend was in the room with a bag. This friend removed patient's BiPAP mask and gave patient water. I did not see how much. I walked in to address the BiPAP alarm and  Observed patient swallowing. The friend was huddled over a backpack. Patient coughed. BiPAP placed back on patient. NPO and aspiration explained to the patient and friend again. He stated "It was only a little bit. She was begging me for water." Patient's communication board states "Nothing by mouth" and there was already a sign outside of patient's door that states "NPO: Nothing by mouth." I placed a "Please see nurse before entering" sign on patient's door. Patient's friend was asked " to leave.

## 2017-12-06 NOTE — PROGRESS NOTES
[facility] Hospital Medicine  Progress Note    Patient Name: Jossie Crowley  MRN: [id number]  Patient Class: IP- Inpatient   Admission Date: 12/1/2017  Length of Stay: 5 days  Attending Physician: Andra Cifuentes MD  Primary Care Provider: Jordyn Owen MD        Subjective:     Principal Problem:ARDS (adult respiratory distress syndrome)    HPI:  58 y/o female with schizophrenia, HLP, hypothyroid, and h/o breast CA who was brought in by daughter for change in mental status. Daughter reported that she suspected the patient took more Depakote than normal. Pt reported to the ER physician that she has been taking Goody's powder and it made her nauseous. She stated that she has been more SOB and had coughing for about a week. This was mainly reported to the ER physician. During my interview, patient only answered few questions, and then was mostly silent and did not want to engage in conversation. All she answered to me was that she had a cough and SOB, and did not answer any other questions. Rest of the hx was via review of medical records and no family was available at the bedside. In the ER, patient had pulse of 145 on presentation and fever of 101.4F. Workup with CTA of chest was negative for PE but was remarkable for diffuse opacities. She treated with IVF and antibiotics, and cultures were drawn.    Hospital Course:  Ms Crowley presented with unintentional overdose with depakote. This is a recurrent issue. Workup significant for aspiration pneumonia that progressed to ARDS. Provided with broad spectrum antibiotics, IVFs and supplemental O2. Upgraded to ICU for increased O2 requirements and placed on BiPAP. Abx and BiPAP continued. Added duo-nebs, diuresis and solumedrol IV. Difficult to wean O2. CXR with edema. Echo w/ EF 55%, +DD. BNP not elevated. Speech evaluated and has high concern for aspiration and recommended MBSS. She continues to require to much supplemental O2 to have MBSS and CXR  remained unchanged. Pulmonology consulted. Labs for hypersensitivity pneumonitis and CTD eval pending. Flu A returned positive 12/6. Out of the window for antivirals.    Interval History: Still slow to wean. Pulm consulted.     Review of Systems   Constitutional: Negative.    Respiratory: Positive for shortness of breath. Negative for wheezing.    Cardiovascular: Negative.    Gastrointestinal: Negative.    Genitourinary: Negative.    Musculoskeletal: Negative.    Skin: Negative.    Neurological: Negative.    Psychiatric/Behavioral: Negative.      Objective:     Vital Signs (Most Recent):  Temp: 98.9 °F (37.2 °C) (12/06/17 1501)  Pulse: 79 (12/06/17 1700)  Resp: (!) 37 (12/06/17 1700)  BP: (!) 148/72 (12/06/17 1700)  SpO2: 98 % (12/06/17 1700) Vital Signs (24h Range):  Temp:  [97 °F (36.1 °C)-98.9 °F (37.2 °C)] 98.9 °F (37.2 °C)  Pulse:  [] 79  Resp:  [18-51] 37  SpO2:  [78 %-100 %] 98 %  BP: (121-183)/(59-88) 148/72     Weight: 63.7 kg (140 lb 6.9 oz)  Body mass index is 21.99 kg/m².    Intake/Output Summary (Last 24 hours) at 12/06/17 1710  Last data filed at 12/06/17 1600   Gross per 24 hour   Intake             1200 ml   Output             2525 ml   Net            -1325 ml      Physical Exam   Constitutional: She is oriented to person, place, and time. She appears well-developed. No distress.   Eyes: Conjunctivae and EOM are normal.   Neck: Normal range of motion. No thyromegaly present.   Cardiovascular: Normal heart sounds and intact distal pulses.    Sinus tach   Pulmonary/Chest: Effort normal. She has rales (faint).   On BiPAP   Abdominal: Soft. Bowel sounds are normal.   Musculoskeletal: Normal range of motion. She exhibits no edema.   Neurological: She is alert and oriented to person, place, and time.   Skin: Skin is warm and dry. She is not diaphoretic.   Psychiatric: She has a normal mood and affect. Her behavior is normal. Judgment and thought content normal.   Nursing note and vitals  reviewed.      Significant Labs: All pertinent labs within the past 24 hours have been reviewed.    Significant Imaging: I have reviewed and interpreted all pertinent imaging results/findings within the past 24 hours.    Assessment/Plan:      * ARDS (adult respiratory distress syndrome)    Pt met criteria for sepsis with fever, tachycardia and infection source of multilobar pneumonia. Pt was treated empirically with Zosyn/Cipro/Vancomycin and IVF, and cultures were drawn. NEBS and O2 via BiPAP. Difficult to wean from BiPAP. Stopped fluids 12/3 and diuresing. This did not seem to make any improvement and renal function started to elevate so diuresis held. Echo w/ some DD, but BNP not elevated. Appreciate pulm recs.        Aspiration pneumonia    CTA remarkable for diffuse bilateral patchy airspace opacities treated with antibiotics as discussed above. I am concerned she aspirated. ID consulted given the unusual appearance of findings and for further fine tuning of antibiotics regimen. Continue current broad regimen. BCx NGTD. Res panel with Flu A. Out of the window for anti-virals. Droplet isolation precautions.        Acute febrile illness    As above        Influenza A (H1N1)    As above        Accidental drug overdose    Suspected over use of depakote. Unintentional. This was the reason for her similar presentation one year ago.         Schizophrenia    Continue home medication regimen.        Tachycardia    Secondary to sepsis, hypoxia and expected physiologic response        Troponin level elevated    Likely secondary to strain with sepsis and not ACS, but will trend out markers and monitor on telemetry. Echo.        History of breast cancer in female    No acute issues; s/p right breast lumpectomy followed by chemotherapy in 2005. Will need to refer to Hem/Onc as outpatient for follow up.        Tobacco abuse    Smoking cessation counseling will be addressed prior to discharge.           VTE Risk Mitigation          Ordered     enoxaparin injection 40 mg  Daily     Route:  Subcutaneous        12/02/17 0213     Medium Risk of VTE  Once      12/01/17 2146     Place sequential compression device  Until discontinued      12/01/17 2146     Place OMAR hose  Until discontinued      12/01/17 2146          Critical care time spent on the evaluation and treatment of severe organ dysfunction, review of pertinent labs and imaging studies, discussions with consulting providers and discussions with patient/family: 35 minutes.    Andra Cifuentes MD  Department of Hospital Medicine   [facility]

## 2017-12-06 NOTE — PROGRESS NOTES
Progress Note  Infectious Disease    Admit Date: 12/1/2017   LOS: 4 days     SUBJECTIVE:     Follow-up For:  Aspiration pneumonia    Antibiotics     Start     Stop Route Frequency Ordered    12/03/17 1945  vancomycin (VANCOCIN) 1,500 mg in dextrose 5 % 250 mL IVPB      -- IV Every 12 hours (non-standard times) 12/03/17 1033    12/02/17 0600  piperacillin-tazobactam 4.5 g in sodium chloride 0.9% 100 mL IVPB (ready to mix system)      -- IV Every 8 hours (non-standard times) 12/01/17 2146    12/01/17 1832  ciprofloxacin (CIPRO)400mg/200ml D5W IVPB 400 mg      -- IV Every 12 hours (non-standard times) 12/01/17 1832          Review of Systems:  Review of systems not obtained due to patient factors on bipap.    OBJECTIVE:     Vital Signs (Most Recent)  Temp: 98.5 °F (36.9 °C) (12/05/17 1514)  Pulse: 103 (12/05/17 1506)  Resp: (!) 29 (12/05/17 1506)  BP: (!) 148/72 (12/05/17 1500)  SpO2: (!) 89 % (12/05/17 1506)     Temperature Range Min/Max (Last 24H):  Temp:  [97.5 °F (36.4 °C)-100.3 °F (37.9 °C)]     I & O (Last 24H):    Intake/Output Summary (Last 24 hours) at 12/05/17 1833  Last data filed at 12/05/17 1427   Gross per 24 hour   Intake             1000 ml   Output             1075 ml   Net              -75 ml       Physical Exam:  General: well developed, well nourished  Eyes: conjunctivae/corneas clear. PERRL..  HENT: Head:normocephalic, atraumatic. Ears:not examined. Nose: Nares normal. Septum midline. Mucosa normal. No drainage or sinus tenderness., no discharge. Throat: lips, mucosa, and tongue normal; teeth and gums normal and no throat erythema.  Neck: supple, symmetrical, trachea midline, no JVD and thyroid not enlarged, symmetric, no tenderness/mass/nodules  Lungs:  labored breathing, diminished breath sounds bilaterally and rales bilaterally  Cardiovascular: Heart: regular rate and rhythm, S1, S2 normal, no murmur, click, rub or gallop. Chest Wall: no tenderness. Extremities: no cyanosis or edema, or  clubbing. Pulses: 2+ and symmetric.  Abdomen/Rectal: Abdomen: soft, non-tender non-distented; bowel sounds normal; no masses,  no organomegaly. Rectal: not examined  Skin: Skin color, texture, turgor normal. No rashes or lesions  Musculoskeletal:no clubbing, cyanosis    Lines/Drains:       Peripheral IV - Single Lumen 12/02/17 1600 Left Forearm (Active)   Site Assessment Clean;Dry;Intact;No redness;No swelling 12/4/2017  3:30 PM   Line Status Blood return noted;Flushed;Infusing 12/4/2017  3:30 PM   Dressing Status Clean;Intact;Dry 12/4/2017  3:30 PM   Dressing Change Due 12/06/17 12/4/2017  3:30 PM   Site Change Due 12/06/17 12/4/2017  7:52 AM   Reason Not Rotated Not due 12/4/2017  3:30 PM            Peripheral IV - Single Lumen 12/03/17 2018 Left Forearm (Active)   Site Assessment Clean;Dry;Intact;No redness;No swelling 12/4/2017  3:30 PM   Line Status Blood return noted;Flushed;Saline locked 12/4/2017  3:30 PM   Dressing Status Clean;Dry;Intact 12/4/2017  3:30 PM   Dressing Change Due 12/06/17 12/4/2017  3:30 PM   Site Change Due 12/06/17 12/4/2017  7:52 AM   Reason Not Rotated Not due 12/4/2017  3:30 PM       Laboratory:  CBC  No results for input(s): WBC, RBC, HGB, HCT, PLT, MCV, MCH, MCHC in the last 24 hours.  BMP  No results for input(s): GLUCOSE, NA, K, CL, CO2, BUN, CREATININE, CALCIUM in the last 24 hours.  Microbiology Results (last 7 days)     Procedure Component Value Units Date/Time    Blood culture x two cultures. Draw prior to antibiotics. [[id number]] Collected:  12/01/17 1418    Order Status:  Completed Specimen:  Blood from Peripheral, Hand, Left Updated:  12/05/17 1503     Blood Culture, Routine No Growth to date     Blood Culture, Routine No Growth to date     Blood Culture, Routine No Growth to date     Blood Culture, Routine No Growth to date     Blood Culture, Routine No Growth to date    Narrative:       Draw one set from central line if present, draw second  culture from another site.     Blood culture x two cultures. Draw prior to antibiotics. [[id number]] Collected:  12/01/17 1412    Order Status:  Completed Specimen:  Blood from Peripheral, Antecubital, Right Updated:  12/05/17 1503     Blood Culture, Routine No Growth to date     Blood Culture, Routine No Growth to date     Blood Culture, Routine No Growth to date     Blood Culture, Routine No Growth to date     Blood Culture, Routine No Growth to date    Narrative:       Draw one set from central line if present, draw second  culture from another site.    Blood culture [[id number]] Collected:  12/04/17 1610    Order Status:  Completed Specimen:  Blood Updated:  12/05/17 0312     Blood Culture, Routine No Growth to date    Narrative:       Collection has been rescheduled by CMO at 12/4/2017 14:21 Reason:   Patient unavailable  Collection has been rescheduled by CMO at 12/4/2017 14:21 Reason:   Patient unavailable    Blood culture [[id number]] Collected:  12/04/17 1610    Order Status:  Completed Specimen:  Blood Updated:  12/05/17 0312     Blood Culture, Routine No Growth to date    Narrative:       Collection has been rescheduled by CMO at 12/4/2017 14:21 Reason:   Patient unavailable  Collection has been rescheduled by CMO at 12/4/2017 14:21 Reason:   Patient unavailable    Urine culture [[id number]] Collected:  12/01/17 1336    Order Status:  Completed Specimen:  Urine from Urine, Clean Catch Updated:  12/03/17 0822     Urine Culture, Routine No significant growth    Respiratory Viral Panel by PCR [facility] ( ); Nasal Swab [[id number]] Collected:  12/02/17 1845    Order Status:  Sent Specimen:  Respiratory Updated:  12/02/17 1851          Recent Labs  Lab 12/01/17  1336   COLORU Sri   SPECGRAV 1.025   PHUR 6.0   PROTEINUA Negative   BACTERIA Occasional   NITRITE Negative   LEUKOCYTESUR Trace*   UROBILINOGEN Negative       Diagnostic Results:  Labs: Reviewed  X-Ray: Reviewed  CT: Reviewed    ASSESSMENT/PLAN:     Active Hospital Problems    Diagnosis   POA    *ARDS (adult respiratory distress syndrome) [J80]  Yes    Accidental drug overdose [T50.901A]  Yes    Acute febrile illness [R50.9]  Yes    Tachycardia [R00.0]  Yes    Tobacco abuse [Z72.0]  Yes     Chronic    History of breast cancer in female [Z85.3]  Not Applicable     Chronic     S/P right breast lumpectomy and chemo,2005      Schizophrenia [F20.9]  Yes     Chronic    Troponin level elevated [R74.8]  Yes    Aspiration pneumonia [J69.0]  Yes      Resolved Hospital Problems    Diagnosis Date Resolved POA   No resolved problems to display.       1. Aspiration pneumonia  2. Schizophrenia  3. ards  continue empiric abx- no sputum available  hiv negative  Taper and dc steroids

## 2017-12-06 NOTE — PROGRESS NOTES
Pt unable to tolerated high flow O2 sats80. Pt placed back on Bipap and sats in 90's with in minute or two.Radiology unable to do MBSS with pt on Bipap. I explained this to pt but unsure if pt understands why she is unable to drink or eat.Pt has been informed many times today why she is unable to eat or drink anything at this time, not sure if she fully understands though.

## 2017-12-06 NOTE — PLAN OF CARE
12/06/17 1224   Discharge Reassessment   Assessment Type Discharge Planning Reassessment   Provided patient/caregiver education on the expected discharge date and the discharge plan No   Do you have any problems affording any of your prescribed medications? No   Discharge Plan A Home with family   Discharge Plan B Other  (TBD)   Patient choice form signed by patient/caregiver No   Can the patient answer the patient profile reliably? Yes, cognitively intact  (however does not want to follow medical precautions)   How does the patient rate their overall health at the present time? Fair   Describe the patient's ability to walk at the present time. No restrictions   How often would a person be available to care for the patient? Often  (per record)   Number of comorbid conditions (as recorded on the chart) Five or more   During the past month, has the patient often been bothered by feeling down, depressed or hopeless? No   During the past month, has the patient often been bothered by little interest or pleasure in doing things? No   patient remains in ICU with BiPAP vs HF NC for respiratory support. SW reviewed chart, discussed in bed huddle and briefly with respiratory therapist. SW will follow in ICU and assist as needed.

## 2017-12-06 NOTE — SUBJECTIVE & OBJECTIVE
Interval History: Still slow to wean. Pulm consulted.     Review of Systems   Constitutional: Negative.    Respiratory: Positive for shortness of breath. Negative for wheezing.    Cardiovascular: Negative.    Gastrointestinal: Negative.    Genitourinary: Negative.    Musculoskeletal: Negative.    Skin: Negative.    Neurological: Negative.    Psychiatric/Behavioral: Negative.      Objective:     Vital Signs (Most Recent):  Temp: 98.9 °F (37.2 °C) (12/06/17 1501)  Pulse: 79 (12/06/17 1700)  Resp: (!) 37 (12/06/17 1700)  BP: (!) 148/72 (12/06/17 1700)  SpO2: 98 % (12/06/17 1700) Vital Signs (24h Range):  Temp:  [97 °F (36.1 °C)-98.9 °F (37.2 °C)] 98.9 °F (37.2 °C)  Pulse:  [] 79  Resp:  [18-51] 37  SpO2:  [78 %-100 %] 98 %  BP: (121-183)/(59-88) 148/72     Weight: 63.7 kg (140 lb 6.9 oz)  Body mass index is 21.99 kg/m².    Intake/Output Summary (Last 24 hours) at 12/06/17 1710  Last data filed at 12/06/17 1600   Gross per 24 hour   Intake             1200 ml   Output             2525 ml   Net            -1325 ml      Physical Exam   Constitutional: She is oriented to person, place, and time. She appears well-developed. No distress.   Eyes: Conjunctivae and EOM are normal.   Neck: Normal range of motion. No thyromegaly present.   Cardiovascular: Normal heart sounds and intact distal pulses.    Sinus tach   Pulmonary/Chest: Effort normal. She has rales (faint).   On BiPAP   Abdominal: Soft. Bowel sounds are normal.   Musculoskeletal: Normal range of motion. She exhibits no edema.   Neurological: She is alert and oriented to person, place, and time.   Skin: Skin is warm and dry. She is not diaphoretic.   Psychiatric: She has a normal mood and affect. Her behavior is normal. Judgment and thought content normal.   Nursing note and vitals reviewed.      Significant Labs: All pertinent labs within the past 24 hours have been reviewed.    Significant Imaging: I have reviewed and interpreted all pertinent imaging  results/findings within the past 24 hours.

## 2017-12-07 LAB
ALBUMIN SERPL BCP-MCNC: 2.3 G/DL
ALLENS TEST: ABNORMAL
ALP SERPL-CCNC: 97 U/L
ALT SERPL W/O P-5'-P-CCNC: 75 U/L
ANA SER QL IF: NORMAL
ANION GAP SERPL CALC-SCNC: 9 MMOL/L
ANION GAP SERPL CALC-SCNC: 9 MMOL/L
ANISOCYTOSIS BLD QL SMEAR: SLIGHT
AST SERPL-CCNC: 80 U/L
BASOPHILS # BLD AUTO: ABNORMAL K/UL
BASOPHILS NFR BLD: 0 %
BILIRUB SERPL-MCNC: 0.3 MG/DL
BUN SERPL-MCNC: 27 MG/DL
BUN SERPL-MCNC: 27 MG/DL
CALCIUM SERPL-MCNC: 9 MG/DL
CALCIUM SERPL-MCNC: 9 MG/DL
CHLORIDE SERPL-SCNC: 108 MMOL/L
CHLORIDE SERPL-SCNC: 108 MMOL/L
CO2 SERPL-SCNC: 32 MMOL/L
CO2 SERPL-SCNC: 32 MMOL/L
CREAT SERPL-MCNC: 1 MG/DL
CREAT SERPL-MCNC: 1 MG/DL
DELSYS: ABNORMAL
DIFFERENTIAL METHOD: ABNORMAL
EOSINOPHIL # BLD AUTO: ABNORMAL K/UL
EOSINOPHIL NFR BLD: 0 %
EP: 8
ERYTHROCYTE [DISTWIDTH] IN BLOOD BY AUTOMATED COUNT: 16 %
ERYTHROCYTE [SEDIMENTATION RATE] IN BLOOD BY WESTERGREN METHOD: 18 MM/H
EST. GFR  (AFRICAN AMERICAN): >60 ML/MIN/1.73 M^2
EST. GFR  (AFRICAN AMERICAN): >60 ML/MIN/1.73 M^2
EST. GFR  (NON AFRICAN AMERICAN): >60 ML/MIN/1.73 M^2
EST. GFR  (NON AFRICAN AMERICAN): >60 ML/MIN/1.73 M^2
FIO2: 60
GLUCOSE SERPL-MCNC: 152 MG/DL
GLUCOSE SERPL-MCNC: 152 MG/DL
HCO3 UR-SCNC: 35.7 MMOL/L (ref 24–28)
HCT VFR BLD AUTO: 33.8 %
HGB BLD-MCNC: 11.6 G/DL
IP: 15
LYMPHOCYTES # BLD AUTO: ABNORMAL K/UL
LYMPHOCYTES NFR BLD: 30 %
MAGNESIUM SERPL-MCNC: 2.4 MG/DL
MCH RBC QN AUTO: 28.8 PG
MCHC RBC AUTO-ENTMCNC: 34.3 G/DL
MCV RBC AUTO: 84 FL
METAMYELOCYTES NFR BLD MANUAL: 2 %
MODE: ABNORMAL
MONOCYTES # BLD AUTO: ABNORMAL K/UL
MONOCYTES NFR BLD: 7 %
MYELOCYTES NFR BLD MANUAL: 3 %
NEUTROPHILS NFR BLD: 45 %
NEUTS BAND NFR BLD MANUAL: 13 %
PCO2 BLDA: 54.7 MMHG (ref 35–45)
PH SMN: 7.42 [PH] (ref 7.35–7.45)
PLATELET # BLD AUTO: 174 K/UL
PLATELET BLD QL SMEAR: ABNORMAL
PMV BLD AUTO: 11.1 FL
PO2 BLDA: 96 MMHG (ref 80–100)
POC BE: 10 MMOL/L
POC SATURATED O2: 97 % (ref 95–100)
POC TCO2: 37 MMOL/L (ref 23–27)
POTASSIUM SERPL-SCNC: 3.7 MMOL/L
POTASSIUM SERPL-SCNC: 3.7 MMOL/L
PROT SERPL-MCNC: 6.3 G/DL
RBC # BLD AUTO: 4.03 M/UL
RHEUMATOID FACT SERPL-ACNC: 14 IU/ML
SAMPLE: ABNORMAL
SITE: ABNORMAL
SODIUM SERPL-SCNC: 149 MMOL/L
SODIUM SERPL-SCNC: 149 MMOL/L
SP02: 97
SPONT RATE: 27
TARGETS BLD QL SMEAR: ABNORMAL
WBC # BLD AUTO: 2.32 K/UL

## 2017-12-07 PROCEDURE — 25000003 PHARM REV CODE 250: Performed by: INTERNAL MEDICINE

## 2017-12-07 PROCEDURE — 63600175 PHARM REV CODE 636 W HCPCS: Performed by: INTERNAL MEDICINE

## 2017-12-07 PROCEDURE — 99900035 HC TECH TIME PER 15 MIN (STAT)

## 2017-12-07 PROCEDURE — 25000003 PHARM REV CODE 250: Performed by: HOSPITALIST

## 2017-12-07 PROCEDURE — 94640 AIRWAY INHALATION TREATMENT: CPT

## 2017-12-07 PROCEDURE — 36415 COLL VENOUS BLD VENIPUNCTURE: CPT

## 2017-12-07 PROCEDURE — 27100092 HC HIGH FLOW DELIVERY CANNULA

## 2017-12-07 PROCEDURE — 83735 ASSAY OF MAGNESIUM: CPT

## 2017-12-07 PROCEDURE — 25000242 PHARM REV CODE 250 ALT 637 W/ HCPCS: Performed by: INTERNAL MEDICINE

## 2017-12-07 PROCEDURE — 94660 CPAP INITIATION&MGMT: CPT

## 2017-12-07 PROCEDURE — 63600175 PHARM REV CODE 636 W HCPCS: Performed by: EMERGENCY MEDICINE

## 2017-12-07 PROCEDURE — 82803 BLOOD GASES ANY COMBINATION: CPT

## 2017-12-07 PROCEDURE — 20000000 HC ICU ROOM

## 2017-12-07 PROCEDURE — 85027 COMPLETE CBC AUTOMATED: CPT

## 2017-12-07 PROCEDURE — 63600175 PHARM REV CODE 636 W HCPCS: Performed by: HOSPITALIST

## 2017-12-07 PROCEDURE — 36600 WITHDRAWAL OF ARTERIAL BLOOD: CPT

## 2017-12-07 PROCEDURE — 27000221 HC OXYGEN, UP TO 24 HOURS

## 2017-12-07 PROCEDURE — 99233 SBSQ HOSP IP/OBS HIGH 50: CPT | Mod: ,,, | Performed by: INTERNAL MEDICINE

## 2017-12-07 PROCEDURE — 85007 BL SMEAR W/DIFF WBC COUNT: CPT

## 2017-12-07 PROCEDURE — 27100171 HC OXYGEN HIGH FLOW UP TO 24 HOURS

## 2017-12-07 PROCEDURE — 80053 COMPREHEN METABOLIC PANEL: CPT

## 2017-12-07 RX ORDER — DEXTROSE MONOHYDRATE 50 MG/ML
INJECTION, SOLUTION INTRAVENOUS CONTINUOUS
Status: DISCONTINUED | OUTPATIENT
Start: 2017-12-07 | End: 2017-12-13

## 2017-12-07 RX ADMIN — ENOXAPARIN SODIUM 40 MG: 100 INJECTION SUBCUTANEOUS at 05:12

## 2017-12-07 RX ADMIN — LEVALBUTEROL 1.25 MG: 1.25 SOLUTION, CONCENTRATE RESPIRATORY (INHALATION) at 01:12

## 2017-12-07 RX ADMIN — BENZONATATE 100 MG: 100 CAPSULE ORAL at 09:12

## 2017-12-07 RX ADMIN — DEXTROSE MONOHYDRATE: 5 INJECTION, SOLUTION INTRAVENOUS at 07:12

## 2017-12-07 RX ADMIN — PIPERACILLIN AND TAZOBACTAM 4.5 G: 4; .5 INJECTION, POWDER, LYOPHILIZED, FOR SOLUTION INTRAVENOUS; PARENTERAL at 05:12

## 2017-12-07 RX ADMIN — OSELTAMIVIR PHOSPHATE 75 MG: 75 CAPSULE ORAL at 09:12

## 2017-12-07 RX ADMIN — LEVALBUTEROL 1.25 MG: 1.25 SOLUTION, CONCENTRATE RESPIRATORY (INHALATION) at 08:12

## 2017-12-07 RX ADMIN — PIPERACILLIN AND TAZOBACTAM 4.5 G: 4; .5 INJECTION, POWDER, LYOPHILIZED, FOR SOLUTION INTRAVENOUS; PARENTERAL at 09:12

## 2017-12-07 RX ADMIN — VANCOMYCIN HYDROCHLORIDE 1250 MG: 1 INJECTION, POWDER, LYOPHILIZED, FOR SOLUTION INTRAVENOUS at 08:12

## 2017-12-07 RX ADMIN — METHYLPREDNISOLONE SODIUM SUCCINATE 40 MG: 125 INJECTION, POWDER, FOR SOLUTION INTRAMUSCULAR; INTRAVENOUS at 09:12

## 2017-12-07 RX ADMIN — METHYLPREDNISOLONE SODIUM SUCCINATE 40 MG: 125 INJECTION, POWDER, FOR SOLUTION INTRAMUSCULAR; INTRAVENOUS at 02:12

## 2017-12-07 RX ADMIN — LOPERAMIDE HYDROCHLORIDE 2 MG: 2 CAPSULE ORAL at 09:12

## 2017-12-07 RX ADMIN — METHYLPREDNISOLONE SODIUM SUCCINATE 40 MG: 125 INJECTION, POWDER, FOR SOLUTION INTRAMUSCULAR; INTRAVENOUS at 05:12

## 2017-12-07 RX ADMIN — PIPERACILLIN AND TAZOBACTAM 4.5 G: 4; .5 INJECTION, POWDER, LYOPHILIZED, FOR SOLUTION INTRAVENOUS; PARENTERAL at 02:12

## 2017-12-07 NOTE — ASSESSMENT & PLAN NOTE
Pt met criteria for sepsis with fever, tachycardia and infection source of multilobar pneumonia. Pt was treated empirically with Zosyn/Cipro/Vancomycin and IVF, and cultures were drawn. NEBS and O2 via BiPAP. Difficult to wean from BiPAP. Stopped fluids 12/3 and diuresed, but this did not seem to make any improvement and renal function started to elevate so diuresis held. Echo w/ some DD, but BNP not elevated. Appreciate pulm recs. Maybe depakote related? Will hold.

## 2017-12-07 NOTE — PLAN OF CARE
Problem: Patient Care Overview  Goal: Plan of Care Review  Outcome: Ongoing (interventions implemented as appropriate)  Pt remains on Bipap; sats >95%. Pt repeatedly asks for water/ice even though she was instructed on NPO status multiple times this shift. Pt with x3 loose green BM. Miller CDI; UO adequate. Pt complained of headache once; PRN tylenol administered. Droplet precautions maintained for flu. No new hospital acquired injuries this shift.

## 2017-12-07 NOTE — PROGRESS NOTES
Progress Note  Infectious Disease    Admit Date: 12/1/2017   LOS: 6 days     SUBJECTIVE:     Follow-up For:  Aspiration pneumonia, now pcr for influenza a positive    Antibiotics     Start     Stop Route Frequency Ordered    12/06/17 1944  vancomycin (VANCOCIN) 1,250 mg in dextrose 5 % 250 mL IVPB  (Vancomycin IVPB with levels panel)      -- IV Every 12 hours (non-standard times) 12/06/17 1808    12/02/17 0600  piperacillin-tazobactam 4.5 g in sodium chloride 0.9% 100 mL IVPB (ready to mix system)      -- IV Every 8 hours (non-standard times) 12/01/17 2146          Review of Systems:  Review of systems not obtained due to patient factors on bipap.    OBJECTIVE:     Vital Signs (Most Recent)  Temp: 97.4 °F (36.3 °C) (12/07/17 1500)  Pulse: 62 (12/07/17 1506)  Resp: (!) 29 (12/07/17 1506)  BP: 129/68 (12/07/17 1502)  SpO2: 97 % (12/07/17 1506)     Temperature Range Min/Max (Last 24H):  Temp:  [96.5 °F (35.8 °C)-97.5 °F (36.4 °C)]     I & O (Last 24H):    Intake/Output Summary (Last 24 hours) at 12/07/17 1707  Last data filed at 12/07/17 1500   Gross per 24 hour   Intake              700 ml   Output              856 ml   Net             -156 ml       Physical Exam:  General: well developed, well nourished  Eyes: conjunctivae/corneas clear. PERRL..  HENT: Head:normocephalic, atraumatic. Ears:not examined. Nose: Nares normal. Septum midline. Mucosa normal. No drainage or sinus tenderness., no discharge. Throat: lips, mucosa, and tongue normal; teeth and gums normal and no throat erythema.  Neck: supple, symmetrical, trachea midline, no JVD and thyroid not enlarged, symmetric, no tenderness/mass/nodules  Lungs:  labored breathing, diminished breath sounds bilaterally and rales bilaterally  Cardiovascular: Heart: regular rate and rhythm, S1, S2 normal, no murmur, click, rub or gallop. Chest Wall: no tenderness. Extremities: no cyanosis or edema, or clubbing. Pulses: 2+ and symmetric.  Abdomen/Rectal: Abdomen: soft,  non-tender non-distented; bowel sounds normal; no masses,  no organomegaly. Rectal: not examined  Skin: Skin color, texture, turgor normal. No rashes or lesions  Musculoskeletal:no clubbing, cyanosis    Lines/Drains:       Peripheral IV - Single Lumen 12/02/17 1600 Left Forearm (Active)   Site Assessment Clean;Dry;Intact;No redness;No swelling 12/4/2017  3:30 PM   Line Status Blood return noted;Flushed;Infusing 12/4/2017  3:30 PM   Dressing Status Clean;Intact;Dry 12/4/2017  3:30 PM   Dressing Change Due 12/06/17 12/4/2017  3:30 PM   Site Change Due 12/06/17 12/4/2017  7:52 AM   Reason Not Rotated Not due 12/4/2017  3:30 PM            Peripheral IV - Single Lumen 12/03/17 2018 Left Forearm (Active)   Site Assessment Clean;Dry;Intact;No redness;No swelling 12/4/2017  3:30 PM   Line Status Blood return noted;Flushed;Saline locked 12/4/2017  3:30 PM   Dressing Status Clean;Dry;Intact 12/4/2017  3:30 PM   Dressing Change Due 12/06/17 12/4/2017  3:30 PM   Site Change Due 12/06/17 12/4/2017  7:52 AM   Reason Not Rotated Not due 12/4/2017  3:30 PM       Laboratory:  CBC    Recent Labs  Lab 12/07/17  0200   WBC 2.32*   RBC 4.03   HGB 11.6*   HCT 33.8*      MCV 84   MCH 28.8   MCHC 34.3     BMP    Recent Labs  Lab 12/07/17  0200   *  149*   K 3.7  3.7     108   CO2 32*  32*   BUN 27*  27*   CREATININE 1.0  1.0   CALCIUM 9.0  9.0     Microbiology Results (last 7 days)     Procedure Component Value Units Date/Time    Blood culture [[id number]] Collected:  12/04/17 1610    Order Status:  Completed Specimen:  Blood Updated:  12/06/17 1903     Blood Culture, Routine No Growth to date     Blood Culture, Routine No Growth to date     Blood Culture, Routine No Growth to date    Narrative:       Collection has been rescheduled by CMO at 12/4/2017 14:21 Reason:   Patient unavailable  Collection has been rescheduled by CMO at 12/4/2017 14:21 Reason:   Patient unavailable    Blood culture [[id number]] Collected:   12/04/17 1610    Order Status:  Completed Specimen:  Blood Updated:  12/06/17 1903     Blood Culture, Routine No Growth to date     Blood Culture, Routine No Growth to date     Blood Culture, Routine No Growth to date    Narrative:       Collection has been rescheduled by CMO at 12/4/2017 14:21 Reason:   Patient unavailable  Collection has been rescheduled by CMO at 12/4/2017 14:21 Reason:   Patient unavailable    Blood culture x two cultures. Draw prior to antibiotics. [[id number]] Collected:  12/01/17 1418    Order Status:  Completed Specimen:  Blood from Peripheral, Hand, Left Updated:  12/06/17 1503     Blood Culture, Routine No growth after 5 days.    Narrative:       Draw one set from central line if present, draw second  culture from another site.    Blood culture x two cultures. Draw prior to antibiotics. [[id number]] Collected:  12/01/17 1412    Order Status:  Completed Specimen:  Blood from Peripheral, Antecubital, Right Updated:  12/06/17 1503     Blood Culture, Routine No growth after 5 days.    Narrative:       Draw one set from central line if present, draw second  culture from another site.    Respiratory Viral Panel by PCR [facility] ( ); Nasal Swab [[id number]]  (Abnormal) Collected:  12/02/17 1845    Order Status:  Completed Specimen:  Respiratory Updated:  12/06/17 0731     Respiratory Virus Panel, source Nasal Swab     RVP - Adenovirus Not Detected     Comment: Respiratory Viral Panel is a product of [employer].  It has been approved or cleared by the [place]. Food and Drug  Administration for in vitro diagnostic use.  Results should be  used in conjunction with clinical findings, and should not form  the sole basis for a diagnosis or treatment decision.  Negative results do not preclude respiratory virus infection  and should not be used as the sole basis for diagnosis,   treatment, or other management decisions.  Positive results do not rule out bacterial infection, or  co-infection with  other viruses.  The agent detected may not  be the definitive cause of the disease. The use of additional   laboratory testing (e.g. bacterial culture, immunofluorescence,  radiography) and clinical presentation must be taken into  consideration in order to obtain the final diagnosis of   respiratory viral infection.  The RVP assay cannot adequately detect Adenovirus species C,  or serotypes 7a and 41.  The RVP primers for detection of   rhinovirus have been shown to cross-react with enterovirus.  A rhinovirus reactive result should be confirmed by an   alternative method (e.g. cell culture).  The  of the Respiratory Viral Panel has   recommmended that specimens found to be negative for  Adenovirus be confirmed by an alternative method.  The  of the Respiratory Viral Panel has  recommended that specimens found to be negative for   Influenza be confirmed by an alternative method.          Enterovirus Not Detected     Comment: Cross-reactivity has been observed between certain Rhinovirus  strains and the Enterovirus assay.          Human Bocavirus Not Detected     Human Coronavirus Not Detected     Comment: The Human Coronavirus assay detects Human coronavirus types  229E, OC43,NL63 and HKO1.          RVP - Human Metapneumovirus (hMPV) Not Detected     RVP - Influenza A Not Detected     Influenza A - [id number] Positive (A)     RVP - Influenza B Not Detected     Parainfluenza Not Detected     Respiratory Syncytial VirusVirus (RSV) A Not Detected     Comment: The Respiratory Syncytial Viral assay detects types A and B,  however it does not distinguish between the two.          RVP - Rhinovirus Not Detected     Comment: Target Enriched Mulitplex Polymerase Chain Reaction (TEM-PCR)  allows for the detection of multiple pathogens out of a single  reaction.  This test was developed and its performance   characteristics determined by [employer].  It has not   been cleared or approved by the  U.S.Food and Drug Administration.  Results should be used in conjunction with clinical findings,   and should not form the sole basis for a diagnosis or treatment  decision.  TEM-PCR is a licensed technology of [employer].         Narrative:       Receiving Lab:->[facility]    Urine culture [[id number]] Collected:  12/01/17 1336    Order Status:  Completed Specimen:  Urine from Urine, Clean Catch Updated:  12/03/17 0822     Urine Culture, Routine No significant growth          Recent Labs  Lab 12/01/17  1336   COLORU Sri   SPECGRAV 1.025   PHUR 6.0   PROTEINUA Negative   BACTERIA Occasional   NITRITE Negative   LEUKOCYTESUR Trace*   UROBILINOGEN Negative       Diagnostic Results:  Labs: Reviewed  X-Ray: Reviewed  CT: Reviewed    ASSESSMENT/PLAN:     Active Hospital Problems    Diagnosis  POA    *ARDS (adult respiratory distress syndrome) [J80]  Yes    Influenza A (H1N1) [J10.1]  Yes    Accidental drug overdose [T50.901A]  Yes    Acute febrile illness [R50.9]  Yes    Tachycardia [R00.0]  Yes    Tobacco abuse [Z72.0]  Yes     Chronic    History of breast cancer in female [Z85.3]  Not Applicable     Chronic     S/P right breast lumpectomy and chemo,2005      Schizophrenia [F20.9]  Yes     Chronic    Troponin level elevated [R74.8]  Yes    Aspiration pneumonia [J69.0]  Yes      Resolved Hospital Problems    Diagnosis Date Resolved POA   No resolved problems to display.       1. Aspiration pneumonia  2. Schizophrenia  3. Ards, sec to influenza a ?  continue empiric abx- no sputum available  hiv negative  Taper and dc steroids  I added tamiflu   Creatinine stable  Check vanco trough  At 15, drop dose

## 2017-12-07 NOTE — PLAN OF CARE
Problem: Patient Care Overview  Goal: Plan of Care Review  Outcome: Ongoing (interventions implemented as appropriate)  Pt has remained in SR today. Pt still NPO and requesting ice and water frequently even though she has been instructed on NPO status many times today. Pt unable to have MBSS because we are able to wean her from BiPap. We attempted to wean to high flow O2 but pt with in approx 40 minutes with Sats in 80's. Pt remains with resendiz and adequate output.Pt has remained safe and free of injury today. Pt with no skin breakdown noted. Pt is now on isolation for + flu.

## 2017-12-07 NOTE — ASSESSMENT & PLAN NOTE
Per chart reviewed, patient with recurring hypoxic respiratory failure with bilateral infiltrate in 2015 and 2013.  Etiology could be infectious (+influenza) vs non-infectious (i.e.  or hp) vs malignancy (h/o breast cancer).  Currently on broad spectrum antibiotic + antivirals.  Continue with steroid + antibiotic + antiviral.  Will d/w Dr. Wolfe about withholding depakote as there is a case report of depakote + ARDS.  PO2 looks better with BPAP.  Will try high flow again today.

## 2017-12-07 NOTE — NURSING
2040 Spoke with MD Hernandez about pt ordered PO meds, specifically oseltamivir,  while being NPO. MD Hernandez states ok to give meds.

## 2017-12-07 NOTE — PROGRESS NOTES
Progress Note  Infectious Disease    Admit Date: 12/1/2017   LOS: 5 days     SUBJECTIVE:     Follow-up For:  Aspiration pneumonia, now pcr for influenza a positive    Antibiotics     Start     Stop Route Frequency Ordered    12/02/17 0600  piperacillin-tazobactam 4.5 g in sodium chloride 0.9% 100 mL IVPB (ready to mix system)      -- IV Every 8 hours (non-standard times) 12/01/17 2146          Review of Systems:  Review of systems not obtained due to patient factors on bipap.    OBJECTIVE:     Vital Signs (Most Recent)  Temp: 98.9 °F (37.2 °C) (12/06/17 1501)  Pulse: 79 (12/06/17 1700)  Resp: (!) 37 (12/06/17 1700)  BP: (!) 148/72 (12/06/17 1700)  SpO2: 98 % (12/06/17 1700)     Temperature Range Min/Max (Last 24H):  Temp:  [97 °F (36.1 °C)-98.9 °F (37.2 °C)]     I & O (Last 24H):    Intake/Output Summary (Last 24 hours) at 12/06/17 1801  Last data filed at 12/06/17 1600   Gross per 24 hour   Intake             1000 ml   Output             2150 ml   Net            -1150 ml       Physical Exam:  General: well developed, well nourished  Eyes: conjunctivae/corneas clear. PERRL..  HENT: Head:normocephalic, atraumatic. Ears:not examined. Nose: Nares normal. Septum midline. Mucosa normal. No drainage or sinus tenderness., no discharge. Throat: lips, mucosa, and tongue normal; teeth and gums normal and no throat erythema.  Neck: supple, symmetrical, trachea midline, no JVD and thyroid not enlarged, symmetric, no tenderness/mass/nodules  Lungs:  labored breathing, diminished breath sounds bilaterally and rales bilaterally  Cardiovascular: Heart: regular rate and rhythm, S1, S2 normal, no murmur, click, rub or gallop. Chest Wall: no tenderness. Extremities: no cyanosis or edema, or clubbing. Pulses: 2+ and symmetric.  Abdomen/Rectal: Abdomen: soft, non-tender non-distented; bowel sounds normal; no masses,  no organomegaly. Rectal: not examined  Skin: Skin color, texture, turgor normal. No rashes or lesions  Musculoskeletal:no  clubbing, cyanosis    Lines/Drains:       Peripheral IV - Single Lumen 12/02/17 1600 Left Forearm (Active)   Site Assessment Clean;Dry;Intact;No redness;No swelling 12/4/2017  3:30 PM   Line Status Blood return noted;Flushed;Infusing 12/4/2017  3:30 PM   Dressing Status Clean;Intact;Dry 12/4/2017  3:30 PM   Dressing Change Due 12/06/17 12/4/2017  3:30 PM   Site Change Due 12/06/17 12/4/2017  7:52 AM   Reason Not Rotated Not due 12/4/2017  3:30 PM            Peripheral IV - Single Lumen 12/03/17 2018 Left Forearm (Active)   Site Assessment Clean;Dry;Intact;No redness;No swelling 12/4/2017  3:30 PM   Line Status Blood return noted;Flushed;Saline locked 12/4/2017  3:30 PM   Dressing Status Clean;Dry;Intact 12/4/2017  3:30 PM   Dressing Change Due 12/06/17 12/4/2017  3:30 PM   Site Change Due 12/06/17 12/4/2017  7:52 AM   Reason Not Rotated Not due 12/4/2017  3:30 PM       Laboratory:  CBC    Recent Labs  Lab 12/06/17  0203   WBC 2.44*   RBC 4.04   HGB 11.5*   HCT 32.9*      MCV 81*   MCH 28.5   MCHC 35.0     BMP    Recent Labs  Lab 12/06/17  0203      K 3.5      CO2 31*   BUN 24*   CREATININE 1.2   CALCIUM 9.0     Microbiology Results (last 7 days)     Procedure Component Value Units Date/Time    Blood culture x two cultures. Draw prior to antibiotics. [[id number]] Collected:  12/01/17 1418    Order Status:  Completed Specimen:  Blood from Peripheral, Hand, Left Updated:  12/06/17 1503     Blood Culture, Routine No growth after 5 days.    Narrative:       Draw one set from central line if present, draw second  culture from another site.    Blood culture x two cultures. Draw prior to antibiotics. [[id number]] Collected:  12/01/17 1412    Order Status:  Completed Specimen:  Blood from Peripheral, Antecubital, Right Updated:  12/06/17 1503     Blood Culture, Routine No growth after 5 days.    Narrative:       Draw one set from central line if present, draw second  culture from another site.    Respiratory  Viral Panel by PCR [facility] ( ); Nasal Swab [[id number]]  (Abnormal) Collected:  12/02/17 1845    Order Status:  Completed Specimen:  Respiratory Updated:  12/06/17 0731     Respiratory Virus Panel, source Nasal Swab     RVP - Adenovirus Not Detected     Comment: Respiratory Viral Panel is a product of [employer].  It has been approved or cleared by the U.S. Food and Drug  Administration for in vitro diagnostic use.  Results should be  used in conjunction with clinical findings, and should not form  the sole basis for a diagnosis or treatment decision.  Negative results do not preclude respiratory virus infection  and should not be used as the sole basis for diagnosis,   treatment, or other management decisions.  Positive results do not rule out bacterial infection, or  co-infection with other viruses.  The agent detected may not  be the definitive cause of the disease. The use of additional   laboratory testing (e.g. bacterial culture, immunofluorescence,  radiography) and clinical presentation must be taken into  consideration in order to obtain the final diagnosis of   respiratory viral infection.  The RVP assay cannot adequately detect Adenovirus species C,  or serotypes 7a and 41.  The RVP primers for detection of   rhinovirus have been shown to cross-react with enterovirus.  A rhinovirus reactive result should be confirmed by an   alternative method (e.g. cell culture).  The  of the Respiratory Viral Panel has   recommmended that specimens found to be negative for  Adenovirus be confirmed by an alternative method.  The  of the Respiratory Viral Panel has  recommended that specimens found to be negative for   Influenza be confirmed by an alternative method.          Enterovirus Not Detected     Comment: Cross-reactivity has been observed between certain Rhinovirus  strains and the Enterovirus assay.          Human Bocavirus Not Detected     Human Coronavirus Not Detected      Comment: The Human Coronavirus assay detects Human coronavirus types  229E, OC43,NL63 and HKO1.          RVP - Human Metapneumovirus (hMPV) Not Detected     RVP - Influenza A Not Detected     Influenza A - G0V0-67 Positive (A)     RVP - Influenza B Not Detected     Parainfluenza Not Detected     Respiratory Syncytial VirusVirus (RSV) A Not Detected     Comment: The Respiratory Syncytial Viral assay detects types A and B,  however it does not distinguish between the two.          RVP - Rhinovirus Not Detected     Comment: Target Enriched Mulitplex Polymerase Chain Reaction (TEM-PCR)  allows for the detection of multiple pathogens out of a single  reaction.  This test was developed and its performance   characteristics determined by [employer]  It has not   been cleared or approved by the U.S.Food and Drug Administration.  Results should be used in conjunction with clinical findings,   and should not form the sole basis for a diagnosis or treatment  decision.  TEM-PCR is a licensed technology of [employer].         Narrative:       Receiving Lab:->[facility]    Blood culture [[id number]] Collected:  12/04/17 1610    Order Status:  Completed Specimen:  Blood Updated:  12/05/17 1903     Blood Culture, Routine No Growth to date     Blood Culture, Routine No Growth to date    Narrative:       Collection has been rescheduled by CMO at 12/4/2017 14:21 Reason:   Patient unavailable  Collection has been rescheduled by CMO at 12/4/2017 14:21 Reason:   Patient unavailable    Blood culture [[id number]] Collected:  12/04/17 1610    Order Status:  Completed Specimen:  Blood Updated:  12/05/17 1903     Blood Culture, Routine No Growth to date     Blood Culture, Routine No Growth to date    Narrative:       Collection has been rescheduled by CMO at 12/4/2017 14:21 Reason:   Patient unavailable  Collection has been rescheduled by CMO at 12/4/2017 14:21 Reason:   Patient unavailable    Urine culture [[id number]]  Collected:  12/01/17 1336    Order Status:  Completed Specimen:  Urine from Urine, Clean Catch Updated:  12/03/17 0822     Urine Culture, Routine No significant growth          Recent Labs  Lab 12/01/17  1336   COLORU Sri   SPECGRAV 1.025   PHUR 6.0   PROTEINUA Negative   BACTERIA Occasional   NITRITE Negative   LEUKOCYTESUR Trace*   UROBILINOGEN Negative       Diagnostic Results:  Labs: Reviewed  X-Ray: Reviewed  CT: Reviewed    ASSESSMENT/PLAN:     Active Hospital Problems    Diagnosis  POA    *ARDS (adult respiratory distress syndrome) [J80]  Yes    Influenza A (H1N1) [J10.1]  Yes    Accidental drug overdose [T50.901A]  Yes    Acute febrile illness [R50.9]  Yes    Tachycardia [R00.0]  Yes    Tobacco abuse [Z72.0]  Yes     Chronic    History of breast cancer in female [Z85.3]  Not Applicable     Chronic     S/P right breast lumpectomy and chemo,2005      Schizophrenia [F20.9]  Yes     Chronic    Troponin level elevated [R74.8]  Yes    Aspiration pneumonia [J69.0]  Yes      Resolved Hospital Problems    Diagnosis Date Resolved POA   No resolved problems to display.       1. Aspiration pneumonia  2. Schizophrenia  3. Ards, sec to influenza a ?  continue empiric abx- no sputum available  hiv negative  Taper and dc steroids  I added tamiflu   Creatinine slowly rising to 1.2  Check vanco trough  At 15, drop dose

## 2017-12-07 NOTE — PLAN OF CARE
Problem: Patient Care Overview  Goal: Plan of Care Review  Outcome: Ongoing (interventions implemented as appropriate)  Plan of care formulated and reviewed with patient and family. vss stable and afebrile on bipap. Patient was weaned for 40% FIO2 from 70% FIO2. Unable to tolerate hi-flow nasal canula 02. Skin remains intact without complications. Comfort was enhanced and all precautions remain in place.

## 2017-12-07 NOTE — SUBJECTIVE & OBJECTIVE
Interval History: Still slow to wean. Pulm consulted. Flu A positive. Failed high flow trial yesterday.    Review of Systems   Constitutional: Negative.    Respiratory: Positive for shortness of breath. Negative for wheezing.    Cardiovascular: Negative.    Gastrointestinal: Negative.    Genitourinary: Negative.    Musculoskeletal: Negative.    Skin: Negative.    Neurological: Negative.    Psychiatric/Behavioral: Negative.      Objective:     Vital Signs (Most Recent):  Temp: 97 °F (36.1 °C) (12/07/17 1100)  Pulse: 60 (12/07/17 1139)  Resp: (!) 36 (12/07/17 1139)  BP: (!) 151/71 (12/07/17 1059)  SpO2: 99 % (12/07/17 1139) Vital Signs (24h Range):  Temp:  [96.5 °F (35.8 °C)-98.9 °F (37.2 °C)] 97 °F (36.1 °C)  Pulse:  [54-99] 60  Resp:  [17-53] 36  SpO2:  [96 %-100 %] 99 %  BP: (115-158)/() 151/71     Weight: 63.7 kg (140 lb 6.9 oz)  Body mass index is 21.99 kg/m².    Intake/Output Summary (Last 24 hours) at 12/07/17 1328  Last data filed at 12/07/17 0844   Gross per 24 hour   Intake              800 ml   Output              921 ml   Net             -121 ml      Physical Exam   Constitutional: She is oriented to person, place, and time. She appears well-developed. No distress.   Eyes: Conjunctivae and EOM are normal.   Neck: Normal range of motion. No thyromegaly present.   Cardiovascular: Normal heart sounds and intact distal pulses.    Sinus tach   Pulmonary/Chest:   On BiPAP. Decreased air movement   Abdominal: Soft. Bowel sounds are normal.   Musculoskeletal: Normal range of motion. She exhibits no edema.   Neurological: She is alert and oriented to person, place, and time.   Skin: Skin is warm and dry. She is not diaphoretic.   Nursing note and vitals reviewed.      Significant Labs: All pertinent labs within the past 24 hours have been reviewed.    Significant Imaging: I have reviewed and interpreted all pertinent imaging results/findings within the past 24 hours.

## 2017-12-07 NOTE — PROGRESS NOTES
[facility]  Pulmonology  Progress Note    Patient Name: Jossie Crowley  MRN: [id number]  Admission Date: 12/1/2017  Hospital Length of Stay: 6 days  Code Status: Full Code  Attending Provider: Andra Cifuentes MD  Primary Care Provider: Jordyn Owen MD   Principal Problem: ARDS (adult respiratory distress syndrome)    Subjective:     Interval History: patient failed high flow oxygen.  Back on bipap with 70% fio2.  Patient was started on tamiflu per ID.  No  New issue.      Objective:     Vital Signs (Most Recent):  Temp: 97.4 °F (36.3 °C) (12/07/17 0700)  Pulse: 72 (12/07/17 0829)  Resp: (!) 29 (12/07/17 0829)  BP: (!) 145/70 (12/07/17 0803)  SpO2: 97 % (12/07/17 0829) Vital Signs (24h Range):  Temp:  [96.5 °F (35.8 °C)-98.9 °F (37.2 °C)] 97.4 °F (36.3 °C)  Pulse:  [] 72  Resp:  [17-53] 29  SpO2:  [84 %-100 %] 97 %  BP: (115-158)/(55-80) 145/70     Weight: 63.7 kg (140 lb 6.9 oz)  Body mass index is 21.99 kg/m².      Intake/Output Summary (Last 24 hours) at 12/07/17 0857  Last data filed at 12/07/17 0600   Gross per 24 hour   Intake              550 ml   Output             1201 ml   Net             -651 ml       Physical Exam   Constitutional: She is oriented to person, place, and time. She appears well-developed. No distress.   HENT:   Head: Normocephalic.   Nose: Nose normal.   Eyes: Conjunctivae and EOM are normal. Pupils are equal, round, and reactive to light.   Neck: Normal range of motion. Neck supple.   Cardiovascular: Normal rate, regular rhythm and normal heart sounds.    Pulmonary/Chest: Effort normal and breath sounds normal. No respiratory distress. She has no wheezes.   Abdominal: Soft. Bowel sounds are normal. She exhibits no distension.   Musculoskeletal: Normal range of motion. She exhibits no edema.   Neurological: She is alert and oriented to person, place, and time.   Skin: Skin is warm and dry.   Psychiatric: She has a normal mood and affect.       Vents:  Oxygen  Concentration (%): 60 (12/07/17 0832)    Lines/Drains/Airways     Drain                 Urethral Catheter 12/02/17 1545 Straight-tip 16 Fr. 4 days          Peripheral Intravenous Line                 Peripheral IV - Single Lumen 12/07/17 0446 Left Forearm less than 1 day         Peripheral IV - Single Lumen 12/07/17 0447 Right Upper Arm less than 1 day                Significant Labs:    CBC/Anemia Profile:    Recent Labs  Lab 12/06/17  0203 12/07/17  0200   WBC 2.44* 2.32*   HGB 11.5* 11.6*   HCT 32.9* 33.8*    174   MCV 81* 84   RDW 15.6* 16.0*        Chemistries:    Recent Labs  Lab 12/06/17  0203 12/07/17  0200    149*  149*   K 3.5 3.7  3.7    108  108   CO2 31* 32*  32*   BUN 24* 27*  27*   CREATININE 1.2 1.0  1.0   CALCIUM 9.0 9.0  9.0   ALBUMIN  --  2.3*   PROT  --  6.3   BILITOT  --  0.3   ALKPHOS  --  97   ALT  --  75*   AST  --  80*   MG 2.2 2.4       ABGs:   Recent Labs  Lab 12/05/17  1315   PH 7.413   PCO2 52.5*   HCO3 33.5*   POCSATURATED 98   BE 8       Significant Imaging:  none    Assessment/Plan:     * ARDS (adult respiratory distress syndrome)    Per chart reviewed, patient with recurring hypoxic respiratory failure with bilateral infiltrate in 2015 and 2013.  Etiology could be infectious (+influenza) vs non-infectious (i.e.  or hp) vs malignancy (h/o breast cancer).  Currently on broad spectrum antibiotic + antivirals.  Continue with steroid + antibiotic + antiviral.  Will d/w Dr. Wolfe about withholding depakote as there is a case report of depakote + ARDS.  PO2 looks better with BPAP.  Will try high flow again today.                 Selwyn Springer MD  Pulmonology  [facility]

## 2017-12-07 NOTE — PROGRESS NOTES
[facility] Hospital Medicine  Progress Note    Patient Name: Jossie Crowley  MRN: [id number]  Patient Class: IP- Inpatient   Admission Date: 12/1/2017  Length of Stay: 6 days  Attending Physician: Andra Cifuentes MD  Primary Care Provider: Jordyn Owen MD        Subjective:     Principal Problem:ARDS (adult respiratory distress syndrome)    HPI:  58 y/o female with schizophrenia, HLP, hypothyroid, and h/o breast CA who was brought in by daughter for change in mental status. Daughter reported that she suspected the patient took more Depakote than normal. Pt reported to the ER physician that she has been taking Goody's powder and it made her nauseous. She stated that she has been more SOB and had coughing for about a week. This was mainly reported to the ER physician. During my interview, patient only answered few questions, and then was mostly silent and did not want to engage in conversation. All she answered to me was that she had a cough and SOB, and did not answer any other questions. Rest of the hx was via review of medical records and no family was available at the bedside. In the ER, patient had pulse of 145 on presentation and fever of 101.4F. Workup with CTA of chest was negative for PE but was remarkable for diffuse opacities. She treated with IVF and antibiotics, and cultures were drawn.    Hospital Course:  Ms Crowley presented with unintentional overdose with depakote. This is a recurrent issue. Workup significant for aspiration pneumonia that progressed to ARDS. Provided with broad spectrum antibiotics, IVFs and supplemental O2. Upgraded to ICU for increased O2 requirements and placed on BiPAP. Abx and BiPAP continued. Added duo-nebs, diuresis and solumedrol IV. Difficult to wean O2. CXR with edema. Echo w/ EF 55%, +DD. BNP not elevated. Speech evaluated and has high concern for aspiration and recommended MBSS. She continues to require to much supplemental O2 to have MBSS and CXR  remained unchanged. Pulmonology consulted. Labs for hypersensitivity pneumonitis and CTD eval pending. Noted to be flu A positive. ID started Tamiflu 12/6. Hyponatremia noted, likely from free water deficit as she has been NPO and not on fluids 2/2 ARDS.      Interval History: Still slow to wean. Pulm consulted. Flu A positive. Failed high flow trial yesterday.    Review of Systems   Constitutional: Negative.    Respiratory: Positive for shortness of breath. Negative for wheezing.    Cardiovascular: Negative.    Gastrointestinal: Negative.    Genitourinary: Negative.    Musculoskeletal: Negative.    Skin: Negative.    Neurological: Negative.    Psychiatric/Behavioral: Negative.      Objective:     Vital Signs (Most Recent):  Temp: 97 °F (36.1 °C) (12/07/17 1100)  Pulse: 60 (12/07/17 1139)  Resp: (!) 36 (12/07/17 1139)  BP: (!) 151/71 (12/07/17 1059)  SpO2: 99 % (12/07/17 1139) Vital Signs (24h Range):  Temp:  [96.5 °F (35.8 °C)-98.9 °F (37.2 °C)] 97 °F (36.1 °C)  Pulse:  [54-99] 60  Resp:  [17-53] 36  SpO2:  [96 %-100 %] 99 %  BP: (115-158)/() 151/71     Weight: 63.7 kg (140 lb 6.9 oz)  Body mass index is 21.99 kg/m².    Intake/Output Summary (Last 24 hours) at 12/07/17 1328  Last data filed at 12/07/17 0844   Gross per 24 hour   Intake              800 ml   Output              921 ml   Net             -121 ml      Physical Exam   Constitutional: She is oriented to person, place, and time. She appears well-developed. No distress.   Eyes: Conjunctivae and EOM are normal.   Neck: Normal range of motion. No thyromegaly present.   Cardiovascular: Normal heart sounds and intact distal pulses.    Sinus tach   Pulmonary/Chest:   On BiPAP. Decreased air movement   Abdominal: Soft. Bowel sounds are normal.   Musculoskeletal: Normal range of motion. She exhibits no edema.   Neurological: She is alert and oriented to person, place, and time.   Skin: Skin is warm and dry. She is not diaphoretic.   Nursing note and vitals  reviewed.      Significant Labs: All pertinent labs within the past 24 hours have been reviewed.    Significant Imaging: I have reviewed and interpreted all pertinent imaging results/findings within the past 24 hours.    Assessment/Plan:      * ARDS (adult respiratory distress syndrome)    Pt met criteria for sepsis with fever, tachycardia and infection source of multilobar pneumonia. Pt was treated empirically with Zosyn/Cipro/Vancomycin and IVF, and cultures were drawn. NEBS and O2 via BiPAP. Difficult to wean from BiPAP. Stopped fluids 12/3 and diuresed, but this did not seem to make any improvement and renal function started to elevate so diuresis held. Echo w/ some DD, but BNP not elevated. Appreciate pulm recs. Maybe depakote related? Will hold.        Aspiration pneumonia    CTA remarkable for diffuse bilateral patchy airspace opacities treated with antibiotics as discussed above. I am concerned she aspirated. ID consulted given the unusual appearance of findings and for further fine tuning of antibiotics regimen. Continue current broad regimen. BCx NGTD. Respiratory panel with Flu A. ID started on Tamiflu. Droplet isolation precautions.        Acute febrile illness    As above        Influenza A (H1N1)    As above        Accidental drug overdose    Suspected over use of depakote. Unintentional. This was the reason for her similar presentation one year ago.         Schizophrenia    Continue home medication regimen.        Tachycardia    Secondary to sepsis, hypoxia and expected physiologic response        Troponin level elevated    Likely secondary to strain with sepsis and not ACS, but will trend out markers and monitor on telemetry. Echo.        History of breast cancer in female    No acute issues; s/p right breast lumpectomy followed by chemotherapy in 2005. Will need to refer to Hem/Onc as outpatient for follow up.        Tobacco abuse    Smoking cessation counseling will be addressed prior to discharge.            VTE Risk Mitigation         Ordered     enoxaparin injection 40 mg  Daily     Route:  Subcutaneous        12/02/17 0213     Medium Risk of VTE  Once      12/01/17 2146     Place sequential compression device  Until discontinued      12/01/17 2146     Place OMAR hose  Until discontinued      12/01/17 2146          Critical care time spent on the evaluation and treatment of severe organ dysfunction, review of pertinent labs and imaging studies, discussions with consulting providers and discussions with patient/family: 35 minutes.    Andra Cifuentes MD  Department of Hospital Medicine   [facility]

## 2017-12-07 NOTE — EICU
Pt reviewed on eICU rounds.  H1N1 ARDS.  Georgina BiPAP 15/8 well.  RR 27, SPo2 99%.  No interval recs but low threshold to intubate if worsens given anticipated slow recovery of influenza ARDS.

## 2017-12-07 NOTE — PROGRESS NOTES
Patient placed on high flow nasal cannula 30 lpm at 100% at 1159.  Saturation fell to 86%.  Increased liter flow to 35%.  1118 saturation 84% on 35lpm 100% high flow nasal cannula, patient placed back on BIPAP 15/8/50%

## 2017-12-07 NOTE — ASSESSMENT & PLAN NOTE
CTA remarkable for diffuse bilateral patchy airspace opacities treated with antibiotics as discussed above. I am concerned she aspirated. ID consulted given the unusual appearance of findings and for further fine tuning of antibiotics regimen. Continue current broad regimen. BCx NGTD. Respiratory panel with Flu A. ID started on Tamiflu. Droplet isolation precautions.

## 2017-12-07 NOTE — SUBJECTIVE & OBJECTIVE
Interval History: patient failed high flow oxygen.  Back on bipap with 70% fio2.  Patient was started on tamiflu per ID.  No  New issue.      Objective:     Vital Signs (Most Recent):  Temp: 97.4 °F (36.3 °C) (12/07/17 0700)  Pulse: 72 (12/07/17 0829)  Resp: (!) 29 (12/07/17 0829)  BP: (!) 145/70 (12/07/17 0803)  SpO2: 97 % (12/07/17 0829) Vital Signs (24h Range):  Temp:  [96.5 °F (35.8 °C)-98.9 °F (37.2 °C)] 97.4 °F (36.3 °C)  Pulse:  [] 72  Resp:  [17-53] 29  SpO2:  [84 %-100 %] 97 %  BP: (115-158)/(55-80) 145/70     Weight: 63.7 kg (140 lb 6.9 oz)  Body mass index is 21.99 kg/m².      Intake/Output Summary (Last 24 hours) at 12/07/17 0857  Last data filed at 12/07/17 0600   Gross per 24 hour   Intake              550 ml   Output             1201 ml   Net             -651 ml       Physical Exam   Constitutional: She is oriented to person, place, and time. She appears well-developed. No distress.   HENT:   Head: Normocephalic.   Nose: Nose normal.   Eyes: Conjunctivae and EOM are normal. Pupils are equal, round, and reactive to light.   Neck: Normal range of motion. Neck supple.   Cardiovascular: Normal rate, regular rhythm and normal heart sounds.    Pulmonary/Chest: Effort normal and breath sounds normal. No respiratory distress. She has no wheezes.   Abdominal: Soft. Bowel sounds are normal. She exhibits no distension.   Musculoskeletal: Normal range of motion. She exhibits no edema.   Neurological: She is alert and oriented to person, place, and time.   Skin: Skin is warm and dry.   Psychiatric: She has a normal mood and affect.       Vents:  Oxygen Concentration (%): 60 (12/07/17 0832)    Lines/Drains/Airways     Drain                 Urethral Catheter 12/02/17 1545 Straight-tip 16 Fr. 4 days          Peripheral Intravenous Line                 Peripheral IV - Single Lumen 12/07/17 0446 Left Forearm less than 1 day         Peripheral IV - Single Lumen 12/07/17 0447 Right Upper Arm less than 1 day                 Significant Labs:    CBC/Anemia Profile:    Recent Labs  Lab 12/06/17  0203 12/07/17  0200   WBC 2.44* 2.32*   HGB 11.5* 11.6*   HCT 32.9* 33.8*    174   MCV 81* 84   RDW 15.6* 16.0*        Chemistries:    Recent Labs  Lab 12/06/17  0203 12/07/17  0200    149*  149*   K 3.5 3.7  3.7    108  108   CO2 31* 32*  32*   BUN 24* 27*  27*   CREATININE 1.2 1.0  1.0   CALCIUM 9.0 9.0  9.0   ALBUMIN  --  2.3*   PROT  --  6.3   BILITOT  --  0.3   ALKPHOS  --  97   ALT  --  75*   AST  --  80*   MG 2.2 2.4       ABGs:   Recent Labs  Lab 12/05/17  1315   PH 7.413   PCO2 52.5*   HCO3 33.5*   POCSATURATED 98   BE 8       Significant Imaging:  none

## 2017-12-08 LAB
ANION GAP SERPL CALC-SCNC: 10 MMOL/L
BASOPHILS # BLD AUTO: 0.06 K/UL
BASOPHILS NFR BLD: 1.6 %
BUN SERPL-MCNC: 25 MG/DL
CALCIUM SERPL-MCNC: 8.5 MG/DL
CHLORIDE SERPL-SCNC: 109 MMOL/L
CO2 SERPL-SCNC: 30 MMOL/L
CREAT SERPL-MCNC: 0.9 MG/DL
DIFFERENTIAL METHOD: ABNORMAL
EOSINOPHIL # BLD AUTO: 0 K/UL
EOSINOPHIL NFR BLD: 0 %
ERYTHROCYTE [DISTWIDTH] IN BLOOD BY AUTOMATED COUNT: 15.9 %
EST. GFR  (AFRICAN AMERICAN): >60 ML/MIN/1.73 M^2
EST. GFR  (NON AFRICAN AMERICAN): >60 ML/MIN/1.73 M^2
GLUCOSE SERPL-MCNC: 211 MG/DL
HCT VFR BLD AUTO: 31.2 %
HGB BLD-MCNC: 10.6 G/DL
LYMPHOCYTES # BLD AUTO: 0.8 K/UL
LYMPHOCYTES NFR BLD: 20.3 %
MAGNESIUM SERPL-MCNC: 2.3 MG/DL
MCH RBC QN AUTO: 28.1 PG
MCHC RBC AUTO-ENTMCNC: 34 G/DL
MCV RBC AUTO: 83 FL
MONOCYTES # BLD AUTO: 0.2 K/UL
MONOCYTES NFR BLD: 6.2 %
NEUTROPHILS # BLD AUTO: 2.8 K/UL
NEUTROPHILS NFR BLD: 71.9 %
PLATELET # BLD AUTO: 186 K/UL
PMV BLD AUTO: 11.4 FL
POTASSIUM SERPL-SCNC: 3.6 MMOL/L
RBC # BLD AUTO: 3.77 M/UL
SODIUM SERPL-SCNC: 149 MMOL/L
VANCOMYCIN TROUGH SERPL-MCNC: 20 UG/ML
WBC # BLD AUTO: 3.85 K/UL

## 2017-12-08 PROCEDURE — 90792 PSYCH DIAG EVAL W/MED SRVCS: CPT | Mod: ,,, | Performed by: PSYCHIATRY & NEUROLOGY

## 2017-12-08 PROCEDURE — 25000003 PHARM REV CODE 250: Performed by: INTERNAL MEDICINE

## 2017-12-08 PROCEDURE — 94660 CPAP INITIATION&MGMT: CPT

## 2017-12-08 PROCEDURE — 85025 COMPLETE CBC W/AUTO DIFF WBC: CPT

## 2017-12-08 PROCEDURE — 99233 SBSQ HOSP IP/OBS HIGH 50: CPT | Mod: ,,, | Performed by: INTERNAL MEDICINE

## 2017-12-08 PROCEDURE — 63600175 PHARM REV CODE 636 W HCPCS: Performed by: INTERNAL MEDICINE

## 2017-12-08 PROCEDURE — 27100171 HC OXYGEN HIGH FLOW UP TO 24 HOURS

## 2017-12-08 PROCEDURE — 25000242 PHARM REV CODE 250 ALT 637 W/ HCPCS: Performed by: INTERNAL MEDICINE

## 2017-12-08 PROCEDURE — 36415 COLL VENOUS BLD VENIPUNCTURE: CPT

## 2017-12-08 PROCEDURE — 20000000 HC ICU ROOM

## 2017-12-08 PROCEDURE — G8996 SWALLOW CURRENT STATUS: HCPCS | Mod: CI

## 2017-12-08 PROCEDURE — 63600175 PHARM REV CODE 636 W HCPCS: Performed by: EMERGENCY MEDICINE

## 2017-12-08 PROCEDURE — 80048 BASIC METABOLIC PNL TOTAL CA: CPT

## 2017-12-08 PROCEDURE — 94640 AIRWAY INHALATION TREATMENT: CPT

## 2017-12-08 PROCEDURE — 83735 ASSAY OF MAGNESIUM: CPT

## 2017-12-08 PROCEDURE — 27100092 HC HIGH FLOW DELIVERY CANNULA

## 2017-12-08 PROCEDURE — 63600175 PHARM REV CODE 636 W HCPCS: Performed by: HOSPITALIST

## 2017-12-08 PROCEDURE — 80202 ASSAY OF VANCOMYCIN: CPT

## 2017-12-08 PROCEDURE — 94761 N-INVAS EAR/PLS OXIMETRY MLT: CPT

## 2017-12-08 PROCEDURE — G8997 SWALLOW GOAL STATUS: HCPCS | Mod: CI

## 2017-12-08 PROCEDURE — 92526 ORAL FUNCTION THERAPY: CPT

## 2017-12-08 PROCEDURE — 99900035 HC TECH TIME PER 15 MIN (STAT)

## 2017-12-08 RX ADMIN — PIPERACILLIN AND TAZOBACTAM 4.5 G: 4; .5 INJECTION, POWDER, LYOPHILIZED, FOR SOLUTION INTRAVENOUS; PARENTERAL at 09:12

## 2017-12-08 RX ADMIN — LEVALBUTEROL 1.25 MG: 1.25 SOLUTION, CONCENTRATE RESPIRATORY (INHALATION) at 02:12

## 2017-12-08 RX ADMIN — LEVALBUTEROL 1.25 MG: 1.25 SOLUTION, CONCENTRATE RESPIRATORY (INHALATION) at 08:12

## 2017-12-08 RX ADMIN — ACETAMINOPHEN 650 MG: 325 TABLET ORAL at 04:12

## 2017-12-08 RX ADMIN — LEVALBUTEROL 1.25 MG: 1.25 SOLUTION, CONCENTRATE RESPIRATORY (INHALATION) at 07:12

## 2017-12-08 RX ADMIN — LOPERAMIDE HYDROCHLORIDE 2 MG: 2 CAPSULE ORAL at 01:12

## 2017-12-08 RX ADMIN — PIPERACILLIN AND TAZOBACTAM 4.5 G: 4; .5 INJECTION, POWDER, LYOPHILIZED, FOR SOLUTION INTRAVENOUS; PARENTERAL at 06:12

## 2017-12-08 RX ADMIN — OSELTAMIVIR PHOSPHATE 75 MG: 75 CAPSULE ORAL at 08:12

## 2017-12-08 RX ADMIN — METHYLPREDNISOLONE SODIUM SUCCINATE 40 MG: 125 INJECTION, POWDER, FOR SOLUTION INTRAMUSCULAR; INTRAVENOUS at 06:12

## 2017-12-08 RX ADMIN — METHYLPREDNISOLONE SODIUM SUCCINATE 40 MG: 40 INJECTION, POWDER, FOR SOLUTION INTRAMUSCULAR; INTRAVENOUS at 08:12

## 2017-12-08 RX ADMIN — ENOXAPARIN SODIUM 40 MG: 100 INJECTION SUBCUTANEOUS at 05:12

## 2017-12-08 RX ADMIN — PIPERACILLIN AND TAZOBACTAM 4.5 G: 4; .5 INJECTION, POWDER, LYOPHILIZED, FOR SOLUTION INTRAVENOUS; PARENTERAL at 01:12

## 2017-12-08 RX ADMIN — VANCOMYCIN HYDROCHLORIDE 1250 MG: 1 INJECTION, POWDER, LYOPHILIZED, FOR SOLUTION INTRAVENOUS at 08:12

## 2017-12-08 RX ADMIN — LEVALBUTEROL 1.25 MG: 1.25 SOLUTION, CONCENTRATE RESPIRATORY (INHALATION) at 01:12

## 2017-12-08 RX ADMIN — VANCOMYCIN HYDROCHLORIDE 1250 MG: 1 INJECTION, POWDER, LYOPHILIZED, FOR SOLUTION INTRAVENOUS at 07:12

## 2017-12-08 NOTE — SUBJECTIVE & OBJECTIVE
Interval History: no acute issue.  Switched back to bipap after 30 minutes of HFNC.      Objective:     Vital Signs (Most Recent):  Temp: 96.4 °F (35.8 °C) (12/08/17 0300)  Pulse: 84 (12/08/17 0809)  Resp: (!) 49 (12/08/17 0809)  BP: (!) 163/78 (12/08/17 0805)  SpO2: 97 % (12/08/17 0809) Vital Signs (24h Range):  Temp:  [96.4 °F (35.8 °C)-97.5 °F (36.4 °C)] 96.4 °F (35.8 °C)  Pulse:  [] 84  Resp:  [17-49] 49  SpO2:  [93 %-100 %] 97 %  BP: (128-180)/(63-86) 163/78     Weight: 63.7 kg (140 lb 6.9 oz)  Body mass index is 21.99 kg/m².      Intake/Output Summary (Last 24 hours) at 12/08/17 0905  Last data filed at 12/08/17 0700   Gross per 24 hour   Intake          1431.25 ml   Output             1120 ml   Net           311.25 ml       Physical Exam   Constitutional: She is oriented to person, place, and time. She appears well-developed. No distress.   HENT:   Head: Normocephalic.   Nose: Nose normal.   Eyes: Conjunctivae and EOM are normal. Pupils are equal, round, and reactive to light.   Neck: Normal range of motion. Neck supple.   Cardiovascular: Normal rate, regular rhythm and normal heart sounds.    Pulmonary/Chest: Effort normal and breath sounds normal. No respiratory distress. She has no wheezes.   Abdominal: Soft. Bowel sounds are normal. She exhibits no distension.   Musculoskeletal: Normal range of motion. She exhibits no edema.   Neurological: She is alert and oriented to person, place, and time.   Skin: Skin is warm and dry.   Psychiatric: She has a normal mood and affect.       Vents:  Oxygen Concentration (%): 60 (12/08/17 0808)    Lines/Drains/Airways     Drain                 Urethral Catheter 12/02/17 1545 Straight-tip 16 Fr. 5 days          Peripheral Intravenous Line                 Peripheral IV - Single Lumen 12/07/17 0446 Left Forearm 1 day         Peripheral IV - Single Lumen 12/07/17 0447 Left Upper Arm 1 day                Significant Labs:    CBC/Anemia Profile:    Recent Labs  Lab  12/07/17  0200 12/08/17  0241   WBC 2.32* 3.85*   HGB 11.6* 10.6*   HCT 33.8* 31.2*    186   MCV 84 83   RDW 16.0* 15.9*        Chemistries:    Recent Labs  Lab 12/07/17  0200 12/08/17  0241   *  149* 149*   K 3.7  3.7 3.6     108 109   CO2 32*  32* 30*   BUN 27*  27* 25*   CREATININE 1.0  1.0 0.9   CALCIUM 9.0  9.0 8.5*   ALBUMIN 2.3*  --    PROT 6.3  --    BILITOT 0.3  --    ALKPHOS 97  --    ALT 75*  --    AST 80*  --    MG 2.4 2.3       ABGs:   Recent Labs  Lab 12/07/17  0900   PH 7.422   PCO2 54.7*   HCO3 35.7*   POCSATURATED 97   BE 10       Significant Imaging:  none

## 2017-12-08 NOTE — PT/OT/SLP EVAL
"Speech Language Pathology Evaluation  Bedside Swallow Reassess    Patient Name:  Jossie Crowley   MRN:  [id number]  Admitting Diagnosis: ARDS (adult respiratory distress syndrome)    Recommendations:                 General Recommendations:  Dysphagia therapy  Diet recommendations:  Dental Soft, Thin   Aspiration Precautions: HOB to 90 degrees and Standard aspiration precautions   General Precautions: Standard, aspiration  Communication strategies:  none    History:     Past Medical History:   Diagnosis Date    Breast cancer     Hyperlipidemia     Schizophrenia     Thyroid disease     thyroid surgery       Past Surgical History:   Procedure Laterality Date    BREAST BIOPSY      BREAST LUMPECTOMY      BREAST SURGERY      THYROIDECTOMY  2005       Social History: (I) for ADLs    Chest X-Rays: concern for aspiration PNA    Prior diet: unrestricted      Subjective   "I can't eat sandwiches because of my dentures" (upper dentures only) Significant improvement in oral motor strength as compared to initial eval. Results of reassess were discussed with Pt; she is requesting dental soft diet.   Patient goals: d/c admit    Objective:     Oral Musculature Evaluation  · Oral Musculature: WFL  · Dentition: upper dentures  · Secretion Management: coughing on secretions  · Mucosal Quality: dry, sticky  · Mandibular Strength and Mobility:  (general weakness)  · Oral Labial Strength and Mobility: WFL  · Lingual Strength and Mobility: WFL  · Velar Elevation: WFL  · Buccal Strength and Mobility: WFL  · Volitional Swallow:  (timely upon palpation)  · Voice Prior to PO Intake:  (low volume, clear)    Bedside Swallow Eval:   Consistencies Assessed:  · Thin liquids 8oz self presented via straw; multiple trials  · Puree 4oz; self presented via spoon; multiple trials  · Solids X3 bites (cracker)     Oral Phase:   · WFL   · Trace-mild increased oral prep with solids (Pt with upper dentures only)     Pharyngeal Phase:   · no overt " clinical signs/symptoms of aspiration   · Timely swallow across consistencies  · laryngeal excursion was palpated and deemed adequate for airway protection    Compensatory Strategies  · None    Treatment: monitor for diet tolerance    Assessment:     Jossie Crowley is a 57 y.o. female with dx of ARS.  She presents with resolving oropharyngeal dysphagia now without overt s/s of aspiraiton. Please note silent aspiration cannot be r/o at bedside. Please note silent aspiraiton cannot be r/o at bedside. MBSS has been place on hold secondary to Pt's isolation status however based on today's performance MBSS is not recommended at this time.     Goals:    SLP Goals        Problem: SLP Goal    Goal Priority Disciplines Outcome   SLP Goal     SLP Ongoing (interventions implemented as appropriate)   Description:  Short Term Goals:  1. Pt will participate in BSS to determine least restrictive diet. (GOAL MET 12/8)  2. Pt will participate in MBSS to objectively r/o aspiration and to determine least restrictive po diet. (GOAL DISCONTINUED 12/8)    Updated goal  1. Pt will tolerate regular consistencies with thin liquids without overt s/s of aspiraiton (GOAL MET 12/8)                    Plan:     · Patient to be seen:  3 x/week   · Plan of Care expires:  12/08/17  · Plan of Care reviewed with:  patient   · SLP Follow-Up:  No       Discharge recommendations:   (TBD)   Barriers to Discharge: none from ST standpoint    Time Tracking:     SLP Treatment Date:   12/08/17  Speech Start Time:  1140  Speech Stop Time:  1210     Speech Total Time (min):  30 min    Billable Minutes: Treatment Swallowing Dysfunction 30    Marcela Richardson CCC-SLP  12/08/2017

## 2017-12-08 NOTE — SUBJECTIVE & OBJECTIVE
Patient History           Medical as of 12/8/2017     Past Medical History     Diagnosis Date Comments Source    Breast cancer -- -- Provider    History of psychiatric hospitalization -- -- Provider    Hx of psychiatric care -- -- Provider    Hyperlipidemia -- -- Provider    Psychiatric problem -- -- Provider    Schizophrenia -- -- Provider    Therapy -- -- Provider    Thyroid disease -- thyroid surgery Provider          Pertinent Negatives     Diagnosis Date Noted Comments Source    Suicide attempt 12/8/2017 -- Provider                  Surgical as of 12/8/2017     Past Surgical History     Procedure Laterality Date Comments Source    THYROIDECTOMY -- 2005 -- Provider    BREAST SURGERY -- -- -- Provider    BREAST BIOPSY -- -- -- Provider    BREAST LUMPECTOMY -- -- -- Provider                  Family as of 12/8/2017     Problem Relation Name Age of Onset Comments Source    Cancer Neg Hx -- -- -- Provider            Tobacco Use as of 12/8/2017     Smoking Status Smoking Start Date Smoking Quit Date Packs/day Years Used    Current Every Day Smoker -- -- 0.50 37.00    Types Comments Smokeless Tobacco Status Smokeless Tobacco Quit Date Source     Cigarettes -- Former User 12/27/2014 Provider            Alcohol Use as of 12/8/2017     Alcohol Use Drinks/Week Alcohol/Week Comments Source    No -- -- -- Provider            Drug Use as of 12/8/2017     Drug Use Types Frequency Comments Source    No -- -- -- Provider            Sexual Activity as of 12/8/2017     Sexually Active Birth Control Partners Comments Source    -- -- -- -- Provider            Activities of Daily Living as of 12/8/2017     Activities of Daily Living Question Response Comments Source    Patient feels they ought to cut down on drinking/drug use Not Asked -- Provider    Patient annoyed by others criticizing their drinking/drug use Not Asked -- Provider    Patient has felt bad or guilty about drinking/drug use Not Asked -- Provider    Patient has  had a drink/used drugs as an eye opener in the AM Not Asked -- Provider            Social Documentation as of 12/8/2017    **None**           Occupational as of 12/8/2017    **None**           Socioeconomic as of 12/8/2017     Marital Status Spouse Name Number of Children Years Education Preferred Language Ethnicity Race Source    Single -- 1 -- English /Black Black or  Provider         Pertinent History Q A Comments    as of 12/8/2017 Lives with family sister    Place in Birth Order      Lives in home     Number of Siblings      Raised by      Legal Involvement none     Childhood Trauma      Criminal History of none     Financial Status disabled     Highest Level of Education      Does patient have access to a firearm? No      Service      Primary Leisure Activity      Spirituality non-practicing      Past Medical History:   Diagnosis Date    Breast cancer     History of psychiatric hospitalization     Hx of psychiatric care     Hyperlipidemia     Psychiatric problem     Schizophrenia     Therapy     Thyroid disease     thyroid surgery     Past Surgical History:   Procedure Laterality Date    BREAST BIOPSY      BREAST LUMPECTOMY      BREAST SURGERY      THYROIDECTOMY  2005     Family History     None        Social History Main Topics    Smoking status: Current Every Day Smoker     Packs/day: 0.50     Years: 37.00     Types: Cigarettes    Smokeless tobacco: Former User     Quit date: 12/27/2014    Alcohol use No    Drug use: No    Sexual activity: Not on file     Review of patient's allergies indicates:  No Known Allergies    No current facility-administered medications on file prior to encounter.      Current Outpatient Prescriptions on File Prior to Encounter   Medication Sig    divalproex (DEPAKOTE) 250 MG 24 hr tablet Take 250 mg by mouth once daily. Take 2 tabs in morning and 3 in evening     albuterol 90 mcg/actuation HFAA Inhale 2 puffs into the lungs  "every 4 (four) hours as needed (shortness of breath).    docusate sodium (COLACE) 100 MG capsule Take 100 mg by mouth. Once daily     predniSONE (DELTASONE) 10 MG tablet 4 tabs po x 2 days, 2tabs po x 2days, 1tab  Po x 3 days and stop    trazodone (DESYREL) 150 MG tablet Take 150 mg by mouth every evening.      Psychotherapeutics     None        Review of Systems   Constitutional: Negative for activity change and appetite change.   Respiratory: Positive for shortness of breath.    Cardiovascular: Negative for chest pain.   Gastrointestinal: Negative for nausea.   Musculoskeletal: Negative for arthralgias and myalgias.   Psychiatric/Behavioral: Positive for confusion. Negative for dysphoric mood, sleep disturbance and suicidal ideas. The patient is not nervous/anxious.      Strengths and Liabilities: Liability: Patient is impulsive., Liability: Patient lacks social skills., Liability: Patient has poor health., Liability: Patient has possible cognitive impairment.    Objective:     Vital Signs (Most Recent):  Temp: 97.2 °F (36.2 °C) (12/08/17 1500)  Pulse: 92 (12/08/17 1530)  Resp: (!) 31 (12/08/17 1530)  BP: (!) 157/73 (12/08/17 1528)  SpO2: (!) 85 % (12/08/17 1530) Vital Signs (24h Range):  Temp:  [96.4 °F (35.8 °C)-97.5 °F (36.4 °C)] 97.2 °F (36.2 °C)  Pulse:  [] 92  Resp:  [17-49] 31  SpO2:  [85 %-100 %] 85 %  BP: (130-180)/(63-86) 157/73     Height: 5' 7" (170.2 cm)  Weight: 63.7 kg (140 lb 6.9 oz)  Body mass index is 21.99 kg/m².      Intake/Output Summary (Last 24 hours) at 12/08/17 1640  Last data filed at 12/08/17 1500   Gross per 24 hour   Intake          2186.25 ml   Output             1140 ml   Net          1046.25 ml       Physical Exam   Psychiatric:   EXAMINATION    CONSTITUTIONAL  General Appearance: Hospital attire, restless in an ICU bed    MUSCULOSKELETAL  Muscle Strength and Tone: Normal  Abnormal Involuntary Movements: Restless appearing  Gait and Station: Not observed    PSYCHIATRIC " "MENTAL STATUS EXAM   Level of Consciousness: Awake and alert  Orientation: Name and "hospital"  Grooming: Limited  Psychomotor Behavior: Somewhat restless  Speech: Soft and delayed  Language: No abnormalities  Mood: "Okay"  Affect: Blunt and oddly related  Thought Process: [place]  Associations: Intact  Thought Content: Denies suicidal/homicidal/psychosis  Memory: Somewhat intact to recent and remote but very simple  Attention: Diminished  Fund of Knowledge: Intact for simple conversation  Insight: Fair towards mental illness  Judgment: Limited towards compliance and redirection         Significant Labs:   Last 24 Hours:   Recent Lab Results       12/08/17  0707 12/08/17  0241      Anion Gap  10     Baso #  0.06     Basophil%  1.6     BUN, Bld  25(H)     Calcium  8.5(L)     Chloride  109     CO2  30(H)     Creatinine  0.9     Differential Method  Automated     eGFR if   >60     eGFR if non   >60  Comment:  Calculation used to obtain the estimated glomerular filtration  rate (eGFR) is the CKD-EPI equation.        Eos #  0.0     Eosinophil%  0.0     Glucose  211(H)     Gran #  2.8     Gran%  71.9     Hematocrit  31.2(L)     Hemoglobin  10.6(L)     Lymph #  0.8(L)     Lymph%  20.3     Magnesium  2.3     MCH  28.1     MCHC  34.0     MCV  83     Mono #  0.2(L)     Mono%  6.2     MPV  11.4     Platelets  186     Potassium  3.6     RBC  3.77(L)     RDW  15.9(H)     Sodium  149(H)     Vancomycin-Trough 20.0      WBC  3.85(L)         All pertinent labs within the past 24 hours have been reviewed.    Significant Imaging: I have reviewed all pertinent imaging results/findings within the past 24 hours.  "

## 2017-12-08 NOTE — PLAN OF CARE
12/08/17 1535   Discharge Reassessment   Assessment Type Discharge Planning Reassessment   Provided patient/caregiver education on the expected discharge date and the discharge plan No   Do you have any problems affording any of your prescribed medications? No   Discharge Plan A Home with family   Discharge Plan B Home with family;Other  (to be determined)   Patient choice form signed by patient/caregiver No   Can the patient answer the patient profile reliably? Yes, cognitively intact  (but on BIPAP vs High flow with limited ability to communicate)   patient remains in ICU, varies between BIPAP and high flow O2. SW will follow in ICU and assist as needed.

## 2017-12-08 NOTE — PROGRESS NOTES
Progress Note  Infectious Disease    Admit Date: 12/1/2017   LOS: 7 days     SUBJECTIVE:     Follow-up For:  Aspiration pneumonia, now pcr for influenza a positive    Antibiotics     Start     Stop Route Frequency Ordered    12/06/17 1944  vancomycin (VANCOCIN) 1,250 mg in dextrose 5 % 250 mL IVPB  (Vancomycin IVPB with levels panel)      -- IV Every 12 hours (non-standard times) 12/06/17 1808    12/02/17 0600  piperacillin-tazobactam 4.5 g in sodium chloride 0.9% 100 mL IVPB (ready to mix system)      -- IV Every 8 hours (non-standard times) 12/01/17 2146          Review of Systems:  Review of systems not obtained due to patient factors on bipap.    OBJECTIVE:     Vital Signs (Most Recent)  Temp: 97 °F (36.1 °C) (12/08/17 1100)  Pulse: 60 (12/08/17 1211)  Resp: 18 (12/08/17 1211)  BP: (!) 157/67 (12/08/17 1204)  SpO2: 95 % (12/08/17 1211)     Temperature Range Min/Max (Last 24H):  Temp:  [96.4 °F (35.8 °C)-97.5 °F (36.4 °C)]     I & O (Last 24H):    Intake/Output Summary (Last 24 hours) at 12/08/17 1352  Last data filed at 12/08/17 1200   Gross per 24 hour   Intake          1961.25 ml   Output             1205 ml   Net           756.25 ml       Physical Exam:  General: well developed, well nourished  Eyes: conjunctivae/corneas clear. PERRL..  HENT: Head:normocephalic, atraumatic. Ears:not examined. Nose: Nares normal. Septum midline. Mucosa normal. No drainage or sinus tenderness., no discharge. Throat: lips, mucosa, and tongue normal; teeth and gums normal and no throat erythema.  Neck: supple, symmetrical, trachea midline, no JVD and thyroid not enlarged, symmetric, no tenderness/mass/nodules  Lungs:  labored breathing, diminished breath sounds bilaterally and rales bilaterally  Cardiovascular: Heart: regular rate and rhythm, S1, S2 normal, no murmur, click, rub or gallop. Chest Wall: no tenderness. Extremities: no cyanosis or edema, or clubbing. Pulses: 2+ and symmetric.  Abdomen/Rectal: Abdomen: soft,  non-tender non-distented; bowel sounds normal; no masses,  no organomegaly. Rectal: not examined  Skin: Skin color, texture, turgor normal. No rashes or lesions  Musculoskeletal:no clubbing, cyanosis    Lines/Drains:       Peripheral IV - Single Lumen 12/02/17 1600 Left Forearm (Active)   Site Assessment Clean;Dry;Intact;No redness;No swelling 12/4/2017  3:30 PM   Line Status Blood return noted;Flushed;Infusing 12/4/2017  3:30 PM   Dressing Status Clean;Intact;Dry 12/4/2017  3:30 PM   Dressing Change Due 12/06/17 12/4/2017  3:30 PM   Site Change Due 12/06/17 12/4/2017  7:52 AM   Reason Not Rotated Not due 12/4/2017  3:30 PM            Peripheral IV - Single Lumen 12/03/17 2018 Left Forearm (Active)   Site Assessment Clean;Dry;Intact;No redness;No swelling 12/4/2017  3:30 PM   Line Status Blood return noted;Flushed;Saline locked 12/4/2017  3:30 PM   Dressing Status Clean;Dry;Intact 12/4/2017  3:30 PM   Dressing Change Due 12/06/17 12/4/2017  3:30 PM   Site Change Due 12/06/17 12/4/2017  7:52 AM   Reason Not Rotated Not due 12/4/2017  3:30 PM       Laboratory:  CBC    Recent Labs  Lab 12/08/17  0241   WBC 3.85*   RBC 3.77*   HGB 10.6*   HCT 31.2*      MCV 83   MCH 28.1   MCHC 34.0     BMP    Recent Labs  Lab 12/08/17  0241   *   K 3.6      CO2 30*   BUN 25*   CREATININE 0.9   CALCIUM 8.5*     Microbiology Results (last 7 days)     Procedure Component Value Units Date/Time    Blood culture [[id number]] Collected:  12/04/17 1610    Order Status:  Completed Specimen:  Blood Updated:  12/07/17 1903     Blood Culture, Routine No Growth to date     Blood Culture, Routine No Growth to date     Blood Culture, Routine No Growth to date     Blood Culture, Routine No Growth to date    Narrative:       Collection has been rescheduled by CMO at 12/4/2017 14:21 Reason:   Patient unavailable  Collection has been rescheduled by CMO at 12/4/2017 14:21 Reason:   Patient unavailable    Blood culture [[id number]]  Collected:  12/04/17 1610    Order Status:  Completed Specimen:  Blood Updated:  12/07/17 1903     Blood Culture, Routine No Growth to date     Blood Culture, Routine No Growth to date     Blood Culture, Routine No Growth to date     Blood Culture, Routine No Growth to date    Narrative:       Collection has been rescheduled by CMO at 12/4/2017 14:21 Reason:   Patient unavailable  Collection has been rescheduled by CMO at 12/4/2017 14:21 Reason:   Patient unavailable    Blood culture x two cultures. Draw prior to antibiotics. [[id number]] Collected:  12/01/17 1418    Order Status:  Completed Specimen:  Blood from Peripheral, Hand, Left Updated:  12/06/17 1503     Blood Culture, Routine No growth after 5 days.    Narrative:       Draw one set from central line if present, draw second  culture from another site.    Blood culture x two cultures. Draw prior to antibiotics. [[id number]] Collected:  12/01/17 1412    Order Status:  Completed Specimen:  Blood from Peripheral, Antecubital, Right Updated:  12/06/17 1503     Blood Culture, Routine No growth after 5 days.    Narrative:       Draw one set from central line if present, draw second  culture from another site.    Respiratory Viral Panel by PCR [facility] ( ); Nasal Swab [[id number]]  (Abnormal) Collected:  12/02/17 1845    Order Status:  Completed Specimen:  Respiratory Updated:  12/06/17 0731     Respiratory Virus Panel, source Nasal Swab     RVP - Adenovirus Not Detected     Comment: Respiratory Viral Panel is a product of [employer].  It has been approved or cleared by the [place]. Food and Drug  Administration for in vitro diagnostic use.  Results should be  used in conjunction with clinical findings, and should not form  the sole basis for a diagnosis or treatment decision.  Negative results do not preclude respiratory virus infection  and should not be used as the sole basis for diagnosis,   treatment, or other management decisions.  Positive results do not  rule out bacterial infection, or  co-infection with other viruses.  The agent detected may not  be the definitive cause of the disease. The use of additional   laboratory testing (e.g. bacterial culture, immunofluorescence,  radiography) and clinical presentation must be taken into  consideration in order to obtain the final diagnosis of   respiratory viral infection.  The RVP assay cannot adequately detect Adenovirus species C,  or serotypes 7a and 41.  The RVP primers for detection of   rhinovirus have been shown to cross-react with enterovirus.  A rhinovirus reactive result should be confirmed by an   alternative method (e.g. cell culture).  The  of the Respiratory Viral Panel has   recommmended that specimens found to be negative for  Adenovirus be confirmed by an alternative method.  The  of the Respiratory Viral Panel has  recommended that specimens found to be negative for   Influenza be confirmed by an alternative method.          Enterovirus Not Detected     Comment: Cross-reactivity has been observed between certain Rhinovirus  strains and the Enterovirus assay.          Human Bocavirus Not Detected     Human Coronavirus Not Detected     Comment: The Human Coronavirus assay detects Human coronavirus types  229E, OC43,NL63 and HKO1.          RVP - Human Metapneumovirus (hMPV) Not Detected     RVP - Influenza A Not Detected     Influenza A - R2G4-90 Positive (A)     RVP - Influenza B Not Detected     Parainfluenza Not Detected     Respiratory Syncytial VirusVirus (RSV) A Not Detected     Comment: The Respiratory Syncytial Viral assay detects types A and B,  however it does not distinguish between the two.          RVP - Rhinovirus Not Detected     Comment: Target Enriched Mulitplex Polymerase Chain Reaction (TEM-PCR)  allows for the detection of multiple pathogens out of a single  reaction.  This test was developed and its performance   characteristics determined by [employer].   It has not   been cleared or approved by the U.S.Food and Drug Administration.  Results should be used in conjunction with clinical findings,   and should not form the sole basis for a diagnosis or treatment  decision.  TEM-PCR is a licensed technology of [employer].         Narrative:       Receiving Lab:->[facility]    Urine culture [[id number]] Collected:  12/01/17 1336    Order Status:  Completed Specimen:  Urine from Urine, Clean Catch Updated:  12/03/17 0822     Urine Culture, Routine No significant growth        No results for input(s): COLORU, CLARITYU, SPECGRAV, PHUR, PROTEINUA, GLUCOSEU, BILIRUBINCON, BLOODU, WBCU, RBCU, BACTERIA, MUCUS, NITRITE, LEUKOCYTESUR, UROBILINOGEN, HYALINECASTS in the last 168 hours.    Diagnostic Results:  Labs: Reviewed  X-Ray: Reviewed  CT: Reviewed    ASSESSMENT/PLAN:     Active Hospital Problems    Diagnosis  POA    *ARDS (adult respiratory distress syndrome) [J80]  Yes    Influenza A (H1N1) [J10.1]  Yes    Accidental drug overdose [T50.901A]  Yes    Acute febrile illness [R50.9]  Yes    Tachycardia [R00.0]  Yes    Tobacco abuse [Z72.0]  Yes     Chronic    History of breast cancer in female [Z85.3]  Not Applicable     Chronic     S/P right breast lumpectomy and chemo,2005      Schizophrenia [F20.9]  Yes     Chronic    Troponin level elevated [R74.8]  Yes    Aspiration pneumonia [J69.0]  Yes      Resolved Hospital Problems    Diagnosis Date Resolved POA   No resolved problems to display.       1. Aspiration pneumonia  2. Schizophrenia  3. Ards, sec to influenza a ?  continue empiric abx- no sputum available  hiv negative  Taper and dc steroids  I added tamiflu   Creatinine stable  Check vanco trough  At 15, drop dose  She is better and on nasal cannula

## 2017-12-08 NOTE — ASSESSMENT & PLAN NOTE
Pt met criteria for sepsis with fever, tachycardia and infection source of multilobar pneumonia. Pt was treated empirically with Zosyn/Cipro/Vancomycin and IVF, and cultures were drawn. NEBS and O2 via BiPAP. Difficult to wean from BiPAP. Stopped fluids 12/3/17 and diuresed, but this did not seem to make any improvement and renal function started to elevate so diuresis held. Echo w/ some DD, but BNP not elevated. Appreciate pulm recs. CTD workup negative. Hypersensitivity panel pending. Maybe depakote related? Reported case of depakote-associated ARDS. Will hold.

## 2017-12-08 NOTE — ASSESSMENT & PLAN NOTE
Per chart reviewed, patient with recurring hypoxic respiratory failure with bilateral infiltrate in 2015 and 2013.  Etiology could be infectious (+influenza) vs non-infectious (i.e.  or hp) vs malignancy (h/o breast cancer).  Currently on broad spectrum antibiotic + antivirals.  Continue with steroid + antibiotic + antiviral.  Off depakote without issue.  Will switch to high flow.  Wean fio2 if sat is above 90%.  Await swallow evaluation.

## 2017-12-08 NOTE — SUBJECTIVE & OBJECTIVE
Interval History: Failed trial of high flow yesterday. Will try again today.    Review of Systems   Constitutional: Negative.    Respiratory: Positive for shortness of breath. Negative for wheezing.    Cardiovascular: Negative.    Gastrointestinal: Negative.    Genitourinary: Negative.    Musculoskeletal: Negative.    Skin: Negative.    Neurological: Negative.    Psychiatric/Behavioral: Negative.      Objective:     Vital Signs (Most Recent):  Temp: 96.4 °F (35.8 °C) (12/08/17 0300)  Pulse: (!) 56 (12/08/17 0925)  Resp: 18 (12/08/17 0925)  BP: (!) 163/78 (12/08/17 0805)  SpO2: 95 % (12/08/17 0925) Vital Signs (24h Range):  Temp:  [96.4 °F (35.8 °C)-97.5 °F (36.4 °C)] 96.4 °F (35.8 °C)  Pulse:  [] 56  Resp:  [17-49] 18  SpO2:  [93 %-100 %] 95 %  BP: (128-180)/(63-86) 163/78     Weight: 63.7 kg (140 lb 6.9 oz)  Body mass index is 21.99 kg/m².    Intake/Output Summary (Last 24 hours) at 12/08/17 1044  Last data filed at 12/08/17 0700   Gross per 24 hour   Intake          1431.25 ml   Output             1090 ml   Net           341.25 ml      Physical Exam   Constitutional: She is oriented to person, place, and time. She appears well-developed. No distress.   Eyes: Conjunctivae and EOM are normal.   Neck: Normal range of motion. No thyromegaly present.   Cardiovascular: Normal heart sounds and intact distal pulses.    Sinus tach   Pulmonary/Chest:   High flow NC. Decreased air movement   Abdominal: Soft. Bowel sounds are normal.   Musculoskeletal: Normal range of motion. She exhibits no edema.   Neurological: She is alert and oriented to person, place, and time.   Skin: Skin is warm and dry. She is not diaphoretic.   Nursing note and vitals reviewed.      Significant Labs: All pertinent labs within the past 24 hours have been reviewed.    Significant Imaging: I have reviewed and interpreted all pertinent imaging results/findings within the past 24 hours.

## 2017-12-08 NOTE — ASSESSMENT & PLAN NOTE
Patient has a long documented history of schizophrenia, likely schizoaffective diathesis.  She has had worsening of her symptoms over time which has likely also led to some cognitive impairment.  There has been consideration for overuse of her Depakote but her Depakote level was within normal limits upon her presenting to the hospital.  Still, I would recommend for her to remain on Depakote but decrease the dose to 500 mg by mouth twice a day.  She needs to be restarted back on Haldol 10 mg by mouth twice a day, Cogentin 1 mg by mouth twice a day.  Being unmedicated, she will likely have an exacerbation of her psychotic symptoms and odd behaviors.  She will be a difficult case.  It would be in her best interest to set up some psychiatric home health to help manage her medications in the home setting.  Another consideration would be for case management to contact the ACT team for her area and see if they would be able to accommodate with regular visits.  Should she have any non-redirectable behavior or psychotic agitation, utilize Haldol 10 mg/Ativan 1 mg/Benadryl 50 mg by mouth or IM.  She can follow-up with her outpatient Dr. Fiore upon discharge.

## 2017-12-08 NOTE — HPI
"Patient Jossie Crowley presents to the hospital with strange behavior.  She was found to be possibly under an unintentional overdose of her Depakote with subsequent ARDS.  Now she is found to have influenza a and is in isolation in the ICU.  Patient is easily approached and conversation but is a difficult and somewhat poor historian.  She tells me that she is in the hospital because she was "shaky".  She says that she follows with an outpatient psychiatrist, Dr. Fiore, and sees him on a monthly basis.  She is not sure when her next appointment is.  She is not able to name her medications but when I go over a list with her, she is able to tell me which ones she is taking.  She says that she takes Depakote twice daily, Haldol twice daily, Cogentin twice daily.  She is not sure of the doses.  She says that she is not currently depressed or anxious.  She is not having any manic symptoms.  She denies any psychosis at this time.  She says that when her schizophrenia "acts up", she can see things that aren't there and her mind plays tricks on her.  Records indicate previous hospitalizations which show that she becomes very paranoid and isolative when she has an exacerbation.  She can strangely rearrange things around the house and do odd things such as drink water from ashtrays, put things in the trash that do not belong there, and drink excessive amounts of water.  She says that she has not been in a psychiatric hospital for some time now and does not feel that she needs to be there.    There is speculation that she has overdosed on her Depakote but an admit valproic acid level was within range.  Nursing reports that she is constantly pushing the nursing alert button and is restless in bed.  "

## 2017-12-08 NOTE — PROGRESS NOTES
Pt received on BIPAP 18/5 60%. BIPAP is continuous at this time, will monitor pt status and attempt to wean as tolerated. Pt face and nose checked for redness and skin break down, none noted at this time.

## 2017-12-08 NOTE — CONSULTS
"[facility]  Psychiatry  Consult Note    Patient Name: Jossie Crowley  MRN: [id number]   Code Status: Full Code  Admission Date: 12/1/2017  Hospital Length of Stay: 7 days  Attending Physician: Andra Cifuentes MD  Primary Care Provider: Jordyn Owen MD    Current Legal Status: N/A    Patient information was obtained from patient and ER records.   Inpatient consult to Psychiatry  Consult performed by: JUAREZ LAGUNAS  Consult ordered by: ANDRA CIFUENTES        Subjective:     Principal Problem:ARDS (adult respiratory distress syndrome)    Chief Complaint:  Odd behavior and possible unintentional overdose     HPI: Patient Jossie Crowley presents to the hospital with strange behavior.  She was found to be possibly under an unintentional overdose of her Depakote with subsequent ARDS.  Now she is found to have influenza a and is in isolation in the ICU.  Patient is easily approached and conversation but is a difficult and somewhat poor historian.  She tells me that she is in the hospital because she was "shaky".  She says that she follows with an outpatient psychiatrist, Dr. Fiore, and sees him on a monthly basis.  She is not sure when her next appointment is.  She is not able to name her medications but when I go over a list with her, she is able to tell me which ones she is taking.  She says that she takes Depakote twice daily, Haldol twice daily, Cogentin twice daily.  She is not sure of the doses.  She says that she is not currently depressed or anxious.  She is not having any manic symptoms.  She denies any psychosis at this time.  She says that when her schizophrenia "acts up", she can see things that aren't there and her mind plays tricks on her.  Records indicate previous hospitalizations which show that she becomes very paranoid and isolative when she has an exacerbation.  She can strangely rearrange things around the house and do odd things such as drink water from ashtrays, put " things in the trash that do not belong there, and drink excessive amounts of water.  She says that she has not been in a psychiatric hospital for some time now and does not feel that she needs to be there.    There is speculation that she has overdosed on her Depakote but an admit valproic acid level was within range.  Nursing reports that she is constantly pushing the nursing alert button and is restless in bed.    Hospital Course: No notes on file         Patient History           Medical as of 12/8/2017     Past Medical History     Diagnosis Date Comments Source    Breast cancer -- -- Provider    History of psychiatric hospitalization -- -- Provider    Hx of psychiatric care -- -- Provider    Hyperlipidemia -- -- Provider    Psychiatric problem -- -- Provider    Schizophrenia -- -- Provider    Therapy -- -- Provider    Thyroid disease -- thyroid surgery Provider          Pertinent Negatives     Diagnosis Date Noted Comments Source    Suicide attempt 12/8/2017 -- Provider                  Surgical as of 12/8/2017     Past Surgical History     Procedure Laterality Date Comments Source    THYROIDECTOMY -- 2005 -- Provider    BREAST SURGERY -- -- -- Provider    BREAST BIOPSY -- -- -- Provider    BREAST LUMPECTOMY -- -- -- Provider                  Family as of 12/8/2017     Problem Relation Name Age of Onset Comments Source    Cancer Neg Hx -- -- -- Provider            Tobacco Use as of 12/8/2017     Smoking Status Smoking Start Date Smoking Quit Date Packs/day Years Used    Current Every Day Smoker -- -- 0.50 37.00    Types Comments Smokeless Tobacco Status Smokeless Tobacco Quit Date Source     Cigarettes -- Former User 12/27/2014 Provider            Alcohol Use as of 12/8/2017     Alcohol Use Drinks/Week Alcohol/Week Comments Source    No -- -- -- Provider            Drug Use as of 12/8/2017     Drug Use Types Frequency Comments Source    No -- -- -- Provider            Sexual Activity as of 12/8/2017     Sexually  Active Birth Control Partners Comments Source    -- -- -- -- Provider            Activities of Daily Living as of 12/8/2017     Activities of Daily Living Question Response Comments Source    Patient feels they ought to cut down on drinking/drug use Not Asked -- Provider    Patient annoyed by others criticizing their drinking/drug use Not Asked -- Provider    Patient has felt bad or guilty about drinking/drug use Not Asked -- Provider    Patient has had a drink/used drugs as an eye opener in the AM Not Asked -- Provider            Social Documentation as of 12/8/2017    **None**           Occupational as of 12/8/2017    **None**           Socioeconomic as of 12/8/2017     Marital Status Spouse Name Number of Children Years Education Preferred Language Ethnicity Race Source    Single -- 1 -- English /Black Black or  Provider         Pertinent History Q A Comments    as of 12/8/2017 Lives with family sister    Place in Birth Order      Lives in home     Number of Siblings      Raised by      Legal Involvement none     Childhood Trauma      Criminal History of none     Financial Status disabled     Highest Level of Education      Does patient have access to a firearm? No      Service      Primary Leisure Activity      Spirituality non-practicing      Past Medical History:   Diagnosis Date    Breast cancer     History of psychiatric hospitalization     Hx of psychiatric care     Hyperlipidemia     Psychiatric problem     Schizophrenia     Therapy     Thyroid disease     thyroid surgery     Past Surgical History:   Procedure Laterality Date    BREAST BIOPSY      BREAST LUMPECTOMY      BREAST SURGERY      THYROIDECTOMY  2005     Family History     None        Social History Main Topics    Smoking status: Current Every Day Smoker     Packs/day: 0.50     Years: 37.00     Types: Cigarettes    Smokeless tobacco: Former User     Quit date: 12/27/2014    Alcohol use No     "Drug use: No    Sexual activity: Not on file     Review of patient's allergies indicates:  No Known Allergies    No current facility-administered medications on file prior to encounter.      Current Outpatient Prescriptions on File Prior to Encounter   Medication Sig    divalproex (DEPAKOTE) 250 MG 24 hr tablet Take 250 mg by mouth once daily. Take 2 tabs in morning and 3 in evening     albuterol 90 mcg/actuation HFAA Inhale 2 puffs into the lungs every 4 (four) hours as needed (shortness of breath).    docusate sodium (COLACE) 100 MG capsule Take 100 mg by mouth. Once daily     predniSONE (DELTASONE) 10 MG tablet 4 tabs po x 2 days, 2tabs po x 2days, 1tab  Po x 3 days and stop    trazodone (DESYREL) 150 MG tablet Take 150 mg by mouth every evening.      Psychotherapeutics     None        Review of Systems   Constitutional: Negative for activity change and appetite change.   Respiratory: Positive for shortness of breath.    Cardiovascular: Negative for chest pain.   Gastrointestinal: Negative for nausea.   Musculoskeletal: Negative for arthralgias and myalgias.   Psychiatric/Behavioral: Positive for confusion. Negative for dysphoric mood, sleep disturbance and suicidal ideas. The patient is not nervous/anxious.      Strengths and Liabilities: Liability: Patient is impulsive., Liability: Patient lacks social skills., Liability: Patient has poor health., Liability: Patient has possible cognitive impairment.    Objective:     Vital Signs (Most Recent):  Temp: 97.2 °F (36.2 °C) (12/08/17 1500)  Pulse: 92 (12/08/17 1530)  Resp: (!) 31 (12/08/17 1530)  BP: (!) 157/73 (12/08/17 1528)  SpO2: (!) 85 % (12/08/17 1530) Vital Signs (24h Range):  Temp:  [96.4 °F (35.8 °C)-97.5 °F (36.4 °C)] 97.2 °F (36.2 °C)  Pulse:  [] 92  Resp:  [17-49] 31  SpO2:  [85 %-100 %] 85 %  BP: (130-180)/(63-86) 157/73     Height: 5' 7" (170.2 cm)  Weight: 63.7 kg (140 lb 6.9 oz)  Body mass index is 21.99 kg/m².      Intake/Output Summary " "(Last 24 hours) at 12/08/17 1640  Last data filed at 12/08/17 1500   Gross per 24 hour   Intake          2186.25 ml   Output             1140 ml   Net          1046.25 ml       Physical Exam   Psychiatric:   EXAMINATION    CONSTITUTIONAL  General Appearance: Hospital attire, restless in an ICU bed    MUSCULOSKELETAL  Muscle Strength and Tone: Normal  Abnormal Involuntary Movements: Restless appearing  Gait and Station: Not observed    PSYCHIATRIC MENTAL STATUS EXAM   Level of Consciousness: Awake and alert  Orientation: Name and "hospital"  Grooming: Limited  Psychomotor Behavior: Somewhat restless  Speech: Soft and delayed  Language: No abnormalities  Mood: "Okay"  Affect: Blunt and oddly related  Thought Process: [place]  Associations: Intact  Thought Content: Denies suicidal/homicidal/psychosis  Memory: Somewhat intact to recent and remote but very simple  Attention: Diminished  Fund of Knowledge: Intact for simple conversation  Insight: Fair towards mental illness  Judgment: Limited towards compliance and redirection         Significant Labs:   Last 24 Hours:   Recent Lab Results       12/08/17  0707 12/08/17  0241      Anion Gap  10     Baso #  0.06     Basophil%  1.6     BUN, Bld  25(H)     Calcium  8.5(L)     Chloride  109     CO2  30(H)     Creatinine  0.9     Differential Method  Automated     eGFR if   >60     eGFR if non   >60  Comment:  Calculation used to obtain the estimated glomerular filtration  rate (eGFR) is the CKD-EPI equation.        Eos #  0.0     Eosinophil%  0.0     Glucose  211(H)     Gran #  2.8     Gran%  71.9     Hematocrit  31.2(L)     Hemoglobin  10.6(L)     Lymph #  0.8(L)     Lymph%  20.3     Magnesium  2.3     MCH  28.1     MCHC  34.0     MCV  83     Mono #  0.2(L)     Mono%  6.2     MPV  11.4     Platelets  186     Potassium  3.6     RBC  3.77(L)     RDW  15.9(H)     Sodium  149(H)     Vancomycin-Trough 20.0      WBC  3.85(L)         All pertinent " labs within the past 24 hours have been reviewed.    Significant Imaging: I have reviewed all pertinent imaging results/findings within the past 24 hours.    Assessment/Plan:     Paranoid schizophrenia    Patient has a long documented history of schizophrenia, likely schizoaffective diathesis.  She has had worsening of her symptoms over time which has likely also led to some cognitive impairment.  There has been consideration for overuse of her Depakote but her Depakote level was within normal limits upon her presenting to the hospital.  Still, I would recommend for her to remain on Depakote but decrease the dose to 500 mg by mouth twice a day.  She needs to be restarted back on Haldol 10 mg by mouth twice a day, Cogentin 1 mg by mouth twice a day.  Being unmedicated, she will likely have an exacerbation of her psychotic symptoms and odd behaviors.  She will be a difficult case.  It would be in her best interest to set up some psychiatric home health to help manage her medications in the home setting.  Another consideration would be for case management to contact the ACT team for her area and see if they would be able to accommodate with regular visits.  Should she have any non-redirectable behavior or psychotic agitation, utilize Haldol 10 mg/Ativan 1 mg/Benadryl 50 mg by mouth or IM.  She can follow-up with her outpatient Dr. Fiore upon discharge.             Total Time:  60 minutes      Gerardo Saba MD   Psychiatry  [facility]

## 2017-12-08 NOTE — PROGRESS NOTES
[facility] Hospital Medicine  Progress Note    Patient Name: Jossie Crowley  MRN: [id number]  Patient Class: IP- Inpatient   Admission Date: 12/1/2017  Length of Stay: 7 days  Attending Physician: Andra Cifuentes MD  Primary Care Provider: Jordyn Owen MD        Subjective:     Principal Problem:ARDS (adult respiratory distress syndrome)    HPI:  56 y/o female with schizophrenia, HLP, hypothyroid, and h/o breast CA who was brought in by daughter for change in mental status. Daughter reported that she suspected the patient took more Depakote than normal. Pt reported to the ER physician that she has been taking Goody's powder and it made her nauseous. She stated that she has been more SOB and had coughing for about a week. This was mainly reported to the ER physician. During my interview, patient only answered few questions, and then was mostly silent and did not want to engage in conversation. All she answered to me was that she had a cough and SOB, and did not answer any other questions. Rest of the hx was via review of medical records and no family was available at the bedside. In the ER, patient had pulse of 145 on presentation and fever of 101.4F. Workup with CTA of chest was negative for PE but was remarkable for diffuse opacities. She treated with IVF and antibiotics, and cultures were drawn.    Hospital Course:  Ms Crowley presented with unintentional overdose with depakote. This is a recurrent issue. Workup significant for presumed aspiration pneumonia that progressed to ARDS. Provided with broad spectrum antibiotics, IVFs and supplemental O2. Upgraded to ICU for increased O2 requirements and placed on BiPAP. She was not improving sodded duo-nebs and solumedrol IV were added. Difficult to wean O2. CXR with possible edema. Tried diuresis with no improvement. Echo w/ EF 55%, +DD. BNP not elevated. Speech evaluated and has high concern for aspiration and recommended MBSS. She continues to  require to much supplemental O2 to have MBSS and CXR remained unchanged. Pulmonology consulted. Labs for hypersensitivity pneumonitis eval pending. Does not appear to have CTD. Noted to be flu A positive. ID started Tamiflu 12/6. Hyponatremia noted, likely from free water deficit as she has been NPO and not on fluids 2/2 ARDS.      Interval History: Failed trial of high flow yesterday. Will try again today.    Review of Systems   Constitutional: Negative.    Respiratory: Positive for shortness of breath. Negative for wheezing.    Cardiovascular: Negative.    Gastrointestinal: Negative.    Genitourinary: Negative.    Musculoskeletal: Negative.    Skin: Negative.    Neurological: Negative.    Psychiatric/Behavioral: Negative.      Objective:     Vital Signs (Most Recent):  Temp: 96.4 °F (35.8 °C) (12/08/17 0300)  Pulse: (!) 56 (12/08/17 0925)  Resp: 18 (12/08/17 0925)  BP: (!) 163/78 (12/08/17 0805)  SpO2: 95 % (12/08/17 0925) Vital Signs (24h Range):  Temp:  [96.4 °F (35.8 °C)-97.5 °F (36.4 °C)] 96.4 °F (35.8 °C)  Pulse:  [] 56  Resp:  [17-49] 18  SpO2:  [93 %-100 %] 95 %  BP: (128-180)/(63-86) 163/78     Weight: 63.7 kg (140 lb 6.9 oz)  Body mass index is 21.99 kg/m².    Intake/Output Summary (Last 24 hours) at 12/08/17 1044  Last data filed at 12/08/17 0700   Gross per 24 hour   Intake          1431.25 ml   Output             1090 ml   Net           341.25 ml      Physical Exam   Constitutional: She is oriented to person, place, and time. She appears well-developed. No distress.   Eyes: Conjunctivae and EOM are normal.   Neck: Normal range of motion. No thyromegaly present.   Cardiovascular: Normal heart sounds and intact distal pulses.    Sinus tach   Pulmonary/Chest:   High flow NC. Decreased air movement   Abdominal: Soft. Bowel sounds are normal.   Musculoskeletal: Normal range of motion. She exhibits no edema.   Neurological: She is alert and oriented to person, place, and time.   Skin: Skin is warm and  dry. She is not diaphoretic.   Nursing note and vitals reviewed.      Significant Labs: All pertinent labs within the past 24 hours have been reviewed.    Significant Imaging: I have reviewed and interpreted all pertinent imaging results/findings within the past 24 hours.    Assessment/Plan:      * ARDS (adult respiratory distress syndrome)    Pt met criteria for sepsis with fever, tachycardia and infection source of multilobar pneumonia. Pt was treated empirically with Zosyn/Cipro/Vancomycin and IVF, and cultures were drawn. NEBS and O2 via BiPAP. Difficult to wean from BiPAP. Stopped fluids 12/3/17 and diuresed, but this did not seem to make any improvement and renal function started to elevate so diuresis held. Echo w/ some DD, but BNP not elevated. Appreciate pulm recs. CTD workup negative. Hypersensitivity panel pending. Maybe depakote related? Reported case of depakote-associated ARDS. Will hold.        Aspiration pneumonia    CTA remarkable for diffuse bilateral patchy airspace opacities treated with antibiotics as discussed above. I am concerned she aspirated. ID consulted given the unusual appearance of findings and for further fine tuning of antibiotics regimen. Continue current broad regimen. BCx NGTD. Respiratory panel with Flu A. ID started on Tamiflu. Droplet isolation precautions.        Acute febrile illness    As above        Influenza A (H1N1)    As above        Accidental drug overdose    Suspected over use of depakote. Unintentional. This was the reason for her similar presentation one year ago.         Schizophrenia    Stopped home depakote. Will ask psychiatry to assist in adjusting medications.        Tachycardia    Secondary to sepsis, hypoxia and expected physiologic response        Troponin level elevated    Likely secondary to strain with sepsis and not ACS, but will trend out markers and monitor on telemetry. Echo.        History of breast cancer in female    No acute issues; s/p right  breast lumpectomy followed by chemotherapy in 2005. Will need to refer to Hem/Onc as outpatient for follow up.        Tobacco abuse    Smoking cessation counseling will be addressed prior to discharge.           VTE Risk Mitigation         Ordered     enoxaparin injection 40 mg  Daily     Route:  Subcutaneous        12/02/17 0213     Medium Risk of VTE  Once      12/01/17 2146     Place sequential compression device  Until discontinued      12/01/17 2146     Place OMAR hose  Until discontinued      12/01/17 2146          Critical care time spent on the evaluation and treatment of severe organ dysfunction, review of pertinent labs and imaging studies, discussions with consulting providers and discussions with patient/family: 30 minutes.    Andra Cifuentes MD  Department of Hospital Medicine   [facility]

## 2017-12-08 NOTE — PLAN OF CARE
Problem: Patient Care Overview  Goal: Individualization & Mutuality  Outcome: Ongoing (interventions implemented as appropriate)  Pt continues in ICU on BIPAP 40% O2. Adequate urine output during shift. Pt vital signs remain stable. Pt. Continues with droplet precautions. Pt NPO pending result of BSS scheduled for today. No new injuries or skin breakdown noted. Pt shows no signs or symptoms of distress..

## 2017-12-09 LAB
ANION GAP SERPL CALC-SCNC: 9 MMOL/L
BACTERIA BLD CULT: NORMAL
BACTERIA BLD CULT: NORMAL
BASOPHILS # BLD AUTO: 0.05 K/UL
BASOPHILS NFR BLD: 0.9 %
BUN SERPL-MCNC: 15 MG/DL
CALCIUM SERPL-MCNC: 8.7 MG/DL
CHLORIDE SERPL-SCNC: 104 MMOL/L
CO2 SERPL-SCNC: 29 MMOL/L
CREAT SERPL-MCNC: 0.8 MG/DL
DIFFERENTIAL METHOD: ABNORMAL
EOSINOPHIL # BLD AUTO: 0 K/UL
EOSINOPHIL NFR BLD: 0 %
ERYTHROCYTE [DISTWIDTH] IN BLOOD BY AUTOMATED COUNT: 15.3 %
EST. GFR  (AFRICAN AMERICAN): >60 ML/MIN/1.73 M^2
EST. GFR  (NON AFRICAN AMERICAN): >60 ML/MIN/1.73 M^2
GLUCOSE SERPL-MCNC: 183 MG/DL
HCT VFR BLD AUTO: 34.2 %
HGB BLD-MCNC: 11.6 G/DL
LYMPHOCYTES # BLD AUTO: 1.1 K/UL
LYMPHOCYTES NFR BLD: 18.9 %
MAGNESIUM SERPL-MCNC: 1.8 MG/DL
MCH RBC QN AUTO: 28.2 PG
MCHC RBC AUTO-ENTMCNC: 33.9 G/DL
MCV RBC AUTO: 83 FL
MONOCYTES # BLD AUTO: 0.2 K/UL
MONOCYTES NFR BLD: 3.7 %
NEUTROPHILS # BLD AUTO: 4.3 K/UL
NEUTROPHILS NFR BLD: 76.5 %
PLATELET # BLD AUTO: 194 K/UL
PMV BLD AUTO: 10.9 FL
POTASSIUM SERPL-SCNC: 3.5 MMOL/L
RBC # BLD AUTO: 4.12 M/UL
SODIUM SERPL-SCNC: 142 MMOL/L
WBC # BLD AUTO: 5.67 K/UL

## 2017-12-09 PROCEDURE — 63600175 PHARM REV CODE 636 W HCPCS: Performed by: EMERGENCY MEDICINE

## 2017-12-09 PROCEDURE — 63600175 PHARM REV CODE 636 W HCPCS: Performed by: INTERNAL MEDICINE

## 2017-12-09 PROCEDURE — 94640 AIRWAY INHALATION TREATMENT: CPT

## 2017-12-09 PROCEDURE — 02HV33Z INSERTION OF INFUSION DEVICE INTO SUPERIOR VENA CAVA, PERCUTANEOUS APPROACH: ICD-10-PCS | Performed by: HOSPITALIST

## 2017-12-09 PROCEDURE — 25000003 PHARM REV CODE 250: Performed by: INTERNAL MEDICINE

## 2017-12-09 PROCEDURE — 27100092 HC HIGH FLOW DELIVERY CANNULA

## 2017-12-09 PROCEDURE — 80048 BASIC METABOLIC PNL TOTAL CA: CPT

## 2017-12-09 PROCEDURE — 20000000 HC ICU ROOM

## 2017-12-09 PROCEDURE — 83735 ASSAY OF MAGNESIUM: CPT

## 2017-12-09 PROCEDURE — 85025 COMPLETE CBC W/AUTO DIFF WBC: CPT

## 2017-12-09 PROCEDURE — 25000242 PHARM REV CODE 250 ALT 637 W/ HCPCS: Performed by: INTERNAL MEDICINE

## 2017-12-09 PROCEDURE — 27100171 HC OXYGEN HIGH FLOW UP TO 24 HOURS

## 2017-12-09 PROCEDURE — 63600175 PHARM REV CODE 636 W HCPCS: Performed by: HOSPITALIST

## 2017-12-09 PROCEDURE — 36569 INSJ PICC 5 YR+ W/O IMAGING: CPT

## 2017-12-09 PROCEDURE — 36415 COLL VENOUS BLD VENIPUNCTURE: CPT

## 2017-12-09 PROCEDURE — 25000003 PHARM REV CODE 250: Performed by: HOSPITALIST

## 2017-12-09 RX ORDER — BENZTROPINE MESYLATE 1 MG/1
1 TABLET ORAL 2 TIMES DAILY
Status: DISCONTINUED | OUTPATIENT
Start: 2017-12-09 | End: 2017-12-15 | Stop reason: HOSPADM

## 2017-12-09 RX ORDER — HALOPERIDOL 5 MG/ML
10 INJECTION INTRAMUSCULAR EVERY 6 HOURS PRN
Status: DISCONTINUED | OUTPATIENT
Start: 2017-12-09 | End: 2017-12-15 | Stop reason: HOSPADM

## 2017-12-09 RX ORDER — HALOPERIDOL 5 MG/1
10 TABLET ORAL 2 TIMES DAILY
Status: DISCONTINUED | OUTPATIENT
Start: 2017-12-09 | End: 2017-12-15 | Stop reason: HOSPADM

## 2017-12-09 RX ORDER — HYDRALAZINE HYDROCHLORIDE 20 MG/ML
10 INJECTION INTRAMUSCULAR; INTRAVENOUS EVERY 6 HOURS PRN
Status: DISCONTINUED | OUTPATIENT
Start: 2017-12-09 | End: 2017-12-15 | Stop reason: HOSPADM

## 2017-12-09 RX ORDER — LABETALOL HYDROCHLORIDE 5 MG/ML
10 INJECTION, SOLUTION INTRAVENOUS ONCE
Status: COMPLETED | OUTPATIENT
Start: 2017-12-10 | End: 2017-12-10

## 2017-12-09 RX ADMIN — BENZTROPINE MESYLATE 1 MG: 1 TABLET ORAL at 02:12

## 2017-12-09 RX ADMIN — METHYLPREDNISOLONE SODIUM SUCCINATE 40 MG: 40 INJECTION, POWDER, FOR SOLUTION INTRAMUSCULAR; INTRAVENOUS at 08:12

## 2017-12-09 RX ADMIN — OSELTAMIVIR PHOSPHATE 75 MG: 75 CAPSULE ORAL at 08:12

## 2017-12-09 RX ADMIN — HALOPERIDOL 10 MG: 5 TABLET ORAL at 08:12

## 2017-12-09 RX ADMIN — ACETAMINOPHEN 650 MG: 325 TABLET ORAL at 12:12

## 2017-12-09 RX ADMIN — LOPERAMIDE HYDROCHLORIDE 2 MG: 2 CAPSULE ORAL at 06:12

## 2017-12-09 RX ADMIN — DEXTROSE MONOHYDRATE: 5 INJECTION, SOLUTION INTRAVENOUS at 09:12

## 2017-12-09 RX ADMIN — HYDRALAZINE HYDROCHLORIDE 10 MG: 20 INJECTION INTRAMUSCULAR; INTRAVENOUS at 06:12

## 2017-12-09 RX ADMIN — LEVALBUTEROL 1.25 MG: 1.25 SOLUTION, CONCENTRATE RESPIRATORY (INHALATION) at 07:12

## 2017-12-09 RX ADMIN — LEVALBUTEROL 1.25 MG: 1.25 SOLUTION, CONCENTRATE RESPIRATORY (INHALATION) at 08:12

## 2017-12-09 RX ADMIN — LEVALBUTEROL 1.25 MG: 1.25 SOLUTION, CONCENTRATE RESPIRATORY (INHALATION) at 12:12

## 2017-12-09 RX ADMIN — VANCOMYCIN HYDROCHLORIDE 1250 MG: 1 INJECTION, POWDER, LYOPHILIZED, FOR SOLUTION INTRAVENOUS at 10:12

## 2017-12-09 RX ADMIN — ENOXAPARIN SODIUM 40 MG: 100 INJECTION SUBCUTANEOUS at 05:12

## 2017-12-09 RX ADMIN — BENZTROPINE MESYLATE 1 MG: 1 TABLET ORAL at 08:12

## 2017-12-09 RX ADMIN — LEVALBUTEROL 1.25 MG: 1.25 SOLUTION, CONCENTRATE RESPIRATORY (INHALATION) at 01:12

## 2017-12-09 RX ADMIN — PIPERACILLIN AND TAZOBACTAM 4.5 G: 4; .5 INJECTION, POWDER, LYOPHILIZED, FOR SOLUTION INTRAVENOUS; PARENTERAL at 02:12

## 2017-12-09 RX ADMIN — HALOPERIDOL 10 MG: 5 TABLET ORAL at 02:12

## 2017-12-09 RX ADMIN — PIPERACILLIN AND TAZOBACTAM 4.5 G: 4; .5 INJECTION, POWDER, LYOPHILIZED, FOR SOLUTION INTRAVENOUS; PARENTERAL at 06:12

## 2017-12-09 RX ADMIN — PIPERACILLIN AND TAZOBACTAM 4.5 G: 4; .5 INJECTION, POWDER, LYOPHILIZED, FOR SOLUTION INTRAVENOUS; PARENTERAL at 09:12

## 2017-12-09 RX ADMIN — VANCOMYCIN HYDROCHLORIDE 1250 MG: 1 INJECTION, POWDER, LYOPHILIZED, FOR SOLUTION INTRAVENOUS at 07:12

## 2017-12-09 NOTE — PROGRESS NOTES
[facility] Hospital Medicine  Progress Note    Patient Name: Jossie Crowley  MRN: [id number]  Patient Class: IP- Inpatient   Admission Date: 12/1/2017  Length of Stay: 8 days  Attending Physician: Andra Cifuentes MD  Primary Care Provider: Jordyn Owen MD        Subjective:     Principal Problem:ARDS (adult respiratory distress syndrome)    HPI:  56 y/o female with schizophrenia, HLP, hypothyroid, and h/o breast CA who was brought in by daughter for change in mental status. Daughter reported that she suspected the patient took more Depakote than normal. Pt reported to the ER physician that she has been taking Goody's powder and it made her nauseous. She stated that she has been more SOB and had coughing for about a week. This was mainly reported to the ER physician. During my interview, patient only answered few questions, and then was mostly silent and did not want to engage in conversation. All she answered to me was that she had a cough and SOB, and did not answer any other questions. Rest of the hx was via review of medical records and no family was available at the bedside. In the ER, patient had pulse of 145 on presentation and fever of 101.4F. Workup with CTA of chest was negative for PE but was remarkable for diffuse opacities. She treated with IVF and antibiotics, and cultures were drawn.    Hospital Course:  Ms Crowley presented with unintentional overdose with depakote. This is a recurrent issue. Workup significant for presumed aspiration pneumonia that progressed to ARDS. Provided with broad spectrum antibiotics, IVFs and supplemental O2. Upgraded to ICU for increased O2 requirements and placed on BiPAP. She was not improving sodded duo-nebs and solumedrol IV were added. Difficult to wean O2. CXR with possible edema. Tried diuresis with no improvement. Echo w/ EF 55%, +DD. BNP not elevated. Speech evaluated and has high concern for aspiration and recommended MBSS. She continues to  require to much supplemental O2 to have MBSS and CXR remained unchanged. Pulmonology consulted. Labs for hypersensitivity pneumonitis eval pending. Does not appear to have CTD. Noted to be flu A positive. ID started Tamiflu 12/6. Able to wean to high flow NC. Passed speech bedside swallow 12/8. Depakote held as potentially associated with ARDS. Appreciate psych recs. Haldol and Cogentin added back. Continue to wean. Will restart depakote at a lower does if able to wean off high flow NC and continue to monitor.    Interval History: Stable on high flow. Diet given but not eating much.    Review of Systems   Constitutional: Negative.    Respiratory: Positive for shortness of breath. Negative for wheezing.    Cardiovascular: Negative.    Gastrointestinal: Negative.    Genitourinary: Negative.    Musculoskeletal: Negative.    Skin: Negative.    Neurological: Negative.    Psychiatric/Behavioral: Negative.      Objective:     Vital Signs (Most Recent):  Temp: 98.3 °F (36.8 °C) (12/09/17 1200)  Pulse: 93 (12/09/17 1355)  Resp: 20 (12/09/17 1355)  BP: (!) 187/84 (12/09/17 1200)  SpO2: 100 % (12/09/17 1355) Vital Signs (24h Range):  Temp:  [98 °F (36.7 °C)-98.3 °F (36.8 °C)] 98.3 °F (36.8 °C)  Pulse:  [] 93  Resp:  [20-52] 20  SpO2:  [83 %-100 %] 100 %  BP: (139-187)/() 187/84     Weight: 63.7 kg (140 lb 6.9 oz)  Body mass index is 21.99 kg/m².    Intake/Output Summary (Last 24 hours) at 12/09/17 1616  Last data filed at 12/09/17 1200   Gross per 24 hour   Intake             2600 ml   Output             1980 ml   Net              620 ml      Physical Exam   Constitutional: She is oriented to person, place, and time. She appears well-developed. No distress.   Eyes: Conjunctivae and EOM are normal.   Neck: Normal range of motion. No thyromegaly present.   Cardiovascular: Normal heart sounds and intact distal pulses.    Sinus tach   Pulmonary/Chest:   High flow NC. Decreased air movement   Abdominal: Soft. Bowel  sounds are normal.   Musculoskeletal: Normal range of motion. She exhibits no edema.   Neurological: She is alert and oriented to person, place, and time.   Skin: Skin is warm and dry. She is not diaphoretic.   Nursing note and vitals reviewed.      Significant Labs: All pertinent labs within the past 24 hours have been reviewed.    Significant Imaging: I have reviewed and interpreted all pertinent imaging results/findings within the past 24 hours.    Assessment/Plan:      * ARDS (adult respiratory distress syndrome)    Pt met criteria for sepsis with fever, tachycardia and infection source of multilobar pneumonia. Pt was treated empirically with Zosyn/Cipro/Vancomycin and IVF, and cultures were drawn. NEBS and O2 via BiPAP. Difficult to wean from BiPAP. Stopped fluids 12/3/17 and diuresed, but this did not seem to make any improvement and renal function started to elevate so diuresis held. Echo w/ some DD, but BNP not elevated. Appreciate pulm recs. CTD workup negative. Hypersensitivity panel pending. Maybe depakote related? Reported case of depakote-associated ARDS. Will hold - restart at 500mg when able to get off high flow and monitor.        Aspiration pneumonia    CTA remarkable for diffuse bilateral patchy airspace opacities treated with antibiotics as discussed above. I am concerned she aspirated. ID consulted given the unusual appearance of findings and for further fine tuning of antibiotics regimen. Continue current broad regimen. BCx NGTD. Respiratory panel with Flu A. ID started on Tamiflu. Droplet isolation precautions.        Acute febrile illness    As above        Influenza A (H1N1)    As above        Accidental drug overdose    Suspected over use of depakote. Unintentional. Will decrease dose if restarted. This was the reported reason for her similar presentation one year ago.         Paranoid schizophrenia    Stopped home depakote. Appreciate psych recs. Haldol and Cogentin restarted. Will restart  depakote at lower dose when more stable.        Tachycardia    Secondary to sepsis, hypoxia and expected physiologic response        Troponin level elevated    Likely secondary to strain with sepsis and not ACS, but will trend out markers and monitor on telemetry. Echo.        History of breast cancer in female    No acute issues; s/p right breast lumpectomy followed by chemotherapy in 2005. Will need to refer to Hem/Onc as outpatient for follow up.        Tobacco abuse    Smoking cessation counseling will be addressed prior to discharge.           VTE Risk Mitigation         Ordered     enoxaparin injection 40 mg  Daily     Route:  Subcutaneous        12/02/17 0213     Medium Risk of VTE  Once      12/01/17 2146     Place sequential compression device  Until discontinued      12/01/17 2146     Place OMAR hose  Until discontinued      12/01/17 2146          Critical care time spent on the evaluation and treatment of severe organ dysfunction, review of pertinent labs and imaging studies, discussions with consulting providers and discussions with patient/family: 30 minutes.    Andra Cifuentes MD  Department of Hospital Medicine   [facility]

## 2017-12-09 NOTE — ASSESSMENT & PLAN NOTE
Suspected over use of depakote. Unintentional. Will decrease dose if restarted. This was the reported reason for her similar presentation one year ago.

## 2017-12-09 NOTE — ASSESSMENT & PLAN NOTE
Pt met criteria for sepsis with fever, tachycardia and infection source of multilobar pneumonia. Pt was treated empirically with Zosyn/Cipro/Vancomycin and IVF, and cultures were drawn. NEBS and O2 via BiPAP. Difficult to wean from BiPAP. Stopped fluids 12/3/17 and diuresed, but this did not seem to make any improvement and renal function started to elevate so diuresis held. Echo w/ some DD, but BNP not elevated. Appreciate pulm recs. CTD workup negative. Hypersensitivity panel pending. Maybe depakote related? Reported case of depakote-associated ARDS. Will hold - restart at 500mg when able to get off high flow and monitor.

## 2017-12-09 NOTE — PLAN OF CARE
Problem: Patient Care Overview  Goal: Plan of Care Review  Outcome: Ongoing (interventions implemented as appropriate)  Plan of care formulated and reviewed with patient and daughter when present. Patient was weaned to Hi-FLOW  NC. Patient passed swallow study. Psych was consulted and saw patient. mutliple periods of diarrhea. immodium was given prn per orders. Patient skin remains intact without complications.

## 2017-12-09 NOTE — ASSESSMENT & PLAN NOTE
Stopped home depakote. Appreciate psych recs. Haldol and Cogentin restarted. Will restart depakote at lower dose when more stable.

## 2017-12-09 NOTE — SUBJECTIVE & OBJECTIVE
Interval History: Stable on high flow. Diet given but not eating much.    Review of Systems   Constitutional: Negative.    Respiratory: Positive for shortness of breath. Negative for wheezing.    Cardiovascular: Negative.    Gastrointestinal: Negative.    Genitourinary: Negative.    Musculoskeletal: Negative.    Skin: Negative.    Neurological: Negative.    Psychiatric/Behavioral: Negative.      Objective:     Vital Signs (Most Recent):  Temp: 98.3 °F (36.8 °C) (12/09/17 1200)  Pulse: 93 (12/09/17 1355)  Resp: 20 (12/09/17 1355)  BP: (!) 187/84 (12/09/17 1200)  SpO2: 100 % (12/09/17 1355) Vital Signs (24h Range):  Temp:  [98 °F (36.7 °C)-98.3 °F (36.8 °C)] 98.3 °F (36.8 °C)  Pulse:  [] 93  Resp:  [20-52] 20  SpO2:  [83 %-100 %] 100 %  BP: (139-187)/() 187/84     Weight: 63.7 kg (140 lb 6.9 oz)  Body mass index is 21.99 kg/m².    Intake/Output Summary (Last 24 hours) at 12/09/17 1616  Last data filed at 12/09/17 1200   Gross per 24 hour   Intake             2600 ml   Output             1980 ml   Net              620 ml      Physical Exam   Constitutional: She is oriented to person, place, and time. She appears well-developed. No distress.   Eyes: Conjunctivae and EOM are normal.   Neck: Normal range of motion. No thyromegaly present.   Cardiovascular: Normal heart sounds and intact distal pulses.    Sinus tach   Pulmonary/Chest:   High flow NC. Decreased air movement   Abdominal: Soft. Bowel sounds are normal.   Musculoskeletal: Normal range of motion. She exhibits no edema.   Neurological: She is alert and oriented to person, place, and time.   Skin: Skin is warm and dry. She is not diaphoretic.   Nursing note and vitals reviewed.      Significant Labs: All pertinent labs within the past 24 hours have been reviewed.    Significant Imaging: I have reviewed and interpreted all pertinent imaging results/findings within the past 24 hours.

## 2017-12-09 NOTE — PLAN OF CARE
Problem: Patient Care Overview  Goal: Plan of Care Review  Outcome: Ongoing (interventions implemented as appropriate)  Pt AAO x 4. SB to NSR on the monitor. Pt on HFNC at 70%. Pt systolic BP in the 170's; new orders received. Miller CDI; UO adequate. Pt with multiple loose BM's this shift. Droplet precautions maintained. No falls or injuries this shift.

## 2017-12-10 LAB
ANION GAP SERPL CALC-SCNC: 8 MMOL/L
BASOPHILS # BLD AUTO: 0.01 K/UL
BASOPHILS NFR BLD: 0.2 %
BUN SERPL-MCNC: 12 MG/DL
CALCIUM SERPL-MCNC: 8.3 MG/DL
CHLORIDE SERPL-SCNC: 102 MMOL/L
CO2 SERPL-SCNC: 29 MMOL/L
CREAT SERPL-MCNC: 0.9 MG/DL
DIFFERENTIAL METHOD: ABNORMAL
EOSINOPHIL # BLD AUTO: 0 K/UL
EOSINOPHIL NFR BLD: 0 %
ERYTHROCYTE [DISTWIDTH] IN BLOOD BY AUTOMATED COUNT: 15.4 %
EST. GFR  (AFRICAN AMERICAN): >60 ML/MIN/1.73 M^2
EST. GFR  (NON AFRICAN AMERICAN): >60 ML/MIN/1.73 M^2
GLUCOSE SERPL-MCNC: 355 MG/DL
HCT VFR BLD AUTO: 30.7 %
HGB BLD-MCNC: 10.6 G/DL
LYMPHOCYTES # BLD AUTO: 1 K/UL
LYMPHOCYTES NFR BLD: 17.6 %
MAGNESIUM SERPL-MCNC: 1.6 MG/DL
MCH RBC QN AUTO: 28.3 PG
MCHC RBC AUTO-ENTMCNC: 34.5 G/DL
MCV RBC AUTO: 82 FL
MONOCYTES # BLD AUTO: 0.2 K/UL
MONOCYTES NFR BLD: 4.1 %
NEUTROPHILS # BLD AUTO: 4.2 K/UL
NEUTROPHILS NFR BLD: 78.1 %
PLATELET # BLD AUTO: 175 K/UL
PMV BLD AUTO: 10.9 FL
POTASSIUM SERPL-SCNC: 3.3 MMOL/L
RBC # BLD AUTO: 3.75 M/UL
SODIUM SERPL-SCNC: 139 MMOL/L
VANCOMYCIN TROUGH SERPL-MCNC: 19.3 UG/ML
WBC # BLD AUTO: 5.4 K/UL

## 2017-12-10 PROCEDURE — 25000003 PHARM REV CODE 250: Performed by: INTERNAL MEDICINE

## 2017-12-10 PROCEDURE — 85025 COMPLETE CBC W/AUTO DIFF WBC: CPT

## 2017-12-10 PROCEDURE — 20000000 HC ICU ROOM

## 2017-12-10 PROCEDURE — 25000003 PHARM REV CODE 250: Performed by: HOSPITALIST

## 2017-12-10 PROCEDURE — 83735 ASSAY OF MAGNESIUM: CPT

## 2017-12-10 PROCEDURE — 94761 N-INVAS EAR/PLS OXIMETRY MLT: CPT

## 2017-12-10 PROCEDURE — 27000221 HC OXYGEN, UP TO 24 HOURS

## 2017-12-10 PROCEDURE — 25000242 PHARM REV CODE 250 ALT 637 W/ HCPCS: Performed by: INTERNAL MEDICINE

## 2017-12-10 PROCEDURE — 80202 ASSAY OF VANCOMYCIN: CPT

## 2017-12-10 PROCEDURE — 63600175 PHARM REV CODE 636 W HCPCS: Performed by: HOSPITALIST

## 2017-12-10 PROCEDURE — 27100092 HC HIGH FLOW DELIVERY CANNULA

## 2017-12-10 PROCEDURE — 80048 BASIC METABOLIC PNL TOTAL CA: CPT

## 2017-12-10 PROCEDURE — 63600175 PHARM REV CODE 636 W HCPCS: Performed by: INTERNAL MEDICINE

## 2017-12-10 PROCEDURE — 99900035 HC TECH TIME PER 15 MIN (STAT)

## 2017-12-10 PROCEDURE — 94640 AIRWAY INHALATION TREATMENT: CPT

## 2017-12-10 PROCEDURE — 63600175 PHARM REV CODE 636 W HCPCS: Performed by: EMERGENCY MEDICINE

## 2017-12-10 PROCEDURE — 36415 COLL VENOUS BLD VENIPUNCTURE: CPT

## 2017-12-10 PROCEDURE — 27100171 HC OXYGEN HIGH FLOW UP TO 24 HOURS

## 2017-12-10 RX ORDER — POTASSIUM CHLORIDE 20 MEQ/15ML
40 SOLUTION ORAL ONCE
Status: DISCONTINUED | OUTPATIENT
Start: 2017-12-10 | End: 2017-12-10

## 2017-12-10 RX ORDER — POTASSIUM CHLORIDE 14.9 MG/ML
20 INJECTION INTRAVENOUS
Status: COMPLETED | OUTPATIENT
Start: 2017-12-10 | End: 2017-12-10

## 2017-12-10 RX ADMIN — DEXTROSE MONOHYDRATE: 5 INJECTION, SOLUTION INTRAVENOUS at 02:12

## 2017-12-10 RX ADMIN — LEVALBUTEROL 1.25 MG: 1.25 SOLUTION, CONCENTRATE RESPIRATORY (INHALATION) at 02:12

## 2017-12-10 RX ADMIN — ENOXAPARIN SODIUM 40 MG: 100 INJECTION SUBCUTANEOUS at 04:12

## 2017-12-10 RX ADMIN — VANCOMYCIN HYDROCHLORIDE 1000 MG: 1 INJECTION, POWDER, LYOPHILIZED, FOR SOLUTION INTRAVENOUS at 08:12

## 2017-12-10 RX ADMIN — HALOPERIDOL 10 MG: 5 TABLET ORAL at 08:12

## 2017-12-10 RX ADMIN — DEXTROSE MONOHYDRATE: 5 INJECTION, SOLUTION INTRAVENOUS at 12:12

## 2017-12-10 RX ADMIN — METHYLPREDNISOLONE SODIUM SUCCINATE 40 MG: 40 INJECTION, POWDER, FOR SOLUTION INTRAMUSCULAR; INTRAVENOUS at 08:12

## 2017-12-10 RX ADMIN — HYDRALAZINE HYDROCHLORIDE 10 MG: 20 INJECTION INTRAMUSCULAR; INTRAVENOUS at 10:12

## 2017-12-10 RX ADMIN — BENZTROPINE MESYLATE 1 MG: 1 TABLET ORAL at 09:12

## 2017-12-10 RX ADMIN — PIPERACILLIN AND TAZOBACTAM 4.5 G: 4; .5 INJECTION, POWDER, LYOPHILIZED, FOR SOLUTION INTRAVENOUS; PARENTERAL at 09:12

## 2017-12-10 RX ADMIN — HYDRALAZINE HYDROCHLORIDE 10 MG: 20 INJECTION INTRAMUSCULAR; INTRAVENOUS at 02:12

## 2017-12-10 RX ADMIN — METHYLPREDNISOLONE SODIUM SUCCINATE 40 MG: 40 INJECTION, POWDER, FOR SOLUTION INTRAMUSCULAR; INTRAVENOUS at 09:12

## 2017-12-10 RX ADMIN — VANCOMYCIN HYDROCHLORIDE 1250 MG: 1 INJECTION, POWDER, LYOPHILIZED, FOR SOLUTION INTRAVENOUS at 08:12

## 2017-12-10 RX ADMIN — OSELTAMIVIR PHOSPHATE 75 MG: 75 CAPSULE ORAL at 09:12

## 2017-12-10 RX ADMIN — LABETALOL HYDROCHLORIDE 10 MG: 5 INJECTION, SOLUTION INTRAVENOUS at 12:12

## 2017-12-10 RX ADMIN — PIPERACILLIN AND TAZOBACTAM 4.5 G: 4; .5 INJECTION, POWDER, LYOPHILIZED, FOR SOLUTION INTRAVENOUS; PARENTERAL at 05:12

## 2017-12-10 RX ADMIN — POTASSIUM CHLORIDE 20 MEQ: 200 INJECTION, SOLUTION INTRAVENOUS at 09:12

## 2017-12-10 RX ADMIN — BENZTROPINE MESYLATE 1 MG: 1 TABLET ORAL at 08:12

## 2017-12-10 RX ADMIN — HALOPERIDOL 10 MG: 5 TABLET ORAL at 09:12

## 2017-12-10 RX ADMIN — PIPERACILLIN AND TAZOBACTAM 4.5 G: 4; .5 INJECTION, POWDER, LYOPHILIZED, FOR SOLUTION INTRAVENOUS; PARENTERAL at 02:12

## 2017-12-10 RX ADMIN — LEVALBUTEROL 1.25 MG: 1.25 SOLUTION, CONCENTRATE RESPIRATORY (INHALATION) at 07:12

## 2017-12-10 RX ADMIN — LEVALBUTEROL 1.25 MG: 1.25 SOLUTION, CONCENTRATE RESPIRATORY (INHALATION) at 01:12

## 2017-12-10 RX ADMIN — LOPERAMIDE HYDROCHLORIDE 2 MG: 2 CAPSULE ORAL at 09:12

## 2017-12-10 RX ADMIN — OSELTAMIVIR PHOSPHATE 75 MG: 75 CAPSULE ORAL at 08:12

## 2017-12-10 RX ADMIN — POTASSIUM CHLORIDE 20 MEQ: 200 INJECTION, SOLUTION INTRAVENOUS at 11:12

## 2017-12-10 NOTE — PLAN OF CARE
Problem: Patient Care Overview  Goal: Plan of Care Review  Outcome: Ongoing (interventions implemented as appropriate)  Patient awake and alert but seems odd and confused at times even though she can answer questions correctly. Patient seems restless but much more in control today.Patient unable to be weaned off high flow nasal cannula today and sob on exertion noted especially when turning self in bed and when talking on the phone. No injuries, no falls and no skin breakdown noted. Groin area remains a little excoriated but better than yesterday, iv fluids continued and patient had three BMS today. Plan of care reviewed with patient and daughter at bedside.

## 2017-12-10 NOTE — NURSING
"Pt with complaints of chest pain. Pt stated "My heart is racing". EICU called and EKG performed with no changes to prior. New orders received.               "

## 2017-12-10 NOTE — PLAN OF CARE
Problem: Patient Care Overview  Goal: Plan of Care Review  Outcome: Ongoing (interventions implemented as appropriate)  Pt remains on HFNC. Pt AAO x 4. VSS. NSR on the monitor. Afebrile. PICC line placed this shift; pt tolerated well. On D5 at 75 mL/hr. Pt had c/o chest pain twice throughout shift; but denies chest pain at this time. Systolic BP in the 170's to 190's with administration of hydralazine but EICU notified and new orders were received; BP stable. No falls or injuries during shift.

## 2017-12-10 NOTE — PROCEDURES
"Jossie Crowley is a 57 y.o. female patient.    Temp: 98.6 °F (37 °C) (12/09/17 1915)  Pulse: 77 (12/09/17 2030)  Resp: (!) 27 (12/09/17 2030)  BP: (!) 152/67 (12/09/17 2030)  SpO2: 95 % (12/09/17 2030)  Weight: 63.7 kg (140 lb 6.9 oz) (12/06/17 0500)  Height: 5' 7" (170.2 cm) (12/01/17 2200)    PICC  Date/Time: 12/9/2017 8:40 PM  Consent Done: Yes  Time out: Immediately prior to procedure a time out was called to verify the correct patient, procedure, equipment, support staff and site/side marked as required  Indications: med administration and vascular access  Anesthesia: local infiltration  Local anesthetic: lidocaine 1% without epinephrine  Anesthetic Total (mL): 5  Preparation: skin prepped with ChloraPrep  Skin prep agent dried: skin prep agent completely dried prior to procedure  Sterile barriers: all five maximum sterile barriers used - cap, mask, sterile gown, sterile gloves, and large sterile sheet  Hand hygiene: hand hygiene performed prior to central venous catheter insertion  Location details: left brachial  Catheter type: double lumen  Catheter size: 5 Fr  Catheter Length: 43cm    Ultrasound guidance: yes  Vessel Caliber: medium and patent, compressibility normal  Needle advanced into vessel with real time Ultrasound guidance.  Guidewire confirmed in vessel.  Sterile sheath used.  Manometry: esophageal manometry  Number of attempts: 1  Post-procedure: blood return through all ports, chlorhexidine patch and sterile dressing applied  Estimated blood loss (mL): 2          José Márquez  12/9/2017  "

## 2017-12-10 NOTE — NURSING
2110- PICC nurse at bedside to perform procedure.    2120 - PICC line placed. Pt tolerated procedure well.

## 2017-12-10 NOTE — NURSING
0800 Attempted to start second iv line on patient times two without .    1035 Charge nurse attempted to place line times two without success.    1500 House  Supervisor attempted to get iv access with ultra sound machine but was     1600 New orders received to consult picc line team for picc line placement..  .

## 2017-12-10 NOTE — PLAN OF CARE
Problem: Patient Care Overview  Goal: Plan of Care Review  Outcome: Outcome(s) achieved Date Met: 12/09/17  Patient  Awake and alert but at times seems confused and disoriented but yet answers questions appropriately. Vital signs are stable and weaning the high flow nasal cannula slowly per respiratory. Patient ate very little food from trays states does not like food. O2 sats stay in mid to high ninety's even during weaning. Having difficulty finding good iv access so Orders received for PIcc line placement. Patient more cooperative and conversing more with nurse with less of a flat affect. Plan of care reviewed with daughter and patient at bedside.

## 2017-12-11 LAB
ANION GAP SERPL CALC-SCNC: 8 MMOL/L
BASOPHILS # BLD AUTO: 0.02 K/UL
BASOPHILS NFR BLD: 0.2 %
BUN SERPL-MCNC: 11 MG/DL
CALCIUM SERPL-MCNC: 9.1 MG/DL
CHLORIDE SERPL-SCNC: 107 MMOL/L
CO2 SERPL-SCNC: 28 MMOL/L
CREAT SERPL-MCNC: 0.8 MG/DL
DIFFERENTIAL METHOD: ABNORMAL
EOSINOPHIL # BLD AUTO: 0 K/UL
EOSINOPHIL NFR BLD: 0.1 %
ERYTHROCYTE [DISTWIDTH] IN BLOOD BY AUTOMATED COUNT: 15.3 %
EST. GFR  (AFRICAN AMERICAN): >60 ML/MIN/1.73 M^2
EST. GFR  (NON AFRICAN AMERICAN): >60 ML/MIN/1.73 M^2
GLUCOSE SERPL-MCNC: 154 MG/DL
HCT VFR BLD AUTO: 30.2 %
HGB BLD-MCNC: 10.7 G/DL
LYMPHOCYTES # BLD AUTO: 1.2 K/UL
LYMPHOCYTES NFR BLD: 14.5 %
MAGNESIUM SERPL-MCNC: 1.9 MG/DL
MCH RBC QN AUTO: 28.7 PG
MCHC RBC AUTO-ENTMCNC: 35.4 G/DL
MCV RBC AUTO: 81 FL
MONOCYTES # BLD AUTO: 0.3 K/UL
MONOCYTES NFR BLD: 3.2 %
NEUTROPHILS # BLD AUTO: 6.7 K/UL
NEUTROPHILS NFR BLD: 82 %
PLATELET # BLD AUTO: 177 K/UL
PMV BLD AUTO: 11.6 FL
POTASSIUM SERPL-SCNC: 3.8 MMOL/L
RBC # BLD AUTO: 3.73 M/UL
SODIUM SERPL-SCNC: 143 MMOL/L
TARGETS BLD QL SMEAR: ABNORMAL
WBC # BLD AUTO: 8.5 K/UL

## 2017-12-11 PROCEDURE — 85025 COMPLETE CBC W/AUTO DIFF WBC: CPT

## 2017-12-11 PROCEDURE — G8997 SWALLOW GOAL STATUS: HCPCS | Mod: CI

## 2017-12-11 PROCEDURE — 80048 BASIC METABOLIC PNL TOTAL CA: CPT

## 2017-12-11 PROCEDURE — 63600175 PHARM REV CODE 636 W HCPCS: Performed by: HOSPITALIST

## 2017-12-11 PROCEDURE — 92526 ORAL FUNCTION THERAPY: CPT

## 2017-12-11 PROCEDURE — 99900035 HC TECH TIME PER 15 MIN (STAT)

## 2017-12-11 PROCEDURE — 20000000 HC ICU ROOM

## 2017-12-11 PROCEDURE — 25000003 PHARM REV CODE 250: Performed by: INTERNAL MEDICINE

## 2017-12-11 PROCEDURE — 63600175 PHARM REV CODE 636 W HCPCS: Performed by: EMERGENCY MEDICINE

## 2017-12-11 PROCEDURE — 25000003 PHARM REV CODE 250: Performed by: HOSPITALIST

## 2017-12-11 PROCEDURE — G8996 SWALLOW CURRENT STATUS: HCPCS | Mod: CI

## 2017-12-11 PROCEDURE — 63600175 PHARM REV CODE 636 W HCPCS: Performed by: INTERNAL MEDICINE

## 2017-12-11 PROCEDURE — 25000242 PHARM REV CODE 250 ALT 637 W/ HCPCS: Performed by: INTERNAL MEDICINE

## 2017-12-11 PROCEDURE — 36415 COLL VENOUS BLD VENIPUNCTURE: CPT

## 2017-12-11 PROCEDURE — 94640 AIRWAY INHALATION TREATMENT: CPT

## 2017-12-11 PROCEDURE — G8998 SWALLOW D/C STATUS: HCPCS | Mod: CI

## 2017-12-11 PROCEDURE — 99231 SBSQ HOSP IP/OBS SF/LOW 25: CPT | Mod: ,,, | Performed by: PSYCHIATRY & NEUROLOGY

## 2017-12-11 PROCEDURE — 27100092 HC HIGH FLOW DELIVERY CANNULA

## 2017-12-11 PROCEDURE — 83735 ASSAY OF MAGNESIUM: CPT

## 2017-12-11 RX ADMIN — OSELTAMIVIR PHOSPHATE 75 MG: 75 CAPSULE ORAL at 08:12

## 2017-12-11 RX ADMIN — LOPERAMIDE HYDROCHLORIDE 2 MG: 2 CAPSULE ORAL at 01:12

## 2017-12-11 RX ADMIN — ENOXAPARIN SODIUM 40 MG: 100 INJECTION SUBCUTANEOUS at 05:12

## 2017-12-11 RX ADMIN — BENZTROPINE MESYLATE 1 MG: 1 TABLET ORAL at 08:12

## 2017-12-11 RX ADMIN — PIPERACILLIN AND TAZOBACTAM 4.5 G: 4; .5 INJECTION, POWDER, LYOPHILIZED, FOR SOLUTION INTRAVENOUS; PARENTERAL at 01:12

## 2017-12-11 RX ADMIN — LOPERAMIDE HYDROCHLORIDE 2 MG: 2 CAPSULE ORAL at 06:12

## 2017-12-11 RX ADMIN — HYDRALAZINE HYDROCHLORIDE 10 MG: 20 INJECTION INTRAMUSCULAR; INTRAVENOUS at 12:12

## 2017-12-11 RX ADMIN — HALOPERIDOL 10 MG: 5 TABLET ORAL at 08:12

## 2017-12-11 RX ADMIN — LEVALBUTEROL 1.25 MG: 1.25 SOLUTION, CONCENTRATE RESPIRATORY (INHALATION) at 01:12

## 2017-12-11 RX ADMIN — VANCOMYCIN HYDROCHLORIDE 1000 MG: 1 INJECTION, POWDER, LYOPHILIZED, FOR SOLUTION INTRAVENOUS at 08:12

## 2017-12-11 RX ADMIN — DEXTROSE MONOHYDRATE: 5 INJECTION, SOLUTION INTRAVENOUS at 06:12

## 2017-12-11 RX ADMIN — PIPERACILLIN AND TAZOBACTAM 4.5 G: 4; .5 INJECTION, POWDER, LYOPHILIZED, FOR SOLUTION INTRAVENOUS; PARENTERAL at 06:12

## 2017-12-11 RX ADMIN — LEVALBUTEROL 1.25 MG: 1.25 SOLUTION, CONCENTRATE RESPIRATORY (INHALATION) at 08:12

## 2017-12-11 RX ADMIN — LOPERAMIDE HYDROCHLORIDE 2 MG: 2 CAPSULE ORAL at 08:12

## 2017-12-11 RX ADMIN — METHYLPREDNISOLONE SODIUM SUCCINATE 40 MG: 40 INJECTION, POWDER, FOR SOLUTION INTRAMUSCULAR; INTRAVENOUS at 08:12

## 2017-12-11 NOTE — PROGRESS NOTES
[facility] Hospital Medicine  Progress Note    Patient Name: Jossie Crowley  MRN: [id number]  Patient Class: IP- Inpatient   Admission Date: 12/1/2017  Length of Stay: 10 days  Attending Physician: Andra Cifuentes MD  Primary Care Provider: Jordyn Owen MD        Subjective:     Principal Problem:ARDS (adult respiratory distress syndrome)    HPI:  56 y/o female with schizophrenia, HLP, hypothyroid, and h/o breast CA who was brought in by daughter for change in mental status. Daughter reported that she suspected the patient took more Depakote than normal. Pt reported to the ER physician that she has been taking Goody's powder and it made her nauseous. She stated that she has been more SOB and had coughing for about a week. This was mainly reported to the ER physician. During my interview, patient only answered few questions, and then was mostly silent and did not want to engage in conversation. All she answered to me was that she had a cough and SOB, and did not answer any other questions. Rest of the hx was via review of medical records and no family was available at the bedside. In the ER, patient had pulse of 145 on presentation and fever of 101.4F. Workup with CTA of chest was negative for PE but was remarkable for diffuse opacities. She treated with IVF and antibiotics, and cultures were drawn.    Hospital Course:  Ms Crowley presented with unintentional overdose with depakote. This is a recurrent issue. Workup significant for presumed aspiration pneumonia that progressed to ARDS. Provided with broad spectrum antibiotics, IVFs and supplemental O2. Upgraded to ICU for increased O2 requirements and placed on BiPAP. She was not improving sodded duo-nebs and solumedrol IV were added. Difficult to wean O2. CXR with possible edema. Tried diuresis with no improvement. Echo w/ EF 55%, +DD. BNP not elevated. Speech evaluated and has high concern for aspiration and recommended MBSS. She continues to  require to much supplemental O2 to have MBSS and CXR remained unchanged. Pulmonology consulted. Labs for hypersensitivity pneumonitis eval pending. Does not appear to have CTD. Noted to be flu A positive. ID started Tamiflu 12/6. Able to wean to high flow NC. Passed speech bedside swallow 12/8. Depakote held as potentially associated with ARDS. Appreciate psych recs. Haldol and Cogentin added back. Continue to wean. Will restart depakote at a lower does when able to wean off high flow NC and continue to monitor.  Of note, when she is stable for discharge she would benefit from having case management notified about her possible non-compliance with medications. Her depakote level was therapeutic, but she has significant schizophrenia to the point that she cannot reliably tell us if she was taking her medication correctly.    Interval History: Stable on high flow. Slow to wean.    Review of Systems   Constitutional: Negative.    Respiratory: Positive for shortness of breath. Negative for wheezing.    Cardiovascular: Negative.    Gastrointestinal: Negative.    Genitourinary: Negative.    Musculoskeletal: Negative.    Skin: Negative.    Neurological: Negative.    Psychiatric/Behavioral: Negative.      Objective:     Vital Signs (Most Recent):  Temp: 99 °F (37.2 °C) (12/11/17 1502)  Pulse: 99 (12/11/17 1502)  Resp: (!) 23 (12/11/17 1502)  BP: (!) 143/70 (12/11/17 1502)  SpO2: 100 % (12/11/17 1502) Vital Signs (24h Range):  Temp:  [98.2 °F (36.8 °C)-99 °F (37.2 °C)] 99 °F (37.2 °C)  Pulse:  [] 99  Resp:  [12-46] 23  SpO2:  [91 %-100 %] 100 %  BP: ()/() 143/70     Weight: 63.7 kg (140 lb 6.9 oz)  Body mass index is 21.99 kg/m².    Intake/Output Summary (Last 24 hours) at 12/11/17 1643  Last data filed at 12/11/17 1500   Gross per 24 hour   Intake             3305 ml   Output             3475 ml   Net             -170 ml      Physical Exam   Constitutional: She is oriented to person, place, and time. She  appears well-developed. No distress.   Eyes: Conjunctivae and EOM are normal.   Neck: Normal range of motion. No thyromegaly present.   Cardiovascular: Normal rate, normal heart sounds and intact distal pulses.    Pulmonary/Chest: She has no wheezes. She has no rales.   High flow NC.    Abdominal: Soft. Bowel sounds are normal. She exhibits no distension.   Musculoskeletal: Normal range of motion. She exhibits no edema.   Neurological: She is alert and oriented to person, place, and time.   Skin: Skin is warm and dry. She is not diaphoretic.   Nursing note and vitals reviewed.      Significant Labs: All pertinent labs within the past 24 hours have been reviewed.    Significant Imaging: I have reviewed and interpreted all pertinent imaging results/findings within the past 24 hours.    Assessment/Plan:      * ARDS (adult respiratory distress syndrome)    Pt met criteria for sepsis with fever, tachycardia and infection source of multilobar pneumonia. Pt was treated empirically with Zosyn/Cipro/Vancomycin and IVF, and cultures were drawn. NEBS and O2 via BiPAP. Difficult to wean from BiPAP. Stopped fluids 12/3/17 and diuresed, but this did not seem to make any improvement and renal function started to elevate so diuresis held. Echo w/ some DD, but BNP not elevated. Appreciate pulm recs. CTD workup negative. Hypersensitivity panel pending. Maybe depakote related? Reported case of depakote-associated ARDS. Will hold - restart at 500mg when able to get off high flow and monitor.        Aspiration pneumonia    CTA remarkable for diffuse bilateral patchy airspace opacities treated with antibiotics as discussed above. I am concerned she aspirated. ID consulted given the unusual appearance of findings and for further fine tuning of antibiotics regimen. Continue current broad regimen. BCx NGTD. Respiratory panel with Flu A. ID started on Tamiflu. Droplet isolation precautions.        Acute febrile illness    As above         Influenza A (H1N1)    As above        Accidental drug overdose    Suspected over use of depakote. Unintentional. Will decrease dose if restarted. This was the reported reason for her similar presentation one year ago.         Paranoid schizophrenia    Stopped home depakote. Appreciate psych recs. Haldol and Cogentin restarted. Will restart depakote at lower dose when more stable.        Tachycardia    Secondary to sepsis, hypoxia and expected physiologic response        Troponin level elevated    Likely secondary to strain with sepsis and not ACS, but will trend out markers and monitor on telemetry. Echo.        History of breast cancer in female    No acute issues; s/p right breast lumpectomy followed by chemotherapy in 2005. Will need to refer to Hem/Onc as outpatient for follow up.        Tobacco abuse    Smoking cessation counseling will be addressed prior to discharge.           VTE Risk Mitigation         Ordered     enoxaparin injection 40 mg  Daily     Route:  Subcutaneous        12/02/17 0213     Medium Risk of VTE  Once      12/01/17 2146     Place sequential compression device  Until discontinued      12/01/17 2146     Place OMAR hose  Until discontinued      12/01/17 2146          Critical care time spent on the evaluation and treatment of severe organ dysfunction, review of pertinent labs and imaging studies, discussions with consulting providers and discussions with patient/family: 30 minutes.    Andra Cifuentes MD  Department of Hospital Medicine   [facility]

## 2017-12-11 NOTE — PROGRESS NOTES
"[facility]  Psychiatry  Progress Note    Patient Name: Jossie Crowley  MRN: [id number]   Code Status: Full Code  Admission Date: 12/1/2017  Hospital Length of Stay: 10 days  Expected Discharge Date:   Attending Physician: Andra Cifuentes MD  Primary Care Provider: Jordyn Owen MD    Current Legal Status: N/A    Patient information was obtained from patient.     Subjective:     Principal Problem:ARDS (adult respiratory distress syndrome)    Chief Complaint: odd behavior    HPI: Patient Jossie Crowley presents to the hospital with strange behavior.  She was found to be possibly under an unintentional overdose of her Depakote with subsequent ARDS.  Now she is found to have influenza a and is in isolation in the ICU.  Patient is easily approached and conversation but is a difficult and somewhat poor historian.  She tells me that she is in the hospital because she was "shaky".  She says that she follows with an outpatient psychiatrist, Dr. Fiore, and sees him on a monthly basis.  She is not sure when her next appointment is.  She is not able to name her medications but when I go over a list with her, she is able to tell me which ones she is taking.  She says that she takes Depakote twice daily, Haldol twice daily, Cogentin twice daily.  She is not sure of the doses.  She says that she is not currently depressed or anxious.  She is not having any manic symptoms.  She denies any psychosis at this time.  She says that when her schizophrenia "acts up", she can see things that aren't there and her mind plays tricks on her.  Records indicate previous hospitalizations which show that she becomes very paranoid and isolative when she has an exacerbation.  She can strangely rearrange things around the house and do odd things such as drink water from ashtrays, put things in the trash that do not belong there, and drink excessive amounts of water.  She says that she has not been in a psychiatric hospital for some " time now and does not feel that she needs to be there.    There is speculation that she has overdosed on her Depakote but an admit valproic acid level was within range.  Nursing reports that she is constantly pushing the nursing alert button and is restless in bed.    Hospital Course: 12/11/2017 - patient is seen in her ICU isolation room.  She remains easy to approach in conversation but quiet and odd.  She says that she is doing well and without complaint.  She is asking for her [facility] cancer doctor to come see her.  She says that she is sleeping and eating well.  Not really able to explain her medical care here.  Still a poor historian.  She was started back on her haloperidol and benztropine.  Still concern for depakote induced ARDS?    Interval History: unchanged    Family History     None        Social History Main Topics    Smoking status: Current Every Day Smoker     Packs/day: 0.50     Years: 37.00     Types: Cigarettes    Smokeless tobacco: Former User     Quit date: 12/27/2014    Alcohol use No    Drug use: No    Sexual activity: Not on file     Psychotherapeutics     Start     Stop Route Frequency Ordered    12/09/17 1417  haloperidol lactate injection 10 mg      -- IM Every 6 hours PRN 12/09/17 1318    12/09/17 1330  haloperidol tablet 10 mg      -- Oral 2 times daily 12/09/17 1318           Review of Systems   Constitutional: Negative for activity change and appetite change.   Respiratory: Positive for shortness of breath.    Cardiovascular: Negative for chest pain.   Gastrointestinal: Negative for nausea.   Musculoskeletal: Negative for arthralgias and myalgias.   Psychiatric/Behavioral: Positive for confusion. Negative for dysphoric mood, sleep disturbance and suicidal ideas. The patient is not nervous/anxious.      Objective:     Vital Signs (Most Recent):  Temp: 98.7 °F (37.1 °C) (12/11/17 1130)  Pulse: 105 (12/11/17 1332)  Resp: (!) 29 (12/11/17 1332)  BP: 131/60 (12/11/17 1303)  SpO2: 100 %  "(12/11/17 1332) Vital Signs (24h Range):  Temp:  [98.1 °F (36.7 °C)-98.7 °F (37.1 °C)] 98.7 °F (37.1 °C)  Pulse:  [] 105  Resp:  [12-46] 29  SpO2:  [91 %-100 %] 100 %  BP: ()/() 131/60     Height: 5' 7.01" (170.2 cm)  Weight: 63.7 kg (140 lb 6.9 oz)  Body mass index is 21.99 kg/m².      Intake/Output Summary (Last 24 hours) at 12/11/17 1432  Last data filed at 12/11/17 1307   Gross per 24 hour   Intake             2925 ml   Output             3650 ml   Net             -725 ml       Physical Exam   Psychiatric:   EXAMINATION    CONSTITUTIONAL  General Appearance: Hospital attire, restless in an ICU bed    MUSCULOSKELETAL  Muscle Strength and Tone: Normal  Abnormal Involuntary Movements: Restless appearing  Gait and Station: Not observed    PSYCHIATRIC MENTAL STATUS EXAM   Level of Consciousness: Awake and alert  Orientation: Name and "hospital"  Grooming: Limited  Psychomotor Behavior: Somewhat restless  Speech: Soft and delayed  Language: No abnormalities  Mood: "Okay"  Affect: Blunt and oddly related  Thought Process: Shedd  Associations: Intact  Thought Content: Denies suicidal/homicidal/psychosis  Memory: Somewhat intact to recent and remote but very simple  Attention: Diminished  Fund of Knowledge: Intact for simple conversation  Insight: Fair towards mental illness  Judgment: Limited towards compliance and redirection         Significant Labs:   Last 24 Hours:   Recent Lab Results       12/11/17  0425 12/10/17  1854      Anion Gap 8      Baso # 0.02      Basophil% 0.2      BUN, Bld 11      Calcium 9.1      Chloride 107      CO2 28      Creatinine 0.8      Differential Method Automated      eGFR if  >60      eGFR if non  >60  Comment:  Calculation used to obtain the estimated glomerular filtration  rate (eGFR) is the CKD-EPI equation.         Eos # 0.0      Eosinophil% 0.1      Glucose 154(H)      Gran # 6.7      Gran% 82.0(H)      Hematocrit 30.2(L)      " Hemoglobin 10.7(L)      Lymph # 1.2      Lymph% 14.5(L)      Magnesium 1.9      MCH 28.7      MCHC 35.4      MCV 81(L)      Mono # 0.3      Mono% 3.2(L)      MPV 11.6      Platelets 177      Potassium 3.8      RBC 3.73(L)      RDW 15.3(H)      Sodium 143      Target Cells Moderate      Vancomycin-Trough  19.3     WBC 8.50          All pertinent labs within the past 24 hours have been reviewed.    Significant Imaging: I have reviewed all pertinent imaging results/findings within the past 24 hours.    Assessment/Plan:     Paranoid schizophrenia    Patient has a long documented history of schizophrenia, likely schizoaffective diathesis.  She has had worsening of her symptoms over time which has likely also led to some cognitive impairment.  There has been consideration for overuse of her Depakote but her Depakote level was within normal limits upon her presenting to the hospital.  Still, I would recommend for her to remain on Depakote but decrease the dose to 500 mg by mouth twice a day.  Continue Haldol 10 mg by mouth twice a day, Cogentin 1 mg by mouth twice a day.  Being unmedicated, she will likely have an exacerbation of her psychotic symptoms and odd behaviors.  She will be a difficult case.  It would be in her best interest to set up some psychiatric home health to help manage her medications in the home setting.  Another consideration would be for case management to contact the ACT team for her area and see if they would be able to accommodate with regular visits.  Should she have any non-redirectable behavior or psychotic agitation, utilize Haldol 10 mg/Ativan 1 mg/Benadryl 50 mg by mouth or IM.  She can follow-up with her outpatient Dr. Fiore upon discharge.  She may also have a  set up through her insurance that may have access to psychiatric home health.             Need for Continued Hospitalization:   No need for inpatient psychiatric hospitalization. Continue medical care as per the primary  team.    Anticipated Disposition: Home or Self Care     Total time:  15 with greater than 50% of this time spent in counseling and/or coordination of care.       Gerardo Saba MD   Psychiatry  [facility]

## 2017-12-11 NOTE — PLAN OF CARE
Recommendations     Recommendation/Intervention:  1. Rec Boost Plus strawberry TID. Continue dental soft diet.   2. Encourage PO intake.   3. RD to monitor.  Goals: >85% of EEN, EPN  Nutrition Goal Status: new      D/C planning: Dental Soft diet with Supplements as needed.

## 2017-12-11 NOTE — SUBJECTIVE & OBJECTIVE
Interval History: Stable on high flow. Appetite improved.    Review of Systems   Constitutional: Negative.    Respiratory: Positive for shortness of breath. Negative for wheezing.    Cardiovascular: Negative.    Gastrointestinal: Negative.    Genitourinary: Negative.    Musculoskeletal: Negative.    Skin: Negative.    Neurological: Negative.    Psychiatric/Behavioral: Negative.      Objective:     Vital Signs (Most Recent):  Temp: 98.7 °F (37.1 °C) (12/10/17 1930)  Pulse: 87 (12/10/17 2000)  Resp: (!) 29 (12/10/17 2000)  BP: (!) 142/71 (12/10/17 2000)  SpO2: 97 % (12/10/17 2000) Vital Signs (24h Range):  Temp:  [98 °F (36.7 °C)-98.7 °F (37.1 °C)] 98.7 °F (37.1 °C)  Pulse:  [] 87  Resp:  [16-62] 29  SpO2:  [81 %-100 %] 97 %  BP: (129-209)/() 142/71     Weight: 63.7 kg (140 lb 6.9 oz)  Body mass index is 21.99 kg/m².    Intake/Output Summary (Last 24 hours) at 12/10/17 2024  Last data filed at 12/10/17 2003   Gross per 24 hour   Intake          3403.75 ml   Output             4120 ml   Net          -716.25 ml      Physical Exam   Constitutional: She is oriented to person, place, and time. She appears well-developed. No distress.   Eyes: Conjunctivae and EOM are normal.   Neck: Normal range of motion. No thyromegaly present.   Cardiovascular: Normal rate, normal heart sounds and intact distal pulses.    Pulmonary/Chest:   High flow NC. Improved air movement   Abdominal: Soft. Bowel sounds are normal.   Musculoskeletal: Normal range of motion. She exhibits no edema.   Neurological: She is alert and oriented to person, place, and time.   Skin: Skin is warm and dry. She is not diaphoretic.   Nursing note and vitals reviewed.      Significant Labs: All pertinent labs within the past 24 hours have been reviewed.    Significant Imaging: I have reviewed and interpreted all pertinent imaging results/findings within the past 24 hours.

## 2017-12-11 NOTE — PROGRESS NOTES
Progress Note  Infectious Disease    Admit Date: 12/1/2017   LOS: 10 days     SUBJECTIVE:     Follow-up For:  Aspiration pneumonia, now pcr for influenza a positive    Antibiotics     None          Review of Systems:  Review of systems not obtained due to patient factors on bipap.    OBJECTIVE:     Vital Signs (Most Recent)  Temp: 99 °F (37.2 °C) (12/11/17 1502)  Pulse: 99 (12/11/17 1502)  Resp: (!) 23 (12/11/17 1502)  BP: (!) 143/70 (12/11/17 1502)  SpO2: 100 % (12/11/17 1502)     Temperature Range Min/Max (Last 24H):  Temp:  [98.2 °F (36.8 °C)-99 °F (37.2 °C)]     I & O (Last 24H):    Intake/Output Summary (Last 24 hours) at 12/11/17 1723  Last data filed at 12/11/17 1500   Gross per 24 hour   Intake             3050 ml   Output             3350 ml   Net             -300 ml       Physical Exam:  General: well developed, well nourished  Eyes: conjunctivae/corneas clear. PERRL..  HENT: Head:normocephalic, atraumatic. Ears:not examined. Nose: Nares normal. Septum midline. Mucosa normal. No drainage or sinus tenderness., no discharge. Throat: lips, mucosa, and tongue normal; teeth and gums normal and no throat erythema.  Neck: supple, symmetrical, trachea midline, no JVD and thyroid not enlarged, symmetric, no tenderness/mass/nodules  Lungs:  labored breathing, diminished breath sounds bilaterally and rales bilaterally  Cardiovascular: Heart: regular rate and rhythm, S1, S2 normal, no murmur, click, rub or gallop. Chest Wall: no tenderness. Extremities: no cyanosis or edema, or clubbing. Pulses: 2+ and symmetric.  Abdomen/Rectal: Abdomen: soft, non-tender non-distented; bowel sounds normal; no masses,  no organomegaly. Rectal: not examined  Skin: Skin color, texture, turgor normal. No rashes or lesions  Musculoskeletal:no clubbing, cyanosis    Lines/Drains:       Peripheral IV - Single Lumen 12/02/17 1600 Left Forearm (Active)   Site Assessment Clean;Dry;Intact;No redness;No swelling 12/4/2017  3:30 PM   Line Status  Blood return noted;Flushed;Infusing 12/4/2017  3:30 PM   Dressing Status Clean;Intact;Dry 12/4/2017  3:30 PM   Dressing Change Due 12/06/17 12/4/2017  3:30 PM   Site Change Due 12/06/17 12/4/2017  7:52 AM   Reason Not Rotated Not due 12/4/2017  3:30 PM            Peripheral IV - Single Lumen 12/03/17 2018 Left Forearm (Active)   Site Assessment Clean;Dry;Intact;No redness;No swelling 12/4/2017  3:30 PM   Line Status Blood return noted;Flushed;Saline locked 12/4/2017  3:30 PM   Dressing Status Clean;Dry;Intact 12/4/2017  3:30 PM   Dressing Change Due 12/06/17 12/4/2017  3:30 PM   Site Change Due 12/06/17 12/4/2017  7:52 AM   Reason Not Rotated Not due 12/4/2017  3:30 PM       Laboratory:  CBC    Recent Labs  Lab 12/11/17  0425   WBC 8.50   RBC 3.73*   HGB 10.7*   HCT 30.2*      MCV 81*   MCH 28.7   MCHC 35.4     BMP    Recent Labs  Lab 12/11/17  0425      K 3.8      CO2 28   BUN 11   CREATININE 0.8   CALCIUM 9.1     Microbiology Results (last 7 days)     Procedure Component Value Units Date/Time    Blood culture [[id number]] Collected:  12/04/17 1610    Order Status:  Completed Specimen:  Blood Updated:  12/09/17 1903     Blood Culture, Routine No growth after 5 days.    Narrative:       Collection has been rescheduled by CMO at 12/4/2017 14:21 Reason:   Patient unavailable  Collection has been rescheduled by CMO at 12/4/2017 14:21 Reason:   Patient unavailable    Blood culture [[id number]] Collected:  12/04/17 1610    Order Status:  Completed Specimen:  Blood Updated:  12/09/17 1903     Blood Culture, Routine No growth after 5 days.    Narrative:       Collection has been rescheduled by CMO at 12/4/2017 14:21 Reason:   Patient unavailable  Collection has been rescheduled by CMO at 12/4/2017 14:21 Reason:   Patient unavailable    Blood culture x two cultures. Draw prior to antibiotics. [[id number]] Collected:  12/01/17 1418    Order Status:  Completed Specimen:  Blood from Peripheral, Hand, Left  Updated:  12/06/17 1503     Blood Culture, Routine No growth after 5 days.    Narrative:       Draw one set from central line if present, draw second  culture from another site.    Blood culture x two cultures. Draw prior to antibiotics. [[id number]] Collected:  12/01/17 1412    Order Status:  Completed Specimen:  Blood from Peripheral, Antecubital, Right Updated:  12/06/17 1503     Blood Culture, Routine No growth after 5 days.    Narrative:       Draw one set from central line if present, draw second  culture from another site.    Respiratory Viral Panel by PCR [facility] ( ); Nasal Swab [[id number]]  (Abnormal) Collected:  12/02/17 1845    Order Status:  Completed Specimen:  Respiratory Updated:  12/06/17 0731     Respiratory Virus Panel, source Nasal Swab     RVP - Adenovirus Not Detected     Comment: Respiratory Viral Panel is a product of [employer].  It has been approved or cleared by the U.S. Food and Drug  Administration for in vitro diagnostic use.  Results should be  used in conjunction with clinical findings, and should not form  the sole basis for a diagnosis or treatment decision.  Negative results do not preclude respiratory virus infection  and should not be used as the sole basis for diagnosis,   treatment, or other management decisions.  Positive results do not rule out bacterial infection, or  co-infection with other viruses.  The agent detected may not  be the definitive cause of the disease. The use of additional   laboratory testing (e.g. bacterial culture, immunofluorescence,  radiography) and clinical presentation must be taken into  consideration in order to obtain the final diagnosis of   respiratory viral infection.  The RVP assay cannot adequately detect Adenovirus species C,  or serotypes 7a and 41.  The RVP primers for detection of   rhinovirus have been shown to cross-react with enterovirus.  A rhinovirus reactive result should be confirmed by an   alternative method (e.g. cell  culture).  The  of the Respiratory Viral Panel has   recommmended that specimens found to be negative for  Adenovirus be confirmed by an alternative method.  The  of the Respiratory Viral Panel has  recommended that specimens found to be negative for   Influenza be confirmed by an alternative method.          Enterovirus Not Detected     Comment: Cross-reactivity has been observed between certain Rhinovirus  strains and the Enterovirus assay.          Human Bocavirus Not Detected     Human Coronavirus Not Detected     Comment: The Human Coronavirus assay detects Human coronavirus types  229E, OC43,NL63 and HKO1.          RVP - Human Metapneumovirus (hMPV) Not Detected     RVP - Influenza A Not Detected     Influenza A - D7I6-01 Positive (A)     RVP - Influenza B Not Detected     Parainfluenza Not Detected     Respiratory Syncytial VirusVirus (RSV) A Not Detected     Comment: The Respiratory Syncytial Viral assay detects types A and B,  however it does not distinguish between the two.          RVP - Rhinovirus Not Detected     Comment: Target Enriched Mulitplex Polymerase Chain Reaction (TEM-PCR)  allows for the detection of multiple pathogens out of a single  reaction.  This test was developed and its performance   characteristics determined by [employer].  It has not   been cleared or approved by the U.S.Food and Drug Administration.  Results should be used in conjunction with clinical findings,   and should not form the sole basis for a diagnosis or treatment  decision.  TEM-PCR is a licensed technology of [employer].         Narrative:       Receiving Lab:->[facility]        No results for input(s): COLORU, CLARITYU, SPECGRAV, PHUR, PROTEINUA, GLUCOSEU, BILIRUBINCON, BLOODU, WBCU, RBCU, BACTERIA, MUCUS, NITRITE, LEUKOCYTESUR, UROBILINOGEN, HYALINECASTS in the last 168 hours.    Diagnostic Results:  Labs: Reviewed  X-Ray: Reviewed  CT: Reviewed    ASSESSMENT/PLAN:      Active Hospital Problems    Diagnosis  POA    *ARDS (adult respiratory distress syndrome) [J80]  Yes    Influenza A (H1N1) [J10.1]  Yes    Accidental drug overdose [T50.901A]  Yes    Acute febrile illness [R50.9]  Yes    Tachycardia [R00.0]  Yes    Tobacco abuse [Z72.0]  Yes     Chronic    History of breast cancer in female [Z85.3]  Not Applicable     Chronic     S/P right breast lumpectomy and chemo,2005      Paranoid schizophrenia [F20.0]  Yes    Troponin level elevated [R74.8]  Yes    Aspiration pneumonia [J69.0]  Yes      Resolved Hospital Problems    Diagnosis Date Resolved POA   No resolved problems to display.       1. Aspiration pneumonia  2. Schizophrenia  3. Ards, sec to influenza a ?  continue empiric abx- no sputum available  hiv negative   dc steroids  I added tamiflu  X 5 days  Creatinine stable  She has been on zosyn from 12/1/17 to date and also on vancomycin from 12/1/17 to date  This is an adequate course of empirical abx  I suspect her ards is sec to influenza and will take a while to resolve

## 2017-12-11 NOTE — PT/OT/SLP EVAL
"Speech Language Pathology Evaluation  Bedside Swallow    Patient Name:  Jossie Crowley   MRN:  [id number]  Admitting Diagnosis: ARDS (adult respiratory distress syndrome)    Recommendations:     Diet recommendations:  Dental Soft, Thin   Aspiration Precautions: HOB to 90 degrees   General Precautions: Standard, aspiration  Communication strategies:  none    History:     Past Medical History:   Diagnosis Date    Breast cancer     History of psychiatric hospitalization     Hx of psychiatric care     Hyperlipidemia     Psychiatric problem     Schizophrenia     Therapy     Thyroid disease     thyroid surgery       Past Surgical History:   Procedure Laterality Date    BREAST BIOPSY      BREAST LUMPECTOMY      BREAST SURGERY      THYROIDECTOMY  2005       Subjective   Pt and nursing reporting that Pt is tolerating diet without overt s/s of aspiration.   Patient goals: discharge home "When do I get to go home"    Objective:     Oral Musculature Evaluation  · Oral Musculature: WFL  · Dentition: upper dentures  · Secretion Management: coughing on secretions  · Mucosal Quality: good  · Mandibular Strength and Mobility:  (general weakness)  · Oral Labial Strength and Mobility: WFL  · Lingual Strength and Mobility: WFL  · Velar Elevation: WFL  · Buccal Strength and Mobility: WFL  · Volitional Swallow:  (timely upon palpation)  · Voice Prior to PO Intake:  (low volume, clear)  · Oral Musculature Comments: wfl    Bedside Swallow Eval:   Consistencies Assessed:  · Thin liquids 2oz; self presented via straw, muliple trials  · Solids X1     Oral Phase:   · WFL    Pharyngeal Phase:   · no overt clinical signs/symptoms of aspiration   · Please note silent aspiration cannot be r/o at bedside    Treatment: no further ST is warranted.     Assessment:     Jossie Crowley is a 57 y.o. female with a medical dx of PNA, she presents with functional swallow.     Goals:    SLP Goals     Not on file          Multidisciplinary Problems " (Resolved)        Problem: SLP Goal    Goal Priority Disciplines Outcome   SLP Goal   (Resolved)     SLP Outcome(s) achieved   Description:  Short Term Goals:  1. Pt will participate in BSS to determine least restrictive diet. (GOAL MET 12/8)  2. Pt will participate in MBSS to objectively r/o aspiration and to determine least restrictive po diet. (GOAL DISCONTINUED 12/8)    Updated goal  1. Pt will tolerate regular consistencies with thin liquids without overt s/s of aspiraiton (GOAL MET 12/8)                    Plan:     No further ST is warranted at this time.   · Plan of Care expires:  12/11/17  · Plan of Care reviewed with:  patient   · SLP Follow-Up:  No         Time Tracking:     SLP Treatment Date:   12/11/17  Speech Start Time:  1330  Speech Stop Time:  1340     Speech Total Time (min):  10 min    Billable Minutes: Treatment Swallowing Dysfunction 10    Marcela Richardson CCC-SLP  12/11/2017

## 2017-12-11 NOTE — SUBJECTIVE & OBJECTIVE
"Interval History: unchanged    Family History     None        Social History Main Topics    Smoking status: Current Every Day Smoker     Packs/day: 0.50     Years: 37.00     Types: Cigarettes    Smokeless tobacco: Former User     Quit date: 12/27/2014    Alcohol use No    Drug use: No    Sexual activity: Not on file     Psychotherapeutics     Start     Stop Route Frequency Ordered    12/09/17 1417  haloperidol lactate injection 10 mg      -- IM Every 6 hours PRN 12/09/17 1318    12/09/17 1330  haloperidol tablet 10 mg      -- Oral 2 times daily 12/09/17 1318           Review of Systems   Constitutional: Negative for activity change and appetite change.   Respiratory: Positive for shortness of breath.    Cardiovascular: Negative for chest pain.   Gastrointestinal: Negative for nausea.   Musculoskeletal: Negative for arthralgias and myalgias.   Psychiatric/Behavioral: Positive for confusion. Negative for dysphoric mood, sleep disturbance and suicidal ideas. The patient is not nervous/anxious.      Objective:     Vital Signs (Most Recent):  Temp: 98.7 °F (37.1 °C) (12/11/17 1130)  Pulse: 105 (12/11/17 1332)  Resp: (!) 29 (12/11/17 1332)  BP: 131/60 (12/11/17 1303)  SpO2: 100 % (12/11/17 1332) Vital Signs (24h Range):  Temp:  [98.1 °F (36.7 °C)-98.7 °F (37.1 °C)] 98.7 °F (37.1 °C)  Pulse:  [] 105  Resp:  [12-46] 29  SpO2:  [91 %-100 %] 100 %  BP: ()/() 131/60     Height: 5' 7.01" (170.2 cm)  Weight: 63.7 kg (140 lb 6.9 oz)  Body mass index is 21.99 kg/m².      Intake/Output Summary (Last 24 hours) at 12/11/17 1432  Last data filed at 12/11/17 1307   Gross per 24 hour   Intake             2925 ml   Output             3650 ml   Net             -725 ml       Physical Exam   Psychiatric:   EXAMINATION    CONSTITUTIONAL  General Appearance: Hospital attire, restless in an ICU bed    MUSCULOSKELETAL  Muscle Strength and Tone: Normal  Abnormal Involuntary Movements: Restless appearing  Gait and Station: " "Not observed    PSYCHIATRIC MENTAL STATUS EXAM   Level of Consciousness: Awake and alert  Orientation: Name and "hospital"  Grooming: Limited  Psychomotor Behavior: Somewhat restless  Speech: Soft and delayed  Language: No abnormalities  Mood: "Okay"  Affect: Blunt and oddly related  Thought Process: Dale  Associations: Intact  Thought Content: Denies suicidal/homicidal/psychosis  Memory: Somewhat intact to recent and remote but very simple  Attention: Diminished  Fund of Knowledge: Intact for simple conversation  Insight: Fair towards mental illness  Judgment: Limited towards compliance and redirection         Significant Labs:   Last 24 Hours:   Recent Lab Results       12/11/17  0425 12/10/17  1854      Anion Gap 8      Baso # 0.02      Basophil% 0.2      BUN, Bld 11      Calcium 9.1      Chloride 107      CO2 28      Creatinine 0.8      Differential Method Automated      eGFR if  >60      eGFR if non  >60  Comment:  Calculation used to obtain the estimated glomerular filtration  rate (eGFR) is the CKD-EPI equation.         Eos # 0.0      Eosinophil% 0.1      Glucose 154(H)      Gran # 6.7      Gran% 82.0(H)      Hematocrit 30.2(L)      Hemoglobin 10.7(L)      Lymph # 1.2      Lymph% 14.5(L)      Magnesium 1.9      MCH 28.7      MCHC 35.4      MCV 81(L)      Mono # 0.3      Mono% 3.2(L)      MPV 11.6      Platelets 177      Potassium 3.8      RBC 3.73(L)      RDW 15.3(H)      Sodium 143      Target Cells Moderate      Vancomycin-Trough  19.3     WBC 8.50          All pertinent labs within the past 24 hours have been reviewed.    Significant Imaging: I have reviewed all pertinent imaging results/findings within the past 24 hours.  "

## 2017-12-11 NOTE — PLAN OF CARE
Problem: Patient Care Overview  Goal: Plan of Care Review  Outcome: Ongoing (interventions implemented as appropriate)  Pt AAO x 3; confused to situation at times. Pt weaned to 25 L and 40% oxygen on HFNC. ON D5 at 75 mL/hr.  Pt with 2 loose BM's today. No c/o chest pain. VSS. NSR on the monitor.  Afebrile. No falls or injuries this shift.

## 2017-12-11 NOTE — SUBJECTIVE & OBJECTIVE
Interval History: Stable on high flow. Slow to wean.    Review of Systems   Constitutional: Negative.    Respiratory: Positive for shortness of breath. Negative for wheezing.    Cardiovascular: Negative.    Gastrointestinal: Negative.    Genitourinary: Negative.    Musculoskeletal: Negative.    Skin: Negative.    Neurological: Negative.    Psychiatric/Behavioral: Negative.      Objective:     Vital Signs (Most Recent):  Temp: 99 °F (37.2 °C) (12/11/17 1502)  Pulse: 99 (12/11/17 1502)  Resp: (!) 23 (12/11/17 1502)  BP: (!) 143/70 (12/11/17 1502)  SpO2: 100 % (12/11/17 1502) Vital Signs (24h Range):  Temp:  [98.2 °F (36.8 °C)-99 °F (37.2 °C)] 99 °F (37.2 °C)  Pulse:  [] 99  Resp:  [12-46] 23  SpO2:  [91 %-100 %] 100 %  BP: ()/() 143/70     Weight: 63.7 kg (140 lb 6.9 oz)  Body mass index is 21.99 kg/m².    Intake/Output Summary (Last 24 hours) at 12/11/17 1643  Last data filed at 12/11/17 1500   Gross per 24 hour   Intake             3305 ml   Output             3475 ml   Net             -170 ml      Physical Exam   Constitutional: She is oriented to person, place, and time. She appears well-developed. No distress.   Eyes: Conjunctivae and EOM are normal.   Neck: Normal range of motion. No thyromegaly present.   Cardiovascular: Normal rate, normal heart sounds and intact distal pulses.    Pulmonary/Chest: She has no wheezes. She has no rales.   High flow NC.    Abdominal: Soft. Bowel sounds are normal. She exhibits no distension.   Musculoskeletal: Normal range of motion. She exhibits no edema.   Neurological: She is alert and oriented to person, place, and time.   Skin: Skin is warm and dry. She is not diaphoretic.   Nursing note and vitals reviewed.      Significant Labs: All pertinent labs within the past 24 hours have been reviewed.    Significant Imaging: I have reviewed and interpreted all pertinent imaging results/findings within the past 24 hours.

## 2017-12-11 NOTE — PROGRESS NOTES
"[facility]  Adult Nutrition  Progress Note    SUMMARY     Recommendations    Recommendation/Intervention:  1. Rec Boost Plus strawberry TID. Continue dental soft diet.   2. Encourage PO intake.   3. RD to monitor.  Goals: >85% of EEN, EPN  Nutrition Goal Status: new     D/C planning: Dental Soft diet with Supplements as needed.    Reason for Assessment    Reason for Assessment: length of stay  Diagnosis:  (ARDS)  Relevent Medical History: HLD, breast cancer, schizophrenia   Interdisciplinary Rounds: attended    General Information Comments: Pt. brought in for change in mental status presented with unintentioanl overdose of depakote.  Patient denies n/v. Nsg reports minimal intake with breakfast but patient states intake is fine.  Discussed use of supplements and patient is willing to try Boost plus tid (strawberry).     Nutrition Prescription Ordered    Current Diet Order: Dental soft     Evaluation of Received Nutrients/Fluid Intake    Energy Calories Required: not meeting needs  Protein Required: not meeting needs  Fluid Required: meeting needs  [id number]  Comments: LBM 12/10/17    % Intake of Estimated Energy Needs: 0 - 25 %  % Meal Intake: 25%     Nutrition Risk Screen     Nutrition Risk Screen: no indicators present  Denies cultural, Rastafarian, ethnic food intake.    Nutrition/Diet History    Patient Reported Diet/Restrictions/Preferences: general     Food Preferences: No cultural or Rastafarian preferences.      Labs/Tests/Procedures/Meds     Pertinent Labs Reviewed: reviewed  Pertinent Labs Comments: Glucose 154  Pertinent Medications Reviewed: reviewed  Pertinent Medications Comments: prednisone, vanc    Physical Findings    Overall Physical Appearance: weak, loss of muscle mass  Skin: intact  Teeth: WDL    Anthropometrics    Temp: 98.7 °F (37.1 °C)     Height: 5' 7.01" (170.2 cm)  Weight Method: Bed Scale  Weight: 63.7 kg (140 lb 6.9 oz)     Ideal Body Weight (IBW), Female: 135.05 lb   "   % Ideal Body Weight, Female (lb): 103.98 lb  BMI (Calculated): 22  BMI Grade: 18.5-24.9 - normal    Estimated/Assessed Needs    Weight Used For Calorie Calculations: 63.7 kg (140 lb 6.9 oz)   Height (cm): 170.2 cm  Energy Calorie Requirements (kcal): 1567kcals  Energy Need Method: McDowell-St Jeor (x1.25(PAL))     RMR (McDowell-St. Jeor Equation): 1254.75     Weight Used For Protein Calculations: 63.7 kg (140 lb 6.9 oz)     Fluid Need Method: RDA Method (or per MD.)  RDA Method (mL): 1567     Assessment and Plan    Nutrition Problem  Inadequate energy intake    Related to (etiology):   Reduced appetite    Signs and Symptoms (as evidenced by):   PO intake <25% of meals    Interventions/Recommendations (treatment strategy):  See recs    Nutrition Diagnosis Status:   New    Monitor and Evaluation    Food and Nutrient Intake: food and beverage intake  Food and Nutrient Adminstration: diet order  Physical Activity and Function: nutrition-related ADLs and IADLs  Anthropometric Measurements: weight, weight change  Biochemical Data, Medical Tests and Procedures: electrolyte and renal panel, gastrointestinal profile, glucose/endocrine profile, inflammatory profile, lipid profile  Nutrition-Focused Physical Findings: overall appearance    Nutrition Risk    Level of Risk:  (1X /week)    Nutrition Follow-Up    RD Follow-up?: Yes

## 2017-12-11 NOTE — PLAN OF CARE
Problem: SLP Goal  Goal: SLP Goal  Short Term Goals:  1. Pt will participate in BSS to determine least restrictive diet. (GOAL MET 12/8)  2. Pt will participate in MBSS to objectively r/o aspiration and to determine least restrictive po diet. (GOAL DISCONTINUED 12/8)    Updated goal  1. Pt will tolerate regular consistencies with thin liquids without overt s/s of aspiraiton (GOAL MET 12/8)   Outcome: Outcome(s) achieved Date Met: 12/11/17  Goal met

## 2017-12-11 NOTE — PROGRESS NOTES
[facility] Hospital Medicine  Progress Note    Patient Name: Jossie Crowley  MRN: [id number]  Patient Class: IP- Inpatient   Admission Date: 12/1/2017  Length of Stay: 9 days  Attending Physician: Andra Cifuentes MD  Primary Care Provider: Jordyn Owen MD        Subjective:     Principal Problem:ARDS (adult respiratory distress syndrome)    HPI:  58 y/o female with schizophrenia, HLP, hypothyroid, and h/o breast CA who was brought in by daughter for change in mental status. Daughter reported that she suspected the patient took more Depakote than normal. Pt reported to the ER physician that she has been taking Goody's powder and it made her nauseous. She stated that she has been more SOB and had coughing for about a week. This was mainly reported to the ER physician. During my interview, patient only answered few questions, and then was mostly silent and did not want to engage in conversation. All she answered to me was that she had a cough and SOB, and did not answer any other questions. Rest of the hx was via review of medical records and no family was available at the bedside. In the ER, patient had pulse of 145 on presentation and fever of 101.4F. Workup with CTA of chest was negative for PE but was remarkable for diffuse opacities. She treated with IVF and antibiotics, and cultures were drawn.    Hospital Course:  Ms Crowley presented with unintentional overdose with depakote. This is a recurrent issue. Workup significant for presumed aspiration pneumonia that progressed to ARDS. Provided with broad spectrum antibiotics, IVFs and supplemental O2. Upgraded to ICU for increased O2 requirements and placed on BiPAP. She was not improving sodded duo-nebs and solumedrol IV were added. Difficult to wean O2. CXR with possible edema. Tried diuresis with no improvement. Echo w/ EF 55%, +DD. BNP not elevated. Speech evaluated and has high concern for aspiration and recommended MBSS. She continues to  require to much supplemental O2 to have MBSS and CXR remained unchanged. Pulmonology consulted. Labs for hypersensitivity pneumonitis eval pending. Does not appear to have CTD. Noted to be flu A positive. ID started Tamiflu 12/6. Able to wean to high flow NC. Passed speech bedside swallow 12/8. Depakote held as potentially associated with ARDS. Appreciate psych recs. Haldol and Cogentin added back. Continue to wean. Will restart depakote at a lower does if able to wean off high flow NC and continue to monitor.    Interval History: Stable on high flow. Appetite improved.    Review of Systems   Constitutional: Negative.    Respiratory: Positive for shortness of breath. Negative for wheezing.    Cardiovascular: Negative.    Gastrointestinal: Negative.    Genitourinary: Negative.    Musculoskeletal: Negative.    Skin: Negative.    Neurological: Negative.    Psychiatric/Behavioral: Negative.      Objective:     Vital Signs (Most Recent):  Temp: 98.7 °F (37.1 °C) (12/10/17 1930)  Pulse: 87 (12/10/17 2000)  Resp: (!) 29 (12/10/17 2000)  BP: (!) 142/71 (12/10/17 2000)  SpO2: 97 % (12/10/17 2000) Vital Signs (24h Range):  Temp:  [98 °F (36.7 °C)-98.7 °F (37.1 °C)] 98.7 °F (37.1 °C)  Pulse:  [] 87  Resp:  [16-62] 29  SpO2:  [81 %-100 %] 97 %  BP: (129-209)/() 142/71     Weight: 63.7 kg (140 lb 6.9 oz)  Body mass index is 21.99 kg/m².    Intake/Output Summary (Last 24 hours) at 12/10/17 2024  Last data filed at 12/10/17 2003   Gross per 24 hour   Intake          3403.75 ml   Output             4120 ml   Net          -716.25 ml      Physical Exam   Constitutional: She is oriented to person, place, and time. She appears well-developed. No distress.   Eyes: Conjunctivae and EOM are normal.   Neck: Normal range of motion. No thyromegaly present.   Cardiovascular: Normal rate, normal heart sounds and intact distal pulses.    Pulmonary/Chest:   High flow NC. Improved air movement   Abdominal: Soft. Bowel sounds are  normal.   Musculoskeletal: Normal range of motion. She exhibits no edema.   Neurological: She is alert and oriented to person, place, and time.   Skin: Skin is warm and dry. She is not diaphoretic.   Nursing note and vitals reviewed.      Significant Labs: All pertinent labs within the past 24 hours have been reviewed.    Significant Imaging: I have reviewed and interpreted all pertinent imaging results/findings within the past 24 hours.    Assessment/Plan:      * ARDS (adult respiratory distress syndrome)    Pt met criteria for sepsis with fever, tachycardia and infection source of multilobar pneumonia. Pt was treated empirically with Zosyn/Cipro/Vancomycin and IVF, and cultures were drawn. NEBS and O2 via BiPAP. Difficult to wean from BiPAP. Stopped fluids 12/3/17 and diuresed, but this did not seem to make any improvement and renal function started to elevate so diuresis held. Echo w/ some DD, but BNP not elevated. Appreciate pulm recs. CTD workup negative. Hypersensitivity panel pending. Maybe depakote related? Reported case of depakote-associated ARDS. Will hold - restart at 500mg when able to get off high flow and monitor.        Aspiration pneumonia    CTA remarkable for diffuse bilateral patchy airspace opacities treated with antibiotics as discussed above. I am concerned she aspirated. ID consulted given the unusual appearance of findings and for further fine tuning of antibiotics regimen. Continue current broad regimen. BCx NGTD. Respiratory panel with Flu A. ID started on Tamiflu. Droplet isolation precautions.        Acute febrile illness    As above        Influenza A (H1N1)    As above        Accidental drug overdose    Suspected over use of depakote. Unintentional. Will decrease dose if restarted. This was the reported reason for her similar presentation one year ago.         Paranoid schizophrenia    Stopped home depakote. Appreciate psych recs. Haldol and Cogentin restarted. Will restart depakote at  lower dose when more stable.        Tachycardia    Secondary to sepsis, hypoxia and expected physiologic response        Troponin level elevated    Likely secondary to strain with sepsis and not ACS, but will trend out markers and monitor on telemetry. Echo.        History of breast cancer in female    No acute issues; s/p right breast lumpectomy followed by chemotherapy in 2005. Will need to refer to Hem/Onc as outpatient for follow up.        Tobacco abuse    Smoking cessation counseling will be addressed prior to discharge.           VTE Risk Mitigation         Ordered     enoxaparin injection 40 mg  Daily     Route:  Subcutaneous        12/02/17 0213     Medium Risk of VTE  Once      12/01/17 2146     Place sequential compression device  Until discontinued      12/01/17 2146     Place OMAR hose  Until discontinued      12/01/17 2146          Critical care time spent on the evaluation and treatment of severe organ dysfunction, review of pertinent labs and imaging studies, discussions with consulting providers and discussions with patient/family: 30 minutes.    Andra Cifuentes MD  Department of Hospital Medicine   [facility]

## 2017-12-11 NOTE — HOSPITAL COURSE
"12/11/2017 - patient is seen in her ICU isolation room.  She remains easy to approach in conversation but quiet and odd.  She says that she is doing well and without complaint.  She is asking for her Health system cancer doctor to come see her.  She says that she is sleeping and eating well.  Not really able to explain her medical care here.  Still a poor historian.  She was started back on her haloperidol and benztropine.  Still concern for depakote induced ARDS?    12/12/2017 - patient is seen I her ICU isolation room.  She again is easy to join in conversation but is very simple and concrete.  She says that she is "being good".  Nurse says that she does take some redirection at times to take her medications.  Patient does ask if she can go see her psychiatrist when she leaves.    12/13/2017 - patient is again seen in her isolation room in the ICU.  She appears to be somewhat easier to engage in conversation.  She says that she is doing okay and without complaint.  She denies any psychosis but is still quite odd at baseline.  Nursing reports that she is cooperative.  She is compliant with her Haldol, Depakote, Cogentin.  Patient tells me that she is going to see her psychiatrist for a regular checkup when she leaves the hospital.    12/14/2017 - patient has now moved to the medical floor.  Earlier today, she pulled out her IV access and was wanting to leave.  After some education and redirection from staff, she decided to stay and continue treatment.  Patient remains on supplemental oxygen.  She is not very engaged in conversation today, only to tell me that she is doing well.  She appears to be somewhat more sleepy than usual.  There is a sitter in the room helping with redirection.  "

## 2017-12-12 LAB
A FUMIGATUS1 AB SER QL ID: ABNORMAL
A FUMIGATUS6 AB SER QL ID: ABNORMAL
A PULLULANS AB SER QL ID: ABNORMAL
ANION GAP SERPL CALC-SCNC: 8 MMOL/L
BASOPHILS # BLD AUTO: 0.02 K/UL
BASOPHILS NFR BLD: 0.2 %
BUN SERPL-MCNC: 17 MG/DL
CALCIUM SERPL-MCNC: 9.2 MG/DL
CHLORIDE SERPL-SCNC: 109 MMOL/L
CO2 SERPL-SCNC: 26 MMOL/L
CREAT SERPL-MCNC: 0.8 MG/DL
DIFFERENTIAL METHOD: ABNORMAL
EOSINOPHIL # BLD AUTO: 0.1 K/UL
EOSINOPHIL NFR BLD: 0.6 %
ERYTHROCYTE [DISTWIDTH] IN BLOOD BY AUTOMATED COUNT: 15.5 %
EST. GFR  (AFRICAN AMERICAN): >60 ML/MIN/1.73 M^2
EST. GFR  (NON AFRICAN AMERICAN): >60 ML/MIN/1.73 M^2
GLUCOSE SERPL-MCNC: 130 MG/DL
HCT VFR BLD AUTO: 31.4 %
HGB BLD-MCNC: 10.8 G/DL
LYMPHOCYTES # BLD AUTO: 2.1 K/UL
LYMPHOCYTES NFR BLD: 20.5 %
MAGNESIUM SERPL-MCNC: 1.8 MG/DL
MCH RBC QN AUTO: 28 PG
MCHC RBC AUTO-ENTMCNC: 34.4 G/DL
MCV RBC AUTO: 81 FL
MONOCYTES # BLD AUTO: 0.3 K/UL
MONOCYTES NFR BLD: 3.2 %
NEUTROPHILS # BLD AUTO: 7.7 K/UL
NEUTROPHILS NFR BLD: 77.9 %
PIGEON SERUM AB QL ID: ABNORMAL
PLATELET # BLD AUTO: 146 K/UL
PMV BLD AUTO: 11.4 FL
POTASSIUM SERPL-SCNC: 3.3 MMOL/L
RBC # BLD AUTO: 3.86 M/UL
S RECTIVIRGULA AB SER QL ID: DETECTED
SODIUM SERPL-SCNC: 143 MMOL/L
T VULGARIS1 AB SER QL ID: ABNORMAL
WBC # BLD AUTO: 10.16 K/UL

## 2017-12-12 PROCEDURE — 85025 COMPLETE CBC W/AUTO DIFF WBC: CPT

## 2017-12-12 PROCEDURE — 25000003 PHARM REV CODE 250: Performed by: HOSPITALIST

## 2017-12-12 PROCEDURE — 94761 N-INVAS EAR/PLS OXIMETRY MLT: CPT

## 2017-12-12 PROCEDURE — 94640 AIRWAY INHALATION TREATMENT: CPT

## 2017-12-12 PROCEDURE — 27100171 HC OXYGEN HIGH FLOW UP TO 24 HOURS

## 2017-12-12 PROCEDURE — 25000242 PHARM REV CODE 250 ALT 637 W/ HCPCS: Performed by: INTERNAL MEDICINE

## 2017-12-12 PROCEDURE — 27100092 HC HIGH FLOW DELIVERY CANNULA

## 2017-12-12 PROCEDURE — 36415 COLL VENOUS BLD VENIPUNCTURE: CPT

## 2017-12-12 PROCEDURE — 83735 ASSAY OF MAGNESIUM: CPT

## 2017-12-12 PROCEDURE — 99231 SBSQ HOSP IP/OBS SF/LOW 25: CPT | Mod: ,,, | Performed by: PSYCHIATRY & NEUROLOGY

## 2017-12-12 PROCEDURE — 25000003 PHARM REV CODE 250: Performed by: INTERNAL MEDICINE

## 2017-12-12 PROCEDURE — 63600175 PHARM REV CODE 636 W HCPCS: Performed by: HOSPITALIST

## 2017-12-12 PROCEDURE — 20000000 HC ICU ROOM

## 2017-12-12 PROCEDURE — 80048 BASIC METABOLIC PNL TOTAL CA: CPT

## 2017-12-12 PROCEDURE — 99900035 HC TECH TIME PER 15 MIN (STAT)

## 2017-12-12 RX ORDER — DIVALPROEX SODIUM 250 MG/1
500 TABLET, DELAYED RELEASE ORAL EVERY 12 HOURS
Status: DISCONTINUED | OUTPATIENT
Start: 2017-12-12 | End: 2017-12-15 | Stop reason: HOSPADM

## 2017-12-12 RX ORDER — MAGNESIUM SULFATE 1 G/100ML
1 INJECTION INTRAVENOUS ONCE
Status: COMPLETED | OUTPATIENT
Start: 2017-12-12 | End: 2017-12-12

## 2017-12-12 RX ORDER — POTASSIUM CHLORIDE 20 MEQ/15ML
60 SOLUTION ORAL ONCE
Status: DISCONTINUED | OUTPATIENT
Start: 2017-12-12 | End: 2017-12-12

## 2017-12-12 RX ADMIN — BENZTROPINE MESYLATE 1 MG: 1 TABLET ORAL at 08:12

## 2017-12-12 RX ADMIN — LEVALBUTEROL 1.25 MG: 1.25 SOLUTION, CONCENTRATE RESPIRATORY (INHALATION) at 08:12

## 2017-12-12 RX ADMIN — HALOPERIDOL 10 MG: 5 TABLET ORAL at 09:12

## 2017-12-12 RX ADMIN — BENZTROPINE MESYLATE 1 MG: 1 TABLET ORAL at 09:12

## 2017-12-12 RX ADMIN — MAGNESIUM SULFATE IN DEXTROSE 1 G: 10 INJECTION, SOLUTION INTRAVENOUS at 06:12

## 2017-12-12 RX ADMIN — HALOPERIDOL 10 MG: 5 TABLET ORAL at 08:12

## 2017-12-12 RX ADMIN — HYDRALAZINE HYDROCHLORIDE 10 MG: 20 INJECTION INTRAMUSCULAR; INTRAVENOUS at 12:12

## 2017-12-12 RX ADMIN — LEVALBUTEROL 1.25 MG: 1.25 SOLUTION, CONCENTRATE RESPIRATORY (INHALATION) at 01:12

## 2017-12-12 RX ADMIN — DIVALPROEX SODIUM 500 MG: 250 TABLET, DELAYED RELEASE ORAL at 09:12

## 2017-12-12 RX ADMIN — ENOXAPARIN SODIUM 40 MG: 100 INJECTION SUBCUTANEOUS at 04:12

## 2017-12-12 RX ADMIN — DEXTROSE MONOHYDRATE: 5 INJECTION, SOLUTION INTRAVENOUS at 09:12

## 2017-12-12 NOTE — ASSESSMENT & PLAN NOTE
Patient has a long documented history of schizophrenia, likely schizoaffective diathesis.  She has had worsening of her symptoms over time which has likely also led to some cognitive impairment.  There has been consideration for overuse of her Depakote but her Depakote level was within normal limits upon her presenting to the hospital.  Still, I would recommend for her to remain on Depakote but decrease the dose to 500 mg by mouth twice a day.  Continue Haldol 10 mg by mouth twice a day, Cogentin 1 mg by mouth twice a day.  Being unmedicated, she will likely have an exacerbation of her psychotic symptoms and odd behaviors.  She will be a difficult case.  It would be in her best interest to set up some psychiatric home health to help manage her medications in the home setting.  Another consideration would be for case management to contact the ACT team for her area and see if they would be able to accommodate with regular visits.  Should she have any non-redirectable behavior or psychotic agitation, utilize Haldol 10 mg/Ativan 1 mg/Benadryl 50 mg by mouth or IM.  She can follow-up with her outpatient Dr. Fiore upon discharge.  She may also have a  set up through her insurance that may have access to psychiatric home health.

## 2017-12-12 NOTE — PROGRESS NOTES
[facility] Hospital Medicine  Progress Note    Patient Name: Jossie Crowley  MRN: [id number]  Patient Class: IP- Inpatient   Admission Date: 12/1/2017  Length of Stay: 11 days  Attending Physician: Bernard Alvarez MD  Primary Care Provider: Jordyn Owen MD        Subjective:     Principal Problem:ARDS (adult respiratory distress syndrome)    HPI:  56 y/o female with schizophrenia, HLP, hypothyroid, and h/o breast CA who was brought in by daughter for change in mental status. Daughter reported that she suspected the patient took more Depakote than normal. Pt reported to the ER physician that she has been taking Goody's powder and it made her nauseous. She stated that she has been more SOB and had coughing for about a week. This was mainly reported to the ER physician. During my interview, patient only answered few questions, and then was mostly silent and did not want to engage in conversation. All she answered to me was that she had a cough and SOB, and did not answer any other questions. Rest of the hx was via review of medical records and no family was available at the bedside. In the ER, patient had pulse of 145 on presentation and fever of 101.4F. Workup with CTA of chest was negative for PE but was remarkable for diffuse opacities. She treated with IVF and antibiotics, and cultures were drawn.    Hospital Course:  Ms Crowley presented with unintentional overdose with depakote. This is a recurrent issue. Workup significant for presumed aspiration pneumonia that progressed to ARDS. Provided with broad spectrum antibiotics, IVFs and supplemental O2. Upgraded to ICU for increased O2 requirements and placed on BiPAP. She was not improving sodded duo-nebs and solumedrol IV were added. Difficult to wean O2. CXR with possible edema. Tried diuresis with no improvement. Echo w/ EF 55%, +DD. BNP not elevated. Speech evaluated and has high concern for aspiration and recommended MBSS. She continues to  require to much supplemental O2 to have MBSS and CXR remained unchanged. Pulmonology consulted. Labs for hypersensitivity pneumonitis eval pending. Does not appear to have CTD. Noted to be flu A positive. ID started Tamiflu 12/6. Able to wean to high flow NC. Passed speech bedside swallow 12/8. Depakote held as potentially associated with ARDS. Appreciate psych recs. Haldol and Cogentin added back. Continue to wean. Will restart depakote at a lower does when able to wean off high flow NC and continue to monitor.  Of note, when she is stable for discharge she would benefit from having case management notified about her possible non-compliance with medications. Her depakote level was therapeutic, but she has significant schizophrenia to the point that she cannot reliably tell us if she was taking her medication correctly.    Interval History: Looks better.  No acute events overnight.    Review of Systems   HENT: Negative for ear discharge and ear pain.    Eyes: Negative for pain and itching.   Gastrointestinal: Negative for abdominal distention and abdominal pain.   Endocrine: Negative for polyphagia and polyuria.     Objective:     Vital Signs (Most Recent):  Temp: 100.1 °F (37.8 °C) (12/12/17 1105)  Pulse: (!) 118 (12/12/17 1606)  Resp: (!) 36 (12/12/17 1606)  BP: (!) 129/53 (12/12/17 1602)  SpO2: 96 % (12/12/17 1606) Vital Signs (24h Range):  Temp:  [98.6 °F (37 °C)-100.1 °F (37.8 °C)] 100.1 °F (37.8 °C)  Pulse:  [] 118  Resp:  [15-39] 36  SpO2:  [89 %-100 %] 96 %  BP: ()/(53-85) 129/53     Weight: 63.7 kg (140 lb 6.9 oz)  Body mass index is 21.99 kg/m².    Intake/Output Summary (Last 24 hours) at 12/12/17 1658  Last data filed at 12/12/17 1600   Gross per 24 hour   Intake             2760 ml   Output             2575 ml   Net              185 ml      Physical Exam   Constitutional: She is oriented to person, place, and time. She appears well-developed. No distress.   Eyes: Conjunctivae and EOM are  normal.   Neck: Normal range of motion. No thyromegaly present.   Cardiovascular: Normal rate, normal heart sounds and intact distal pulses.    Pulmonary/Chest: She has no wheezes. She has no rales.   High flow NC.    Abdominal: Soft. Bowel sounds are normal. She exhibits no distension.   Musculoskeletal: Normal range of motion. She exhibits no edema.   Neurological: She is alert and oriented to person, place, and time.   Skin: Skin is warm and dry. She is not diaphoretic.   Nursing note and vitals reviewed.      Significant Labs:   BMP:   Recent Labs  Lab 12/12/17  0410   *      K 3.3*      CO2 26   BUN 17   CREATININE 0.8   CALCIUM 9.2   MG 1.8     CBC:   Recent Labs  Lab 12/11/17  0425 12/12/17  0410   WBC 8.50 10.16   HGB 10.7* 10.8*   HCT 30.2* 31.4*    146*         Assessment/Plan:      * ARDS (adult respiratory distress syndrome)    Pt met criteria for sepsis with fever, tachycardia and infection source of multilobar pneumonia.   Pt was treated empirically with Zosyn/Cipro/Vancomycin and IVF, and cultures were drawn.   NEBS and O2 via BiPAP. Difficult to wean from BiPAP. Stopped fluids 12/3/17 and diuresed, but this did not seem to make any improvement and renal function started to elevate so diuresis held.   Echo w/ some DD, but BNP not elevated. Appreciate pulm recs.   ARDS probably combination of aspiration pneumonia (already treated) and H1N1 influenza.  Currently on Tamiflu.  Looks better, but remains on high flow NC.  Continue current management.        Influenza A (H1N1)    As above        Tachycardia    Secondary to sepsis, hypoxia and expected physiologic response        Paranoid schizophrenia    Appreciate psych recs.   Continue Haldol and Cogentin.  Resume Depakote at lower dose.        Troponin level elevated    Likely secondary to strain with sepsis and not ACS, but will trend out markers and monitor on telemetry. Echo.        History of breast cancer in female    No  acute issues; s/p right breast lumpectomy followed by chemotherapy in 2005. Will need to refer to Hem/Onc as outpatient for follow up.        Tobacco abuse    Smoking cessation counseling will be addressed prior to discharge.         Aspiration pneumonia    CTA remarkable for diffuse bilateral patchy airspace opacities.  Treated as above.          VTE Risk Mitigation         Ordered     enoxaparin injection 40 mg  Daily     Route:  Subcutaneous        12/02/17 0213     Medium Risk of VTE  Once      12/01/17 2146     Place sequential compression device  Until discontinued      12/01/17 2146     Place OMAR hose  Until discontinued      12/01/17 2146          Critical care time spent on the evaluation and treatment of severe organ dysfunction, review of pertinent labs and imaging studies, discussions with consulting providers and discussions with patient/family: 40 minutes.    Bernard Alvarez MD  Department of Hospital Medicine   [facility]

## 2017-12-12 NOTE — PROGRESS NOTES
"[facility]  Psychiatry  Progress Note    Patient Name: Jossie Crowley  MRN: [id number]   Code Status: Full Code  Admission Date: 12/1/2017  Hospital Length of Stay: 11 days  Expected Discharge Date:   Attending Physician: Bernard Alvarez MD  Primary Care Provider: Jordyn Owen MD    Current Legal Status: N/A    Patient information was obtained from patient.     Subjective:     Principal Problem:ARDS (adult respiratory distress syndrome)    Chief Complaint: odd behavior    HPI: Patient Jossie Crowley presents to the hospital with strange behavior.  She was found to be possibly under an unintentional overdose of her Depakote with subsequent ARDS.  Now she is found to have influenza a and is in isolation in the ICU.  Patient is easily approached and conversation but is a difficult and somewhat poor historian.  She tells me that she is in the hospital because she was "shaky".  She says that she follows with an outpatient psychiatrist, Dr. Fiore, and sees him on a monthly basis.  She is not sure when her next appointment is.  She is not able to name her medications but when I go over a list with her, she is able to tell me which ones she is taking.  She says that she takes Depakote twice daily, Haldol twice daily, Cogentin twice daily.  She is not sure of the doses.  She says that she is not currently depressed or anxious.  She is not having any manic symptoms.  She denies any psychosis at this time.  She says that when her schizophrenia "acts up", she can see things that aren't there and her mind plays tricks on her.  Records indicate previous hospitalizations which show that she becomes very paranoid and isolative when she has an exacerbation.  She can strangely rearrange things around the house and do odd things such as drink water from ashtrays, put things in the trash that do not belong there, and drink excessive amounts of water.  She says that she has not been in a psychiatric hospital for some " "time now and does not feel that she needs to be there.    There is speculation that she has overdosed on her Depakote but an admit valproic acid level was within range.  Nursing reports that she is constantly pushing the nursing alert button and is restless in bed.    Hospital Course: 12/11/2017 - patient is seen in her ICU isolation room.  She remains easy to approach in conversation but quiet and odd.  She says that she is doing well and without complaint.  She is asking for her [facility] cancer doctor to come see her.  She says that she is sleeping and eating well.  Not really able to explain her medical care here.  Still a poor historian.  She was started back on her haloperidol and benztropine.  Still concern for depakote induced ARDS?    12/12/2017 - patient is seen I her ICU isolation room.  She again is easy to join in conversation but is very simple and concrete.  She says that she is "being good".  Nurse says that she does take some redirection at times to take her medications.  Patient does ask if she can go see her psychiatrist when she leaves.    Interval History: unchanged    Family History     None        Social History Main Topics    Smoking status: Current Every Day Smoker     Packs/day: 0.50     Years: 37.00     Types: Cigarettes    Smokeless tobacco: Former User     Quit date: 12/27/2014    Alcohol use No    Drug use: No    Sexual activity: Not on file     Psychotherapeutics     Start     Stop Route Frequency Ordered    12/09/17 1417  haloperidol lactate injection 10 mg      -- IM Every 6 hours PRN 12/09/17 1318    12/09/17 1330  haloperidol tablet 10 mg      -- Oral 2 times daily 12/09/17 1318           Review of Systems   Constitutional: Negative for activity change and appetite change.   Respiratory: Negative for shortness of breath.    Cardiovascular: Negative for chest pain.   Gastrointestinal: Negative for nausea.   Musculoskeletal: Negative for arthralgias and myalgias. " "  Psychiatric/Behavioral: Positive for confusion. Negative for dysphoric mood, sleep disturbance and suicidal ideas. The patient is not nervous/anxious.      Objective:     Vital Signs (Most Recent):  Temp: 100.1 °F (37.8 °C) (12/12/17 1105)  Pulse: 106 (12/12/17 1508)  Resp: (!) 32 (12/12/17 1508)  BP: (!) 144/69 (12/12/17 1503)  SpO2: 100 % (12/12/17 1508) Vital Signs (24h Range):  Temp:  [98.6 °F (37 °C)-100.1 °F (37.8 °C)] 100.1 °F (37.8 °C)  Pulse:  [] 106  Resp:  [15-39] 32  SpO2:  [89 %-100 %] 100 %  BP: ()/(59-85) 144/69     Height: 5' 7.01" (170.2 cm)  Weight: 63.7 kg (140 lb 6.9 oz)  Body mass index is 21.99 kg/m².      Intake/Output Summary (Last 24 hours) at 12/12/17 1553  Last data filed at 12/12/17 1500   Gross per 24 hour   Intake             2760 ml   Output             2375 ml   Net              385 ml       Physical Exam   Psychiatric:   EXAMINATION    CONSTITUTIONAL  General Appearance: Hospital attire, restless in an ICU bed    MUSCULOSKELETAL  Muscle Strength and Tone: Normal  Abnormal Involuntary Movements: Restless appearing  Gait and Station: Not observed    PSYCHIATRIC MENTAL STATUS EXAM   Level of Consciousness: Awake and alert  Orientation: Name and "hospital"  Grooming: Limited  Psychomotor Behavior: Somewhat restless  Speech: Soft and delayed  Language: No abnormalities  Mood: "Okay"  Affect: Blunt and oddly related  Thought Process: [place]  Associations: Intact  Thought Content: Denies suicidal/homicidal/psychosis  Memory: Somewhat intact to recent and remote but very simple  Attention: Diminished  Fund of Knowledge: Intact for simple conversation  Insight: Fair towards mental illness  Judgment: Limited towards compliance and redirection         Significant Labs:   Last 24 Hours:   Recent Lab Results       12/12/17  0410      Anion Gap 8     Baso # 0.02     Basophil% 0.2     BUN, Bld 17     Calcium 9.2     Chloride 109     CO2 26     Creatinine 0.8     Differential Method " Automated     eGFR if  >60     eGFR if non  >60  Comment:  Calculation used to obtain the estimated glomerular filtration  rate (eGFR) is the CKD-EPI equation.        Eos # 0.1     Eosinophil% 0.6     Glucose 130(H)     Gran # 7.7     Gran% 77.9(H)     Hematocrit 31.4(L)     Hemoglobin 10.8(L)     Lymph # 2.1     Lymph% 20.5     Magnesium 1.8     MCH 28.0     MCHC 34.4     MCV 81(L)     Mono # 0.3     Mono% 3.2(L)     MPV 11.4     Platelets 146(L)     Potassium 3.3(L)     RBC 3.86(L)     RDW 15.5(H)     Sodium 143     WBC 10.16         All pertinent labs within the past 24 hours have been reviewed.    Significant Imaging: I have reviewed all pertinent imaging results/findings within the past 24 hours.    Assessment/Plan:     Paranoid schizophrenia    Patient has a long documented history of schizophrenia, likely schizoaffective diathesis.  She has had worsening of her symptoms over time which has likely also led to some cognitive impairment.  There has been consideration for overuse of her Depakote but her Depakote level was within normal limits upon her presenting to the hospital.  Still, I would recommend for her to remain on Depakote but decrease the dose to 500 mg by mouth twice a day.  Continue Haldol 10 mg by mouth twice a day, Cogentin 1 mg by mouth twice a day.  Being unmedicated, she will likely have an exacerbation of her psychotic symptoms and odd behaviors.  She will be a difficult case.  It would be in her best interest to set up some psychiatric home health to help manage her medications in the home setting.  Another consideration would be for case management to contact the ACT team for her area and see if they would be able to accommodate with regular visits.  Should she have any non-redirectable behavior or psychotic agitation, utilize Haldol 10 mg/Ativan 1 mg/Benadryl 50 mg by mouth or IM.  She can follow-up with her outpatient Dr. Fiore upon discharge.  She may also  have a  set up through her insurance that may have access to psychiatric home health.             Need for Continued Hospitalization:   No need for inpatient psychiatric hospitalization. Continue medical care as per the primary team.    Anticipated Disposition: Home or Self Care     Total time:  15 with greater than 50% of this time spent in counseling and/or coordination of care.       Gerardo Saba MD   Psychiatry  [facility]

## 2017-12-12 NOTE — SUBJECTIVE & OBJECTIVE
"Interval History: unchanged    Family History     None        Social History Main Topics    Smoking status: Current Every Day Smoker     Packs/day: 0.50     Years: 37.00     Types: Cigarettes    Smokeless tobacco: Former User     Quit date: 12/27/2014    Alcohol use No    Drug use: No    Sexual activity: Not on file     Psychotherapeutics     Start     Stop Route Frequency Ordered    12/09/17 1417  haloperidol lactate injection 10 mg      -- IM Every 6 hours PRN 12/09/17 1318    12/09/17 1330  haloperidol tablet 10 mg      -- Oral 2 times daily 12/09/17 1318           Review of Systems   Constitutional: Negative for activity change and appetite change.   Respiratory: Negative for shortness of breath.    Cardiovascular: Negative for chest pain.   Gastrointestinal: Negative for nausea.   Musculoskeletal: Negative for arthralgias and myalgias.   Psychiatric/Behavioral: Positive for confusion. Negative for dysphoric mood, sleep disturbance and suicidal ideas. The patient is not nervous/anxious.      Objective:     Vital Signs (Most Recent):  Temp: 100.1 °F (37.8 °C) (12/12/17 1105)  Pulse: 106 (12/12/17 1508)  Resp: (!) 32 (12/12/17 1508)  BP: (!) 144/69 (12/12/17 1503)  SpO2: 100 % (12/12/17 1508) Vital Signs (24h Range):  Temp:  [98.6 °F (37 °C)-100.1 °F (37.8 °C)] 100.1 °F (37.8 °C)  Pulse:  [] 106  Resp:  [15-39] 32  SpO2:  [89 %-100 %] 100 %  BP: ()/(59-85) 144/69     Height: 5' 7.01" (170.2 cm)  Weight: 63.7 kg (140 lb 6.9 oz)  Body mass index is 21.99 kg/m².      Intake/Output Summary (Last 24 hours) at 12/12/17 1553  Last data filed at 12/12/17 1500   Gross per 24 hour   Intake             2760 ml   Output             2375 ml   Net              385 ml       Physical Exam   Psychiatric:   EXAMINATION    CONSTITUTIONAL  General Appearance: Hospital attire, restless in an ICU bed    MUSCULOSKELETAL  Muscle Strength and Tone: Normal  Abnormal Involuntary Movements: Restless appearing  Gait and " "Station: Not observed    PSYCHIATRIC MENTAL STATUS EXAM   Level of Consciousness: Awake and alert  Orientation: Name and "hospital"  Grooming: Limited  Psychomotor Behavior: Somewhat restless  Speech: Soft and delayed  Language: No abnormalities  Mood: "Okay"  Affect: Blunt and oddly related  Thought Process: [place]  Associations: Intact  Thought Content: Denies suicidal/homicidal/psychosis  Memory: Somewhat intact to recent and remote but very simple  Attention: Diminished  Fund of Knowledge: Intact for simple conversation  Insight: Fair towards mental illness  Judgment: Limited towards compliance and redirection         Significant Labs:   Last 24 Hours:   Recent Lab Results       12/12/17  0410      Anion Gap 8     Baso # 0.02     Basophil% 0.2     BUN, Bld 17     Calcium 9.2     Chloride 109     CO2 26     Creatinine 0.8     Differential Method Automated     eGFR if  >60     eGFR if non  >60  Comment:  Calculation used to obtain the estimated glomerular filtration  rate (eGFR) is the CKD-EPI equation.        Eos # 0.1     Eosinophil% 0.6     Glucose 130(H)     Gran # 7.7     Gran% 77.9(H)     Hematocrit 31.4(L)     Hemoglobin 10.8(L)     Lymph # 2.1     Lymph% 20.5     Magnesium 1.8     MCH 28.0     MCHC 34.4     MCV 81(L)     Mono # 0.3     Mono% 3.2(L)     MPV 11.4     Platelets 146(L)     Potassium 3.3(L)     RBC 3.86(L)     RDW 15.5(H)     Sodium 143     WBC 10.16         All pertinent labs within the past 24 hours have been reviewed.    Significant Imaging: I have reviewed all pertinent imaging results/findings within the past 24 hours.  "

## 2017-12-12 NOTE — SUBJECTIVE & OBJECTIVE
Interval History: Looks better.  No acute events overnight.    Review of Systems   HENT: Negative for ear discharge and ear pain.    Eyes: Negative for pain and itching.   Gastrointestinal: Negative for abdominal distention and abdominal pain.   Endocrine: Negative for polyphagia and polyuria.     Objective:     Vital Signs (Most Recent):  Temp: 100.1 °F (37.8 °C) (12/12/17 1105)  Pulse: (!) 118 (12/12/17 1606)  Resp: (!) 36 (12/12/17 1606)  BP: (!) 129/53 (12/12/17 1602)  SpO2: 96 % (12/12/17 1606) Vital Signs (24h Range):  Temp:  [98.6 °F (37 °C)-100.1 °F (37.8 °C)] 100.1 °F (37.8 °C)  Pulse:  [] 118  Resp:  [15-39] 36  SpO2:  [89 %-100 %] 96 %  BP: ()/(53-85) 129/53     Weight: 63.7 kg (140 lb 6.9 oz)  Body mass index is 21.99 kg/m².    Intake/Output Summary (Last 24 hours) at 12/12/17 1658  Last data filed at 12/12/17 1600   Gross per 24 hour   Intake             2760 ml   Output             2575 ml   Net              185 ml      Physical Exam   Constitutional: She is oriented to person, place, and time. She appears well-developed. No distress.   Eyes: Conjunctivae and EOM are normal.   Neck: Normal range of motion. No thyromegaly present.   Cardiovascular: Normal rate, normal heart sounds and intact distal pulses.    Pulmonary/Chest: She has no wheezes. She has no rales.   High flow NC.    Abdominal: Soft. Bowel sounds are normal. She exhibits no distension.   Musculoskeletal: Normal range of motion. She exhibits no edema.   Neurological: She is alert and oriented to person, place, and time.   Skin: Skin is warm and dry. She is not diaphoretic.   Nursing note and vitals reviewed.      Significant Labs:   BMP:   Recent Labs  Lab 12/12/17  0410   *      K 3.3*      CO2 26   BUN 17   CREATININE 0.8   CALCIUM 9.2   MG 1.8     CBC:   Recent Labs  Lab 12/11/17  0425 12/12/17  0410   WBC 8.50 10.16   HGB 10.7* 10.8*   HCT 30.2* 31.4*    146*

## 2017-12-12 NOTE — PLAN OF CARE
12/12/17 1706   Discharge Reassessment   Assessment Type Discharge Planning Reassessment   Provided patient/caregiver education on the expected discharge date and the discharge plan No   Do you have any problems affording any of your prescribed medications? No   Discharge Plan A Home with family   Discharge Plan B Home with family;Other  (to be determined)   Patient choice form signed by patient/caregiver No   Can the patient answer the patient profile reliably? Yes, cognitively intact  (varies with respiratory status)   How does the patient rate their overall health at the present time? Fair   Describe the patient's ability to walk at the present time. Major restrictions/daily assistance from another person  (ICU, respiratory issues)   How often would a person be available to care for the patient? Often   Number of comorbid conditions (as recorded on the chart) Five or more   During the past month, has the patient often been bothered by feeling down, depressed or hopeless? No   During the past month, has the patient often been bothered by little interest or pleasure in doing things? No   patient remains in ICU, progressing. SW reviewed chart. SW/CM will continue to follow and assist as needed.

## 2017-12-12 NOTE — PLAN OF CARE
Problem: Patient Care Overview  Goal: Plan of Care Review  Outcome: Ongoing (interventions implemented as appropriate)  Patient remains in ICU. AAOX4. Tachycardic on monitor. Afebrile. Intermittently restless and anxious. No complaints of chest pain throughout night. Miller clean, dry, intact with adequate urine output. No BM. No falls or new skin breakdown throughout shift. Plan of care reviewed with patient.

## 2017-12-12 NOTE — ASSESSMENT & PLAN NOTE
Pt met criteria for sepsis with fever, tachycardia and infection source of multilobar pneumonia.   Pt was treated empirically with Zosyn/Cipro/Vancomycin and IVF, and cultures were drawn.   NEBS and O2 via BiPAP. Difficult to wean from BiPAP. Stopped fluids 12/3/17 and diuresed, but this did not seem to make any improvement and renal function started to elevate so diuresis held.   Echo w/ some DD, but BNP not elevated. Appreciate pulm recs.   ARDS probably combination of aspiration pneumonia (already treated) and H1N1 influenza.  Currently on Tamiflu.  Looks better, but remains on high flow NC.  Continue current management.

## 2017-12-12 NOTE — PLAN OF CARE
Problem: Fall Risk (Adult)  Goal: Identify Related Risk Factors and Signs and Symptoms  Related risk factors and signs and symptoms are identified upon initiation of Human Response Clinical Practice Guideline (CPG)   Outcome: Ongoing (interventions implemented as appropriate)  Fall risk prevention interventions on-going.    Problem: Patient Care Overview  Goal: Plan of Care Review  Remains in ICU on high flow O2.  Tachypneic in 30's, [id number]'s but denies c/o's.  Odd behavior at times refusing treatment.  Needs much reinforcement.    Problem: Infection, Risk/Actual (Adult)  Goal: Identify Related Risk Factors and Signs and Symptoms  Related risk factors and signs and symptoms are identified upon initiation of Human Response Clinical Practice Guideline (CPG)   Outcome: Ongoing (interventions implemented as appropriate)  Remains in isolation.  Droplet precautions.    Problem: Pressure Ulcer Risk (Charlie Scale) (Adult,Obstetrics,Pediatric)  Goal: Identify Related Risk Factors and Signs and Symptoms  Related risk factors and signs and symptoms are identified upon initiation of Human Response Clinical Practice Guideline (CPG)   Outcome: Ongoing (interventions implemented as appropriate)  Pressure ulcer prevention interventions on-going.    Problem: Pneumonia (Adult)  Goal: Signs and Symptoms of Listed Potential Problems Will be Absent, Minimized or Managed (Pneumonia)  Signs and symptoms of listed potential problems will be absent, minimized or managed by discharge/transition of care (reference Pneumonia (Adult) CPG).   Outcome: Ongoing (interventions implemented as appropriate)  Remains in ICU on high flow nasal cannula.

## 2017-12-12 NOTE — PLAN OF CARE
Problem: Fall Risk (Adult)  Goal: Absence of Falls  Patient will demonstrate the desired outcomes by discharge/transition of care.   Outcome: Ongoing (interventions implemented as appropriate)  Fall prevention interventions on-going.    Problem: Patient Care Overview  Goal: Plan of Care Review  Outcome: Ongoing (interventions implemented as appropriate)  Remains in ICU.  Pt odd at times with hx of schizophrenia but easily reoriented.  Remains on high flow O2, tolerating well.    Problem: Infection, Risk/Actual (Adult)  Goal: Infection Prevention/Resolution  Patient will demonstrate the desired outcomes by discharge/transition of care.   Outcome: Ongoing (interventions implemented as appropriate)  Completed course of antibiotics,  d/c'd per ID.    Problem: Pressure Ulcer Risk (Charlie Scale) (Adult,Obstetrics,Pediatric)  Goal: Skin Integrity  Patient will demonstrate the desired outcomes by discharge/transition of care.   Outcome: Ongoing (interventions implemented as appropriate)  Pressure ulcer prevention interventions on-going.    Problem: Pneumonia (Adult)  Goal: Signs and Symptoms of Listed Potential Problems Will be Absent, Minimized or Managed (Pneumonia)  Signs and symptoms of listed potential problems will be absent, minimized or managed by discharge/transition of care (reference Pneumonia (Adult) CPG).   Outcome: Ongoing (interventions implemented as appropriate)  Remains on 40% high flow cannula.

## 2017-12-13 PROBLEM — R50.9 ACUTE FEBRILE ILLNESS: Status: ACTIVE | Noted: 2017-12-13

## 2017-12-13 LAB
ANION GAP SERPL CALC-SCNC: 6 MMOL/L
BASOPHILS # BLD AUTO: 0.02 K/UL
BASOPHILS NFR BLD: 0.2 %
BUN SERPL-MCNC: 15 MG/DL
CALCIUM SERPL-MCNC: 8.8 MG/DL
CHLORIDE SERPL-SCNC: 108 MMOL/L
CO2 SERPL-SCNC: 27 MMOL/L
CREAT SERPL-MCNC: 0.8 MG/DL
DIFFERENTIAL METHOD: ABNORMAL
EOSINOPHIL # BLD AUTO: 0.2 K/UL
EOSINOPHIL NFR BLD: 1.6 %
ERYTHROCYTE [DISTWIDTH] IN BLOOD BY AUTOMATED COUNT: 15.7 %
EST. GFR  (AFRICAN AMERICAN): >60 ML/MIN/1.73 M^2
EST. GFR  (NON AFRICAN AMERICAN): >60 ML/MIN/1.73 M^2
GLUCOSE SERPL-MCNC: 128 MG/DL
HCT VFR BLD AUTO: 28.7 %
HGB BLD-MCNC: 9.8 G/DL
LYMPHOCYTES # BLD AUTO: 2.1 K/UL
LYMPHOCYTES NFR BLD: 21.6 %
MAGNESIUM SERPL-MCNC: 1.7 MG/DL
MCH RBC QN AUTO: 27.9 PG
MCHC RBC AUTO-ENTMCNC: 34.1 G/DL
MCV RBC AUTO: 82 FL
MONOCYTES # BLD AUTO: 0.6 K/UL
MONOCYTES NFR BLD: 5.9 %
NEUTROPHILS # BLD AUTO: 6.7 K/UL
NEUTROPHILS NFR BLD: 75.2 %
PLATELET # BLD AUTO: 134 K/UL
PMV BLD AUTO: 11.9 FL
POTASSIUM SERPL-SCNC: 3.8 MMOL/L
RBC # BLD AUTO: 3.51 M/UL
SODIUM SERPL-SCNC: 141 MMOL/L
WBC # BLD AUTO: 9.49 K/UL

## 2017-12-13 PROCEDURE — 25000003 PHARM REV CODE 250: Performed by: HOSPITALIST

## 2017-12-13 PROCEDURE — 27100092 HC HIGH FLOW DELIVERY CANNULA

## 2017-12-13 PROCEDURE — 99900035 HC TECH TIME PER 15 MIN (STAT)

## 2017-12-13 PROCEDURE — 83735 ASSAY OF MAGNESIUM: CPT

## 2017-12-13 PROCEDURE — 94640 AIRWAY INHALATION TREATMENT: CPT

## 2017-12-13 PROCEDURE — 25000003 PHARM REV CODE 250: Performed by: INTERNAL MEDICINE

## 2017-12-13 PROCEDURE — 99231 SBSQ HOSP IP/OBS SF/LOW 25: CPT | Mod: ,,, | Performed by: PSYCHIATRY & NEUROLOGY

## 2017-12-13 PROCEDURE — 27000221 HC OXYGEN, UP TO 24 HOURS

## 2017-12-13 PROCEDURE — 94761 N-INVAS EAR/PLS OXIMETRY MLT: CPT

## 2017-12-13 PROCEDURE — 36415 COLL VENOUS BLD VENIPUNCTURE: CPT

## 2017-12-13 PROCEDURE — 20600001 HC STEP DOWN PRIVATE ROOM

## 2017-12-13 PROCEDURE — 80048 BASIC METABOLIC PNL TOTAL CA: CPT

## 2017-12-13 PROCEDURE — 27100171 HC OXYGEN HIGH FLOW UP TO 24 HOURS

## 2017-12-13 PROCEDURE — 25000242 PHARM REV CODE 250 ALT 637 W/ HCPCS: Performed by: INTERNAL MEDICINE

## 2017-12-13 PROCEDURE — 85025 COMPLETE CBC W/AUTO DIFF WBC: CPT

## 2017-12-13 PROCEDURE — 63600175 PHARM REV CODE 636 W HCPCS: Performed by: HOSPITALIST

## 2017-12-13 RX ORDER — METOPROLOL TARTRATE 25 MG/1
25 TABLET, FILM COATED ORAL 2 TIMES DAILY
Status: DISCONTINUED | OUTPATIENT
Start: 2017-12-13 | End: 2017-12-15 | Stop reason: HOSPADM

## 2017-12-13 RX ORDER — METOPROLOL TARTRATE 1 MG/ML
2.5 INJECTION, SOLUTION INTRAVENOUS ONCE
Status: COMPLETED | OUTPATIENT
Start: 2017-12-13 | End: 2017-12-13

## 2017-12-13 RX ORDER — ACETAMINOPHEN 325 MG/1
650 TABLET ORAL EVERY 6 HOURS PRN
Status: DISCONTINUED | OUTPATIENT
Start: 2017-12-13 | End: 2017-12-15 | Stop reason: HOSPADM

## 2017-12-13 RX ADMIN — LEVALBUTEROL 1.25 MG: 1.25 SOLUTION, CONCENTRATE RESPIRATORY (INHALATION) at 02:12

## 2017-12-13 RX ADMIN — METOPROLOL TARTRATE 25 MG: 25 TABLET, FILM COATED ORAL at 10:12

## 2017-12-13 RX ADMIN — LEVALBUTEROL 1.25 MG: 1.25 SOLUTION, CONCENTRATE RESPIRATORY (INHALATION) at 12:12

## 2017-12-13 RX ADMIN — METOROPROLOL TARTRATE 2.5 MG: 5 INJECTION, SOLUTION INTRAVENOUS at 06:12

## 2017-12-13 RX ADMIN — DEXTROSE MONOHYDRATE: 5 INJECTION, SOLUTION INTRAVENOUS at 12:12

## 2017-12-13 RX ADMIN — BENZTROPINE MESYLATE 1 MG: 1 TABLET ORAL at 08:12

## 2017-12-13 RX ADMIN — LEVALBUTEROL 1.25 MG: 1.25 SOLUTION, CONCENTRATE RESPIRATORY (INHALATION) at 07:12

## 2017-12-13 RX ADMIN — HALOPERIDOL 10 MG: 5 TABLET ORAL at 10:12

## 2017-12-13 RX ADMIN — ENOXAPARIN SODIUM 40 MG: 100 INJECTION SUBCUTANEOUS at 05:12

## 2017-12-13 RX ADMIN — DIVALPROEX SODIUM 500 MG: 250 TABLET, DELAYED RELEASE ORAL at 10:12

## 2017-12-13 RX ADMIN — DIVALPROEX SODIUM 500 MG: 250 TABLET, DELAYED RELEASE ORAL at 08:12

## 2017-12-13 RX ADMIN — HALOPERIDOL 10 MG: 5 TABLET ORAL at 08:12

## 2017-12-13 RX ADMIN — BENZTROPINE MESYLATE 1 MG: 1 TABLET ORAL at 10:12

## 2017-12-13 NOTE — PROGRESS NOTES
[facility] Hospital Medicine  Progress Note    Patient Name: Jossie Crowley  MRN: [id number]  Patient Class: IP- Inpatient   Admission Date: 12/1/2017  Length of Stay: 12 days  Attending Physician: Bernard Alvarez MD  Primary Care Provider: Jordyn Owen MD        Subjective:     Principal Problem:ARDS (adult respiratory distress syndrome)    HPI:  56 y/o female with schizophrenia, HLP, hypothyroid, and h/o breast CA who was brought in by daughter for change in mental status. Daughter reported that she suspected the patient took more Depakote than normal. Pt reported to the ER physician that she has been taking Goody's powder and it made her nauseous. She stated that she has been more SOB and had coughing for about a week. This was mainly reported to the ER physician. During my interview, patient only answered few questions, and then was mostly silent and did not want to engage in conversation. All she answered to me was that she had a cough and SOB, and did not answer any other questions. Rest of the hx was via review of medical records and no family was available at the bedside. In the ER, patient had pulse of 145 on presentation and fever of 101.4F. Workup with CTA of chest was negative for PE but was remarkable for diffuse opacities. She treated with IVF and antibiotics, and cultures were drawn.    Hospital Course:  Ms Crowley presented with unintentional overdose with depakote. This is a recurrent issue. Workup significant for presumed aspiration pneumonia that progressed to ARDS. Provided with broad spectrum antibiotics, IVFs and supplemental O2. Upgraded to ICU for increased O2 requirements and placed on BiPAP. She was not improving sodded duo-nebs and solumedrol IV were added. Difficult to wean O2. CXR with possible edema. Tried diuresis with no improvement. Echo w/ EF 55%, +DD. BNP not elevated. Speech evaluated and has high concern for aspiration and recommended MBSS. She continues to  require to much supplemental O2 to have MBSS and CXR remained unchanged. Pulmonology consulted. Labs for hypersensitivity pneumonitis eval pending. Does not appear to have CTD. Noted to be flu A positive. ID started Tamiflu 12/6. Able to wean to high flow NC. Passed speech bedside swallow 12/8. Depakote held as potentially associated with ARDS. Appreciate psych recs. Haldol and Cogentin added back. Continue to wean. Will restart depakote at a lower does when able to wean off high flow NC and continue to monitor.  Of note, when she is stable for discharge she would benefit from having case management notified about her possible non-compliance with medications. Her depakote level was therapeutic, but she has significant schizophrenia to the point that she cannot reliably tell us if she was taking her medication correctly.  Slowly improving.  Now on 3L per NC.    Interval History: Looking and feeling better.  No complaints.    Review of Systems   HENT: Negative for ear discharge and ear pain.    Eyes: Negative for pain and itching.   Gastrointestinal: Negative for abdominal distention and abdominal pain.   Endocrine: Negative for polyphagia and polyuria.     Objective:     Vital Signs (Most Recent):  Temp: 98.1 °F (36.7 °C) (12/13/17 1100)  Pulse: (!) 116 (12/13/17 1300)  Resp: (!) 39 (12/13/17 1300)  BP: 131/73 (12/13/17 1100)  SpO2: 96 % (12/13/17 1300) Vital Signs (24h Range):  Temp:  [97.8 °F (36.6 °C)-98.6 °F (37 °C)] 98.1 °F (36.7 °C)  Pulse:  [] 116  Resp:  [21-49] 39  SpO2:  [85 %-100 %] 96 %  BP: (100-170)/(53-96) 131/73     Weight: 63.7 kg (140 lb 6.9 oz)  Body mass index is 21.99 kg/m².    Intake/Output Summary (Last 24 hours) at 12/13/17 1330  Last data filed at 12/13/17 0600   Gross per 24 hour   Intake             1635 ml   Output             2625 ml   Net             -990 ml      Physical Exam   Constitutional: She is oriented to person, place, and time. She appears well-developed. No distress.    Eyes: Conjunctivae and EOM are normal.   Neck: Normal range of motion. No thyromegaly present.   Cardiovascular: Intact distal pulses.    Tachycardic.   Pulmonary/Chest: Breath sounds normal. She has no wheezes. She has no rales.   Slightly tachypneic.   Abdominal: Soft. Bowel sounds are normal. She exhibits no distension.   Musculoskeletal: Normal range of motion. She exhibits no edema.   Neurological: She is alert and oriented to person, place, and time.   Skin: Skin is warm and dry. She is not diaphoretic.   Nursing note and vitals reviewed.      Significant Labs:   BMP:     Recent Labs  Lab 12/13/17 0410   *      K 3.8      CO2 27   BUN 15   CREATININE 0.8   CALCIUM 8.8   MG 1.7     CBC:     Recent Labs  Lab 12/12/17 0410 12/13/17 0410   WBC 10.16 9.49   HGB 10.8* 9.8*   HCT 31.4* 28.7*   * 134*         Assessment/Plan:      * ARDS (adult respiratory distress syndrome)    Pt met criteria for sepsis with fever, tachycardia and infection source of multilobar pneumonia.   Pt was treated empirically with Zosyn/Cipro/Vancomycin and IVF.  NEBS and O2 via BiPAP. Difficult to wean from BiPAP. Stopped fluids 12/3/17 and diuresed, but this did not seem to make any improvement and renal function started to elevate so diuresis held.   Echo w/ some DD, but BNP not elevated. Appreciate pulm recs.   ARDS probably combination of aspiration pneumonia (already treated) and H1N1 influenza.  Treated with Tamiflu.  She has now completed course of ABx's and Tamiflu.  Slowly improving.  Able to wean down to NC.  Looking and feeling better.  Will try to see how she does out of ICU.          Influenza A (H1N1)    As above        Tachycardia    Secondary to sepsis, hypoxia and expected physiologic response.  HR remains elevated, but improving.  Asymptomatic.        Paranoid schizophrenia    Appreciate psych recs.   Continue Haldol and Cogentin.  Resume Depakote at lower dose.        Troponin level elevated     Likely secondary to strain with sepsis and not ACS, but will trend out markers and monitor on telemetry. Echo.        History of breast cancer in female    No acute issues; s/p right breast lumpectomy followed by chemotherapy in 2005. Will need to refer to Hem/Onc as outpatient for follow up.        Tobacco abuse    Smoking cessation counseling.        Aspiration pneumonia    CTA remarkable for diffuse bilateral patchy airspace opacities.  Treated as above.          VTE Risk Mitigation         Ordered     enoxaparin injection 40 mg  Daily     Route:  Subcutaneous        12/02/17 0213     Medium Risk of VTE  Once      12/01/17 2146     Place sequential compression device  Until discontinued      12/01/17 2146     Place OMAR hose  Until discontinued      12/01/17 2146          Critical care time spent on the evaluation and treatment of severe organ dysfunction, review of pertinent labs and imaging studies, discussions with consulting providers and discussions with patient/family: 50 minutes.    Bernard Alvarez MD  Department of Hospital Medicine   [facility]

## 2017-12-13 NOTE — PROGRESS NOTES
Progress Note  Infectious Disease    Admit Date: 12/1/2017   LOS: 11 days     SUBJECTIVE:     Follow-up For:  Aspiration pneumonia, now pcr for influenza a positive    Antibiotics     None          Review of Systems:  Review of systems not obtained due to patient factors on bipap.    OBJECTIVE:     Vital Signs (Most Recent)  Temp: 100.1 °F (37.8 °C) (12/12/17 1105)  Pulse: (!) 120 (12/12/17 1900)  Resp: (!) 38 (12/12/17 1900)  BP: 137/63 (12/12/17 1832)  SpO2: (!) 93 % (12/12/17 1900)     Temperature Range Min/Max (Last 24H):  Temp:  [98.6 °F (37 °C)-100.1 °F (37.8 °C)]     I & O (Last 24H):    Intake/Output Summary (Last 24 hours) at 12/12/17 1912  Last data filed at 12/12/17 1800   Gross per 24 hour   Intake             2880 ml   Output             2475 ml   Net              405 ml       Physical Exam:  General: well developed, well nourished  Eyes: conjunctivae/corneas clear. PERRL..  HENT: Head:normocephalic, atraumatic. Ears:not examined. Nose: Nares normal. Septum midline. Mucosa normal. No drainage or sinus tenderness., no discharge. Throat: lips, mucosa, and tongue normal; teeth and gums normal and no throat erythema.  Neck: supple, symmetrical, trachea midline, no JVD and thyroid not enlarged, symmetric, no tenderness/mass/nodules  Lungs:  labored breathing, diminished breath sounds bilaterally and rales bilaterally  Cardiovascular: Heart: regular rate and rhythm, S1, S2 normal, no murmur, click, rub or gallop. Chest Wall: no tenderness. Extremities: no cyanosis or edema, or clubbing. Pulses: 2+ and symmetric.  Abdomen/Rectal: Abdomen: soft, non-tender non-distented; bowel sounds normal; no masses,  no organomegaly. Rectal: not examined  Skin: Skin color, texture, turgor normal. No rashes or lesions  Musculoskeletal:no clubbing, cyanosis    Lines/Drains:       Peripheral IV - Single Lumen 12/02/17 1600 Left Forearm (Active)   Site Assessment Clean;Dry;Intact;No redness;No swelling 12/4/2017  3:30 PM   Line  Status Blood return noted;Flushed;Infusing 12/4/2017  3:30 PM   Dressing Status Clean;Intact;Dry 12/4/2017  3:30 PM   Dressing Change Due 12/06/17 12/4/2017  3:30 PM   Site Change Due 12/06/17 12/4/2017  7:52 AM   Reason Not Rotated Not due 12/4/2017  3:30 PM            Peripheral IV - Single Lumen 12/03/17 2018 Left Forearm (Active)   Site Assessment Clean;Dry;Intact;No redness;No swelling 12/4/2017  3:30 PM   Line Status Blood return noted;Flushed;Saline locked 12/4/2017  3:30 PM   Dressing Status Clean;Dry;Intact 12/4/2017  3:30 PM   Dressing Change Due 12/06/17 12/4/2017  3:30 PM   Site Change Due 12/06/17 12/4/2017  7:52 AM   Reason Not Rotated Not due 12/4/2017  3:30 PM       Laboratory:  CBC    Recent Labs  Lab 12/12/17  0410   WBC 10.16   RBC 3.86*   HGB 10.8*   HCT 31.4*   *   MCV 81*   MCH 28.0   MCHC 34.4     BMP    Recent Labs  Lab 12/12/17  0410      K 3.3*      CO2 26   BUN 17   CREATININE 0.8   CALCIUM 9.2     Microbiology Results (last 7 days)     Procedure Component Value Units Date/Time    Blood culture [[id number]] Collected:  12/04/17 1610    Order Status:  Completed Specimen:  Blood Updated:  12/09/17 1903     Blood Culture, Routine No growth after 5 days.    Narrative:       Collection has been rescheduled by CMO at 12/4/2017 14:21 Reason:   Patient unavailable  Collection has been rescheduled by CMO at 12/4/2017 14:21 Reason:   Patient unavailable    Blood culture [[id number]] Collected:  12/04/17 1610    Order Status:  Completed Specimen:  Blood Updated:  12/09/17 1903     Blood Culture, Routine No growth after 5 days.    Narrative:       Collection has been rescheduled by CMO at 12/4/2017 14:21 Reason:   Patient unavailable  Collection has been rescheduled by CMO at 12/4/2017 14:21 Reason:   Patient unavailable    Blood culture x two cultures. Draw prior to antibiotics. [[id number]] Collected:  12/01/17 1418    Order Status:  Completed Specimen:  Blood from Peripheral, Hand,  Left Updated:  12/06/17 1503     Blood Culture, Routine No growth after 5 days.    Narrative:       Draw one set from central line if present, draw second  culture from another site.    Blood culture x two cultures. Draw prior to antibiotics. [[id number]] Collected:  12/01/17 1412    Order Status:  Completed Specimen:  Blood from Peripheral, Antecubital, Right Updated:  12/06/17 1503     Blood Culture, Routine No growth after 5 days.    Narrative:       Draw one set from central line if present, draw second  culture from another site.    Respiratory Viral Panel by PCR [facility] ( ); Nasal Swab [[id number]]  (Abnormal) Collected:  12/02/17 1845    Order Status:  Completed Specimen:  Respiratory Updated:  12/06/17 0731     Respiratory Virus Panel, source Nasal Swab     RVP - Adenovirus Not Detected     Comment: Respiratory Viral Panel is a product of [employer].  It has been approved or cleared by the U.S. Food and Drug  Administration for in vitro diagnostic use.  Results should be  used in conjunction with clinical findings, and should not form  the sole basis for a diagnosis or treatment decision.  Negative results do not preclude respiratory virus infection  and should not be used as the sole basis for diagnosis,   treatment, or other management decisions.  Positive results do not rule out bacterial infection, or  co-infection with other viruses.  The agent detected may not  be the definitive cause of the disease. The use of additional   laboratory testing (e.g. bacterial culture, immunofluorescence,  radiography) and clinical presentation must be taken into  consideration in order to obtain the final diagnosis of   respiratory viral infection.  The RVP assay cannot adequately detect Adenovirus species C,  or serotypes 7a and 41.  The RVP primers for detection of   rhinovirus have been shown to cross-react with enterovirus.  A rhinovirus reactive result should be confirmed by an   alternative method (e.g.  cell culture).  The  of the Respiratory Viral Panel has   recommmended that specimens found to be negative for  Adenovirus be confirmed by an alternative method.  The  of the Respiratory Viral Panel has  recommended that specimens found to be negative for   Influenza be confirmed by an alternative method.          Enterovirus Not Detected     Comment: Cross-reactivity has been observed between certain Rhinovirus  strains and the Enterovirus assay.          Human Bocavirus Not Detected     Human Coronavirus Not Detected     Comment: The Human Coronavirus assay detects Human coronavirus types  229E, OC43,NL63 and HKO1.          RVP - Human Metapneumovirus (hMPV) Not Detected     RVP - Influenza A Not Detected     Influenza A - K2H6-86 Positive (A)     RVP - Influenza B Not Detected     Parainfluenza Not Detected     Respiratory Syncytial VirusVirus (RSV) A Not Detected     Comment: The Respiratory Syncytial Viral assay detects types A and B,  however it does not distinguish between the two.          RVP - Rhinovirus Not Detected     Comment: Target Enriched Mulitplex Polymerase Chain Reaction (TEM-PCR)  allows for the detection of multiple pathogens out of a single  reaction.  This test was developed and its performance   characteristics determined by [employer].  It has not   been cleared or approved by the U.S.Food and Drug Administration.  Results should be used in conjunction with clinical findings,   and should not form the sole basis for a diagnosis or treatment  decision.  TEM-PCR is a licensed technology of [employer].         Narrative:       Receiving Lab:->[facility]        No results for input(s): COLORU, CLARITYU, SPECGRAV, PHUR, PROTEINUA, GLUCOSEU, BILIRUBINCON, BLOODU, WBCU, RBCU, BACTERIA, MUCUS, NITRITE, LEUKOCYTESUR, UROBILINOGEN, HYALINECASTS in the last 168 hours.    Diagnostic Results:  Labs: Reviewed  X-Ray: Reviewed  CT: Reviewed    ASSESSMENT/PLAN:      Active Hospital Problems    Diagnosis  POA    *ARDS (adult respiratory distress syndrome) [J80]  Yes    Influenza A (H1N1) [J10.1]  Yes    Tachycardia [R00.0]  Yes    Tobacco abuse [Z72.0]  Yes     Chronic    History of breast cancer in female [Z85.3]  Not Applicable     Chronic     S/P right breast lumpectomy and chemo,2005      Paranoid schizophrenia [F20.0]  Yes    Troponin level elevated [R74.8]  Yes    Aspiration pneumonia [J69.0]  Yes      Resolved Hospital Problems    Diagnosis Date Resolved POA   No resolved problems to display.       1. Aspiration pneumonia  2. Schizophrenia  3. Ards, sec to influenza a ?  continue empiric abx- no sputum available  hiv negative   dc steroids  I added tamiflu  X 5 days  Creatinine stable  She has been on zosyn from 12/1/17 to date and also on vancomycin from 12/1/17 to date  This is an adequate course of empirical abx  I suspect her ards is sec to influenza and will take a while to resolve  She remains on high flow o2

## 2017-12-13 NOTE — UM SECONDARY REVIEW
Physician Advisor Internal    IP Extended Stay > 10    LOS: approved w/o recommendations due to severity of clinical picture

## 2017-12-13 NOTE — PROGRESS NOTES
"[facility]  Psychiatry  Progress Note    Patient Name: Jossie Crowley  MRN: [id number]   Code Status: Full Code  Admission Date: 12/1/2017  Hospital Length of Stay: 12 days  Expected Discharge Date:   Attending Physician: Bernard Alvarez MD  Primary Care Provider: Jordyn Owen MD    Current Legal Status: N/A    Patient information was obtained from patient.     Subjective:     Principal Problem:ARDS (adult respiratory distress syndrome)    Chief Complaint: Odd behavior and possible overdose on Depakote    HPI: Patient Jossie Crowley presents to the hospital with strange behavior.  She was found to be possibly under an unintentional overdose of her Depakote with subsequent ARDS.  Now she is found to have influenza a and is in isolation in the ICU.  Patient is easily approached and conversation but is a difficult and somewhat poor historian.  She tells me that she is in the hospital because she was "shaky".  She says that she follows with an outpatient psychiatrist, Dr. Fiore, and sees him on a monthly basis.  She is not sure when her next appointment is.  She is not able to name her medications but when I go over a list with her, she is able to tell me which ones she is taking.  She says that she takes Depakote twice daily, Haldol twice daily, Cogentin twice daily.  She is not sure of the doses.  She says that she is not currently depressed or anxious.  She is not having any manic symptoms.  She denies any psychosis at this time.  She says that when her schizophrenia "acts up", she can see things that aren't there and her mind plays tricks on her.  Records indicate previous hospitalizations which show that she becomes very paranoid and isolative when she has an exacerbation.  She can strangely rearrange things around the house and do odd things such as drink water from ashtrays, put things in the trash that do not belong there, and drink excessive amounts of water.  She says that she has not been in " "a psychiatric hospital for some time now and does not feel that she needs to be there.    There is speculation that she has overdosed on her Depakote but an admit valproic acid level was within range.  Nursing reports that she is constantly pushing the nursing alert button and is restless in bed.    Hospital Course: 12/11/2017 - patient is seen in her ICU isolation room.  She remains easy to approach in conversation but quiet and odd.  She says that she is doing well and without complaint.  She is asking for her [facility] cancer doctor to come see her.  She says that she is sleeping and eating well.  Not really able to explain her medical care here.  Still a poor historian.  She was started back on her haloperidol and benztropine.  Still concern for depakote induced ARDS?    12/12/2017 - patient is seen I her ICU isolation room.  She again is easy to join in conversation but is very simple and concrete.  She says that she is "being good".  Nurse says that she does take some redirection at times to take her medications.  Patient does ask if she can go see her psychiatrist when she leaves.    12/13/2017 - patient is again seen in her isolation room in the ICU.  She appears to be somewhat easier to engage in conversation.  She says that she is doing okay and without complaint.  She denies any psychosis but is still quite odd at baseline.  Nursing reports that she is cooperative.  She is compliant with her Haldol, Depakote, Cogentin.  Patient tells me that she is going to see her psychiatrist for a regular checkup when she leaves the hospital.    Interval History: Appears to be improved and likely at her mental baseline    Family History     None        Social History Main Topics    Smoking status: Current Every Day Smoker     Packs/day: 0.50     Years: 37.00     Types: Cigarettes    Smokeless tobacco: Former User     Quit date: 12/27/2014    Alcohol use No    Drug use: No    Sexual activity: Not on file " "    Psychotherapeutics     Start     Stop Route Frequency Ordered    12/09/17 1417  haloperidol lactate injection 10 mg      -- IM Every 6 hours PRN 12/09/17 1318    12/09/17 1330  haloperidol tablet 10 mg      -- Oral 2 times daily 12/09/17 1318           Review of Systems   Constitutional: Negative for activity change and appetite change.   Respiratory: Negative for shortness of breath.    Cardiovascular: Negative for chest pain.   Gastrointestinal: Negative for nausea.   Musculoskeletal: Negative for arthralgias and myalgias.   Psychiatric/Behavioral: Positive for confusion. Negative for dysphoric mood, sleep disturbance and suicidal ideas. The patient is not nervous/anxious.      Objective:     Vital Signs (Most Recent):  Temp: 98.1 °F (36.7 °C) (12/13/17 1100)  Pulse: (!) 121 (12/13/17 1500)  Resp: (!) 30 (12/13/17 1500)  BP: (!) 143/68 (12/13/17 1500)  SpO2: 98 % (12/13/17 1500) Vital Signs (24h Range):  Temp:  [97.8 °F (36.6 °C)-98.6 °F (37 °C)] 98.1 °F (36.7 °C)  Pulse:  [] 121  Resp:  [21-49] 30  SpO2:  [85 %-100 %] 98 %  BP: (100-170)/(53-96) 143/68     Height: 5' 7.01" (170.2 cm)  Weight: 63.7 kg (140 lb 6.9 oz)  Body mass index is 21.99 kg/m².      Intake/Output Summary (Last 24 hours) at 12/13/17 1547  Last data filed at 12/13/17 1245   Gross per 24 hour   Intake          1991.25 ml   Output             2375 ml   Net          -383.75 ml       Physical Exam   Psychiatric:   EXAMINATION    CONSTITUTIONAL  General Appearance: Hospital attire, restless in an ICU bed    MUSCULOSKELETAL  Muscle Strength and Tone: Normal  Abnormal Involuntary Movements: Restless appearing  Gait and Station: Not observed    PSYCHIATRIC MENTAL STATUS EXAM   Level of Consciousness: Awake and alert  Orientation: Name and "hospital"  Grooming: Limited  Psychomotor Behavior: Somewhat restless  Speech: Soft and delayed  Language: No abnormalities  Mood: "Okay"  Affect: Blunt and oddly related  Thought Process: " [place]  Associations: Intact  Thought Content: Denies suicidal/homicidal/psychosis  Memory: Somewhat intact to recent and remote but very simple  Attention: Diminished  Fund of Knowledge: Intact for simple conversation  Insight: Fair towards mental illness  Judgment: Limited towards compliance and redirection         Significant Labs:   Last 24 Hours:   Recent Lab Results       12/13/17  0410      Anion Gap 6(L)     Baso # 0.02     Basophil% 0.2     BUN, Bld 15     Calcium 8.8     Chloride 108     CO2 27     Creatinine 0.8     Differential Method Automated     eGFR if  >60     eGFR if non  >60  Comment:  Calculation used to obtain the estimated glomerular filtration  rate (eGFR) is the CKD-EPI equation.        Eos # 0.2     Eosinophil% 1.6     Glucose 128(H)     Gran # 6.7     Gran% 75.2(H)     Hematocrit 28.7(L)     Hemoglobin 9.8(L)     Lymph # 2.1     Lymph% 21.6     Magnesium 1.7     MCH 27.9     MCHC 34.1     MCV 82     Mono # 0.6     Mono% 5.9     MPV 11.9     Platelets 134(L)     Potassium 3.8     RBC 3.51(L)     RDW 15.7(H)     Sodium 141     WBC 9.49         All pertinent labs within the past 24 hours have been reviewed.    Significant Imaging: I have reviewed all pertinent imaging results/findings within the past 24 hours.    Assessment/Plan:     Paranoid schizophrenia    Patient has a long documented history of schizophrenia, likely schizoaffective diathesis.  She has had worsening of her symptoms over time which has likely also led to some cognitive impairment.  There has been consideration for overuse of her Depakote but her Depakote level was within normal limits upon her presenting to the hospital.  Still, I would recommend for her to remain on Depakote but decrease the dose to 500 mg by mouth twice a day.  Continue Haldol 10 mg by mouth twice a day, Cogentin 1 mg by mouth twice a day.  Being unmedicated, she will likely have an exacerbation of her psychotic symptoms  and odd behaviors.  She will be a difficult case.  It would be in her best interest to set up some psychiatric home health to help manage her medications in the home setting.  Another consideration would be for case management to contact the ACT team for her area and see if they would be able to accommodate with regular visits.  Should she have any non-redirectable behavior or psychotic agitation, utilize Haldol 10 mg/Ativan 1 mg/Benadryl 50 mg by mouth or IM.  She can follow-up with her outpatient Dr. Fiore upon discharge.  She may also have a  set up through her insurance that may have access to psychiatric home health.             Need for Continued Hospitalization:   No need for inpatient psychiatric hospitalization. Continue medical care as per the primary team.    Anticipated Disposition: Home or Self Care     Total time:  15 with greater than 50% of this time spent in counseling and/or coordination of care.       Gerardo Saba MD   Psychiatry  [facility]

## 2017-12-13 NOTE — ASSESSMENT & PLAN NOTE
Pt met criteria for sepsis with fever, tachycardia and infection source of multilobar pneumonia.   Pt was treated empirically with Zosyn/Cipro/Vancomycin and IVF.  NEBS and O2 via BiPAP. Difficult to wean from BiPAP. Stopped fluids 12/3/17 and diuresed, but this did not seem to make any improvement and renal function started to elevate so diuresis held.   Echo w/ some DD, but BNP not elevated. Appreciate pulm recs.   ARDS probably combination of aspiration pneumonia (already treated) and H1N1 influenza.  Treated with Tamiflu.  She has now completed course of ABx's and Tamiflu.  Slowly improving.  Able to wean down to NC.  Looking and feeling better.  Will try to see how she does out of ICU.

## 2017-12-13 NOTE — PROGRESS NOTES
Pt on high flow nasal cannula at 20 LPM with 40% fio2 sats 95%the patient humidifier changed.Pt has crackles in lung bases.Pt tolerated respiratory treatment with AAMIR.

## 2017-12-13 NOTE — CARE UPDATE
Transfer Note    Ms Crowley presented with potential unintentional overdose with depakote. This is a recurrent issue. Workup significant for presumed aspiration pneumonia that progressed to ARDS. Provided with broad spectrum antibiotics, IVFs and supplemental O2. Upgraded to ICU for increased O2 requirements and placed on BiPAP. She was not improving and duo-nebs and solumedrol IV were added. Difficult to wean O2. CXR with possible edema. Tried diuresis with no improvement. Echo w/ EF 55%, +DD. BNP not elevated. Speech evaluated and has high concern for aspiration and recommended MBSS. She continued to require too much supplemental O2 to have MBSS and CXR remained unchanged. Noted to be positive for H1N1. ID started Tamiflu 12/6. Able to wean to high flow NC. Passed speech bedside swallow 12/8. Depakote initially held. Psych following. Haldol and Cogentin added back. Continued to wean oxygen. Started Depakote at lower doses. Of note, when she is stable for discharge she would benefit from having case management notified about her possible non-compliance with medications. Her depakote level was therapeutic, but she has significant schizophrenia to the point that she cannot reliably tell us if she was taking her medication correctly.  Patient has slowly improved.  Looking and feeling much better.  She has completed course of ABX's for aspiration pneumonia and Tamiflu course for H1N1 influenza.  Currently on 3L per NC.  Still slightly tachypneic and tachycardic.  Will transfer to Med/Tele.  PT/OT consult.

## 2017-12-13 NOTE — SUBJECTIVE & OBJECTIVE
Interval History: Looking and feeling better.  No complaints.    Review of Systems   HENT: Negative for ear discharge and ear pain.    Eyes: Negative for pain and itching.   Gastrointestinal: Negative for abdominal distention and abdominal pain.   Endocrine: Negative for polyphagia and polyuria.     Objective:     Vital Signs (Most Recent):  Temp: 98.1 °F (36.7 °C) (12/13/17 1100)  Pulse: (!) 116 (12/13/17 1300)  Resp: (!) 39 (12/13/17 1300)  BP: 131/73 (12/13/17 1100)  SpO2: 96 % (12/13/17 1300) Vital Signs (24h Range):  Temp:  [97.8 °F (36.6 °C)-98.6 °F (37 °C)] 98.1 °F (36.7 °C)  Pulse:  [] 116  Resp:  [21-49] 39  SpO2:  [85 %-100 %] 96 %  BP: (100-170)/(53-96) 131/73     Weight: 63.7 kg (140 lb 6.9 oz)  Body mass index is 21.99 kg/m².    Intake/Output Summary (Last 24 hours) at 12/13/17 1330  Last data filed at 12/13/17 0600   Gross per 24 hour   Intake             1635 ml   Output             2625 ml   Net             -990 ml      Physical Exam   Constitutional: She is oriented to person, place, and time. She appears well-developed. No distress.   Eyes: Conjunctivae and EOM are normal.   Neck: Normal range of motion. No thyromegaly present.   Cardiovascular: Intact distal pulses.    Tachycardic.   Pulmonary/Chest: Breath sounds normal. She has no wheezes. She has no rales.   Slightly tachypneic.   Abdominal: Soft. Bowel sounds are normal. She exhibits no distension.   Musculoskeletal: Normal range of motion. She exhibits no edema.   Neurological: She is alert and oriented to person, place, and time.   Skin: Skin is warm and dry. She is not diaphoretic.   Nursing note and vitals reviewed.      Significant Labs:   BMP:     Recent Labs  Lab 12/13/17  0410   *      K 3.8      CO2 27   BUN 15   CREATININE 0.8   CALCIUM 8.8   MG 1.7     CBC:     Recent Labs  Lab 12/12/17  0410 12/13/17  0410   WBC 10.16 9.49   HGB 10.8* 9.8*   HCT 31.4* 28.7*   * 134*

## 2017-12-13 NOTE — PLAN OF CARE
Problem: Patient Care Overview  Goal: Plan of Care Review  Outcome: Ongoing (interventions implemented as appropriate)  Weaned to 5L nasal cannula with sats greater than 90%.  Miller discontinued, voiding well on her own.  Sinus tachycardia per monitor.  Tolerating diet.

## 2017-12-13 NOTE — SUBJECTIVE & OBJECTIVE
"Interval History: Appears to be improved and likely at her mental baseline    Family History     None        Social History Main Topics    Smoking status: Current Every Day Smoker     Packs/day: 0.50     Years: 37.00     Types: Cigarettes    Smokeless tobacco: Former User     Quit date: 12/27/2014    Alcohol use No    Drug use: No    Sexual activity: Not on file     Psychotherapeutics     Start     Stop Route Frequency Ordered    12/09/17 1417  haloperidol lactate injection 10 mg      -- IM Every 6 hours PRN 12/09/17 1318    12/09/17 1330  haloperidol tablet 10 mg      -- Oral 2 times daily 12/09/17 1318           Review of Systems   Constitutional: Negative for activity change and appetite change.   Respiratory: Negative for shortness of breath.    Cardiovascular: Negative for chest pain.   Gastrointestinal: Negative for nausea.   Musculoskeletal: Negative for arthralgias and myalgias.   Psychiatric/Behavioral: Positive for confusion. Negative for dysphoric mood, sleep disturbance and suicidal ideas. The patient is not nervous/anxious.      Objective:     Vital Signs (Most Recent):  Temp: 98.1 °F (36.7 °C) (12/13/17 1100)  Pulse: (!) 121 (12/13/17 1500)  Resp: (!) 30 (12/13/17 1500)  BP: (!) 143/68 (12/13/17 1500)  SpO2: 98 % (12/13/17 1500) Vital Signs (24h Range):  Temp:  [97.8 °F (36.6 °C)-98.6 °F (37 °C)] 98.1 °F (36.7 °C)  Pulse:  [] 121  Resp:  [21-49] 30  SpO2:  [85 %-100 %] 98 %  BP: (100-170)/(53-96) 143/68     Height: 5' 7.01" (170.2 cm)  Weight: 63.7 kg (140 lb 6.9 oz)  Body mass index is 21.99 kg/m².      Intake/Output Summary (Last 24 hours) at 12/13/17 1547  Last data filed at 12/13/17 1245   Gross per 24 hour   Intake          1991.25 ml   Output             2375 ml   Net          -383.75 ml       Physical Exam   Psychiatric:   EXAMINATION    CONSTITUTIONAL  General Appearance: Hospital attire, restless in an ICU bed    MUSCULOSKELETAL  Muscle Strength and Tone: Normal  Abnormal " "Involuntary Movements: Restless appearing  Gait and Station: Not observed    PSYCHIATRIC MENTAL STATUS EXAM   Level of Consciousness: Awake and alert  Orientation: Name and "hospital"  Grooming: Limited  Psychomotor Behavior: Somewhat restless  Speech: Soft and delayed  Language: No abnormalities  Mood: "Okay"  Affect: Blunt and oddly related  Thought Process: [place]  Associations: Intact  Thought Content: Denies suicidal/homicidal/psychosis  Memory: Somewhat intact to recent and remote but very simple  Attention: Diminished  Fund of Knowledge: Intact for simple conversation  Insight: Fair towards mental illness  Judgment: Limited towards compliance and redirection         Significant Labs:   Last 24 Hours:   Recent Lab Results       12/13/17  0410      Anion Gap 6(L)     Baso # 0.02     Basophil% 0.2     BUN, Bld 15     Calcium 8.8     Chloride 108     CO2 27     Creatinine 0.8     Differential Method Automated     eGFR if  >60     eGFR if non  >60  Comment:  Calculation used to obtain the estimated glomerular filtration  rate (eGFR) is the CKD-EPI equation.        Eos # 0.2     Eosinophil% 1.6     Glucose 128(H)     Gran # 6.7     Gran% 75.2(H)     Hematocrit 28.7(L)     Hemoglobin 9.8(L)     Lymph # 2.1     Lymph% 21.6     Magnesium 1.7     MCH 27.9     MCHC 34.1     MCV 82     Mono # 0.6     Mono% 5.9     MPV 11.9     Platelets 134(L)     Potassium 3.8     RBC 3.51(L)     RDW 15.7(H)     Sodium 141     WBC 9.49         All pertinent labs within the past 24 hours have been reviewed.    Significant Imaging: I have reviewed all pertinent imaging results/findings within the past 24 hours.  "

## 2017-12-13 NOTE — PROGRESS NOTES
Progress Note  Infectious Disease    Admit Date: 12/1/2017   LOS: 12 days     SUBJECTIVE:     Follow-up For:  Aspiration pneumonia, now pcr for influenza a positive    Antibiotics     None          Review of Systems:  Review of systems not obtained due to patient factors on bipap.    OBJECTIVE:     Vital Signs (Most Recent)  Temp: 97.8 °F (36.6 °C) (12/13/17 1600)  Pulse: (!) 122 (12/13/17 1730)  Resp: (!) 29 (12/13/17 1730)  BP: (!) 147/74 (12/13/17 1700)  SpO2: 96 % (12/13/17 1730)     Temperature Range Min/Max (Last 24H):  Temp:  [97.8 °F (36.6 °C)-98.6 °F (37 °C)]     I & O (Last 24H):    Intake/Output Summary (Last 24 hours) at 12/13/17 1739  Last data filed at 12/13/17 1245   Gross per 24 hour   Intake          1481.25 ml   Output             2200 ml   Net          -718.75 ml       Physical Exam:  General: well developed, well nourished  Eyes: conjunctivae/corneas clear. PERRL..  HENT: Head:normocephalic, atraumatic. Ears:not examined. Nose: Nares normal. Septum midline. Mucosa normal. No drainage or sinus tenderness., no discharge. Throat: lips, mucosa, and tongue normal; teeth and gums normal and no throat erythema.  Neck: supple, symmetrical, trachea midline, no JVD and thyroid not enlarged, symmetric, no tenderness/mass/nodules  Lungs:  labored breathing, diminished breath sounds bilaterally and rales bilaterally  Cardiovascular: Heart: regular rate and rhythm, S1, S2 normal, no murmur, click, rub or gallop. Chest Wall: no tenderness. Extremities: no cyanosis or edema, or clubbing. Pulses: 2+ and symmetric.  Abdomen/Rectal: Abdomen: soft, non-tender non-distented; bowel sounds normal; no masses,  no organomegaly. Rectal: not examined  Skin: Skin color, texture, turgor normal. No rashes or lesions  Musculoskeletal:no clubbing, cyanosis    Lines/Drains:       Peripheral IV - Single Lumen 12/02/17 1600 Left Forearm (Active)   Site Assessment Clean;Dry;Intact;No redness;No swelling 12/4/2017  3:30 PM   Line  Status Blood return noted;Flushed;Infusing 12/4/2017  3:30 PM   Dressing Status Clean;Intact;Dry 12/4/2017  3:30 PM   Dressing Change Due 12/06/17 12/4/2017  3:30 PM   Site Change Due 12/06/17 12/4/2017  7:52 AM   Reason Not Rotated Not due 12/4/2017  3:30 PM            Peripheral IV - Single Lumen 12/03/17 2018 Left Forearm (Active)   Site Assessment Clean;Dry;Intact;No redness;No swelling 12/4/2017  3:30 PM   Line Status Blood return noted;Flushed;Saline locked 12/4/2017  3:30 PM   Dressing Status Clean;Dry;Intact 12/4/2017  3:30 PM   Dressing Change Due 12/06/17 12/4/2017  3:30 PM   Site Change Due 12/06/17 12/4/2017  7:52 AM   Reason Not Rotated Not due 12/4/2017  3:30 PM       Laboratory:  CBC    Recent Labs  Lab 12/13/17  0410   WBC 9.49   RBC 3.51*   HGB 9.8*   HCT 28.7*   *   MCV 82   MCH 27.9   MCHC 34.1     BMP    Recent Labs  Lab 12/13/17  0410      K 3.8      CO2 27   BUN 15   CREATININE 0.8   CALCIUM 8.8     Microbiology Results (last 7 days)     Procedure Component Value Units Date/Time    Blood culture [[id number]] Collected:  12/04/17 1610    Order Status:  Completed Specimen:  Blood Updated:  12/09/17 1903     Blood Culture, Routine No growth after 5 days.    Narrative:       Collection has been rescheduled by CMO at 12/4/2017 14:21 Reason:   Patient unavailable  Collection has been rescheduled by CMO at 12/4/2017 14:21 Reason:   Patient unavailable    Blood culture [[id number]] Collected:  12/04/17 1610    Order Status:  Completed Specimen:  Blood Updated:  12/09/17 1903     Blood Culture, Routine No growth after 5 days.    Narrative:       Collection has been rescheduled by CMO at 12/4/2017 14:21 Reason:   Patient unavailable  Collection has been rescheduled by CMO at 12/4/2017 14:21 Reason:   Patient unavailable        No results for input(s): COLORU, CLARITYU, SPECGRAV, PHUR, PROTEINUA, GLUCOSEU, BILIRUBINCON, BLOODU, WBCU, RBCU, BACTERIA, MUCUS, NITRITE, LEUKOCYTESUR,  UROBILINOGEN, HYALINECASTS in the last 168 hours.    Diagnostic Results:  Labs: Reviewed  X-Ray: Reviewed  CT: Reviewed    ASSESSMENT/PLAN:     Active Hospital Problems    Diagnosis  POA    *ARDS (adult respiratory distress syndrome) [J80]  Yes    Acute febrile illness [R50.9]  Yes    Influenza A (H1N1) [J10.1]  Yes    Tachycardia [R00.0]  Yes    Tobacco abuse [Z72.0]  Yes     Chronic    History of breast cancer in female [Z85.3]  Not Applicable     Chronic     S/P right breast lumpectomy and chemo,2005      Paranoid schizophrenia [F20.0]  Yes    Troponin level elevated [R74.8]  Yes    Aspiration pneumonia [J69.0]  Yes      Resolved Hospital Problems    Diagnosis Date Resolved POA   No resolved problems to display.       1. Aspiration pneumonia  2. Schizophrenia  3. Ards, sec to influenza a ?  continue empiric abx- no sputum available  hiv negative   dc steroids  o2 down to 5 l nc  Dc isolation

## 2017-12-13 NOTE — PLAN OF CARE
12/12/17 2000   Patient Assessment/Suction   Level of Consciousness (AVPU) alert   Respiratory Effort Slight;Labored   Expansion/Accessory Muscles/Retractions accessory muscle use   All Lung Fields Breath Sounds diminished   PRE-TX-O2-ETCO2   O2 Device (Oxygen Therapy) Comfort Flow   Humidification temp actual 33   Flow (L/min) 20   Oxygen Concentration (%) 40   SpO2 (!) 94 %   Pulse (!) 135   Resp (!) 34   BP (!) 153/72   Aerosol Therapy   $ Aerosol Therapy Charges Aerosol Treatment   Respiratory Treatment Status given   SVN/Inhaler Treatment Route mask;with oxygen   Position During Treatment HOB at 45 degrees   Patient Tolerance good   Post-Treatment   Post-treatment Heart Rate (beats/min) 133   Post-treatment Resp Rate (breaths/min) 32   All Fields Breath Sounds unchanged   Pt tolerates treatments and comfort flow well.

## 2017-12-13 NOTE — ASSESSMENT & PLAN NOTE
Secondary to sepsis, hypoxia and expected physiologic response.  HR remains elevated, but improving.  Asymptomatic.

## 2017-12-13 NOTE — PLAN OF CARE
Problem: Patient Care Overview  Goal: Plan of Care Review  Outcome: Outcome(s) achieved Date Met: 12/13/17  Patient remains in ICU. AAO x 4. VSS. NSR - ST on monitor. Afebrile. Remains on 40% high flow NC; refused BIPAP per RT. 1 large BM this shift. Miller intact with over 2L UO. Remains free from falls and skin breakdown. Plan of care reviewed with patient.

## 2017-12-14 PROBLEM — R50.9 ACUTE FEBRILE ILLNESS: Status: RESOLVED | Noted: 2017-12-13 | Resolved: 2017-12-14

## 2017-12-14 PROCEDURE — G8989 SELF CARE D/C STATUS: HCPCS | Mod: CH

## 2017-12-14 PROCEDURE — G8978 MOBILITY CURRENT STATUS: HCPCS | Mod: CH

## 2017-12-14 PROCEDURE — 63600175 PHARM REV CODE 636 W HCPCS: Performed by: HOSPITALIST

## 2017-12-14 PROCEDURE — 25000242 PHARM REV CODE 250 ALT 637 W/ HCPCS: Performed by: INTERNAL MEDICINE

## 2017-12-14 PROCEDURE — 99231 SBSQ HOSP IP/OBS SF/LOW 25: CPT | Mod: ,,, | Performed by: PSYCHIATRY & NEUROLOGY

## 2017-12-14 PROCEDURE — 94640 AIRWAY INHALATION TREATMENT: CPT

## 2017-12-14 PROCEDURE — G8987 SELF CARE CURRENT STATUS: HCPCS | Mod: CH

## 2017-12-14 PROCEDURE — 94761 N-INVAS EAR/PLS OXIMETRY MLT: CPT

## 2017-12-14 PROCEDURE — 97165 OT EVAL LOW COMPLEX 30 MIN: CPT

## 2017-12-14 PROCEDURE — 25000003 PHARM REV CODE 250: Performed by: HOSPITALIST

## 2017-12-14 PROCEDURE — 25000003 PHARM REV CODE 250: Performed by: INTERNAL MEDICINE

## 2017-12-14 PROCEDURE — 20600001 HC STEP DOWN PRIVATE ROOM

## 2017-12-14 PROCEDURE — 97161 PT EVAL LOW COMPLEX 20 MIN: CPT

## 2017-12-14 PROCEDURE — G8988 SELF CARE GOAL STATUS: HCPCS | Mod: CH

## 2017-12-14 PROCEDURE — G8980 MOBILITY D/C STATUS: HCPCS | Mod: CH

## 2017-12-14 PROCEDURE — G8979 MOBILITY GOAL STATUS: HCPCS | Mod: CH

## 2017-12-14 RX ORDER — LORAZEPAM 2 MG/ML
1 INJECTION INTRAMUSCULAR ONCE
Status: DISCONTINUED | OUTPATIENT
Start: 2017-12-14 | End: 2017-12-14

## 2017-12-14 RX ORDER — HALOPERIDOL 5 MG/ML
10 INJECTION INTRAMUSCULAR ONCE
Status: DISCONTINUED | OUTPATIENT
Start: 2017-12-14 | End: 2017-12-14

## 2017-12-14 RX ORDER — DIPHENHYDRAMINE HYDROCHLORIDE 50 MG/ML
50 INJECTION INTRAMUSCULAR; INTRAVENOUS ONCE
Status: DISCONTINUED | OUTPATIENT
Start: 2017-12-14 | End: 2017-12-14

## 2017-12-14 RX ORDER — OLANZAPINE 10 MG/2ML
10 INJECTION, POWDER, FOR SOLUTION INTRAMUSCULAR ONCE
Status: DISCONTINUED | OUTPATIENT
Start: 2017-12-14 | End: 2017-12-15 | Stop reason: HOSPADM

## 2017-12-14 RX ADMIN — LEVALBUTEROL 1.25 MG: 1.25 SOLUTION, CONCENTRATE RESPIRATORY (INHALATION) at 01:12

## 2017-12-14 RX ADMIN — ENOXAPARIN SODIUM 40 MG: 100 INJECTION SUBCUTANEOUS at 05:12

## 2017-12-14 RX ADMIN — DIVALPROEX SODIUM 500 MG: 250 TABLET, DELAYED RELEASE ORAL at 08:12

## 2017-12-14 RX ADMIN — LEVALBUTEROL 1.25 MG: 1.25 SOLUTION, CONCENTRATE RESPIRATORY (INHALATION) at 07:12

## 2017-12-14 RX ADMIN — ACETAMINOPHEN 650 MG: 325 TABLET ORAL at 08:12

## 2017-12-14 RX ADMIN — LEVALBUTEROL 1.25 MG: 1.25 SOLUTION, CONCENTRATE RESPIRATORY (INHALATION) at 12:12

## 2017-12-14 RX ADMIN — HALOPERIDOL 10 MG: 5 TABLET ORAL at 08:12

## 2017-12-14 RX ADMIN — METOPROLOL TARTRATE 25 MG: 25 TABLET, FILM COATED ORAL at 05:12

## 2017-12-14 RX ADMIN — BENZTROPINE MESYLATE 1 MG: 1 TABLET ORAL at 08:12

## 2017-12-14 RX ADMIN — LEVALBUTEROL 1.25 MG: 1.25 SOLUTION, CONCENTRATE RESPIRATORY (INHALATION) at 09:12

## 2017-12-14 RX ADMIN — METOPROLOL TARTRATE 25 MG: 25 TABLET, FILM COATED ORAL at 09:12

## 2017-12-14 NOTE — PROGRESS NOTES
Progress Note  Infectious Disease    Admit Date: 12/1/2017   LOS: 13 days     SUBJECTIVE:     Follow-up For:  Aspiration pneumonia, now pcr for influenza a positive    Antibiotics     None          Review of Systems:  Review of systems - denies sobar, cough or sputum  No chest pain  No diarrhea  No emesis  No headache  OBJECTIVE:     Vital Signs (Most Recent)  Temp: 98.3 °F (36.8 °C) (12/14/17 1628)  Pulse: (!) 125 (12/14/17 1628)  Resp: 17 (12/14/17 1628)  BP: 138/62 (12/14/17 1628)  SpO2: (!) 93 % (12/14/17 1628)     Temperature Range Min/Max (Last 24H):  Temp:  [96.3 °F (35.7 °C)-98.8 °F (37.1 °C)]     I & O (Last 24H):    Intake/Output Summary (Last 24 hours) at 12/14/17 1658  Last data filed at 12/13/17 1745   Gross per 24 hour   Intake              440 ml   Output             2200 ml   Net            -1760 ml       Physical Exam:  General: well developed, well nourished  Eyes: conjunctivae/corneas clear. PERRL..  HENT: Head:normocephalic, atraumatic. Ears:not examined. Nose: Nares normal. Septum midline. Mucosa normal. No drainage or sinus tenderness., no discharge. Throat: lips, mucosa, and tongue normal; teeth and gums normal and no throat erythema.  Neck: supple, symmetrical, trachea midline, no JVD and thyroid not enlarged, symmetric, no tenderness/mass/nodules  Lungs:  labored breathing, diminished breath sounds bilaterally and rales bilaterally  Cardiovascular: Heart: regular rate and rhythm, S1, S2 normal, no murmur, click, rub or gallop. Chest Wall: no tenderness. Extremities: no cyanosis or edema, or clubbing. Pulses: 2+ and symmetric.  Abdomen/Rectal: Abdomen: soft, non-tender non-distented; bowel sounds normal; no masses,  no organomegaly. Rectal: not examined  Skin: Skin color, texture, turgor normal. No rashes or lesions  Musculoskeletal:no clubbing, cyanosis    Lines/Drains:       Peripheral IV - Single Lumen 12/02/17 1600 Left Forearm (Active)   Site Assessment Clean;Dry;Intact;No redness;No  swelling 12/4/2017  3:30 PM   Line Status Blood return noted;Flushed;Infusing 12/4/2017  3:30 PM   Dressing Status Clean;Intact;Dry 12/4/2017  3:30 PM   Dressing Change Due 12/06/17 12/4/2017  3:30 PM   Site Change Due 12/06/17 12/4/2017  7:52 AM   Reason Not Rotated Not due 12/4/2017  3:30 PM            Peripheral IV - Single Lumen 12/03/17 2018 Left Forearm (Active)   Site Assessment Clean;Dry;Intact;No redness;No swelling 12/4/2017  3:30 PM   Line Status Blood return noted;Flushed;Saline locked 12/4/2017  3:30 PM   Dressing Status Clean;Dry;Intact 12/4/2017  3:30 PM   Dressing Change Due 12/06/17 12/4/2017  3:30 PM   Site Change Due 12/06/17 12/4/2017  7:52 AM   Reason Not Rotated Not due 12/4/2017  3:30 PM       Laboratory:  CBC  No results for input(s): WBC, RBC, HGB, HCT, PLT, MCV, MCH, MCHC in the last 24 hours.  BMP  No results for input(s): GLUCOSE, NA, K, CL, CO2, BUN, CREATININE, CALCIUM in the last 24 hours.  Microbiology Results (last 7 days)     Procedure Component Value Units Date/Time    Blood culture [[id number]] Collected:  12/04/17 1610    Order Status:  Completed Specimen:  Blood Updated:  12/09/17 1903     Blood Culture, Routine No growth after 5 days.    Narrative:       Collection has been rescheduled by CMO at 12/4/2017 14:21 Reason:   Patient unavailable  Collection has been rescheduled by CMO at 12/4/2017 14:21 Reason:   Patient unavailable    Blood culture [[id number]] Collected:  12/04/17 1610    Order Status:  Completed Specimen:  Blood Updated:  12/09/17 1903     Blood Culture, Routine No growth after 5 days.    Narrative:       Collection has been rescheduled by CMO at 12/4/2017 14:21 Reason:   Patient unavailable  Collection has been rescheduled by CMO at 12/4/2017 14:21 Reason:   Patient unavailable        No results for input(s): COLORU, CLARITYU, SPECGRAV, PHUR, PROTEINUA, GLUCOSEU, BILIRUBINCON, BLOODU, WBCU, RBCU, BACTERIA, MUCUS, NITRITE, LEUKOCYTESUR, UROBILINOGEN, HYALINECASTS  in the last 168 hours.    Diagnostic Results:  Labs: Reviewed  X-Ray: Reviewed  CT: Reviewed    ASSESSMENT/PLAN:     Active Hospital Problems    Diagnosis  POA    *ARDS (adult respiratory distress syndrome) [J80]  Yes    Influenza A (H1N1) [J10.1]  Yes    Tachycardia [R00.0]  Yes    Tobacco abuse [Z72.0]  Yes     Chronic    History of breast cancer in female [Z85.3]  Not Applicable     Chronic     S/P right breast lumpectomy and chemo,2005      Paranoid schizophrenia [F20.0]  Yes    Troponin level elevated [R74.8]  Yes    Aspiration pneumonia [J69.0]  Yes      Resolved Hospital Problems    Diagnosis Date Resolved POA    Acute febrile illness [R50.9] 12/14/2017 Yes       1. Aspiration pneumonia  2. Schizophrenia  3. Ards, sec to influenza a ?  hiv negative   dc steroids  o2 down to 2 l nc  Dc isolation  Off abx  I will sign off

## 2017-12-14 NOTE — PT/OT/SLP EVAL
Physical Therapy Evaluation    Patient Name:  Jossie Crowley   MRN:  [id number]    Recommendations:     Discharge Recommendations:      Discharge Equipment Recommendations: none   Barriers to discharge: None from PT standpoint    Assessment:     Jossie Crowley is a 57 y.o. female admitted with a medical diagnosis of ARDS (adult respiratory distress syndrome).  She presents with the following impairments/functional limitations:   (N/A) Pt with no deficits warranting skilled PT services at this level of care.    Rehab Prognosis:  Good; patient would benefit from acute skilled PT services to address these deficits and reach maximum level of function.      Recent Surgery: * No surgery found *      Plan:     During this hospitalization, patient to be seen  (N/A) to address the above listed problems via  (N/A)  · Plan of Care Expires:  12/14/17   Plan of Care Reviewed with: patient    Subjective     Communicated with nsg prior to session.  Patient found with HOB elevated upon PT entry to room, agreeable to evaluation.      Chief Complaint: no c/o  Patient comments/goals: to go home  Pain/Comfort:  · Pain Rating 1: 0/10    Patients cultural, spiritual, [employer]al conflicts given the current situation: none    Living Environment:  Pt lives with sister who assists with IADL's  Prior to admission, patients level of function was Independent with ambulation and ADL's.  Patient has the following equipment: none.  DME owned (not currently used): none.  Upon discharge, patient will have assistance from Sister.    Objective:     Patient found with:       General Precautions: Standard, fall   Orthopedic Precautions:N/A   Braces: N/A     Exams:  · Gross Motor Coordination:  WFL  · Sensation:    · -       Intact  · Skin Integrity/Edema:      · -       Skin integrity: Visible skin intact  · RLE ROM: WFL  · RLE Strength: WFL  · LLE ROM: WFL  · LLE Strength: WFL    Functional Mobility:  · Bed Mobility:     · Scooting: modified  independence  · Supine to Sit: modified independence  · Transfers:     · Sit to Stand:  modified independence and no AD with no AD  · Gait: 300'  · Balance: Good    AM-PAC 6 CLICK MOBILITY  Total Score:24       Therapeutic Activities and Exercises:   N/A    Patient left seated at EOB with call button in reach, nsg notified and sitter present.    GOALS:    Physical Therapy Goals     Not on file          Multidisciplinary Problems (Resolved)        Problem: Physical Therapy Goal    Goal Priority Disciplines Outcome Goal Variances Interventions   Physical Therapy Goal   (Resolved)     PT/OT, PT Outcome(s) achieved                     History:     Past Medical History:   Diagnosis Date    Breast cancer     History of psychiatric hospitalization     Hx of psychiatric care     Hyperlipidemia     Psychiatric problem     Schizophrenia     Therapy     Thyroid disease     thyroid surgery       Past Surgical History:   Procedure Laterality Date    BREAST BIOPSY      BREAST LUMPECTOMY      BREAST SURGERY      THYROIDECTOMY  2005       Clinical Decision Making:   Not completed  Time Tracking:     PT Received On: 12/14/17  PT Start Time: 1025     PT Stop Time: 1040  PT Total Time (min): 15 min     Billable Minutes: Evaluation 15 co-treat with OT      Lena Isidro, PT  12/14/2017

## 2017-12-14 NOTE — PLAN OF CARE
12/14/17 1649   Discharge Reassessment   Assessment Type Discharge Planning Reassessment   Provided patient/caregiver education on the expected discharge date and the discharge plan Yes   Do you have any problems affording any of your prescribed medications? No   Discharge Plan A Home with family   Discharge Plan B Home Health;Home with family   Patient choice form signed by patient/caregiver No   Can the patient answer the patient profile reliably? Yes, cognitively intact  (sitter present)   How does the patient rate their overall health at the present time? Fair   Describe the patient's ability to walk at the present time. No restrictions   How often would a person be available to care for the patient? Often   Number of comorbid conditions (as recorded on the chart) Five or more   During the past month, has the patient often been bothered by feeling down, depressed or hopeless? No   During the past month, has the patient often been bothered by little interest or pleasure in doing things? No   patient progressing, transferred to med/surg yesterday. She desires to go home now but does not yet have discharge order. TAMARA explained goal to be a safe discharge. Sisters arrived to visit--encouraging patient to cooperate and be well enough for plan. TAMARA aware that Dr Saba recommends RN for med management at home. Dr Alvarez aware--no orders as yet. SW/CM will follow, assist as needed.

## 2017-12-14 NOTE — NURSING
Patient arrived to MS unit via wheel chair. Patient ambulated w/ 1 assist. O2 nasal cannula in place. Cardiac monitor in place. No complaints at this time. Will continue to monitor patient.

## 2017-12-14 NOTE — ASSESSMENT & PLAN NOTE
Secondary to sepsis, hypoxia and expected physiologic response.  HR remains elevated, but improving.  Asymptomatic.  Started low dose B blocker.

## 2017-12-14 NOTE — PT/OT/SLP EVAL
Occupational Therapy   Evaluation and Discharge Note    Name: Jossie Crowley  MRN: [id number]  Admitting Diagnosis:  ARDS (adult respiratory distress syndrome)      Recommendations:     Discharge Recommendations: home (with family)  Discharge Equipment Recommendations:  none  Barriers to discharge:  None    History:     Occupational Profile:  Living Environment: The patient lives with her sister in an apartment with no steps.  Previous level of function: (I) with all self care and amb. The patient does not drive. Her sister drives and takes the patient to her appointments.  Roles and Routines: takes care of herself  Equipment Owned:  none  Assistance upon Discharge: sister    Past Medical History:   Diagnosis Date    Breast cancer     History of psychiatric hospitalization     Hx of psychiatric care     Hyperlipidemia     Psychiatric problem     Schizophrenia     Therapy     Thyroid disease     thyroid surgery       Past Surgical History:   Procedure Laterality Date    BREAST BIOPSY      BREAST LUMPECTOMY      BREAST SURGERY      THYROIDECTOMY  2005       Subjective     Chief Complaint: feels tired  Patient/Family stated goals: wants to go home  Communicated with: nurseCherelle,  prior to session.  Pain/Comfort:  · Pain Rating 1: 0/10    Objective:     Patient found with:  (hospital sitter)    General Precautions: Standard, fall   Orthopedic Precautions:N/A   Braces: N/A     Occupational Performance:    Bed Mobility:    · Patient completed Supine to Sit with modified independence  · Patient completed Sit to Supine with modified independence    Functional Mobility/Transfers:  · Patient completed Sit <> Stand Transfer with modified independence  with  no assistive device     Activities of Daily Living:  · UB Dressing: modified independence    · LB Dressing: modified independence to don/doff socks    Cognitive/Visual Perceptual:  Cognitive/Psychosocial Skills:     -       Oriented to: Person, Place and  "Situation   -       Follows Commands/attention:Follows two-step commands  -       Communication: clear/fluent  -       Memory: No Deficits noted  -       Safety awareness/insight to disability: impaired   -       Mood/Affect/Coping skills/emotional control: Appropriate to situation    Physical Exam:  Balance:  ggod  Postural examination/scapula alignment:    -       No postural abnormalities identified  Skin integrity: Visible skin intact  Edema:  None noted  Upper Extremity Range of Motion:     -       Right Upper Extremity: WFL  -       Left Upper Extremity: WFL  Upper Extremity Strength: WFL   Strength: WFL    Patient left right sidelying with all lines intact, call button in reach, nurseCherelle notified and sitter present    [facility] 6 Click:  [facility] Total Score: 24    Treatment & Education:  The patient participated in OT eval with encouragement. The patient was able to make herself coffee at the coffee station. He patient is at her functional baseline and no OT is recommended.  Education:    Assessment:     Jossie Crowley is a 57 y.o. female with a medical diagnosis of ARDS (adult respiratory distress syndrome). At this time, patient is functioning at their prior level of function and does not require further acute OT services.     Clinical Decision Makin.  OT Low:  "Pt evaluation falls under low complexity for evaluation coding due to performance deficits noted in 1-3 areas as stated above and 0 co-morbities affecting current functional status. Data obtained from problem focused assessments. No modifications or assistance was required for completion of evaluation. Only brief occupational profile and history review completed."     Plan:     During this hospitalization, patient does not require further acute OT services.  Please re-consult if situation changes.    · Plan of Care Reviewed with: patient    This Plan of care has been discussed with the patient who was involved in its development and " understands and is in agreement with the identified goals and treatment plan    GOALS:    Occupational Therapy Goals     Not on file          Multidisciplinary Problems (Resolved)        Problem: Occupational Therapy Goal    Goal Priority Disciplines Outcome Interventions   Occupational Therapy Goal   (Resolved)     OT, PT/OT Outcome(s) achieved                    Time Tracking:     OT Date of Treatment: 12/14/17  OT Start Time: 1023  OT Stop Time: 1039  OT Total Time (min): 16 min    Billable Minutes:Evaluation 16 (co-tx with PT)    Suki Caicedo OT  12/14/2017

## 2017-12-14 NOTE — PROGRESS NOTES
Patient called for ice and water using call light.  Went to patient room directly.  Patient again asking for ice water; assessing the patient I noted that the picc line was sitting on the bed.  Asked patient why she pulled out picc line.   She said that she's wants to leave and knows her rights.    MD Dr. Bell notified by HS:Selam Bell noted patient condition and will place orders.    Received call from family asking if patient was discharge. Notified family that patient was not discharged

## 2017-12-14 NOTE — PROGRESS NOTES
[facility] Hospital Medicine  Progress Note    Patient Name: Jossie Crowley  MRN: [id number]  Patient Class: IP- Inpatient   Admission Date: 12/1/2017  Length of Stay: 13 days  Attending Physician: Bernard Alvarez MD  Primary Care Provider: Jordyn Owen MD        Subjective:     Principal Problem:ARDS (adult respiratory distress syndrome)    HPI:  58 y/o female with schizophrenia, HLP, hypothyroid, and h/o breast CA who was brought in by daughter for change in mental status. Daughter reported that she suspected the patient took more Depakote than normal. Pt reported to the ER physician that she has been taking Goody's powder and it made her nauseous. She stated that she has been more SOB and had coughing for about a week. This was mainly reported to the ER physician. During my interview, patient only answered few questions, and then was mostly silent and did not want to engage in conversation. All she answered to me was that she had a cough and SOB, and did not answer any other questions. Rest of the hx was via review of medical records and no family was available at the bedside. In the ER, patient had pulse of 145 on presentation and fever of 101.4F. Workup with CTA of chest was negative for PE but was remarkable for diffuse opacities. She treated with IVF and antibiotics, and cultures were drawn.    Hospital Course:  Ms Crowley presented with unintentional overdose with depakote. This is a recurrent issue. Workup significant for presumed aspiration pneumonia that progressed to ARDS. Provided with broad spectrum antibiotics, IVFs and supplemental O2. Upgraded to ICU for increased O2 requirements and placed on BiPAP. She was not improving sodded duo-nebs and solumedrol IV were added. Difficult to wean O2. CXR with possible edema. Tried diuresis with no improvement. Echo w/ EF 55%, +DD. BNP not elevated. Speech evaluated and has high concern for aspiration and recommended MBSS. She continues to  require to much supplemental O2 to have MBSS and CXR remained unchanged. Pulmonology consulted. Labs for hypersensitivity pneumonitis eval pending. Does not appear to have CTD. Noted to be flu A positive. ID started Tamiflu 12/6. Able to wean to high flow NC. Passed speech bedside swallow 12/8. Depakote held as potentially associated with ARDS. Appreciate psych recs. Haldol and Cogentin added back. Continue to wean. Will restart depakote at a lower does when able to wean off high flow NC and continue to monitor.  Of note, when she is stable for discharge she would benefit from having case management notified about her possible non-compliance with medications. Her depakote level was therapeutic, but she has significant schizophrenia to the point that she cannot reliably tell us if she was taking her medication correctly.  Slowly improving.  Now on 3L per NC.    Interval History: Wants to go home.  Feeling much better.    Review of Systems   HENT: Negative for ear discharge and ear pain.    Eyes: Negative for pain and itching.   Gastrointestinal: Negative for abdominal distention and abdominal pain.   Endocrine: Negative for polyphagia and polyuria.     Objective:     Vital Signs (Most Recent):  Temp: 98.8 °F (37.1 °C) (12/14/17 0742)  Pulse: 90 (12/14/17 0742)  Resp: 17 (12/14/17 0742)  BP: (!) 106/53 (12/14/17 0742)  SpO2: 100 % (12/14/17 0742) Vital Signs (24h Range):  Temp:  [96.3 °F (35.7 °C)-98.8 °F (37.1 °C)] 98.8 °F (37.1 °C)  Pulse:  [] 90  Resp:  [17-61] 17  SpO2:  [82 %-100 %] 100 %  BP: (106-154)/(53-77) 106/53     Weight: 63.7 kg (140 lb 6.9 oz)  Body mass index is 21.99 kg/m².    Intake/Output Summary (Last 24 hours) at 12/14/17 0843  Last data filed at 12/13/17 1745   Gross per 24 hour   Intake          1786.25 ml   Output             2200 ml   Net          -413.75 ml      Physical Exam   Constitutional: She is oriented to person, place, and time. She appears well-developed. No distress.   Eyes:  Conjunctivae and EOM are normal.   Neck: Normal range of motion. No thyromegaly present.   Cardiovascular: Normal rate, regular rhythm and intact distal pulses.    Pulmonary/Chest: Effort normal and breath sounds normal. She has no wheezes. She has no rales.   Abdominal: Soft. Bowel sounds are normal. She exhibits no distension.   Musculoskeletal: Normal range of motion. She exhibits no edema.   Neurological: She is alert and oriented to person, place, and time.   Skin: Skin is warm and dry. She is not diaphoretic.   Nursing note and vitals reviewed.      Significant Labs:   BMP:     Recent Labs  Lab 12/13/17  0410   *      K 3.8      CO2 27   BUN 15   CREATININE 0.8   CALCIUM 8.8   MG 1.7     CBC:     Recent Labs  Lab 12/13/17  0410   WBC 9.49   HGB 9.8*   HCT 28.7*   *         Assessment/Plan:      * ARDS (adult respiratory distress syndrome)    Pt met criteria for sepsis with fever, tachycardia and infection source of multilobar pneumonia.   Pt was treated empirically with Zosyn/Cipro/Vancomycin and IVF.  NEBS and O2 via BiPAP. Difficult to wean from BiPAP. Stopped fluids 12/3/17 and diuresed, but this did not seem to make any improvement and renal function started to elevate so diuresis held.   Echo w/ some DD, but BNP not elevated. Appreciate pulm recs.   ARDS probably combination of aspiration pneumonia (already treated) and H1N1 influenza.  Treated with Tamiflu.  She has now completed course of ABx's and Tamiflu.  Slowly improving.  Able to wean down to NC.    Looking great.  Will d/c resendiz.  PT/OT evaluation.  Check pulse ox on RA on ambulation.  Home soon.          Influenza A (H1N1)    As above        Tachycardia    Secondary to sepsis, hypoxia and expected physiologic response.  HR remains elevated, but improving.  Asymptomatic.  Started low dose B blocker.        Paranoid schizophrenia    Appreciate psych recs.   Continue Haldol and Cogentin.  Resume Depakote at lower dose.         Troponin level elevated    Likely secondary to strain with sepsis and not ACS, but will trend out markers and monitor on telemetry. Echo.        History of breast cancer in female    No acute issues; s/p right breast lumpectomy followed by chemotherapy in 2005. Will need to refer to Hem/Onc as outpatient for follow up.        Tobacco abuse    Smoking cessation counseling.        Aspiration pneumonia    CTA remarkable for diffuse bilateral patchy airspace opacities.  Treated as above.          VTE Risk Mitigation         Ordered     enoxaparin injection 40 mg  Daily     Route:  Subcutaneous        12/02/17 0213     Medium Risk of VTE  Once      12/01/17 2146              Bernard Alvarez MD  Department of Hospital Medicine   [facility]

## 2017-12-14 NOTE — PLAN OF CARE
Problem: Occupational Therapy Goal  Goal: Occupational Therapy Goal  Outcome: Outcome(s) achieved Date Met: 12/14/17  OT eval is complete. The patient is at his functional baseline and no OT is recommended. The patient can amb to the bathroom, ad wilton.  Nursing should encourage the patient to participate in self care as able. OT will D/C.

## 2017-12-14 NOTE — NURSING
Cardiac monitor tech reports patient . Dr. Alvarez notified, new orders to give night dose of metoprolol now. Will continue to monitor patient.

## 2017-12-14 NOTE — PROGRESS NOTES
"[facility]  Psychiatry  Progress Note    Patient Name: Jossie Crowley  MRN: [id number]   Code Status: Full Code  Admission Date: 12/1/2017  Hospital Length of Stay: 13 days  Expected Discharge Date:   Attending Physician: Bernard Alvarez MD  Primary Care Provider: Jordyn Owen MD    Current Legal Status: N/A    Patient information was obtained from patient.     Subjective:     Principal Problem:ARDS (adult respiratory distress syndrome)    Chief Complaint: Odd behavior, possible overdose on Depakote    HPI: Patient Jossie Crowley presents to the hospital with strange behavior.  She was found to be possibly under an unintentional overdose of her Depakote with subsequent ARDS.  Now she is found to have influenza a and is in isolation in the ICU.  Patient is easily approached and conversation but is a difficult and somewhat poor historian.  She tells me that she is in the hospital because she was "shaky".  She says that she follows with an outpatient psychiatrist, Dr. Fiore, and sees him on a monthly basis.  She is not sure when her next appointment is.  She is not able to name her medications but when I go over a list with her, she is able to tell me which ones she is taking.  She says that she takes Depakote twice daily, Haldol twice daily, Cogentin twice daily.  She is not sure of the doses.  She says that she is not currently depressed or anxious.  She is not having any manic symptoms.  She denies any psychosis at this time.  She says that when her schizophrenia "acts up", she can see things that aren't there and her mind plays tricks on her.  Records indicate previous hospitalizations which show that she becomes very paranoid and isolative when she has an exacerbation.  She can strangely rearrange things around the house and do odd things such as drink water from ashtrays, put things in the trash that do not belong there, and drink excessive amounts of water.  She says that she has not been in a " "psychiatric hospital for some time now and does not feel that she needs to be there.    There is speculation that she has overdosed on her Depakote but an admit valproic acid level was within range.  Nursing reports that she is constantly pushing the nursing alert button and is restless in bed.    Hospital Course: 12/11/2017 - patient is seen in her ICU isolation room.  She remains easy to approach in conversation but quiet and odd.  She says that she is doing well and without complaint.  She is asking for her [facility] cancer doctor to come see her.  She says that she is sleeping and eating well.  Not really able to explain her medical care here.  Still a poor historian.  She was started back on her haloperidol and benztropine.  Still concern for depakote induced ARDS?    12/12/2017 - patient is seen I her ICU isolation room.  She again is easy to join in conversation but is very simple and concrete.  She says that she is "being good".  Nurse says that she does take some redirection at times to take her medications.  Patient does ask if she can go see her psychiatrist when she leaves.    12/13/2017 - patient is again seen in her isolation room in the ICU.  She appears to be somewhat easier to engage in conversation.  She says that she is doing okay and without complaint.  She denies any psychosis but is still quite odd at baseline.  Nursing reports that she is cooperative.  She is compliant with her Haldol, Depakote, Cogentin.  Patient tells me that she is going to see her psychiatrist for a regular checkup when she leaves the hospital.    12/14/2017 - patient has now moved to the medical floor.  Earlier today, she pulled out her IV access and was wanting to leave.  After some education and redirection from staff, she decided to stay and continue treatment.  Patient remains on supplemental oxygen.  She is not very engaged in conversation today, only to tell me that she is doing well.  She appears to be somewhat more " "sleepy than usual.  There is a sitter in the room helping with redirection.    Interval History: Unchanged    Family History     None        Social History Main Topics    Smoking status: Current Every Day Smoker     Packs/day: 0.50     Years: 37.00     Types: Cigarettes    Smokeless tobacco: Former User     Quit date: 12/27/2014    Alcohol use No    Drug use: No    Sexual activity: Not on file     Psychotherapeutics     Start     Stop Route Frequency Ordered    12/14/17 0845  OLANZapine injection 10 mg      -- IM Once 12/14/17 0742    12/09/17 1417  haloperidol lactate injection 10 mg      -- IM Every 6 hours PRN 12/09/17 1318    12/09/17 1330  haloperidol tablet 10 mg      -- Oral 2 times daily 12/09/17 1318           Review of Systems   Constitutional: Negative for activity change and appetite change.   Respiratory: Negative for shortness of breath.    Cardiovascular: Negative for chest pain.   Gastrointestinal: Negative for nausea.   Musculoskeletal: Negative for arthralgias and myalgias.   Psychiatric/Behavioral: Positive for confusion. Negative for dysphoric mood, sleep disturbance and suicidal ideas. The patient is not nervous/anxious.      Objective:     Vital Signs (Most Recent):  Temp: 98 °F (36.7 °C) (12/14/17 1134)  Pulse: 100 (12/14/17 1344)  Resp: 18 (12/14/17 1344)  BP: 104/66 (12/14/17 1134)  SpO2: 98 % (12/14/17 1344) Vital Signs (24h Range):  Temp:  [96.3 °F (35.7 °C)-98.8 °F (37.1 °C)] 98 °F (36.7 °C)  Pulse:  [] 100  Resp:  [17-61] 18  SpO2:  [82 %-100 %] 98 %  BP: (104-154)/(53-74) 104/66     Height: 5' 7.01" (170.2 cm)  Weight: 63.7 kg (140 lb 6.9 oz)  Body mass index is 21.99 kg/m².      Intake/Output Summary (Last 24 hours) at 12/14/17 1546  Last data filed at 12/13/17 1745   Gross per 24 hour   Intake              440 ml   Output             2200 ml   Net            -1760 ml       Physical Exam   Psychiatric:   EXAMINATION    CONSTITUTIONAL  General Appearance: Hospital attire, " "restless in an ICU bed    MUSCULOSKELETAL  Muscle Strength and Tone: Normal  Abnormal Involuntary Movements: Restless appearing  Gait and Station: Not observed    PSYCHIATRIC MENTAL STATUS EXAM   Level of Consciousness: Awake and alert  Orientation: Name and "hospital"  Grooming: Limited  Psychomotor Behavior: Somewhat restless  Speech: Soft and delayed  Language: No abnormalities  Mood: "Okay"  Affect: Blunt and oddly related  Thought Process: [place]  Associations: Intact  Thought Content: Denies suicidal/homicidal/psychosis  Memory: Somewhat intact to recent and remote but very simple  Attention: Diminished  Fund of Knowledge: Intact for simple conversation  Insight: Fair towards mental illness  Judgment: Limited towards compliance and redirection         Significant Labs:   Last 24 Hours:   Recent Lab Results     None        All pertinent labs within the past 24 hours have been reviewed.    Significant Imaging: I have reviewed all pertinent imaging results/findings within the past 24 hours.    Assessment/Plan:     Paranoid schizophrenia    Patient has a long documented history of schizophrenia, likely schizoaffective diathesis.  She has had worsening of her symptoms over time which has likely also led to some cognitive impairment.  There has been consideration for overuse of her Depakote but her Depakote level was within normal limits upon her presenting to the hospital.  Still, I would recommend for her to remain on Depakote but decrease the dose to 500 mg by mouth twice a day.  Continue Haldol 10 mg by mouth twice a day, Cogentin 1 mg by mouth twice a day.  Being unmedicated, she will likely have an exacerbation of her psychotic symptoms and odd behaviors.  She will be a difficult case.  It would be in her best interest to set up some psychiatric home health to help manage her medications in the home setting.  Another consideration would be for case management to contact the ACT team for her area and see if " they would be able to accommodate with regular visits.  Should she have any non-redirectable behavior or psychotic agitation, utilize Haldol 10 mg/Ativan 1 mg/Benadryl 50 mg by mouth or IM.  She can follow-up with her outpatient Dr. Fiore upon discharge.  She may also have a  set up through her insurance that may have access to psychiatric home health.  This is her unfortunate baseline and redirection with a sitter is appropriate in her care.             Need for Continued Hospitalization:   No need for inpatient psychiatric hospitalization. Continue medical care as per the primary team.    Anticipated Disposition: Home or Self Care     Total time:  15 with greater than 50% of this time spent in counseling and/or coordination of care.       Gerardo Saba MD   Psychiatry  [facility]

## 2017-12-14 NOTE — NURSING
Patient sitting up in bed stating she wants to go home and that she is going to remove her oxygen and leave. Dr. Alvarez notified, new orders given. Sitter at bedside. Will continue to monitor patient.

## 2017-12-14 NOTE — ASSESSMENT & PLAN NOTE
Pt met criteria for sepsis with fever, tachycardia and infection source of multilobar pneumonia.   Pt was treated empirically with Zosyn/Cipro/Vancomycin and IVF.  NEBS and O2 via BiPAP. Difficult to wean from BiPAP. Stopped fluids 12/3/17 and diuresed, but this did not seem to make any improvement and renal function started to elevate so diuresis held.   Echo w/ some DD, but BNP not elevated. Appreciate pulm recs.   ARDS probably combination of aspiration pneumonia (already treated) and H1N1 influenza.  Treated with Tamiflu.  She has now completed course of ABx's and Tamiflu.  Slowly improving.  Able to wean down to NC.    Looking great.  Will d/c martín.  PT/OT evaluation.  Check pulse ox on RA on ambulation.  Home soon.

## 2017-12-14 NOTE — PLAN OF CARE
12/14/17 1640 Medicare Message   Important Message from Medicare regarding Discharge Appeal Rights Given to patient/caregiver;Explained to patient/caregiver;Signed/date by patient/caregiver

## 2017-12-14 NOTE — SUBJECTIVE & OBJECTIVE
Interval History: Wants to go home.  Feeling much better.    Review of Systems   HENT: Negative for ear discharge and ear pain.    Eyes: Negative for pain and itching.   Gastrointestinal: Negative for abdominal distention and abdominal pain.   Endocrine: Negative for polyphagia and polyuria.     Objective:     Vital Signs (Most Recent):  Temp: 98.8 °F (37.1 °C) (12/14/17 0742)  Pulse: 90 (12/14/17 0742)  Resp: 17 (12/14/17 0742)  BP: (!) 106/53 (12/14/17 0742)  SpO2: 100 % (12/14/17 0742) Vital Signs (24h Range):  Temp:  [96.3 °F (35.7 °C)-98.8 °F (37.1 °C)] 98.8 °F (37.1 °C)  Pulse:  [] 90  Resp:  [17-61] 17  SpO2:  [82 %-100 %] 100 %  BP: (106-154)/(53-77) 106/53     Weight: 63.7 kg (140 lb 6.9 oz)  Body mass index is 21.99 kg/m².    Intake/Output Summary (Last 24 hours) at 12/14/17 0843  Last data filed at 12/13/17 1745   Gross per 24 hour   Intake          1786.25 ml   Output             2200 ml   Net          -413.75 ml      Physical Exam   Constitutional: She is oriented to person, place, and time. She appears well-developed. No distress.   Eyes: Conjunctivae and EOM are normal.   Neck: Normal range of motion. No thyromegaly present.   Cardiovascular: Normal rate, regular rhythm and intact distal pulses.    Pulmonary/Chest: Effort normal and breath sounds normal. She has no wheezes. She has no rales.   Abdominal: Soft. Bowel sounds are normal. She exhibits no distension.   Musculoskeletal: Normal range of motion. She exhibits no edema.   Neurological: She is alert and oriented to person, place, and time.   Skin: Skin is warm and dry. She is not diaphoretic.   Nursing note and vitals reviewed.      Significant Labs:   BMP:     Recent Labs  Lab 12/13/17  0410   *      K 3.8      CO2 27   BUN 15   CREATININE 0.8   CALCIUM 8.8   MG 1.7     CBC:     Recent Labs  Lab 12/13/17  0410   WBC 9.49   HGB 9.8*   HCT 28.7*   *

## 2017-12-14 NOTE — SUBJECTIVE & OBJECTIVE
"Interval History: Unchanged    Family History     None        Social History Main Topics    Smoking status: Current Every Day Smoker     Packs/day: 0.50     Years: 37.00     Types: Cigarettes    Smokeless tobacco: Former User     Quit date: 12/27/2014    Alcohol use No    Drug use: No    Sexual activity: Not on file     Psychotherapeutics     Start     Stop Route Frequency Ordered    12/14/17 0845  OLANZapine injection 10 mg      -- IM Once 12/14/17 0742    12/09/17 1417  haloperidol lactate injection 10 mg      -- IM Every 6 hours PRN 12/09/17 1318    12/09/17 1330  haloperidol tablet 10 mg      -- Oral 2 times daily 12/09/17 1318           Review of Systems   Constitutional: Negative for activity change and appetite change.   Respiratory: Negative for shortness of breath.    Cardiovascular: Negative for chest pain.   Gastrointestinal: Negative for nausea.   Musculoskeletal: Negative for arthralgias and myalgias.   Psychiatric/Behavioral: Positive for confusion. Negative for dysphoric mood, sleep disturbance and suicidal ideas. The patient is not nervous/anxious.      Objective:     Vital Signs (Most Recent):  Temp: 98 °F (36.7 °C) (12/14/17 1134)  Pulse: 100 (12/14/17 1344)  Resp: 18 (12/14/17 1344)  BP: 104/66 (12/14/17 1134)  SpO2: 98 % (12/14/17 1344) Vital Signs (24h Range):  Temp:  [96.3 °F (35.7 °C)-98.8 °F (37.1 °C)] 98 °F (36.7 °C)  Pulse:  [] 100  Resp:  [17-61] 18  SpO2:  [82 %-100 %] 98 %  BP: (104-154)/(53-74) 104/66     Height: 5' 7.01" (170.2 cm)  Weight: 63.7 kg (140 lb 6.9 oz)  Body mass index is 21.99 kg/m².      Intake/Output Summary (Last 24 hours) at 12/14/17 1546  Last data filed at 12/13/17 1745   Gross per 24 hour   Intake              440 ml   Output             2200 ml   Net            -1760 ml       Physical Exam   Psychiatric:   EXAMINATION    CONSTITUTIONAL  General Appearance: Hospital attire, restless in an ICU bed    MUSCULOSKELETAL  Muscle Strength and Tone: " "Normal  Abnormal Involuntary Movements: Restless appearing  Gait and Station: Not observed    PSYCHIATRIC MENTAL STATUS EXAM   Level of Consciousness: Awake and alert  Orientation: Name and "hospital"  Grooming: Limited  Psychomotor Behavior: Somewhat restless  Speech: Soft and delayed  Language: No abnormalities  Mood: "Okay"  Affect: Blunt and oddly related  Thought Process: [place]  Associations: Intact  Thought Content: Denies suicidal/homicidal/psychosis  Memory: Somewhat intact to recent and remote but very simple  Attention: Diminished  Fund of Knowledge: Intact for simple conversation  Insight: Fair towards mental illness  Judgment: Limited towards compliance and redirection         Significant Labs:   Last 24 Hours:   Recent Lab Results     None        All pertinent labs within the past 24 hours have been reviewed.    Significant Imaging: I have reviewed all pertinent imaging results/findings within the past 24 hours.  "

## 2017-12-14 NOTE — ASSESSMENT & PLAN NOTE
Patient has a long documented history of schizophrenia, likely schizoaffective diathesis.  She has had worsening of her symptoms over time which has likely also led to some cognitive impairment.  There has been consideration for overuse of her Depakote but her Depakote level was within normal limits upon her presenting to the hospital.  Still, I would recommend for her to remain on Depakote but decrease the dose to 500 mg by mouth twice a day.  Continue Haldol 10 mg by mouth twice a day, Cogentin 1 mg by mouth twice a day.  Being unmedicated, she will likely have an exacerbation of her psychotic symptoms and odd behaviors.  She will be a difficult case.  It would be in her best interest to set up some psychiatric home health to help manage her medications in the home setting.  Another consideration would be for case management to contact the ACT team for her area and see if they would be able to accommodate with regular visits.  Should she have any non-redirectable behavior or psychotic agitation, utilize Haldol 10 mg/Ativan 1 mg/Benadryl 50 mg by mouth or IM.  She can follow-up with her outpatient Dr. Fiore upon discharge.  She may also have a  set up through her insurance that may have access to psychiatric home health.  This is her unfortunate baseline and redirection with a sitter is appropriate in her care.

## 2017-12-14 NOTE — PLAN OF CARE
Problem: Physical Therapy Goal  Goal: Physical Therapy Goal  Outcome: Outcome(s) achieved Date Met: 12/14/17  Pt OK for D/C from PT standpoint.  No DME or skilled pt needs.

## 2017-12-14 NOTE — PLAN OF CARE
Problem: Fall Risk (Adult)  Intervention: Review Medications/Identify Contributors to Fall Risk   12/02/17 1628   Safety Interventions   Medication Review/Management medications reviewed     Intervention: Patient Rounds   12/14/17 1600   Safety Interventions   Patient Rounds bed in low position;call light in reach;bed wheels locked;clutter free environment maintained;ID band on;placement of personal items at bedside;toileting offered;visualized patient

## 2017-12-14 NOTE — PLAN OF CARE
Problem: Patient Care Overview  Goal: Plan of Care Review  Outcome: Ongoing (interventions implemented as appropriate)  AO x4,  Patient denies pain the entire shift.  Patient pulled out PICC line and refused to wear telemetry.  Patient call family and was trying to call for a ride home.  Dr. Bell called to advise on patient's behavior disruption; safety sitter was requested and Dr. Bell agreed.  Hourly rounding conducted to ensure safety. no fall or injury. Patient ambulates to restromm with the assistance of sitter.  Tolerated scheduled meds. Vital signs stable; afebrile.  Will continue to monitor.

## 2017-12-15 VITALS
SYSTOLIC BLOOD PRESSURE: 138 MMHG | OXYGEN SATURATION: 94 % | BODY MASS INDEX: 22.04 KG/M2 | DIASTOLIC BLOOD PRESSURE: 71 MMHG | WEIGHT: 140.44 LBS | TEMPERATURE: 99 F | RESPIRATION RATE: 17 BRPM | HEIGHT: 67 IN | HEART RATE: 107 BPM

## 2017-12-15 PROBLEM — J10.1 INFLUENZA A (H1N1): Status: RESOLVED | Noted: 2017-12-06 | Resolved: 2017-12-15

## 2017-12-15 LAB
ANION GAP SERPL CALC-SCNC: 9 MMOL/L
ANISOCYTOSIS BLD QL SMEAR: SLIGHT
BASOPHILS # BLD AUTO: 0.02 K/UL
BASOPHILS NFR BLD: 0.2 %
BUN SERPL-MCNC: 14 MG/DL
CALCIUM SERPL-MCNC: 9.3 MG/DL
CHLORIDE SERPL-SCNC: 111 MMOL/L
CO2 SERPL-SCNC: 23 MMOL/L
CREAT SERPL-MCNC: 0.8 MG/DL
DIFFERENTIAL METHOD: ABNORMAL
EOSINOPHIL # BLD AUTO: 0.1 K/UL
EOSINOPHIL NFR BLD: 1.3 %
ERYTHROCYTE [DISTWIDTH] IN BLOOD BY AUTOMATED COUNT: 16 %
EST. GFR  (AFRICAN AMERICAN): >60 ML/MIN/1.73 M^2
EST. GFR  (NON AFRICAN AMERICAN): >60 ML/MIN/1.73 M^2
GLUCOSE SERPL-MCNC: 95 MG/DL
HCT VFR BLD AUTO: 28.7 %
HGB BLD-MCNC: 9.6 G/DL
LYMPHOCYTES # BLD AUTO: 1.9 K/UL
LYMPHOCYTES NFR BLD: 21.7 %
MCH RBC QN AUTO: 27.5 PG
MCHC RBC AUTO-ENTMCNC: 33.4 G/DL
MCV RBC AUTO: 82 FL
MONOCYTES # BLD AUTO: 0.9 K/UL
MONOCYTES NFR BLD: 9.7 %
NEUTROPHILS # BLD AUTO: 6 K/UL
NEUTROPHILS NFR BLD: 72 %
PLATELET # BLD AUTO: 165 K/UL
PLATELET BLD QL SMEAR: ABNORMAL
PMV BLD AUTO: 11.4 FL
POTASSIUM SERPL-SCNC: 4.9 MMOL/L
RBC # BLD AUTO: 3.49 M/UL
SODIUM SERPL-SCNC: 143 MMOL/L
TARGETS BLD QL SMEAR: ABNORMAL
WBC # BLD AUTO: 8.96 K/UL

## 2017-12-15 PROCEDURE — 85025 COMPLETE CBC W/AUTO DIFF WBC: CPT

## 2017-12-15 PROCEDURE — 36415 COLL VENOUS BLD VENIPUNCTURE: CPT

## 2017-12-15 PROCEDURE — 25000003 PHARM REV CODE 250: Performed by: INTERNAL MEDICINE

## 2017-12-15 PROCEDURE — 25000242 PHARM REV CODE 250 ALT 637 W/ HCPCS: Performed by: INTERNAL MEDICINE

## 2017-12-15 PROCEDURE — 94640 AIRWAY INHALATION TREATMENT: CPT

## 2017-12-15 PROCEDURE — 25000003 PHARM REV CODE 250: Performed by: HOSPITALIST

## 2017-12-15 PROCEDURE — 80048 BASIC METABOLIC PNL TOTAL CA: CPT

## 2017-12-15 PROCEDURE — 94761 N-INVAS EAR/PLS OXIMETRY MLT: CPT

## 2017-12-15 PROCEDURE — 27000221 HC OXYGEN, UP TO 24 HOURS

## 2017-12-15 RX ORDER — BENZTROPINE MESYLATE 1 MG/1
1 TABLET ORAL 2 TIMES DAILY
Qty: 60 TABLET | Refills: 11 | Status: ON HOLD | OUTPATIENT
Start: 2017-12-15 | End: 2020-06-30 | Stop reason: HOSPADM

## 2017-12-15 RX ORDER — DIVALPROEX SODIUM 500 MG/1
500 TABLET, DELAYED RELEASE ORAL EVERY 12 HOURS
Qty: 60 TABLET | Refills: 11 | Status: SHIPPED | OUTPATIENT
Start: 2017-12-15 | End: 2019-09-08

## 2017-12-15 RX ORDER — HALOPERIDOL 10 MG/1
10 TABLET ORAL 2 TIMES DAILY
Qty: 60 TABLET | Refills: 11 | Status: ON HOLD | OUTPATIENT
Start: 2017-12-15 | End: 2020-06-30 | Stop reason: HOSPADM

## 2017-12-15 RX ADMIN — BENZTROPINE MESYLATE 1 MG: 1 TABLET ORAL at 09:12

## 2017-12-15 RX ADMIN — METOPROLOL TARTRATE 25 MG: 25 TABLET, FILM COATED ORAL at 09:12

## 2017-12-15 RX ADMIN — LEVALBUTEROL 1.25 MG: 1.25 SOLUTION, CONCENTRATE RESPIRATORY (INHALATION) at 12:12

## 2017-12-15 RX ADMIN — LEVALBUTEROL 1.25 MG: 1.25 SOLUTION, CONCENTRATE RESPIRATORY (INHALATION) at 08:12

## 2017-12-15 RX ADMIN — HALOPERIDOL 10 MG: 5 TABLET ORAL at 09:12

## 2017-12-15 RX ADMIN — DIVALPROEX SODIUM 500 MG: 250 TABLET, DELAYED RELEASE ORAL at 09:12

## 2017-12-15 NOTE — PROGRESS NOTES
"2 L n/c at rest= 98%  Room air at rest= 93%  Ambulating on room air= 86/87%  Room air at rest/recovery= 90%      Pt found with telemetry off, refused for monitor to be re applied. "Either they discharge me today or I'm leaving."      "

## 2017-12-15 NOTE — PROGRESS NOTES
[facility]    WRITTEN HEALTHCARE and DISCHARGE INFORMATION   FROM YOUR CARE MANAGER  Follow-up Information     Ant Diaz MD On 12/20/2017.    Specialty:  Internal Medicine  Why:  9:30 Wednesday. arrive 30 minutes early, Bring ALL your medications, your discharge summary, your insurance card and picture ID.  Contact information:  [place] [address]  [phone]             [facility] On 12/20/2017.    Specialties:  Psychiatry, Behavioral Health  Why:  Return to [facility] at your regular schedule--you will see Dr Fiore at your regular schedule their. I spoke with Avani JOHNSON today and she is aware that you have a primary care appointment on 12/20.  Contact information:  [address] [place] [id number] [phone]                 PLEASE REMEMBER YOUR PLAN:  1. Getting your prescriptions filled   2. Taking your medications as directed, DO NOT MISS ANY DOSES!  3. Going to your follow-up doctor appointment. This is important because it allow the doctor to monitor your progress and determine if any changes need to made to your treatment plan.    HELP AT HOME:  [facility] On [facility] Care Line - 24/7 Assistance  Registered [facility] nurses can provide appointment booking, health education, clinical advisement, and other advisory services.   Call for this free service at [phone].    Thank you for choosing [facility] for your care.  Within 48-72 hours after leaving the hospital you will receive a call from [facility] Nurses following up to see how you are doing. The team will ask you a few questions and the call will last approximately 20 minutes.     Please answer any calls you may receive from [facility] we want to continue to support you as you manage your healthcare needs. [facility] is happy to have the opportunity to serve you.     Sincerely,  Your [facility] Team,   THANK YOU FOR LETTING ME ASSIST WITH  YOUR DISCHARGE PLANNING,     Zarina Calderon [facility]   II  [phone]

## 2017-12-15 NOTE — PROGRESS NOTES
Discharge planning--TAMARA spoke with patient daughter Deena Crowley who will  patient once patient is discharged. Discussed patient medication management. Daughter works however can go to patient apartment and assist with PM medication monitoring. Confirmed that patient does live with sister Ld however sister would not be able to assist patient due own health needs. SW explained have PCP appointment and trying to obtain psychiatric appointment. Also patient is unlikely to allow/qualiffy for home health RN to develop medication management strategies.   TAMARA contacted Dr Fiore (psychiatrist) office.  informs that patient sees Dr Fiore at [facility], not at private practice. SW contacted [facility] [phone]. Per Avani, RN, patient attends the day program at [facility] Wed's and Thur's. Transportation is proviced. Avani is aware of patient hospital stay and asked if medically clear to return. TAMARA informed that off isolation precautions and plan to d/c home later today. Per Avani, patient will see Dr Fiore at her return to program next week.   SW will add to discharge appointment schedule.

## 2017-12-15 NOTE — PROGRESS NOTES
granddaughter at bedside for patient discharge. AVS, prescriptions and appointments discussed with grand daughter and patient. Both verbalized understanding. IV/PICC previously removed. Wheelchair escort to parking area. AO x4, no distress noted

## 2017-12-15 NOTE — PLAN OF CARE
12/15/17 1245   Final Note   Assessment Type Final Discharge Note   Discharge Disposition Home   What phone number can be called within the next 1-3 days to see how you are doing after discharge? [phone]   Hospital Follow Up  Appt(s) scheduled? Yes   Discharge plans and expectations educations in teach back method with documentation complete? Yes   SW met with patient, provided written discharge instructions with follow up appointments. Patient marginally able to repeat date, time of appointments and for respiratory illness s/s. She reports her daughter is returning to pick her up. Shows SW discharge instructions provided by RN. Friend in room, sitter in room. TAMARA explained there are 2 copies--one for patient, one for daughter. Patient verbalizes understanding. TAMARA called ALEX Oconnell to inform SW has completed SW/CM tasks for discharge.

## 2017-12-15 NOTE — DISCHARGE SUMMARY
[facility] Hospital Medicine  Discharge Summary      Patient Name: Jossie Crowley  MRN: [id number]  Admission Date: 12/1/2017  Hospital Length of Stay: 14 days  Discharge Date and Time:  12/15/2017 12:19 PM  Attending Physician: Bernard Alvarez MD   Discharging Provider: Bernard Alvarez MD  Primary Care Provider: Ant Diaz MD      HPI:   58 y/o female with schizophrenia, HLP, hypothyroid, and h/o breast CA who was brought in by daughter for change in mental status. Daughter reported that she suspected the patient took more Depakote than normal. Pt reported to the ER physician that she has been taking Goody's powder and it made her nauseous. She stated that she has been more SOB and had coughing for about a week. This was mainly reported to the ER physician. During my interview, patient only answered few questions, and then was mostly silent and did not want to engage in conversation. All she answered to me was that she had a cough and SOB, and did not answer any other questions. Rest of the hx was via review of medical records and no family was available at the bedside. In the ER, patient had pulse of 145 on presentation and fever of 101.4F. Workup with CTA of chest was negative for PE but was remarkable for diffuse opacities. She treated with IVF and antibiotics, and cultures were drawn.    * No surgery found *      Hospital Course:   Ms Crowley presented with possible unintentional overdose with depakote. This is a recurrent issue. Workup significant for presumed aspiration pneumonia that progressed to ARDS. Provided with broad spectrum antibiotics, IVFs and supplemental O2. Upgraded to ICU for increased O2 requirements and placed on BiPAP. She was not improving and duo-nebs and solumedrol IV were added. Difficult to wean O2. CXR with possible edema. Tried diuresis with no improvement. Echo w/ EF 55%, +DD. BNP not elevated. Speech evaluated and has high concern for aspiration and  recommended MBSS. She continued to require to much supplemental O2 to have MBSS and CXR remained unchanged. Pulmonology consulted.  Noted to be flu A positive. ID started Tamiflu 12/6.  Able to wean to high flow NC. Passed speech bedside swallow 12/8. Depakote held as potentially associated with ARDS. Appreciate psych recs. Haldol and Cogentin added back. Continued to wean oxygen. Restarted depakote at a lower dose.  Patient slowly improved during hospital stay.  She completed course of ABx's for aspiration pneumonia and course of Tamiflu for Influenza.  She was able to be weaned from Bipap to high flow NC to low dose NC.  She is currently doing well on RA.  Afebrile and hemodynamically stable.  Patient will be discharged home.       Consults:   Consults         Status Ordering Provider     Inpatient consult to Infectious Diseases  Once     Provider:  Christen Mata MD    Completed FERN COLE     [facility]  Once     Provider:  (Not yet assigned)    Acknowledged SUNNY RAHMAN     Inpatient consult to Psychiatry  Once     Provider:  Gerardo Saba MD    Completed SUNNY RAHMAN     Inpatient consult to Pulmonology  Once     Provider:  Selwyn Springer MD    Completed SUNNY RAHMAN     IP consult to case management  Once     Provider:  (Not yet assigned)    Completed FERN COLE          No new Assessment & Plan notes have been filed under this hospital service since the last note was generated.  Service: Hospital Medicine    Final Active Diagnoses:    Diagnosis Date Noted POA    Tobacco abuse [Z72.0] 01/04/2015 Yes     Chronic    History of breast cancer in female [Z85.3] 01/04/2015 Not Applicable     Chronic    Paranoid schizophrenia [F20.0] 01/04/2015 Yes      Problems Resolved During this Admission:    Diagnosis Date Noted Date Resolved POA    PRINCIPAL PROBLEM:  ARDS (adult respiratory distress syndrome) [J80] 01/03/2015 12/15/2017 Yes    Acute febrile  illness [R50.9] 12/13/2017 12/14/2017 Yes    Influenza A (H1N1) [J10.1] 12/06/2017 12/15/2017 Yes    Tachycardia [R00.0]  12/15/2017 Yes    Troponin level elevated [R74.8] 01/04/2015 12/15/2017 Yes    Aspiration pneumonia [J69.0] 02/07/2013 12/15/2017 Yes       Discharged Condition: stable    Disposition: Home or Self Care    Follow Up:  Follow-up Information     Ant Diaz MD On 12/20/2017.    Specialty:  Internal Medicine  Why:  9:30 Wednesday. arrive 30 minutes early, Bring ALL your medications, your discharge summary, your insurance card and picture ID.  Contact information:  [address]  [facility] [id number] [phone]             [facility] On 12/20/2017.    Specialties:  Psychiatry, Behavioral Health  Why:  Return to [facility] at your regular schedule--you will see Dr Fiore at your regular schedule their. I spoke with Avani JOHNSON today and she is aware that you have a primary care appointment on 12/20.  Contact information:  [address]  [facility] [id number] [phone]                 Patient Instructions:     Diet general     Activity as tolerated     Call MD for:  temperature >100.4     Call MD for:  persistent nausea and vomiting or diarrhea     Call MD for:  difficulty breathing or increased cough     Call MD for:  persistent dizziness, light-headedness, or visual disturbances     Call MD for:  increased confusion or weakness           Pending Diagnostic Studies:     None         Medications:  Reconciled Home Medications:   Current Discharge Medication List      START taking these medications    Details   benztropine (COGENTIN) 1 MG tablet Take 1 tablet (1 mg total) by mouth 2 (two) times daily.  Qty: 60 tablet, Refills: 11      divalproex (DEPAKOTE) 500 MG TbEC Take 1 tablet (500 mg total) by mouth every 12 (twelve) hours.  Qty: 60 tablet, Refills: 11      haloperidol (HALDOL) 10 MG tablet Take 1 tablet (10 mg total) by mouth 2 (two)  times daily.  Qty: 60 tablet, Refills: 11         CONTINUE these medications which have NOT CHANGED    Details   trazodone (DESYREL) 150 MG tablet Take 150 mg by mouth every evening.          STOP taking these medications       divalproex (DEPAKOTE) 250 MG 24 hr tablet Comments:   Reason for Stopping:         albuterol 90 mcg/actuation HFAA Comments:   Reason for Stopping:         docusate sodium (COLACE) 100 MG capsule Comments:   Reason for Stopping:         predniSONE (DELTASONE) 10 MG tablet Comments:   Reason for Stopping:               Indwelling Lines/Drains at time of discharge:   Lines/Drains/Airways     Peripherally Inserted Central Catheter Line                 PICC Double Lumen 12/09/17 2040 left brachial 5 days                Time spent on the discharge of patient: >30 minutes  Patient was seen and examined on the date of discharge and determined to be suitable for discharge.         Bernard Alvarez MD  Department of Hospital Medicine  [facility]

## 2017-12-15 NOTE — PROGRESS NOTES
"Discharge planning--per Dr Alvarez in bed huddle, patient expects to discharge today. TAMARA contacted Dr Jordyn Owen office [phone]. This is patient oncologist and patient is scheduled for follow up in January with them. Per the oncology record, patient PCP is Dr Ant Diaz. TAMARA contacted [phone] ( Sophia) who informs patient has not been "in awhile". Appointment scheduled. Will update with patient.   "

## 2017-12-18 ENCOUNTER — PATIENT OUTREACH (OUTPATIENT)
Dept: ADMINISTRATIVE | Facility: CLINIC | Age: 57
End: 2017-12-18

## 2017-12-18 RX ORDER — METFORMIN HYDROCHLORIDE 1000 MG/1
500 TABLET ORAL
COMMUNITY

## 2018-01-26 ENCOUNTER — HOSPITAL ENCOUNTER (EMERGENCY)
Facility: HOSPITAL | Age: 58
Discharge: HOME | End: 2018-01-26
Attending: EMERGENCY MEDICINE
Payer: MEDICARE

## 2018-01-26 VITALS
DIASTOLIC BLOOD PRESSURE: 67 MMHG | WEIGHT: 135 LBS | SYSTOLIC BLOOD PRESSURE: 140 MMHG | TEMPERATURE: 98 F | OXYGEN SATURATION: 96 % | RESPIRATION RATE: 18 BRPM | BODY MASS INDEX: 21.19 KG/M2 | HEART RATE: 83 BPM | HEIGHT: 67 IN

## 2018-01-26 DIAGNOSIS — R22.1 MASS IN NECK: Primary | ICD-10-CM

## 2018-01-26 PROCEDURE — 99283 EMERGENCY DEPT VISIT LOW MDM: CPT

## 2018-01-26 NOTE — DISCHARGE INSTRUCTIONS
You have a possible neck mass.  You will need to follow up with a ENT specialist for further evaluation.  All other vital signs are normal today.  Please continue to eat and drink normally.  You may take ibuprofen or Tylenol as needed for pain control.    Please return to the Emergency Department for any new or worsening symptoms including: fever, chest pain, shortness of breath, loss of consciousness, dizziness, weakness, or any other concerns.     Please follow up with your Primary Care Provider within in the week. If you do not have one, you may contact the one listed on your discharge paperwork or you may also call the [facility] Appointment Desk at [phone] to schedule an appointment with one.     Please take all medication as prescribed.

## 2018-01-26 NOTE — ED PROVIDER NOTES
"Encounter Date: 1/26/2018       History     Chief Complaint   Patient presents with    Cyst     reports knot on the right side of her neck "for months"     This is a 57-year-old female presents emergency department for a possible mass to the right side of her neck.  Patient reports that this has been present for approximately the last 3 months.  Patient says that it "moves around from side to side" but is recently been on the right side of her neck.  She states that when she wears collared shirts this irritates the area.  Patient states she's been eating and drinking normally without difficulty.  Patient can swallow without difficulty.  Denies fevers, shortness of breath, chest pain, ear pain, nasal congestion, diaphoresis, fatigue.  Patient speaks with ease.  Patient denies pain.  Denies taking anything over-the-counter for A symptoms.          Review of patient's allergies indicates:  No Known Allergies  Past Medical History:   Diagnosis Date    Breast cancer     History of psychiatric hospitalization     Hx of psychiatric care     Hyperlipidemia     Psychiatric problem     Schizophrenia     Therapy     Thyroid disease     thyroid surgery     Past Surgical History:   Procedure Laterality Date    BREAST BIOPSY      BREAST LUMPECTOMY      BREAST SURGERY      THYROIDECTOMY  2005     Family History   Problem Relation Age of Onset    Cancer Neg Hx      Social History   Substance Use Topics    Smoking status: Current Every Day Smoker     Packs/day: 0.50     Years: 37.00     Types: Cigarettes    Smokeless tobacco: Former User     Quit date: 12/27/2014    Alcohol use No     Review of Systems   Constitutional: Negative for appetite change, chills, diaphoresis, fatigue and fever.   HENT: Negative for congestion, dental problem, drooling, ear pain, facial swelling, nosebleeds, postnasal drip, rhinorrhea, sinus pain, sinus pressure, sneezing, sore throat and trouble swallowing.    Respiratory: Negative for " apnea, cough, choking, chest tightness, shortness of breath, wheezing and stridor.    Cardiovascular: Negative for chest pain, palpitations and leg swelling.   Gastrointestinal: Negative for abdominal distention, abdominal pain, constipation, diarrhea, nausea and vomiting.   Endocrine: Negative for cold intolerance, heat intolerance, polydipsia, polyphagia and polyuria.   Genitourinary: Negative for difficulty urinating, dysuria, flank pain, hematuria and urgency.   Musculoskeletal: Negative for arthralgias, back pain, gait problem, joint swelling, myalgias, neck pain and neck stiffness.   Skin: Negative for rash.   Neurological: Negative for dizziness, seizures, syncope, speech difficulty, weakness, light-headedness, numbness and headaches.   Hematological: Negative for adenopathy. Does not bruise/bleed easily.   Psychiatric/Behavioral: Negative for agitation.       Physical Exam     Initial Vitals [01/26/18 1001]   BP Pulse Resp Temp SpO2   (!) 147/65 (!) 117 20 98.2 °F (36.8 °C) 97 %      MAP       92.33         Physical Exam    Nursing note and vitals reviewed.  Constitutional: Vital signs are normal. She appears well-developed and well-nourished. She is cooperative.  Non-toxic appearance. She does not have a sickly appearance. She does not appear ill. No distress.   HENT:   Head: Normocephalic and atraumatic.   Right Ear: Tympanic membrane, external ear and ear canal normal. Tympanic membrane is not injected and not perforated. No hemotympanum.   Left Ear: Tympanic membrane, external ear and ear canal normal. Tympanic membrane is not injected and not perforated. No hemotympanum.   Nose: Nose normal. No mucosal edema or rhinorrhea. Right sinus exhibits no maxillary sinus tenderness and no frontal sinus tenderness. Left sinus exhibits no maxillary sinus tenderness and no frontal sinus tenderness.   Mouth/Throat: Uvula is midline, oropharynx is clear and moist and mucous membranes are normal. No oropharyngeal  exudate, posterior oropharyngeal edema, posterior oropharyngeal erythema or tonsillar abscesses.   Eyes: Conjunctivae and EOM are normal. Pupils are equal, round, and reactive to light.   Neck: Trachea normal, normal range of motion, full passive range of motion without pain and phonation normal. Neck supple. No thyroid mass and no thyromegaly present. No stridor present. No tracheal tenderness, no spinous process tenderness and no muscular tenderness present. No tracheal deviation present. No neck rigidity. No Brudzinski's sign and no Kernig's sign noted. No JVD present.   Cardiovascular: Normal rate, regular rhythm, normal heart sounds, intact distal pulses and normal pulses. Exam reveals no decreased pulses.    No murmur heard.  Pulmonary/Chest: Effort normal and breath sounds normal. No stridor. No respiratory distress. She has no decreased breath sounds. She has no wheezes. She exhibits no tenderness.   Abdominal: Soft. Normal appearance and bowel sounds are normal. She exhibits no distension and no mass. There is no tenderness. There is no rebound and no guarding.   Musculoskeletal: Normal range of motion.   Lymphadenopathy:        Head (right side): No submental, no submandibular, no tonsillar, no preauricular, no posterior auricular and no occipital adenopathy present.        Head (left side): No submental, no submandibular, no tonsillar, no preauricular, no posterior auricular and no occipital adenopathy present.     She has no cervical adenopathy.        Right cervical: No superficial cervical, no deep cervical and no posterior cervical adenopathy present.       Left cervical: No superficial cervical, no deep cervical and no posterior cervical adenopathy present.     She has no axillary adenopathy.   Neurological: She is alert and oriented to person, place, and time. She has normal reflexes.   Skin: Skin is warm, dry and intact. Capillary refill takes less than 2 seconds. No rash noted. No pallor.  "  Psychiatric: She has a normal mood and affect. Her behavior is normal. Judgment and thought content normal.         ED Course   Procedures  Labs Reviewed - No data to display                APC / Resident Notes:   Jossie Crowley is a 57 y.o. female who presents to the Emergency Department with an approximately 3 month history of a right neck mass.  Patient reports she first noticed this mass approximately 3 months ago and it has since moved around from side to side.  She states "do I have cancer".  Patient reports no change in ADLs, no changes in diet or eating without difficulty.  Patient denies any difficulty sleeping, dry mouth, increased appetite, any weight fluctuations however she did state she was in the hospital recently and had lost some weight secondary to that.  Her vital signs are stable and emergency department stay.  Patient denies weakness, numbness, tingling, chest pain, shortness of breath, no nausea vomiting or diarrhea.    Physical Exam shows a non-toxic, afebrile, and well appearing female. Pertinent exam findings include no palpable mass in the neck, no tenderness to palpation, full range of motion to neck, without pain, no bony tenderness or step-offs deformities.  No lymphadenopathy, patient is denying any difficulty swallowing and during exam.  Phonation is normal.  Posterior oropharynx is without erythema or signs of infection.  Bilateral TMs intact without signs of infection.  No left of the lower soft palate to suggest Donovan.    Vital Signs Are Reassuring. If available, previous records reviewed.     My overall impression is probable neck mass. Differential diagnosis includes but is not limited to PTA, Donovan's, thyromegaly.    ED Course: none. D/C Meds: none. Additional D/C Information: Follow-up with ENT specialist and primary care doctor. The diagnosis, treatment plan, instructions for follow-up and reevaluation with PCP as well as ED return precautions were discussed and " understanding was verbalized. All questions or concerns have been addressed. Patient was discharged home with an instructional sheet which gave not only information regarding the most likely diagnoses but also information regarding when to return to the emergency department for alarming symptoms and when to seek further care.    This case was discussed with  Dr. Sandoval who is in agreement with my assessment and plan.            Attending Attestation:     Physician Attestation Statement for NP/PA:   I discussed this assessment and plan of this patient with the NP/PA, but I did not personally examine the patient. The face to face encounter was performed by the NP/PA.                  ED Course      Clinical Impression:   The encounter diagnosis was Mass in neck.    Disposition:   Disposition: Discharged  Condition: Stable                        Melissa Bustamante NP  01/26/18 1442       Vance Sandoval MD  01/27/18 144

## 2019-08-07 ENCOUNTER — HOSPITAL ENCOUNTER (EMERGENCY)
Facility: HOSPITAL | Age: 59
Discharge: HOME | End: 2019-08-07
Attending: EMERGENCY MEDICINE
Payer: MEDICARE

## 2019-08-07 VITALS
WEIGHT: 140 LBS | HEIGHT: 67 IN | DIASTOLIC BLOOD PRESSURE: 71 MMHG | OXYGEN SATURATION: 100 % | SYSTOLIC BLOOD PRESSURE: 159 MMHG | HEART RATE: 104 BPM | TEMPERATURE: 98 F | RESPIRATION RATE: 18 BRPM | BODY MASS INDEX: 21.97 KG/M2

## 2019-08-07 DIAGNOSIS — S99.921A RIGHT FOOT INJURY: ICD-10-CM

## 2019-08-07 PROCEDURE — 99283 EMERGENCY DEPT VISIT LOW MDM: CPT

## 2019-08-07 RX ORDER — SULINDAC 150 MG/1
150 TABLET ORAL 2 TIMES DAILY
Qty: 10 TABLET | Refills: 0 | OUTPATIENT
Start: 2019-08-07 | End: 2019-09-08

## 2019-08-07 NOTE — ED PROVIDER NOTES
Encounter Date: 8/7/2019    SCRIBE #1 NOTE: I, Ashley Delarosa, am scribing for, and in the presence of,  Hua Tinsley NP. I have scribed the following portions of the note - Other sections scribed: DEIRDRE PATINO.       History     Chief Complaint   Patient presents with    Foot Injury     Right foot pain after hitting foot on chair 1 week ago.     58 y.o. female with PMHx of DM and Arthritis presents to the ED with complaints of a pinky toe injury on the right foot beginning 1 week ago. She did not take any medication for Sx, but placed foot in an [employer] Salt bath. Patient manages DM well by taking Metformin 500 mg twice a day consistently.     The history is provided by the patient.     Review of patient's allergies indicates:  No Known Allergies  Past Medical History:   Diagnosis Date    Breast cancer     History of psychiatric hospitalization     Hx of psychiatric care     Hyperlipidemia     Psychiatric problem     Schizophrenia     Therapy     Thyroid disease     thyroid surgery     Past Surgical History:   Procedure Laterality Date    BREAST BIOPSY      BREAST LUMPECTOMY      BREAST SURGERY      THYROIDECTOMY  2005     Family History   Problem Relation Age of Onset    Cancer Neg Hx      Social History     Tobacco Use    Smoking status: Current Every Day Smoker     Packs/day: 0.50     Years: 37.00     Pack years: 18.50     Types: Cigarettes    Smokeless tobacco: Former User     Quit date: 12/27/2014   Substance Use Topics    Alcohol use: No    Drug use: No     Review of Systems   Constitutional: Negative for fever.   HENT: Negative for sore throat.    Eyes: Negative for visual disturbance.   Respiratory: Negative for shortness of breath.    Cardiovascular: Negative for chest pain.   Gastrointestinal: Negative for nausea.   Genitourinary: Negative for dysuria.   Musculoskeletal: Negative for back pain.        (+) pinky toe injury   Skin: Negative for rash.   Neurological: Negative for weakness.    Hematological: Does not bruise/bleed easily.       Physical Exam     Initial Vitals [08/07/19 1416]   BP Pulse Resp Temp SpO2   (!) 159/71 104 18 98.2 °F (36.8 °C) 100 %      MAP       --         Physical Exam    Nursing note and vitals reviewed.  Constitutional: She appears well-developed and well-nourished.   HENT:   Head: Normocephalic and atraumatic.   Right Ear: External ear normal.   Left Ear: External ear normal.   Nose: Nose normal.   Eyes: Conjunctivae and EOM are normal. Pupils are equal, round, and reactive to light. Right eye exhibits no discharge. Left eye exhibits no discharge.   Neck: Normal range of motion.   Abdominal: She exhibits no distension.   Musculoskeletal: Normal range of motion.        Right ankle: Normal.        Right foot: Normal.   Neurological: She is alert and oriented to person, place, and time.   Skin: Skin is dry. Capillary refill takes less than 2 seconds.         ED Course   Procedures  Labs Reviewed - No data to display       Imaging Results    None          Medical Decision Making:   Initial Assessment:   58y.o. Bf who c/o right 5th toe pain after injury.  Differential Diagnosis:   Drug seeking behavior, Fracture, dislocation, sprain, strain, septic joint   ED Management:  Xray of the right foot ordered.         Scribe attestation: GERARD HAN DNP, personally performed the services described in this documentation. All medical record entries made by the scribe were at my direction and in my presence.  I have reviewed the chart and agree that the record reflects my personal performance and is accurate and complete.             ED Course as of Aug 07 1955   Wed Aug 07, 2019   1501 There are no acute fractures or dislocations.  Mild DJD of the 1st metatarsophalangeal joint.  There is no acute talocalcaneal fractures.  Soft tissues are unremarkable.   X-Ray Foot Complete Right [VC]      ED Course User Index  [VC] Hua Tinsley DNP     Clinical Impression:        "ICD-10-CM ICD-9-CM   1. Right foot injury S99.921A 959.7     Recommended hard soled shoes, rice therapy.  Pt asked "what kind of medicine are you giving me, can I have marajuana pills?"  Request declined. Provided clinoril 150mg po bid.  Medication choices were made after reviewing allergies, medications, history, available laboratories. Symptomatic therapies and return precautions on AVS.    Disposition:   Disposition: Discharged  Condition: Stable                        Hua Tinsley DNP  08/07/19 1957    "

## 2019-08-07 NOTE — DISCHARGE INSTRUCTIONS
You have been given an anti-inflammatory (clinoril (sulindac)) prescription.  While taking this medication, do not use ibuprofen, motrin, advil, aleve, or any other anti-inflammatory. Return to the Emergency department for any worsening or failure to improve, otherwise follow up with your primary care provider.   Return to the Emergency department for any worsening or failure to improve, otherwise follow up with your primary care provider.

## 2019-08-15 ENCOUNTER — HOSPITAL ENCOUNTER (EMERGENCY)
Facility: HOSPITAL | Age: 59
Discharge: HOME | End: 2019-08-15
Attending: EMERGENCY MEDICINE
Payer: MEDICARE

## 2019-08-15 VITALS
TEMPERATURE: 98 F | WEIGHT: 140 LBS | HEART RATE: 99 BPM | OXYGEN SATURATION: 99 % | SYSTOLIC BLOOD PRESSURE: 106 MMHG | DIASTOLIC BLOOD PRESSURE: 61 MMHG | HEIGHT: 67 IN | RESPIRATION RATE: 15 BRPM | BODY MASS INDEX: 21.97 KG/M2

## 2019-08-15 DIAGNOSIS — M79.671 RIGHT FOOT PAIN: Primary | ICD-10-CM

## 2019-08-15 PROCEDURE — 99284 EMERGENCY DEPT VISIT MOD MDM: CPT

## 2019-08-15 PROCEDURE — 25000003 PHARM REV CODE 250: Performed by: PHYSICIAN ASSISTANT

## 2019-08-15 RX ORDER — ACETAMINOPHEN 500 MG
500 TABLET ORAL
Status: COMPLETED | OUTPATIENT
Start: 2019-08-15 | End: 2019-08-15

## 2019-08-15 RX ORDER — IBUPROFEN 600 MG/1
600 TABLET ORAL EVERY 6 HOURS PRN
Qty: 20 TABLET | Refills: 0 | Status: SHIPPED | OUTPATIENT
Start: 2019-08-15 | End: 2019-08-20

## 2019-08-15 RX ORDER — ACETAMINOPHEN 500 MG
500 TABLET ORAL EVERY 4 HOURS PRN
Qty: 20 TABLET | Refills: 0 | Status: SHIPPED | OUTPATIENT
Start: 2019-08-15 | End: 2019-08-20

## 2019-08-15 RX ORDER — IBUPROFEN 600 MG/1
600 TABLET ORAL
Status: COMPLETED | OUTPATIENT
Start: 2019-08-15 | End: 2019-08-15

## 2019-08-15 RX ADMIN — ACETAMINOPHEN 500 MG: 500 TABLET ORAL at 12:08

## 2019-08-15 RX ADMIN — IBUPROFEN 600 MG: 600 TABLET, FILM COATED ORAL at 12:08

## 2019-08-15 NOTE — ED TRIAGE NOTES
"The patient presents to the ED via personal transportation alone. Patient reports hitting her right foot on something while walking about a week ago. Reports being seen here on 8/7/19 and was prescribed clinoril. Patient states that she came in today because her right fifth metatarsal is swollen on "still hurting".  "

## 2019-08-15 NOTE — ED PROVIDER NOTES
"Encounter Date: 8/15/2019       History     Chief Complaint   Patient presents with    Foot Pain     Right foot pain. Pt states she was seen here last week and diagnosed for "infection of the right foot" and was given antbiotics. She reports increased pain and swelling the lateral aspect of the right foot.      Chief complaint:  Foot pain      58-year-old female history of diabetes presenting for evaluation of persisting right foot pain status post injury that occurred when she ran into a table at home.  Pain is 8 out of 10, worse with ambulation. Completed prescription of sulindac   She reports increased pain and swelling to the lateral right foot.  Denies any fever, chills, nausea vomiting, purulent drainage, wounds.  No other attempted tx.         Review of patient's allergies indicates:  No Known Allergies  Past Medical History:   Diagnosis Date    Breast cancer     History of psychiatric hospitalization     Hx of psychiatric care     Hyperlipidemia     Psychiatric problem     Schizophrenia     Therapy     Thyroid disease     thyroid surgery     Past Surgical History:   Procedure Laterality Date    BREAST BIOPSY      BREAST LUMPECTOMY      BREAST SURGERY      THYROIDECTOMY  2005     Family History   Problem Relation Age of Onset    Cancer Neg Hx      Social History     Tobacco Use    Smoking status: Current Every Day Smoker     Packs/day: 0.50     Years: 37.00     Pack years: 18.50     Types: Cigarettes    Smokeless tobacco: Former User     Quit date: 12/27/2014   Substance Use Topics    Alcohol use: Yes     Comment: occasionally    Drug use: No     Review of Systems   Constitutional: Negative for chills and fever.   Eyes: Negative for redness.   Respiratory: Negative for stridor.    Gastrointestinal: Negative for nausea and vomiting.   Musculoskeletal: Negative for back pain and neck pain.        (+) R foot pain     Skin: Negative for rash and wound.   Neurological: Negative for speech " difficulty, weakness and numbness.   Psychiatric/Behavioral: Negative for confusion.       Physical Exam     Initial Vitals [08/15/19 1235]   BP Pulse Resp Temp SpO2   106/61 99 15 98.3 °F (36.8 °C) 99 %      MAP       --         Physical Exam    Nursing note and vitals reviewed.  Constitutional: She appears well-developed and well-nourished.   HENT:   Head: Normocephalic.   Right Ear: External ear normal.   Left Ear: External ear normal.   Nose: Nose normal.   Mouth/Throat: Oropharynx is clear and moist.   Eyes: Conjunctivae are normal.   Cardiovascular: Normal rate and regular rhythm. Exam reveals no gallop and no friction rub.    No murmur heard.  Pulses:       Dorsalis pedis pulses are 2+ on the right side, and 2+ on the left side.   Pulmonary/Chest: Breath sounds normal. She has no wheezes. She has no rhonchi. She has no rales.   Abdominal: Soft. Bowel sounds are normal. She exhibits no distension. There is no tenderness. There is no rebound, no guarding, no tenderness at McBurney's point and negative Wooten's sign.   Musculoskeletal: Normal range of motion.   Tenderness to palpation over the right lateral foot with no appreciated swelling or erythema. No wounds.    Lymphadenopathy:     She has no cervical adenopathy.   Neurological: She is alert. She has normal strength. No sensory deficit.   Skin: Skin is warm and dry.   Psychiatric: She has a normal mood and affect.         ED Course   Procedures  Labs Reviewed - No data to display       Imaging Results    None          Medical Decision Making:   Initial Assessment:   58-year-old female presenting for evaluation of persisting right foot pain and swelling after injury that occurred 8 days ago.  She completed a course of anti-inflammatories and reports the pain is still persisting.  Denies any fever chills, nausea vomiting, purulent drainage or wound, weakness, paresthesias.  Exam above.  X-ray from previous visit reviewed and negative for fracture,  dislocation.  No evidence of infection.  Patient is ambulatory.  No neurovascular deficits.  Ibuprofen and Tylenol given emergency department for pain. Ace wrap applied.  Will have patient follow up with primary care and podiatry and return emergency department for worsening symptoms or as needed.                      Clinical Impression:       ICD-10-CM ICD-9-CM   1. Right foot pain M79.671 729.5                                Jesenia Miller PA-C  08/15/19 1709

## 2019-08-15 NOTE — DISCHARGE INSTRUCTIONS
Take Ibuprofen and Tylenol for pain. Follow up with primary care and podiatry in 2 days. Return to ER for worsening symptoms or as needed.

## 2019-09-03 ENCOUNTER — HOSPITAL ENCOUNTER (EMERGENCY)
Facility: HOSPITAL | Age: 59
Discharge: HOME | End: 2019-09-04
Attending: EMERGENCY MEDICINE
Payer: MEDICARE

## 2019-09-03 DIAGNOSIS — R00.2 PALPITATIONS: ICD-10-CM

## 2019-09-03 DIAGNOSIS — F41.9 ANXIETY: Primary | ICD-10-CM

## 2019-09-03 LAB
BASOPHILS # BLD AUTO: 0.05 K/UL (ref 0–0.2)
BASOPHILS NFR BLD: 0.5 % (ref 0–1.9)
DIFFERENTIAL METHOD: ABNORMAL
EOSINOPHIL # BLD AUTO: 0.2 K/UL (ref 0–0.5)
EOSINOPHIL NFR BLD: 1.6 % (ref 0–8)
ERYTHROCYTE [DISTWIDTH] IN BLOOD BY AUTOMATED COUNT: 15.5 % (ref 11.5–14.5)
GLUCOSE SERPL-MCNC: 87 MG/DL (ref 70–110)
HCT VFR BLD AUTO: 34.4 % (ref 37–48.5)
HGB BLD-MCNC: 11.5 G/DL (ref 12–16)
LYMPHOCYTES # BLD AUTO: 4.2 K/UL (ref 1–4.8)
LYMPHOCYTES NFR BLD: 39.9 % (ref 18–48)
MCH RBC QN AUTO: 27.7 PG (ref 27–31)
MCHC RBC AUTO-ENTMCNC: 33.4 G/DL (ref 32–36)
MCV RBC AUTO: 83 FL (ref 82–98)
MONOCYTES # BLD AUTO: 1 K/UL (ref 0.3–1)
MONOCYTES NFR BLD: 9.1 % (ref 4–15)
NEUTROPHILS # BLD AUTO: 5.1 K/UL (ref 1.8–7.7)
NEUTROPHILS NFR BLD: 48.9 % (ref 38–73)
PLATELET # BLD AUTO: 249 K/UL (ref 150–350)
PMV BLD AUTO: 9.9 FL (ref 9.2–12.9)
RBC # BLD AUTO: 4.15 M/UL (ref 4–5.4)
WBC # BLD AUTO: 10.51 K/UL (ref 3.9–12.7)

## 2019-09-03 PROCEDURE — 93010 EKG 12-LEAD: ICD-10-PCS | Mod: ,,, | Performed by: INTERNAL MEDICINE

## 2019-09-03 PROCEDURE — 85379 FIBRIN DEGRADATION QUANT: CPT

## 2019-09-03 PROCEDURE — 93005 ELECTROCARDIOGRAM TRACING: CPT

## 2019-09-03 PROCEDURE — 85025 COMPLETE CBC W/AUTO DIFF WBC: CPT

## 2019-09-03 PROCEDURE — 36000 PLACE NEEDLE IN VEIN: CPT

## 2019-09-03 PROCEDURE — 99284 EMERGENCY DEPT VISIT MOD MDM: CPT | Mod: 25

## 2019-09-03 PROCEDURE — 80048 BASIC METABOLIC PNL TOTAL CA: CPT

## 2019-09-03 PROCEDURE — 83735 ASSAY OF MAGNESIUM: CPT

## 2019-09-03 PROCEDURE — 93010 ELECTROCARDIOGRAM REPORT: CPT | Mod: ,,, | Performed by: INTERNAL MEDICINE

## 2019-09-03 PROCEDURE — 84484 ASSAY OF TROPONIN QUANT: CPT

## 2019-09-03 PROCEDURE — 84439 ASSAY OF FREE THYROXINE: CPT

## 2019-09-03 PROCEDURE — 84443 ASSAY THYROID STIM HORMONE: CPT

## 2019-09-04 VITALS
RESPIRATION RATE: 20 BRPM | HEIGHT: 67 IN | TEMPERATURE: 98 F | BODY MASS INDEX: 21.97 KG/M2 | WEIGHT: 140 LBS | HEART RATE: 97 BPM | DIASTOLIC BLOOD PRESSURE: 74 MMHG | SYSTOLIC BLOOD PRESSURE: 156 MMHG | OXYGEN SATURATION: 97 %

## 2019-09-04 LAB
ANION GAP SERPL CALC-SCNC: 10 MMOL/L (ref 8–16)
BUN SERPL-MCNC: 12 MG/DL (ref 6–20)
CALCIUM SERPL-MCNC: 9.4 MG/DL (ref 8.7–10.5)
CHLORIDE SERPL-SCNC: 109 MMOL/L (ref 95–110)
CO2 SERPL-SCNC: 23 MMOL/L (ref 23–29)
CREAT SERPL-MCNC: 0.7 MG/DL (ref 0.5–1.4)
D DIMER PPP IA.FEU-MCNC: 0.24 MG/L FEU
EST. GFR  (AFRICAN AMERICAN): >60 ML/MIN/1.73 M^2
EST. GFR  (NON AFRICAN AMERICAN): >60 ML/MIN/1.73 M^2
GLUCOSE SERPL-MCNC: 95 MG/DL (ref 70–110)
MAGNESIUM SERPL-MCNC: 1.9 MG/DL (ref 1.6–2.6)
POTASSIUM SERPL-SCNC: 4.2 MMOL/L (ref 3.5–5.1)
SODIUM SERPL-SCNC: 142 MMOL/L (ref 136–145)
T4 FREE SERPL-MCNC: 0.93 NG/DL (ref 0.71–1.51)
TROPONIN I SERPL DL<=0.01 NG/ML-MCNC: 0.02 NG/ML (ref 0–0.03)
TSH SERPL DL<=0.005 MIU/L-ACNC: 2.7 UIU/ML (ref 0.4–4)

## 2019-09-04 RX ORDER — HYDROXYZINE HYDROCHLORIDE 25 MG/1
25 TABLET, FILM COATED ORAL 3 TIMES DAILY PRN
Qty: 20 TABLET | Refills: 0 | Status: ON HOLD | OUTPATIENT
Start: 2019-09-04 | End: 2020-06-30 | Stop reason: HOSPADM

## 2019-09-04 NOTE — ED TRIAGE NOTES
""My heart is beating to fast for about 2 months, When I lie down that how it starts". It was going on too fast tonight and I decided to come to see a doctor. Denied chest pain. Hx of HTN and kin blood sugar  "

## 2019-09-04 NOTE — ED PROVIDER NOTES
"Encounter Date: 9/3/2019       History     Chief Complaint   Patient presents with    Palpitations     "My heart is beating really fast". Pt reports feeling this way for the past couple of months but her symptoms worsened tonight. Denies chest pain-states it just feels like her heart is beating out of her chest.  Denies SOB. +smoker. Pt reports a hx of HTN, HLD, DM2.      This is a 58 y.o. Female with a PMHx of bipolar disorder, breast CA, HLD, HTN, DM, Thyroid disease who presents to the ED complaining of heart palpitations that began a few months ago and became worse tonight. Symptoms worsen when the pt becomes stressed due to family difficulties.  She admits that sometimes she becomes dizzy and sweaty.  Denies any chest pain, SOB, or any other worsening or alleviating factors. Pt states "My heart is beating really fast." NKDA. PSHx of Thyroidectomy. The pt is a current everyday smoker. Drinks alcohol occasionally. Denies any drug use.    The history is provided by the patient and medical records.     Review of patient's allergies indicates:  No Known Allergies  Past Medical History:   Diagnosis Date    Breast cancer     History of psychiatric hospitalization     Hx of psychiatric care     Hyperlipidemia     Psychiatric problem     Schizophrenia     Therapy     Thyroid disease     thyroid surgery     Past Surgical History:   Procedure Laterality Date    BREAST BIOPSY      BREAST LUMPECTOMY      BREAST SURGERY      THYROIDECTOMY  2005     Family History   Problem Relation Age of Onset    Cancer Neg Hx      Social History     Tobacco Use    Smoking status: Current Every Day Smoker     Packs/day: 0.50     Years: 37.00     Pack years: 18.50     Types: Cigarettes    Smokeless tobacco: Former User     Quit date: 12/27/2014   Substance Use Topics    Alcohol use: Yes     Comment: occasionally    Drug use: No     Review of Systems   Constitutional: Positive for diaphoresis. Negative for chills and fever. "   HENT: Negative for congestion, rhinorrhea, sinus pain and sore throat.    Eyes: Negative for pain and visual disturbance.   Respiratory: Positive for cough. Negative for shortness of breath.    Cardiovascular: Positive for palpitations. Negative for chest pain and leg swelling.   Gastrointestinal: Negative for abdominal pain, blood in stool, diarrhea, nausea and vomiting.        No melena.   Genitourinary: Negative for dysuria, flank pain, hematuria and urgency.   Musculoskeletal: Negative for back pain and neck pain.   Skin: Negative for rash and wound.   Neurological: Positive for dizziness. Negative for facial asymmetry, speech difficulty, weakness, light-headedness, numbness and headaches.   Psychiatric/Behavioral: Negative for behavioral problems and hallucinations. The patient is nervous/anxious.    All other systems reviewed and are negative.      Physical Exam     Initial Vitals [09/03/19 2205]   BP Pulse Resp Temp SpO2   (!) 166/75 (!) 118 20 98.1 °F (36.7 °C) 97 %      MAP       --         Physical Exam    Nursing note and vitals reviewed.  Constitutional: She appears well-developed and well-nourished. She is not diaphoretic. No distress.   HENT:   Head: Normocephalic and atraumatic.   Nose: Nose normal.   Mouth/Throat: Oropharynx is clear and moist.   Eyes: Conjunctivae and EOM are normal. Pupils are equal, round, and reactive to light. Right eye exhibits no discharge. Left eye exhibits no discharge. No scleral icterus.   Neck: Normal range of motion. Neck supple. No thyromegaly present. No tracheal deviation present.   Cardiovascular: Regular rhythm and normal heart sounds. Tachycardia present.    No murmur heard.  Pulmonary/Chest: No stridor. No respiratory distress. She has no wheezes. She has rhonchi (bilaterally). She has rales (bilaterally). She exhibits no tenderness.   Abdominal: Soft. She exhibits no distension. There is no tenderness. There is no rebound and no guarding.   Musculoskeletal:  Normal range of motion. She exhibits no edema or tenderness.   Neurological: She is alert and oriented to person, place, and time. She has normal strength. No cranial nerve deficit.   Skin: Skin is warm and dry. No rash noted. No erythema.   Post op scar to neck (thyroidectomy)   Psychiatric: Her behavior is normal. Judgment and thought content normal.   Depressed mood.  Mildly anxious.  No active delusions or hallucinations.  No suicidal or homicidal ideations.         ED Course   Procedures  Labs Reviewed   CBC W/ AUTO DIFFERENTIAL - Abnormal; Notable for the following components:       Result Value    Hemoglobin 11.5 (*)     Hematocrit 34.4 (*)     RDW 15.5 (*)     All other components within normal limits   BASIC METABOLIC PANEL   TROPONIN I   TSH   MAGNESIUM   D DIMER, QUANTITATIVE   T4, FREE     EKG Readings: (Independently Interpreted)   Initial Reading: No STEMI. Rhythm: Sinus Tachycardia. Heart Rate: 116. Ectopy: PACs. Conduction: Normal. ST Segments: Normal ST Segments. T Waves: Normal. Axis: Left Axis Deviation. Q Waves: V1, V2 and V3. Other Findings: Prolonged QT Interval.   No acute ischemic changes.  No change from 12/10/2017.       Imaging Results    None          Medical Decision Making:   History:   Old Medical Records: I decided to obtain old medical records.  Clinical Tests:   Lab Tests: Ordered and Reviewed  The following lab test(s) were unremarkable: CBC, CMP, Troponin and D-Dimer  Medical Tests: Ordered and Reviewed  ED Management:  0100:  EKG does not show ischemic changes.  Troponin is within normal limits. Patient's heart score is 2.  Patient continues to deny chest pain. Palpitations improved.  Symptoms most likely secondary to anxiety. Patient has longstanding psychiatric history.  No electrolyte abnormalities.  No significant anemia.  Patient may be treated outpatient.    Additional MDM:   Heart Score:    History:          Slightly suspicious.  ECG:             Normal  Age:                45-65 years  Risk factors: 1-2 risk factors  Troponin:       Less than or equal to normal limit  Final Score: 2             Scribe Attestation:   Scribe #1: I performed the above scribed service and the documentation accurately describes the services I performed. I attest to the accuracy of the note.               Clinical Impression:       ICD-10-CM ICD-9-CM   1. Anxiety F41.9 300.00   2. Palpitations R00.2 785.1         Disposition:   Disposition: Discharged  Condition: Stable        I, Syd Jones MD, personally performed the services described in this documentation. All medical record entries made by the scribe were at my direction and in my presence.  I have reviewed the chart and agree that the record reflects my personal performance and is accurate and complete                       Syd Jones MD  09/04/19 0107

## 2019-09-08 ENCOUNTER — HOSPITAL ENCOUNTER (EMERGENCY)
Facility: HOSPITAL | Age: 59
Discharge: HOME | End: 2019-09-08
Attending: EMERGENCY MEDICINE
Payer: MEDICARE

## 2019-09-08 VITALS
SYSTOLIC BLOOD PRESSURE: 176 MMHG | HEIGHT: 67 IN | WEIGHT: 140 LBS | RESPIRATION RATE: 16 BRPM | OXYGEN SATURATION: 99 % | BODY MASS INDEX: 21.97 KG/M2 | HEART RATE: 91 BPM | DIASTOLIC BLOOD PRESSURE: 80 MMHG | TEMPERATURE: 97 F

## 2019-09-08 DIAGNOSIS — M79.673 FOOT PAIN: ICD-10-CM

## 2019-09-08 DIAGNOSIS — S92.354A CLOSED NONDISPLACED FRACTURE OF FIFTH METATARSAL BONE OF RIGHT FOOT, INITIAL ENCOUNTER: Primary | ICD-10-CM

## 2019-09-08 LAB — POCT GLUCOSE: 88 MG/DL (ref 70–110)

## 2019-09-08 PROCEDURE — 29515 APPLICATION SHORT LEG SPLINT: CPT | Mod: RT

## 2019-09-08 PROCEDURE — 82962 GLUCOSE BLOOD TEST: CPT

## 2019-09-08 PROCEDURE — 99284 EMERGENCY DEPT VISIT MOD MDM: CPT | Mod: 25

## 2019-09-08 PROCEDURE — 25000003 PHARM REV CODE 250: Performed by: NURSE PRACTITIONER

## 2019-09-08 RX ORDER — MELOXICAM 7.5 MG/1
15 TABLET ORAL
Status: COMPLETED | OUTPATIENT
Start: 2019-09-08 | End: 2019-09-08

## 2019-09-08 RX ORDER — MELOXICAM 7.5 MG/1
7.5 TABLET ORAL DAILY
Status: DISCONTINUED | OUTPATIENT
Start: 2019-09-09 | End: 2019-09-08

## 2019-09-08 RX ORDER — MELOXICAM 15 MG/1
15 TABLET ORAL DAILY
Qty: 5 TABLET | Refills: 0 | Status: SHIPPED | OUTPATIENT
Start: 2019-09-08 | End: 2019-09-13

## 2019-09-08 RX ADMIN — MELOXICAM 15 MG: 7.5 TABLET ORAL at 04:09

## 2019-09-08 NOTE — ED PROVIDER NOTES
Encounter Date: 9/8/2019       History     Chief Complaint   Patient presents with    Foot Pain     Foot pain x 2 months worsening today. Pt seen here last week and diagnosed with arthritis. She states pain is unrelieved by pain meds and she is unable to bear weight on extremity     CC: Foot Pain    HPI: Jossie Crowley, a 58 y.o. female presents to the ED with a 2 month history of right lateral foot pain that has been constant and gradually worsening.  She reports no known injuries.  She reports she has been treated with anti-inflammatory medication which has not helped.  She is still walking on the foot which makes the pain worse.  Of note, she has been seen in this ER twice for similar pain. She reports following up with her primary care doctor gave her pain pills which did not help.  She is requesting a shot in the foot today.         The history is provided by the patient. No  was used.     Review of patient's allergies indicates:  No Known Allergies  Past Medical History:   Diagnosis Date    Breast cancer     History of psychiatric hospitalization     Hx of psychiatric care     Hyperlipidemia     Psychiatric problem     Schizophrenia     Therapy     Thyroid disease     thyroid surgery     Past Surgical History:   Procedure Laterality Date    BREAST BIOPSY      BREAST LUMPECTOMY      BREAST SURGERY      THYROIDECTOMY  2005     Family History   Problem Relation Age of Onset    Cancer Neg Hx      Social History     Tobacco Use    Smoking status: Current Every Day Smoker     Packs/day: 0.50     Years: 37.00     Pack years: 18.50     Types: Cigarettes    Smokeless tobacco: Former User     Quit date: 12/27/2014   Substance Use Topics    Alcohol use: Yes     Comment: occasionally    Drug use: No     Review of Systems   Constitutional: Negative for fever.   Musculoskeletal: Positive for arthralgias (Right Foot) and joint swelling (Right Foot). Negative for back pain, neck pain and  neck stiffness.        (+) Pain with walking   Skin: Negative for color change, pallor, rash and wound.   Neurological: Negative for syncope and weakness.   Psychiatric/Behavioral: Negative for confusion.       Physical Exam     Initial Vitals [09/08/19 1425]   BP Pulse Resp Temp SpO2   130/71 96 16 98.1 °F (36.7 °C) 98 %      MAP       --         Physical Exam    Nursing note and vitals reviewed.  Constitutional: She appears well-developed and well-nourished. She is not diaphoretic. She is cooperative.  Non-toxic appearance. No distress.   HENT:   Head: Normocephalic and atraumatic.   Right Ear: External ear normal.   Left Ear: External ear normal.   Eyes: Conjunctivae and EOM are normal.   Neck: Normal range of motion. No tracheal deviation present.   Cardiovascular: Normal rate and regular rhythm.   Pulmonary/Chest: Effort normal. No stridor. No tachypnea and no bradypnea. No respiratory distress.   Musculoskeletal: Normal range of motion. She exhibits tenderness.        Right knee: She exhibits no swelling. No tenderness found. No medial joint line and no lateral joint line tenderness noted.        Right ankle: She exhibits normal range of motion and no swelling. No tenderness. No lateral malleolus and no medial malleolus tenderness found.        Right lower leg: She exhibits no bony tenderness.        Right foot: There is tenderness and swelling (mild, lateral dorsal).        Feet:    Neurological: She is alert and oriented to person, place, and time. She has normal strength. No sensory deficit. Coordination and gait (Walking around the emergency department.) normal. GCS score is 15. GCS eye subscore is 4. GCS verbal subscore is 5. GCS motor subscore is 6.   Skin: Skin is warm, dry and intact. No bruising and no rash noted. No cyanosis or erythema. Nails show no clubbing.   Psychiatric: She has a normal mood and affect. Her speech is normal and behavior is normal. Judgment and thought content normal.         ED  Course   Orthopedic Injury  Date/Time: 9/8/2019 4:18 PM  Performed by: CAM Barron  Authorized by: Oliver Truong MD     Consent Done?:  Yes  Universal Protocol:     Verbal consent obtained?: Yes      Risks and benefits: Risks, benefits and alternatives were discussed      Consent given by:  Patient    Patient states understanding of procedure being performed: Yes      Patient identity confirmed:  Verbally with patient  Injury:     Injury location:  Foot    Location details:  Right foot    Injury type:  Fracture    Fracture type: fifth metatarsal        Pre-procedure assessment:     Neurovascular status: Neurovascularly intact      Distal perfusion: normal      Neurological function: normal      Range of motion: normal      Local anesthesia used?: No      Patient sedated?: No        Selections made in this section will also lock the Injury type section above.:     Manipulation performed?: No      Immobilization:  Splint    Splint type:  Short leg    Complications: No      Specimens: No      Implants: No    Post-procedure assessment:     Distal perfusion: normal      Neurological function: normal      Range of motion: splinted      Patient tolerance:  Patient tolerated the procedure well with no immediate complications     Splint placed by nursing staff.  Patient also placed in a hard sole shoe.      Labs Reviewed   POCT GLUCOSE   POCT GLUCOSE MONITORING CONTINUOUS          Imaging Results          X-Ray Foot Complete Right (Final result)  Result time 09/08/19 15:25:38    Final result by Aldair De León MD (09/08/19 15:25:38)                 Impression:      Nondisplaced 5th metatarsal fracture as above.      Electronically signed by: Aldair De León MD  Date:    09/08/2019  Time:    15:25             Narrative:    EXAMINATION:  XR FOOT COMPLETE 3 VIEW RIGHT    CLINICAL HISTORY:  . Pain in unspecified foot    TECHNIQUE:  AP, lateral, and oblique views of the right foot were  performed.    COMPARISON:  08/07/2019    FINDINGS:  There is a nondisplaced transverse fracture at the proximal shaft of the 5th metatarsal which was not demonstrated on prior.  Remaining visualized osseous structures appear intact.  No evidence of dislocation.  No evidence of dislocation.  Joint spaces are preserved.  No erosive changes demonstrated.                                       APC / Resident Notes:   This is an evaluation of a 58 y.o. female that presents to the Emergency Department for 2 month history of right foot pain. Physical Exam shows a non-toxic, afebrile, and well appearing female.  There is tenderness palpation with mild swelling over the lateral aspect of the dorsal surface of the right foot overlying the 4th and 5th metatarsals.  Greater tenderness over the proximal aspect.  Normal range of motion of toes with sensation intact distally.  2+ right DP pulse.  No open wounds, erythema, increased warmth.  No tenderness over the right ankle. Vital signs are reassuring. If available, previous records reviewed. RESULTS:  Glucose 88.  X-ray of the right foot with a nondisplaced 5th metatarsal fracture.    My overall impression is 5th metatarsal fracture. I considered, but at this time, do not suspect dislocation, septic joint, cellulitis, or compartment syndrome.    Given the findings of the x-ray I would like to place the patient in a short-leg posterior slab with crutches and nonweightbearing instructions.  Given the patient's psychiatric history will also placed in a postop shoe to give supportive patient does not follow instructions.  I have concern about giving the patient narcotics.  I will immobilize the foot, fried crutches, and anti-inflammatories for pain control.  D/C Information:  Nonweightbearing instructions, fracture instructions, the importance of following up with Orthopedics.  Will place referral for os Orthopedics as well as I will send a referral to [facility]. The diagnosis, treatment  plan, instructions for follow-up and reevaluation with Orthopedics as well as ED return precautions were discussed and understanding was verbalized. All questions or concerns have been addressed. This case was discussed with Dr. Truong who is in agreement with my assessment and plan. DESTINEY Tesfaye, PERRYP-C                    Clinical Impression:       ICD-10-CM ICD-9-CM   1. Closed nondisplaced fracture of fifth metatarsal bone of right foot, initial encounter S92.354A 825.25   2. Foot pain M79.673 729.5         Disposition:   Disposition: Discharged  Condition: Stable                        CAM Barron  09/08/19 9608

## 2019-09-08 NOTE — DISCHARGE INSTRUCTIONS
You have a fracture in the bone of your foot.    Please keep your splint on and dry until you are seen by Orthopedics and they tell you that you are OK to remove it. Rest and Elevate the extremity to help reduce swelling and pain. Use the crutches and DO NOT put any weight on the leg until you are cleared by orthopedics and they tell you that you are OK to walk on it.     I have listed Dr. Khanna on the discharge paperwork as well as put in a referral to her office. Someone should call you. I will also put in a referral to [facility] in case you can not see Dr. Khanna. The number and instructions are below:    [facility] - Orthopedics Clinic:  If you would like to follow up with the [facility] for further care of your injury, please call the [facility] Scheduling Department at [phone] during business hours. Please let the  know you need a follow-up appointment for your injury with Orthopedics, and you will be scheduled in the Orthopedic Clinic. Please bring your Discharge Papers with you to the clinic appointment.    Please return to the Emergency Department for any new or worsening symptoms including: worsening or changes in pain, fever, chest pain, shortness of breath, loss of consciousness, dizziness, weakness, or any other concerns.     Please follow up with Orthopedics in 1 week for a recheck of your symptoms. If you do not have one, you may contact the one listed on your discharge paperwork or you may also call the [facility] Appointment Desk at [phone] to schedule an appointment with one.     Please take all medication as prescribed. You have been prescribed Mobic for pain. This is an Non-Steroidal Anti-Inflammatory (NSAID) Medication. Please do not take any additional NSAIDs while you are taking this medication including (Advil, Aleve, Motrin, Ibuprofen, Mobic\meloxicam, Naprosyn, Toradol, ketoralac, etc.). Please  stop taking this medication if you experience: weakness, itching, yellow skin or eyes, joint pains, vomiting blood, blood or black stools, unusual weight gain, or swelling in your arms, legs, hands, or feet.

## 2019-12-19 ENCOUNTER — HOSPITAL ENCOUNTER (EMERGENCY)
Facility: HOSPITAL | Age: 59
Discharge: HOME | End: 2019-12-19
Attending: EMERGENCY MEDICINE
Payer: MEDICARE

## 2019-12-19 VITALS
BODY MASS INDEX: 21.97 KG/M2 | HEIGHT: 67 IN | DIASTOLIC BLOOD PRESSURE: 64 MMHG | OXYGEN SATURATION: 98 % | TEMPERATURE: 99 F | SYSTOLIC BLOOD PRESSURE: 128 MMHG | WEIGHT: 140 LBS | RESPIRATION RATE: 20 BRPM | HEART RATE: 84 BPM

## 2019-12-19 DIAGNOSIS — K59.00 CONSTIPATION, UNSPECIFIED CONSTIPATION TYPE: Primary | ICD-10-CM

## 2019-12-19 DIAGNOSIS — K59.00 CONSTIPATION: ICD-10-CM

## 2019-12-19 PROCEDURE — 99283 EMERGENCY DEPT VISIT LOW MDM: CPT | Mod: 25

## 2019-12-19 NOTE — ED TRIAGE NOTES
Pt reports constipation for 2 weeks, light pain all over the belly, try some stool softener, but still feel unconfortable. Denies, fever and vomiting.

## 2019-12-19 NOTE — ED PROVIDER NOTES
"Encounter Date: 12/19/2019    SCRIBE #1 NOTE: I, Melly Phoenix, am scribing for, and in the presence of,  Zita Nick MD. I have scribed the following portions of the note - Other sections scribed: HPI/ROS/PE.       History     Chief Complaint   Patient presents with    Constipation     "I haven't had a bowel movement in almost 2 weeks and couple days." Pt reports taking laxatives and fiber with no relief.      This 59 y.o. Female presents to the ED for an emergent evaluation of constipation. Pt reports she takes a lot of medications daily. She attempted to "clean myself out" last week by taking medications to make a BM, like stool softeners. She took Magnesium Citrate yesterday. Pt reports she has not had a BM in 2 weeks and 2 days. She reports no abdominal pain, only feeling "bubbly." No alleviating factors. Otherwise, pt denies fever, chills, leg pain, appetite change, and any other associated symptoms.     The history is provided by the patient. No  was used.     Review of patient's allergies indicates:  No Known Allergies  Past Medical History:   Diagnosis Date    Breast cancer     History of psychiatric hospitalization     Hx of psychiatric care     Hyperlipidemia     Psychiatric problem     Schizophrenia     Therapy     Thyroid disease     thyroid surgery     Past Surgical History:   Procedure Laterality Date    BREAST BIOPSY      BREAST LUMPECTOMY      BREAST SURGERY      THYROIDECTOMY  2005     Family History   Problem Relation Age of Onset    Cancer Neg Hx      Social History     Tobacco Use    Smoking status: Current Every Day Smoker     Packs/day: 0.50     Years: 37.00     Pack years: 18.50     Types: Cigarettes    Smokeless tobacco: Former User     Quit date: 12/27/2014   Substance Use Topics    Alcohol use: Yes     Comment: occasionally    Drug use: No     Review of Systems   Constitutional: Negative for appetite change, chills and fever.   HENT: " Negative for congestion, rhinorrhea and sore throat.    Eyes: Negative for pain.   Respiratory: Negative for cough and shortness of breath.    Cardiovascular: Negative for chest pain.   Gastrointestinal: Positive for constipation. Negative for abdominal pain, diarrhea, nausea and vomiting.   Genitourinary: Negative for difficulty urinating.   Musculoskeletal: Negative for myalgias and neck stiffness.   Skin: Negative for rash.   Neurological: Negative for headaches.       Physical Exam     Initial Vitals [12/19/19 1658]   BP Pulse Resp Temp SpO2   126/64 103 20 97.8 °F (36.6 °C) 99 %      MAP       --         Physical Exam    Nursing note and vitals reviewed.  Constitutional: She appears well-developed and well-nourished. She is not diaphoretic. No distress.   HENT:   Head: Atraumatic.   Eyes: EOM are normal.   Neck: Normal range of motion. Neck supple.   Cardiovascular: Normal rate, regular rhythm and normal heart sounds.   Pulmonary/Chest: Breath sounds normal. No respiratory distress.   Abdominal: Soft. Bowel sounds are normal. She exhibits no distension and no mass. There is no tenderness. There is no rebound and no guarding.   Musculoskeletal: Normal range of motion. She exhibits no edema or tenderness.   Neurological: She is alert.   Skin: Skin is warm and dry. No rash noted.   Psychiatric: She has a normal mood and affect.         ED Course   Procedures  Labs Reviewed - No data to display      59-year-old female with a thyroid dysfunction presents to the ED for constipation.  She states that she remembers her last bowel movement was about 2 weeks ago and since then has not been able to defecate.  My differential includes constipation, rectal mass. She has no other complaints at this time.  Abdominal x-ray confirms that there is a large amount of fecal matter in her colon and rectum.  Patient is still passing gas.  Her abdominal exam is reassuring in that her abdomen is soft and nontender. She does not feel  like she is distended.  She has no evidence on x-ray that she is impacted or obstructed.  I would have liked to do a bedside Chem 8 the patient is refusing at this time.  I recommended that she do a Fleet's enema and see if she can have a bowel movement.  She should come back to the ED if this is unsuccessful or she develops any new symptoms or concerns.  ADRIAN Nick MD  8:22 PM      Imaging Results    None                     Scribe Attestation:   Scribe #1: I performed the above scribed service and the documentation accurately describes the services I performed. I attest to the accuracy of the note.                        Scribe attestation: I, Zita Nick, personally performed the services described in this documentation. All medical record entries made by the scribe were at my direction and in my presence.  I have reviewed the chart and agree that the record reflects my personal performance and is accurate and complete.  Clinical Impression:       ICD-10-CM ICD-9-CM   1. Constipation K59.00 564.00                             Zita Nick MD  12/19/19 2023

## 2020-06-23 ENCOUNTER — HOSPITAL ENCOUNTER (EMERGENCY)
Facility: HOSPITAL | Age: 60
Discharge: PSYCHIATRIC OR SUBSTANCE USE FACILITY | End: 2020-06-23
Attending: EMERGENCY MEDICINE
Payer: MEDICARE

## 2020-06-23 VITALS
OXYGEN SATURATION: 99 % | SYSTOLIC BLOOD PRESSURE: 138 MMHG | WEIGHT: 140 LBS | BODY MASS INDEX: 21.97 KG/M2 | HEART RATE: 102 BPM | DIASTOLIC BLOOD PRESSURE: 70 MMHG | TEMPERATURE: 99 F | RESPIRATION RATE: 18 BRPM | HEIGHT: 67 IN

## 2020-06-23 DIAGNOSIS — R07.9 CHEST PAIN: ICD-10-CM

## 2020-06-23 DIAGNOSIS — F29 PSYCHOSIS, UNSPECIFIED PSYCHOSIS TYPE: Primary | ICD-10-CM

## 2020-06-23 LAB
ALBUMIN SERPL BCP-MCNC: 3.9 G/DL (ref 3.5–5.2)
ALP SERPL-CCNC: 72 U/L (ref 55–135)
ALT SERPL W/O P-5'-P-CCNC: 52 U/L (ref 10–44)
AMPHET+METHAMPHET UR QL: NEGATIVE
ANION GAP SERPL CALC-SCNC: 15 MMOL/L (ref 8–16)
APAP SERPL-MCNC: <3 UG/ML (ref 10–20)
AST SERPL-CCNC: 75 U/L (ref 10–40)
BARBITURATES UR QL SCN>200 NG/ML: NEGATIVE
BASOPHILS # BLD AUTO: 0.05 K/UL (ref 0–0.2)
BASOPHILS NFR BLD: 0.5 % (ref 0–1.9)
BENZODIAZ UR QL SCN>200 NG/ML: NEGATIVE
BILIRUB SERPL-MCNC: 0.3 MG/DL (ref 0.1–1)
BILIRUB UR QL STRIP: NEGATIVE
BUN SERPL-MCNC: 9 MG/DL (ref 6–20)
BZE UR QL SCN: NEGATIVE
CALCIUM SERPL-MCNC: 9.1 MG/DL (ref 8.7–10.5)
CANNABINOIDS UR QL SCN: NEGATIVE
CHLORIDE SERPL-SCNC: 108 MMOL/L (ref 95–110)
CLARITY UR: CLEAR
CO2 SERPL-SCNC: 16 MMOL/L (ref 23–29)
COLOR UR: NORMAL
CREAT SERPL-MCNC: 1 MG/DL (ref 0.5–1.4)
CREAT UR-MCNC: 39.9 MG/DL (ref 15–325)
DIFFERENTIAL METHOD: ABNORMAL
EOSINOPHIL # BLD AUTO: 0 K/UL (ref 0–0.5)
EOSINOPHIL NFR BLD: 0.3 % (ref 0–8)
ERYTHROCYTE [DISTWIDTH] IN BLOOD BY AUTOMATED COUNT: 14.6 % (ref 11.5–14.5)
EST. GFR  (AFRICAN AMERICAN): >60 ML/MIN/1.73 M^2
EST. GFR  (NON AFRICAN AMERICAN): >60 ML/MIN/1.73 M^2
ETHANOL SERPL-MCNC: <10 MG/DL
GLUCOSE SERPL-MCNC: 159 MG/DL (ref 70–110)
GLUCOSE UR QL STRIP: NEGATIVE
HCT VFR BLD AUTO: 35.8 % (ref 37–48.5)
HGB BLD-MCNC: 12 G/DL (ref 12–16)
HGB UR QL STRIP: NEGATIVE
IMM GRANULOCYTES # BLD AUTO: 0.05 K/UL (ref 0–0.04)
IMM GRANULOCYTES NFR BLD AUTO: 0.5 % (ref 0–0.5)
KETONES UR QL STRIP: NEGATIVE
LEUKOCYTE ESTERASE UR QL STRIP: NEGATIVE
LYMPHOCYTES # BLD AUTO: 2.9 K/UL (ref 1–4.8)
LYMPHOCYTES NFR BLD: 27.3 % (ref 18–48)
MCH RBC QN AUTO: 27.5 PG (ref 27–31)
MCHC RBC AUTO-ENTMCNC: 33.5 G/DL (ref 32–36)
MCV RBC AUTO: 82 FL (ref 82–98)
METHADONE UR QL SCN>300 NG/ML: NEGATIVE
MONOCYTES # BLD AUTO: 1.1 K/UL (ref 0.3–1)
MONOCYTES NFR BLD: 10.5 % (ref 4–15)
NEUTROPHILS # BLD AUTO: 6.4 K/UL (ref 1.8–7.7)
NEUTROPHILS NFR BLD: 60.9 % (ref 38–73)
NITRITE UR QL STRIP: NEGATIVE
NRBC BLD-RTO: 0 /100 WBC
OPIATES UR QL SCN: NEGATIVE
PCP UR QL SCN>25 NG/ML: NEGATIVE
PH UR STRIP: 6 [PH] (ref 5–8)
PLATELET # BLD AUTO: 310 K/UL (ref 150–350)
PMV BLD AUTO: 9.7 FL (ref 9.2–12.9)
POTASSIUM SERPL-SCNC: 3.7 MMOL/L (ref 3.5–5.1)
PROT SERPL-MCNC: 7.3 G/DL (ref 6–8.4)
PROT UR QL STRIP: NEGATIVE
RBC # BLD AUTO: 4.37 M/UL (ref 4–5.4)
SARS-COV-2 RDRP RESP QL NAA+PROBE: NEGATIVE
SODIUM SERPL-SCNC: 139 MMOL/L (ref 136–145)
SP GR UR STRIP: 1 (ref 1–1.03)
TOXICOLOGY INFORMATION: NORMAL
TROPONIN I SERPL DL<=0.01 NG/ML-MCNC: 0.01 NG/ML (ref 0–0.03)
TSH SERPL DL<=0.005 MIU/L-ACNC: 1.64 UIU/ML (ref 0.4–4)
URN SPEC COLLECT METH UR: NORMAL
UROBILINOGEN UR STRIP-ACNC: NEGATIVE EU/DL
WBC # BLD AUTO: 10.55 K/UL (ref 3.9–12.7)

## 2020-06-23 PROCEDURE — 93010 ELECTROCARDIOGRAM REPORT: CPT | Mod: ,,, | Performed by: INTERNAL MEDICINE

## 2020-06-23 PROCEDURE — 93005 ELECTROCARDIOGRAM TRACING: CPT

## 2020-06-23 PROCEDURE — 84484 ASSAY OF TROPONIN QUANT: CPT

## 2020-06-23 PROCEDURE — 93010 EKG 12-LEAD: ICD-10-PCS | Mod: ,,, | Performed by: INTERNAL MEDICINE

## 2020-06-23 PROCEDURE — 99285 EMERGENCY DEPT VISIT HI MDM: CPT | Mod: 25

## 2020-06-23 PROCEDURE — 84443 ASSAY THYROID STIM HORMONE: CPT

## 2020-06-23 PROCEDURE — 63600175 PHARM REV CODE 636 W HCPCS: Performed by: EMERGENCY MEDICINE

## 2020-06-23 PROCEDURE — 80329 ANALGESICS NON-OPIOID 1 OR 2: CPT

## 2020-06-23 PROCEDURE — 96372 THER/PROPH/DIAG INJ SC/IM: CPT

## 2020-06-23 PROCEDURE — 80320 DRUG SCREEN QUANTALCOHOLS: CPT

## 2020-06-23 PROCEDURE — 80307 DRUG TEST PRSMV CHEM ANLYZR: CPT

## 2020-06-23 PROCEDURE — U0002 COVID-19 LAB TEST NON-CDC: HCPCS

## 2020-06-23 PROCEDURE — 25000003 PHARM REV CODE 250: Performed by: EMERGENCY MEDICINE

## 2020-06-23 PROCEDURE — 81003 URINALYSIS AUTO W/O SCOPE: CPT | Mod: 59

## 2020-06-23 PROCEDURE — 85025 COMPLETE CBC W/AUTO DIFF WBC: CPT

## 2020-06-23 PROCEDURE — 80053 COMPREHEN METABOLIC PANEL: CPT

## 2020-06-23 RX ORDER — OLANZAPINE 5 MG/1
10 TABLET, ORALLY DISINTEGRATING ORAL ONCE
Status: COMPLETED | OUTPATIENT
Start: 2020-06-23 | End: 2020-06-23

## 2020-06-23 RX ORDER — ZIPRASIDONE MESYLATE 20 MG/ML
10 INJECTION, POWDER, LYOPHILIZED, FOR SOLUTION INTRAMUSCULAR
Status: COMPLETED | OUTPATIENT
Start: 2020-06-23 | End: 2020-06-23

## 2020-06-23 RX ADMIN — OLANZAPINE 10 MG: 5 TABLET, ORALLY DISINTEGRATING ORAL at 01:06

## 2020-06-23 RX ADMIN — ZIPRASIDONE MESYLATE 10 MG: 20 INJECTION, POWDER, LYOPHILIZED, FOR SOLUTION INTRAMUSCULAR at 11:06

## 2020-06-23 NOTE — ED NOTES
Pt became upset came into hallways, refusing to go back into her room and started to yell at and cuss at staff members, pt moved to psych room and is verbally unhappy about it

## 2020-06-23 NOTE — ED NOTES
Pt sitting on bed drinking a coke at this time, states she can not leave urine at this time, sitter remains at bedside

## 2020-06-23 NOTE — ED NOTES
Pt medically cleared pending urine results, pt still unable to leave urine at this time, will attempt soon

## 2020-06-23 NOTE — ED PROVIDER NOTES
Encounter Date: 6/23/2020    SCRIBE #1 NOTE: I, Fay Singleton, am scribing for, and in the presence of,  Wyatt Garcia MD. I have scribed the following portions of the note - Other sections scribed: HPI, ROS, PE.       History     Chief Complaint   Patient presents with    Psychiatric Evaluation     pt brought in by daughter reporting that pt is schizophrenic and needs to see a doctor; pt very restless/fidgety, mumbling to self, and occationally yells out loud     CC: Psychiatric evaluation     Patient is a 59 y.o female with a PMHx of Schizophrenia who presents to the ED for a behavior problem that began today. Per daughter, patient has been hyperactive and behaving bizarrely throughout the day. She states the patient complained of chest pain earlier today. Patient reportedly hasn't slept in 2-3 days. Patient states she hasn't eaten in 5 days. Her daughter is unsure if she has taken her medications. Patient is unable to recall the names of her medications. She remembers previously taking Depakote, Trazodone, and Haldol. No other associated symptoms. No further complaints.     The history is provided by the patient and a relative.     Review of patient's allergies indicates:  No Known Allergies  Past Medical History:   Diagnosis Date    Breast cancer     History of psychiatric hospitalization     Hx of psychiatric care     Hyperlipidemia     Psychiatric problem     Schizophrenia     Therapy     Thyroid disease     thyroid surgery     Past Surgical History:   Procedure Laterality Date    BREAST BIOPSY      BREAST LUMPECTOMY      BREAST SURGERY      THYROIDECTOMY  2005     Family History   Problem Relation Age of Onset    Cancer Neg Hx      Social History     Tobacco Use    Smoking status: Current Every Day Smoker     Packs/day: 0.50     Years: 37.00     Pack years: 18.50     Types: Cigarettes    Smokeless tobacco: Former User     Quit date: 12/27/2014   Substance Use Topics    Alcohol use: Not  Currently     Comment: occasionally    Drug use: No     Review of Systems   Constitutional: Positive for appetite change (Decreased appetite).   Eyes: Negative.    Respiratory: Negative.    Cardiovascular: Positive for chest pain.   Gastrointestinal: Negative.    Genitourinary: Negative.    Musculoskeletal: Negative.    Skin: Negative.    Neurological: Negative.    Psychiatric/Behavioral: Positive for behavioral problems and sleep disturbance. The patient is hyperactive.        Physical Exam     Initial Vitals [06/23/20 0102]   BP Pulse Resp Temp SpO2   (!) 154/101 (!) 135 (!) 22 98.7 °F (37.1 °C) 98 %      MAP       --         Physical Exam    Nursing note and vitals reviewed.  Constitutional: She appears well-developed. She is not diaphoretic. No distress.   HENT:   Head: Normocephalic and atraumatic.   Eyes: EOM are normal. Pupils are equal, round, and reactive to light.   Neck: Normal range of motion. Neck supple. No tracheal deviation present. No JVD present.   Cardiovascular: Normal rate, regular rhythm and normal heart sounds.   No murmur heard.  Pulmonary/Chest: Breath sounds normal. No respiratory distress. She has no wheezes. She has no rhonchi. She has no rales.   Abdominal: Soft. Bowel sounds are normal. She exhibits no distension. There is no abdominal tenderness. There is no rebound and no guarding.   Musculoskeletal: Normal range of motion. No tenderness or edema.   Skin: Skin is warm and dry. Capillary refill takes less than 2 seconds. No rash noted. No erythema.         ED Course   Procedures  Labs Reviewed   ACETAMINOPHEN LEVEL - Abnormal; Notable for the following components:       Result Value    Acetaminophen (Tylenol), Serum <3.0 (*)     All other components within normal limits   COMPREHENSIVE METABOLIC PANEL - Abnormal; Notable for the following components:    CO2 16 (*)     Glucose 159 (*)     AST 75 (*)     ALT 52 (*)     All other components within normal limits   CBC W/ AUTO DIFFERENTIAL  - Abnormal; Notable for the following components:    Hematocrit 35.8 (*)     RDW 14.6 (*)     Immature Grans (Abs) 0.05 (*)     Mono # 1.1 (*)     All other components within normal limits   URINALYSIS, REFLEX TO URINE CULTURE    Narrative:     Preferred Collection Type->Urine, Clean Catch  Specimen Source->Urine   ALCOHOL,MEDICAL (ETHANOL)   TSH   DRUG SCREEN PANEL, URINE EMERGENCY    Narrative:     Preferred Collection Type->Urine, Clean Catch  Specimen Source->Urine   TROPONIN I   SARS-COV-2 RNA AMPLIFICATION, QUAL        ECG Results          EKG 12-lead (Final result)  Result time 06/24/20 10:28:08    Final result by Interface, Lab In [facility] (06/24/20 10:28:08)                 Narrative:    Test Reason : R07.9,    Vent. Rate : 129 BPM     Atrial Rate : 129 BPM     P-R Int : 140 ms          QRS Dur : 064 ms      QT Int : 308 ms       P-R-T Axes : 065 -12 056 degrees     QTc Int : 451 ms    Sinus tachycardia  Biatrial enlargement  Anteroseptal infarct (cited on or before 03-SEP-2019)  Abnormal ECG  When compared with ECG of 03-SEP-2019 22:08,  Significant changes have occurred  Confirmed by José Bruce MD ([id number]) on 6/24/2020 10:27:55 AM    Referred By: [email or web address]   SELF           Confirmed By:José Bruce MD                             EKG 12-LEAD (Final result)  Result time 07/01/20 13:53:08    Final result by Unknown User (07/01/20 13:53:08)                                Imaging Results    None          Medical Decision Making:   History:   Old Medical Records: I decided to obtain old medical records.  Independently Interpreted Test(s):   I have ordered and independently interpreted EKG Reading(s) - see prior notes  Clinical Tests:   Lab Tests: Ordered and Reviewed  Medical Tests: Ordered and Reviewed            Scribe Attestation:   Scribe #1: I performed the above scribed service and the documentation accurately describes the services I performed. I attest to the accuracy of the note.      Patient  underwent a work up in the emergency department including labs and pyhsical exam, no acute organic cause of the patient's current psychiatric illness has been identified at this time. Patient medically cleared for psychiatric evaluation.                Patient Condition: The patient has been stabilized such that, within reasonable medical probability, no material deterioration of the patient's condition or the condition of the unborn child(deep) is likely to result from transfer.  Reason for Transfer: Service(s) unavailable  Benefits of Transfer: Psychiatric Care  Risks of Transfer: Transportation risks  Accepting Physician: Dr. Zuniga  Sending Physician: Dr. Radha AHMADI Certification: Patient examined and risks and benefits explained, Patient and/or family/representative verbalize understanding        Clinical Impression:       ICD-10-CM ICD-9-CM   1. Psychosis, unspecified psychosis type  F29 298.9   2. Chest pain  R07.9 786.50             ED Disposition Condition    Transfer to Psych Facility         ED Prescriptions     None        Follow-up Information    None                         I, Wyatt Garcia M.D., personally performed the services described in this documentation. All medical record entries made by the scribe were at my direction and in my presence.  I have reviewed the chart and agree that the record reflects my personal performance and is accurate and complete.             Wyatt Garcia MD  07/08/20 1254

## 2020-06-23 NOTE — ED TRIAGE NOTES
Pt arrives to room, extremely briskly walking and hyperactive.  Pt family member at bedside, pt ok for exam but asks to speak to MD alone later on, MD agrees.

## 2020-06-23 NOTE — ED PROVIDER NOTES
Pt signed out to me by Dr. Calderon. Pt is medically stable, awaiting psych placement.    Labs/chart reviewed. Pt reassessed. Remains medically stable.    Labs Reviewed   ACETAMINOPHEN LEVEL - Abnormal; Notable for the following components:       Result Value    Acetaminophen (Tylenol), Serum <3.0 (*)     All other components within normal limits   COMPREHENSIVE METABOLIC PANEL - Abnormal; Notable for the following components:    CO2 16 (*)     Glucose 159 (*)     AST 75 (*)     ALT 52 (*)     All other components within normal limits   CBC W/ AUTO DIFFERENTIAL - Abnormal; Notable for the following components:    Hematocrit 35.8 (*)     RDW 14.6 (*)     Immature Grans (Abs) 0.05 (*)     Mono # 1.1 (*)     All other components within normal limits   URINALYSIS, REFLEX TO URINE CULTURE    Narrative:     Preferred Collection Type->Urine, Clean Catch  Specimen Source->Urine   ALCOHOL,MEDICAL (ETHANOL)   TSH   DRUG SCREEN PANEL, URINE EMERGENCY    Narrative:     Preferred Collection Type->Urine, Clean Catch  Specimen Source->Urine   TROPONIN I   SARS-COV-2 RNA AMPLIFICATION, QUAL       No orders to display          Ilana Márquez MD  06/23/20 0900       Wyatt Garcia MD  07/08/20 0807       Ilana Márquez MD  07/10/20 0650

## 2020-06-23 NOTE — ED NOTES
Patient escorted out with her belongings by Security to be transferred to outside facility.  Patient left with \[facility]\"" unit number 40.

## 2020-06-23 NOTE — ED NOTES
Pt able to leave urine sample at this time, pt is making manic, quick movements and answering in short responses

## 2020-06-24 PROBLEM — R79.89 ELEVATED LFTS: Status: ACTIVE | Noted: 2020-06-24

## 2020-06-24 PROBLEM — E44.1 MALNUTRITION OF MILD DEGREE: Status: ACTIVE | Noted: 2020-06-24

## 2020-06-24 PROBLEM — E11.9 TYPE 2 DIABETES MELLITUS: Status: ACTIVE | Noted: 2020-06-24

## 2020-07-03 ENCOUNTER — HOSPITAL ENCOUNTER (EMERGENCY)
Facility: HOSPITAL | Age: 60
Discharge: HOME | End: 2020-07-03
Attending: EMERGENCY MEDICINE
Payer: MEDICARE

## 2020-07-03 VITALS
TEMPERATURE: 98 F | HEIGHT: 67 IN | SYSTOLIC BLOOD PRESSURE: 137 MMHG | DIASTOLIC BLOOD PRESSURE: 70 MMHG | HEART RATE: 100 BPM | OXYGEN SATURATION: 100 % | BODY MASS INDEX: 25.11 KG/M2 | WEIGHT: 160 LBS | RESPIRATION RATE: 18 BRPM

## 2020-07-03 DIAGNOSIS — R07.89 ATYPICAL CHEST PAIN: Primary | ICD-10-CM

## 2020-07-03 DIAGNOSIS — R07.9 CHEST PAIN: ICD-10-CM

## 2020-07-03 LAB
ALBUMIN SERPL BCP-MCNC: 3.7 G/DL (ref 3.5–5.2)
ALP SERPL-CCNC: 71 U/L (ref 55–135)
ALT SERPL W/O P-5'-P-CCNC: 41 U/L (ref 10–44)
ANION GAP SERPL CALC-SCNC: 11 MMOL/L (ref 8–16)
AST SERPL-CCNC: 24 U/L (ref 10–40)
B-HCG UR QL: NEGATIVE
BASOPHILS # BLD AUTO: 0.05 K/UL (ref 0–0.2)
BASOPHILS NFR BLD: 0.6 % (ref 0–1.9)
BILIRUB SERPL-MCNC: 0.2 MG/DL (ref 0.1–1)
BUN SERPL-MCNC: 7 MG/DL (ref 6–20)
CALCIUM SERPL-MCNC: 9.4 MG/DL (ref 8.7–10.5)
CHLORIDE SERPL-SCNC: 105 MMOL/L (ref 95–110)
CO2 SERPL-SCNC: 22 MMOL/L (ref 23–29)
CREAT SERPL-MCNC: 0.8 MG/DL (ref 0.5–1.4)
CTP QC/QA: YES
D DIMER PPP IA.FEU-MCNC: 0.51 MG/L FEU
DIFFERENTIAL METHOD: ABNORMAL
EOSINOPHIL # BLD AUTO: 0.1 K/UL (ref 0–0.5)
EOSINOPHIL NFR BLD: 1.4 % (ref 0–8)
ERYTHROCYTE [DISTWIDTH] IN BLOOD BY AUTOMATED COUNT: 15.6 % (ref 11.5–14.5)
EST. GFR  (AFRICAN AMERICAN): >60 ML/MIN/1.73 M^2
EST. GFR  (NON AFRICAN AMERICAN): >60 ML/MIN/1.73 M^2
GLUCOSE SERPL-MCNC: 117 MG/DL (ref 70–110)
HCT VFR BLD AUTO: 36.3 % (ref 37–48.5)
HGB BLD-MCNC: 12 G/DL (ref 12–16)
IMM GRANULOCYTES # BLD AUTO: 0.06 K/UL (ref 0–0.04)
IMM GRANULOCYTES NFR BLD AUTO: 0.7 % (ref 0–0.5)
LYMPHOCYTES # BLD AUTO: 3.6 K/UL (ref 1–4.8)
LYMPHOCYTES NFR BLD: 43 % (ref 18–48)
MCH RBC QN AUTO: 27 PG (ref 27–31)
MCHC RBC AUTO-ENTMCNC: 33.1 G/DL (ref 32–36)
MCV RBC AUTO: 82 FL (ref 82–98)
MONOCYTES # BLD AUTO: 1 K/UL (ref 0.3–1)
MONOCYTES NFR BLD: 12.2 % (ref 4–15)
NEUTROPHILS # BLD AUTO: 3.5 K/UL (ref 1.8–7.7)
NEUTROPHILS NFR BLD: 42.1 % (ref 38–73)
NRBC BLD-RTO: 0 /100 WBC
PLATELET # BLD AUTO: 292 K/UL (ref 150–350)
PMV BLD AUTO: 9.5 FL (ref 9.2–12.9)
POTASSIUM SERPL-SCNC: 3.9 MMOL/L (ref 3.5–5.1)
PROT SERPL-MCNC: 7.3 G/DL (ref 6–8.4)
RBC # BLD AUTO: 4.45 M/UL (ref 4–5.4)
SODIUM SERPL-SCNC: 138 MMOL/L (ref 136–145)
TROPONIN I SERPL DL<=0.01 NG/ML-MCNC: 0.02 NG/ML (ref 0–0.03)
WBC # BLD AUTO: 8.33 K/UL (ref 3.9–12.7)

## 2020-07-03 PROCEDURE — 25000003 PHARM REV CODE 250: Performed by: EMERGENCY MEDICINE

## 2020-07-03 PROCEDURE — 99285 EMERGENCY DEPT VISIT HI MDM: CPT | Mod: 25

## 2020-07-03 PROCEDURE — 93010 ELECTROCARDIOGRAM REPORT: CPT | Mod: ,,, | Performed by: INTERNAL MEDICINE

## 2020-07-03 PROCEDURE — 80053 COMPREHEN METABOLIC PANEL: CPT

## 2020-07-03 PROCEDURE — 63600175 PHARM REV CODE 636 W HCPCS: Performed by: EMERGENCY MEDICINE

## 2020-07-03 PROCEDURE — 93005 ELECTROCARDIOGRAM TRACING: CPT

## 2020-07-03 PROCEDURE — 81025 URINE PREGNANCY TEST: CPT | Performed by: EMERGENCY MEDICINE

## 2020-07-03 PROCEDURE — 96374 THER/PROPH/DIAG INJ IV PUSH: CPT

## 2020-07-03 PROCEDURE — 82962 GLUCOSE BLOOD TEST: CPT

## 2020-07-03 PROCEDURE — 93010 EKG 12-LEAD: ICD-10-PCS | Mod: ,,, | Performed by: INTERNAL MEDICINE

## 2020-07-03 PROCEDURE — 85379 FIBRIN DEGRADATION QUANT: CPT

## 2020-07-03 PROCEDURE — 85025 COMPLETE CBC W/AUTO DIFF WBC: CPT

## 2020-07-03 PROCEDURE — 84484 ASSAY OF TROPONIN QUANT: CPT

## 2020-07-03 RX ORDER — KETOROLAC TROMETHAMINE 30 MG/ML
10 INJECTION, SOLUTION INTRAMUSCULAR; INTRAVENOUS
Status: COMPLETED | OUTPATIENT
Start: 2020-07-03 | End: 2020-07-03

## 2020-07-03 RX ADMIN — LIDOCAINE HYDROCHLORIDE: 20 SOLUTION ORAL; TOPICAL at 05:07

## 2020-07-03 RX ADMIN — KETOROLAC TROMETHAMINE 10 MG: 30 INJECTION, SOLUTION INTRAMUSCULAR at 05:07

## 2020-07-03 NOTE — ED TRIAGE NOTES
Pt presents to ED with CC of chronic mid back pain she rates 8/10. Pt also reports feeling weak and dizzy, having no appetite.     Denies any other complaints at this time.    AAOx4, able to verbalize and follow commands, ambulate independently.

## 2020-07-03 NOTE — ED PROVIDER NOTES
Encounter Date: 7/3/2020       History     Chief Complaint   Patient presents with    Back Pain     pt comes in via EMS with c/o mid back pain. states this has been ongoing x2 weeks, states it was hurting through to her chest while she was inpatient psych 2 weeks ago. She states they told her there that she has HTN, but she did not  medication. She states she is taking her psych meds as prescribed. hx of schitzophrenia.      59-year-old female with history of schizophrenia presents complaining of chest pain and thoracic back pain.  She states that the pain shoots through her chest.  States he has ongoing for 1 month.  States she has had this before prior.  Describes it mostly as sharp.  Denies any aggravating or alleviating factors.  Denies any other associated symptoms.  Patient does smoke cigarettes.  Patient was at this hospital last week and was placed under a pec and went to an acute inpatient psychiatric facility.  She states she was there so they could monitor her heart because of her chest pains but then did not resolve her chest pain.  She lacks insight into the Psychiatric aspect however she denies any acute psychiatric illness.        Review of patient's allergies indicates:  No Known Allergies  Past Medical History:   Diagnosis Date    Breast cancer     History of psychiatric hospitalization     Hx of psychiatric care     Hyperlipidemia     Psychiatric problem     Schizophrenia     Therapy     Thyroid disease     thyroid surgery     Past Surgical History:   Procedure Laterality Date    BREAST BIOPSY      BREAST LUMPECTOMY      BREAST SURGERY      THYROIDECTOMY  2005     Family History   Problem Relation Age of Onset    Cancer Neg Hx      Social History     Tobacco Use    Smoking status: Current Every Day Smoker     Packs/day: 0.50     Years: 37.00     Pack years: 18.50     Types: Cigarettes    Smokeless tobacco: Former User     Quit date: 12/27/2014   Substance Use Topics     Alcohol use: Not Currently     Comment: occasionally    Drug use: No     Review of Systems   Constitutional: Negative for fever.   HENT: Negative for sore throat.    Eyes: Negative for visual disturbance.   Respiratory: Negative for cough, choking and shortness of breath.    Cardiovascular: Positive for chest pain. Negative for leg swelling.   Gastrointestinal: Negative for abdominal pain, blood in stool, nausea and vomiting.   Genitourinary: Negative for difficulty urinating.   Musculoskeletal: Negative for back pain.   Skin: Negative for rash.   Neurological: Negative for headaches.   Psychiatric/Behavioral: Negative for confusion, dysphoric mood, hallucinations, self-injury and suicidal ideas. The patient is nervous/anxious. The patient is not hyperactive.        Physical Exam     Initial Vitals [07/03/20 1606]   BP Pulse Resp Temp SpO2   135/63 (!) 130 19 98.3 °F (36.8 °C) 98 %      MAP       --         Physical Exam    Nursing note and vitals reviewed.  Constitutional: She appears well-developed and well-nourished.   Eyes: EOM are normal. Pupils are equal, round, and reactive to light.   Neck: Normal range of motion. Neck supple. No thyromegaly present. No JVD present.   Cardiovascular: Normal rate, regular rhythm, normal heart sounds and intact distal pulses. Exam reveals no gallop and no friction rub.    No murmur heard.  Pulmonary/Chest: Breath sounds normal. No respiratory distress. She exhibits no tenderness.   Abdominal: Soft. Bowel sounds are normal. There is no abdominal tenderness.   Musculoskeletal: Normal range of motion. No tenderness or edema.   Neurological: She is alert and oriented to person, place, and time. She has normal strength. GCS score is 15. GCS eye subscore is 4. GCS verbal subscore is 5. GCS motor subscore is 6.   Skin: Skin is warm and dry.         ED Course   Procedures  Labs Reviewed   CBC W/ AUTO DIFFERENTIAL - Abnormal; Notable for the following components:       Result Value     Hematocrit 36.3 (*)     RDW 15.6 (*)     Immature Granulocytes 0.7 (*)     Immature Grans (Abs) 0.06 (*)     All other components within normal limits   COMPREHENSIVE METABOLIC PANEL - Abnormal; Notable for the following components:    CO2 22 (*)     Glucose 117 (*)     All other components within normal limits   D DIMER, QUANTITATIVE - Abnormal; Notable for the following components:    D-Dimer 0.51 (*)     All other components within normal limits   TROPONIN I   POCT URINE PREGNANCY     EKG Readings: (Independently Interpreted)   Initial Reading: No STEMI. Rhythm: Sinus Tachycardia. Heart Rate: 118. Ectopy: No Ectopy. Conduction: Normal.   Biatrial enlargement       Imaging Results          X-Ray Chest 1 View (Final result)  Result time 07/03/20 18:24:16    Final result by Elkin Rivera MD (07/03/20 18:24:16)                 Impression:      No detrimental change or radiographic acute intrathoracic process seen noting resolution of previous bilateral pulmonary opacities.      Electronically signed by: Elkin Rivera MD  Date:    07/03/2020  Time:    18:24             Narrative:    EXAMINATION:  XR CHEST 1 VIEW    CLINICAL HISTORY:  Chest pain, unspecified    TECHNIQUE:  Single frontal view of the chest was performed.    COMPARISON:  Chest radiograph 12/10/2017    FINDINGS:  No detrimental change.Resolution of previous bilateral pulmonary opacities.  The lungs are well expanded without large consolidation, pleural effusion or pneumothorax noting few residual linear opacities at the bases consistent with subsegmental scarring versus atelectasis.  Chronic mild nonspecific elevation of the right hemidiaphragm.    The cardiac silhouette is normal in size. The hilar and mediastinal contours are within normal limits.    Bones are intact.  PA and lateral views can be obtained.                             Patient presents tachycardic initially.  Heart rate has now normalized.  Reports chest pains x1 month.  Chest pains are  atypical.  Will check troponin, D-dimer.    Troponin normal.  D-dimer normal when adjusted for age.  During ER stay patient went outside and had a cigarette.  Chest x-ray still pending.  Patient turned over to Dr. Garcia for follow-up of the chest x-ray and reassessment and disposition.    CXR - unremarkable. Pt presentation consistent with chest pain, suspected MSK etiology.  At this time given patient's history, physical exam, and ED workup do not suspect arrhythmia, MI/ACS, PE, PTX, aortic dissection, pericarditis, PNA, shingles, or any further malignant cause.  Discussed diagnosis and further treatment with patient, return precautions given and all questions answered.  Patient in understanding of plan.  Pt discharged to home improved and stable.          Additional MDM:   Heart Score:    History:          Slightly suspicious.  ECG:             Nonspecific repolarisation disturbance  Age:               45-65 years  Risk factors: 1-2 risk factors  Troponin:       Less than or equal to normal limit  Final Score: 3                                  Clinical Impression:       ICD-10-CM ICD-9-CM   1. Atypical chest pain  R07.89 786.59   2. Chest pain  R07.9 786.50             ED Disposition Condition    Discharge Stable        ED Prescriptions     None        Follow-up Information     Follow up With Specialties Details Why Contact Info    [facility] Emergency Medicine Go to  If symptoms worsen [address]  [facility] [address] [phone]    Ant Diaz MD Internal Medicine Go in 1 week As needed [address]  [address]  [place] [address]  [phone]                                       Wyatt Garcia MD  07/04/20 6667

## 2020-07-04 LAB — POCT GLUCOSE: 103 MG/DL (ref 70–110)

## 2021-04-07 DIAGNOSIS — M25.551 PAIN IN RIGHT HIP: Primary | ICD-10-CM

## 2021-09-26 ENCOUNTER — HOSPITAL ENCOUNTER (EMERGENCY)
Facility: HOSPITAL | Age: 61
Discharge: HOME | End: 2021-09-26
Attending: EMERGENCY MEDICINE
Payer: MEDICARE

## 2021-09-26 VITALS
HEART RATE: 92 BPM | DIASTOLIC BLOOD PRESSURE: 84 MMHG | HEIGHT: 67 IN | OXYGEN SATURATION: 95 % | RESPIRATION RATE: 18 BRPM | TEMPERATURE: 98 F | SYSTOLIC BLOOD PRESSURE: 136 MMHG | WEIGHT: 150 LBS | BODY MASS INDEX: 23.54 KG/M2

## 2021-09-26 DIAGNOSIS — R06.2 WHEEZING: ICD-10-CM
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
## 2021-09-26 DIAGNOSIS — Z53.29 LEFT AGAINST MEDICAL ADVICE: ICD-10-CM

## 2021-09-26 DIAGNOSIS — M25.551 RIGHT HIP PAIN: ICD-10-CM

## 2021-09-26 DIAGNOSIS — R07.81 RIB PAIN ON RIGHT SIDE: Primary | ICD-10-CM

## 2021-09-26 DIAGNOSIS — M54.16 LUMBAR RADICULOPATHY, RIGHT: ICD-10-CM

## 2021-09-26 LAB
ANION GAP SERPL CALC-SCNC: 17 MMOL/L (ref 8–16)
BILIRUB UR QL STRIP: NEGATIVE
BUN SERPL-MCNC: 13 MG/DL (ref 6–30)
CHLORIDE SERPL-SCNC: 103 MMOL/L (ref 95–110)
CLARITY UR: CLEAR
COLOR UR: YELLOW
CREAT SERPL-MCNC: 0.6 MG/DL (ref 0.5–1.4)
D DIMER PPP IA.FEU-MCNC: 0.5 MG/L FEU
GLUCOSE SERPL-MCNC: 114 MG/DL (ref 70–110)
GLUCOSE UR QL STRIP: NEGATIVE
HCT VFR BLD CALC: 46 %PCV (ref 36–54)
HGB UR QL STRIP: NEGATIVE
KETONES UR QL STRIP: NEGATIVE
LEUKOCYTE ESTERASE UR QL STRIP: NEGATIVE
NITRITE UR QL STRIP: NEGATIVE
PH UR STRIP: 7 [PH] (ref 5–8)
POC IONIZED CALCIUM: 1.3 MMOL/L (ref 1.06–1.42)
POC TCO2 (MEASURED): 27 MMOL/L (ref 23–29)
POTASSIUM BLD-SCNC: 4.3 MMOL/L (ref 3.5–5.1)
PROT UR QL STRIP: ABNORMAL
SAMPLE: ABNORMAL
SODIUM BLD-SCNC: 142 MMOL/L (ref 136–145)
SP GR UR STRIP: 1.02 (ref 1–1.03)
URN SPEC COLLECT METH UR: ABNORMAL
UROBILINOGEN UR STRIP-ACNC: NEGATIVE EU/DL

## 2021-09-26 PROCEDURE — 99285 EMERGENCY DEPT VISIT HI MDM: CPT | Mod: 25

## 2021-09-26 PROCEDURE — 81003 URINALYSIS AUTO W/O SCOPE: CPT | Performed by: NURSE PRACTITIONER

## 2021-09-26 PROCEDURE — 82565 ASSAY OF CREATININE: CPT

## 2021-09-26 PROCEDURE — 63600175 PHARM REV CODE 636 W HCPCS: Performed by: NURSE PRACTITIONER

## 2021-09-26 PROCEDURE — 99900035 HC TECH TIME PER 15 MIN (STAT)

## 2021-09-26 PROCEDURE — 85379 FIBRIN DEGRADATION QUANT: CPT | Performed by: NURSE PRACTITIONER

## 2021-09-26 PROCEDURE — 96374 THER/PROPH/DIAG INJ IV PUSH: CPT

## 2021-09-26 PROCEDURE — 25000242 PHARM REV CODE 250 ALT 637 W/ HCPCS: Performed by: NURSE PRACTITIONER

## 2021-09-26 PROCEDURE — 84295 ASSAY OF SERUM SODIUM: CPT

## 2021-09-26 PROCEDURE — 84132 ASSAY OF SERUM POTASSIUM: CPT

## 2021-09-26 PROCEDURE — 82330 ASSAY OF CALCIUM: CPT

## 2021-09-26 PROCEDURE — 94640 AIRWAY INHALATION TREATMENT: CPT

## 2021-09-26 PROCEDURE — 85014 HEMATOCRIT: CPT

## 2021-09-26 RX ORDER — MORPHINE SULFATE 4 MG/ML
4 INJECTION, SOLUTION INTRAMUSCULAR; INTRAVENOUS
Status: COMPLETED | OUTPATIENT
Start: 2021-09-26 | End: 2021-09-26

## 2021-09-26 RX ORDER — ATORVASTATIN CALCIUM 20 MG/1
20 TABLET, FILM COATED ORAL
COMMUNITY
Start: 2019-08-27

## 2021-09-26 RX ORDER — HALOPERIDOL 10 MG/1
TABLET ORAL
COMMUNITY
Start: 2019-07-23

## 2021-09-26 RX ORDER — IPRATROPIUM BROMIDE AND ALBUTEROL SULFATE 2.5; .5 MG/3ML; MG/3ML
3 SOLUTION RESPIRATORY (INHALATION)
Status: COMPLETED | OUTPATIENT
Start: 2021-09-26 | End: 2021-09-26

## 2021-09-26 RX ORDER — NAPROXEN 500 MG/1
500 TABLET ORAL 2 TIMES DAILY PRN
Qty: 10 TABLET | Refills: 0 | Status: SHIPPED | OUTPATIENT
Start: 2021-09-26 | End: 2021-10-01

## 2021-09-26 RX ORDER — PREDNISONE 20 MG/1
40 TABLET ORAL DAILY
Qty: 10 TABLET | Refills: 0 | Status: SHIPPED | OUTPATIENT
Start: 2021-09-26 | End: 2021-10-01

## 2021-09-26 RX ORDER — ALBUTEROL SULFATE 90 UG/1
1-2 AEROSOL, METERED RESPIRATORY (INHALATION) EVERY 6 HOURS PRN
Qty: 6.7 G | Refills: 0 | Status: SHIPPED | OUTPATIENT
Start: 2021-09-26 | End: 2022-09-26

## 2021-09-26 RX ORDER — FLUOXETINE HYDROCHLORIDE 40 MG/1
40 CAPSULE ORAL DAILY
COMMUNITY
Start: 2021-08-25

## 2021-09-26 RX ORDER — BENZTROPINE MESYLATE 1 MG/1
TABLET ORAL
COMMUNITY
Start: 2019-08-01

## 2021-09-26 RX ADMIN — MORPHINE SULFATE 4 MG: 4 INJECTION INTRAVENOUS at 12:09

## 2021-09-26 RX ADMIN — IPRATROPIUM BROMIDE AND ALBUTEROL SULFATE 3 ML: .5; 3 SOLUTION RESPIRATORY (INHALATION) at 11:09

## 2021-10-09 ENCOUNTER — HOSPITAL ENCOUNTER (EMERGENCY)
Facility: HOSPITAL | Age: 61
Discharge: HOME | End: 2021-10-09
Attending: EMERGENCY MEDICINE
Payer: MEDICARE

## 2021-10-09 VITALS
BODY MASS INDEX: 25.9 KG/M2 | HEIGHT: 67 IN | DIASTOLIC BLOOD PRESSURE: 75 MMHG | SYSTOLIC BLOOD PRESSURE: 130 MMHG | RESPIRATION RATE: 20 BRPM | OXYGEN SATURATION: 97 % | TEMPERATURE: 99 F | HEART RATE: 99 BPM | WEIGHT: 165 LBS

## 2021-10-09 DIAGNOSIS — R07.89 RIGHT-SIDED CHEST WALL PAIN: ICD-10-CM

## 2021-10-09 DIAGNOSIS — N63.0 BREAST MASS IN FEMALE: ICD-10-CM

## 2021-10-09 DIAGNOSIS — J18.9 PNEUMONIA OF RIGHT LUNG DUE TO INFECTIOUS ORGANISM, UNSPECIFIED PART OF LUNG: Primary | ICD-10-CM

## 2021-10-09 DIAGNOSIS — R07.9 CHEST PAIN: ICD-10-CM

## 2021-10-09 LAB
ALBUMIN SERPL BCP-MCNC: 3.4 G/DL (ref 3.5–5.2)
ALP SERPL-CCNC: 65 U/L (ref 55–135)
ALT SERPL W/O P-5'-P-CCNC: 32 U/L (ref 10–44)
AMPHET+METHAMPHET UR QL: NEGATIVE
ANION GAP SERPL CALC-SCNC: 12 MMOL/L (ref 8–16)
AST SERPL-CCNC: 28 U/L (ref 10–40)
BARBITURATES UR QL SCN>200 NG/ML: NEGATIVE
BASOPHILS # BLD AUTO: 0.05 K/UL (ref 0–0.2)
BASOPHILS NFR BLD: 0.4 % (ref 0–1.9)
BENZODIAZ UR QL SCN>200 NG/ML: NEGATIVE
BILIRUB SERPL-MCNC: 0.2 MG/DL (ref 0.1–1)
BILIRUB UR QL STRIP: NEGATIVE
BUN SERPL-MCNC: 10 MG/DL (ref 8–23)
BZE UR QL SCN: NEGATIVE
CALCIUM SERPL-MCNC: 10.3 MG/DL (ref 8.7–10.5)
CANNABINOIDS UR QL SCN: ABNORMAL
CHLORIDE SERPL-SCNC: 108 MMOL/L (ref 95–110)
CLARITY UR: CLEAR
CO2 SERPL-SCNC: 20 MMOL/L (ref 23–29)
COLOR UR: YELLOW
CREAT SERPL-MCNC: 0.8 MG/DL (ref 0.5–1.4)
CREAT UR-MCNC: 67.3 MG/DL (ref 15–325)
CTP QC/QA: YES
DIFFERENTIAL METHOD: ABNORMAL
EOSINOPHIL # BLD AUTO: 0.2 K/UL (ref 0–0.5)
EOSINOPHIL NFR BLD: 1.5 % (ref 0–8)
ERYTHROCYTE [DISTWIDTH] IN BLOOD BY AUTOMATED COUNT: 15.5 % (ref 11.5–14.5)
EST. GFR  (AFRICAN AMERICAN): >60 ML/MIN/1.73 M^2
EST. GFR  (NON AFRICAN AMERICAN): >60 ML/MIN/1.73 M^2
GLUCOSE SERPL-MCNC: 111 MG/DL (ref 70–110)
GLUCOSE UR QL STRIP: NEGATIVE
HCT VFR BLD AUTO: 35.7 % (ref 37–48.5)
HGB BLD-MCNC: 12 G/DL (ref 12–16)
HGB UR QL STRIP: ABNORMAL
IMM GRANULOCYTES # BLD AUTO: 0.08 K/UL (ref 0–0.04)
IMM GRANULOCYTES NFR BLD AUTO: 0.6 % (ref 0–0.5)
KETONES UR QL STRIP: NEGATIVE
LEUKOCYTE ESTERASE UR QL STRIP: NEGATIVE
LYMPHOCYTES # BLD AUTO: 3.9 K/UL (ref 1–4.8)
LYMPHOCYTES NFR BLD: 32 % (ref 18–48)
MCH RBC QN AUTO: 28.6 PG (ref 27–31)
MCHC RBC AUTO-ENTMCNC: 33.6 G/DL (ref 32–36)
MCV RBC AUTO: 85 FL (ref 82–98)
METHADONE UR QL SCN>300 NG/ML: NEGATIVE
MONOCYTES # BLD AUTO: 1 K/UL (ref 0.3–1)
MONOCYTES NFR BLD: 8.1 % (ref 4–15)
NEUTROPHILS # BLD AUTO: 7.1 K/UL (ref 1.8–7.7)
NEUTROPHILS NFR BLD: 57.4 % (ref 38–73)
NITRITE UR QL STRIP: NEGATIVE
NRBC BLD-RTO: 0 /100 WBC
OPIATES UR QL SCN: NEGATIVE
PCP UR QL SCN>25 NG/ML: NEGATIVE
PH UR STRIP: 5 [PH] (ref 5–8)
PLATELET # BLD AUTO: ABNORMAL K/UL (ref 150–450)
PMV BLD AUTO: ABNORMAL FL (ref 9.2–12.9)
POTASSIUM SERPL-SCNC: 4.5 MMOL/L (ref 3.5–5.1)
PROT SERPL-MCNC: 7.8 G/DL (ref 6–8.4)
PROT UR QL STRIP: NEGATIVE
RBC # BLD AUTO: 4.2 M/UL (ref 4–5.4)
SARS-COV-2 RDRP RESP QL NAA+PROBE: NEGATIVE
SODIUM SERPL-SCNC: 140 MMOL/L (ref 136–145)
SP GR UR STRIP: 1.01 (ref 1–1.03)
TOXICOLOGY INFORMATION: ABNORMAL
TSH SERPL DL<=0.005 MIU/L-ACNC: 2.29 UIU/ML (ref 0.4–4)
URN SPEC COLLECT METH UR: ABNORMAL
UROBILINOGEN UR STRIP-ACNC: NEGATIVE EU/DL
WBC # BLD AUTO: 12.33 K/UL (ref 3.9–12.7)

## 2021-10-09 PROCEDURE — 80053 COMPREHEN METABOLIC PANEL: CPT | Performed by: EMERGENCY MEDICINE

## 2021-10-09 PROCEDURE — 80307 DRUG TEST PRSMV CHEM ANLYZR: CPT | Performed by: EMERGENCY MEDICINE

## 2021-10-09 PROCEDURE — 96360 HYDRATION IV INFUSION INIT: CPT

## 2021-10-09 PROCEDURE — 93010 EKG 12-LEAD: ICD-10-PCS | Mod: ,,, | Performed by: INTERNAL MEDICINE

## 2021-10-09 PROCEDURE — 81003 URINALYSIS AUTO W/O SCOPE: CPT | Performed by: EMERGENCY MEDICINE

## 2021-10-09 PROCEDURE — 93005 ELECTROCARDIOGRAM TRACING: CPT

## 2021-10-09 PROCEDURE — 99285 EMERGENCY DEPT VISIT HI MDM: CPT | Mod: 25

## 2021-10-09 PROCEDURE — 85025 COMPLETE CBC W/AUTO DIFF WBC: CPT | Performed by: EMERGENCY MEDICINE

## 2021-10-09 PROCEDURE — 93010 ELECTROCARDIOGRAM REPORT: CPT | Mod: ,,, | Performed by: INTERNAL MEDICINE

## 2021-10-09 PROCEDURE — U0002 COVID-19 LAB TEST NON-CDC: HCPCS | Performed by: EMERGENCY MEDICINE

## 2021-10-09 PROCEDURE — 84443 ASSAY THYROID STIM HORMONE: CPT | Performed by: EMERGENCY MEDICINE

## 2021-10-09 PROCEDURE — 25000003 PHARM REV CODE 250: Performed by: EMERGENCY MEDICINE

## 2021-10-09 RX ORDER — DOXYCYCLINE 100 MG/1
100 CAPSULE ORAL 2 TIMES DAILY
Qty: 20 CAPSULE | Refills: 0 | Status: SHIPPED | OUTPATIENT
Start: 2021-10-09 | End: 2021-10-19

## 2021-10-09 RX ADMIN — SODIUM CHLORIDE 1000 ML: 0.9 INJECTION, SOLUTION INTRAVENOUS at 05:10

## 2021-10-15 ENCOUNTER — HOSPITAL ENCOUNTER (OUTPATIENT)
Dept: RADIOLOGY | Facility: HOSPITAL | Age: 61
Discharge: HOME | End: 2021-10-15
Attending: NURSE PRACTITIONER
Payer: MEDICARE

## 2021-10-15 DIAGNOSIS — M25.551 PAIN IN RIGHT HIP: ICD-10-CM

## 2021-10-15 PROCEDURE — 73521 XR HIPS BILATERAL 2 VIEW INCL AP PELVIS: ICD-10-PCS | Mod: 26,,, | Performed by: RADIOLOGY

## 2021-10-15 PROCEDURE — 73521 X-RAY EXAM HIPS BI 2 VIEWS: CPT | Mod: TC,FY

## 2021-10-15 PROCEDURE — 73521 X-RAY EXAM HIPS BI 2 VIEWS: CPT | Mod: 26,,, | Performed by: RADIOLOGY

## 2021-12-26 ENCOUNTER — HOSPITAL ENCOUNTER (INPATIENT)
Facility: HOSPITAL | Age: 61
LOS: 3 days | Discharge: HOME | DRG: 871 | End: 2021-12-29
Attending: EMERGENCY MEDICINE | Admitting: STUDENT IN AN ORGANIZED HEALTH CARE EDUCATION/TRAINING PROGRAM
Payer: MEDICARE

## 2021-12-26 DIAGNOSIS — U07.1 COVID-19 VIRUS INFECTION: ICD-10-CM

## 2021-12-26 DIAGNOSIS — R05.9 COUGH: ICD-10-CM

## 2021-12-26 DIAGNOSIS — U07.1 COVID-19 VIRUS DETECTED: ICD-10-CM

## 2021-12-26 DIAGNOSIS — R09.02 HYPOXIA: ICD-10-CM

## 2021-12-26 DIAGNOSIS — R53.83 FATIGUE: ICD-10-CM

## 2021-12-26 LAB
ALBUMIN SERPL BCP-MCNC: 3.4 G/DL (ref 3.5–5.2)
ALP SERPL-CCNC: 68 U/L (ref 55–135)
ALT SERPL W/O P-5'-P-CCNC: 15 U/L (ref 10–44)
ANION GAP SERPL CALC-SCNC: 12 MMOL/L (ref 8–16)
AST SERPL-CCNC: 19 U/L (ref 10–40)
BASOPHILS # BLD AUTO: 0.02 K/UL (ref 0–0.2)
BASOPHILS NFR BLD: 0.2 % (ref 0–1.9)
BILIRUB SERPL-MCNC: 0.2 MG/DL (ref 0.1–1)
BNP SERPL-MCNC: 18 PG/ML (ref 0–99)
BUN SERPL-MCNC: 8 MG/DL (ref 8–23)
CALCIUM SERPL-MCNC: 8.8 MG/DL (ref 8.7–10.5)
CHLORIDE SERPL-SCNC: 104 MMOL/L (ref 95–110)
CK SERPL-CCNC: 139 U/L (ref 20–180)
CO2 SERPL-SCNC: 19 MMOL/L (ref 23–29)
CREAT SERPL-MCNC: 0.8 MG/DL (ref 0.5–1.4)
CRP SERPL-MCNC: 32.2 MG/L (ref 0–8.2)
CTP QC/QA: YES
CTP QC/QA: YES
DIFFERENTIAL METHOD: ABNORMAL
EOSINOPHIL # BLD AUTO: 0 K/UL (ref 0–0.5)
EOSINOPHIL NFR BLD: 0.1 % (ref 0–8)
ERYTHROCYTE [DISTWIDTH] IN BLOOD BY AUTOMATED COUNT: 14.6 % (ref 11.5–14.5)
EST. GFR  (AFRICAN AMERICAN): >60 ML/MIN/1.73 M^2
EST. GFR  (NON AFRICAN AMERICAN): >60 ML/MIN/1.73 M^2
FERRITIN SERPL-MCNC: 229 NG/ML (ref 20–300)
GLUCOSE SERPL-MCNC: 147 MG/DL (ref 70–110)
HCT VFR BLD AUTO: 32.2 % (ref 37–48.5)
HGB BLD-MCNC: 10.8 G/DL (ref 12–16)
IMM GRANULOCYTES # BLD AUTO: 0.07 K/UL (ref 0–0.04)
IMM GRANULOCYTES NFR BLD AUTO: 0.5 % (ref 0–0.5)
LACTATE SERPL-SCNC: 1.6 MMOL/L (ref 0.5–2.2)
LDH SERPL L TO P-CCNC: 214 U/L (ref 110–260)
LYMPHOCYTES # BLD AUTO: 0.6 K/UL (ref 1–4.8)
LYMPHOCYTES NFR BLD: 4.2 % (ref 18–48)
MCH RBC QN AUTO: 27.2 PG (ref 27–31)
MCHC RBC AUTO-ENTMCNC: 33.5 G/DL (ref 32–36)
MCV RBC AUTO: 81 FL (ref 82–98)
MONOCYTES # BLD AUTO: 1.1 K/UL (ref 0.3–1)
MONOCYTES NFR BLD: 8.2 % (ref 4–15)
NEUTROPHILS # BLD AUTO: 11.4 K/UL (ref 1.8–7.7)
NEUTROPHILS NFR BLD: 86.8 % (ref 38–73)
NRBC BLD-RTO: 0 /100 WBC
PLATELET # BLD AUTO: ABNORMAL K/UL (ref 150–450)
PLATELET BLD QL SMEAR: ABNORMAL
PMV BLD AUTO: ABNORMAL FL (ref 9.2–12.9)
POC MOLECULAR INFLUENZA A AGN: NEGATIVE
POC MOLECULAR INFLUENZA B AGN: NEGATIVE
POTASSIUM SERPL-SCNC: 3.6 MMOL/L (ref 3.5–5.1)
PROCALCITONIN SERPL IA-MCNC: 0.19 NG/ML
PROT SERPL-MCNC: 7.2 G/DL (ref 6–8.4)
RBC # BLD AUTO: 3.97 M/UL (ref 4–5.4)
SARS-COV-2 RDRP RESP QL NAA+PROBE: POSITIVE
SODIUM SERPL-SCNC: 135 MMOL/L (ref 136–145)
TROPONIN I SERPL DL<=0.01 NG/ML-MCNC: 0.01 NG/ML (ref 0–0.03)
WBC # BLD AUTO: 13.07 K/UL (ref 3.9–12.7)

## 2021-12-26 PROCEDURE — 25000242 PHARM REV CODE 250 ALT 637 W/ HCPCS: Performed by: EMERGENCY MEDICINE

## 2021-12-26 PROCEDURE — 86140 C-REACTIVE PROTEIN: CPT | Performed by: EMERGENCY MEDICINE

## 2021-12-26 PROCEDURE — 27000207 HC ISOLATION

## 2021-12-26 PROCEDURE — 94640 AIRWAY INHALATION TREATMENT: CPT

## 2021-12-26 PROCEDURE — 27100098 HC SPACER

## 2021-12-26 PROCEDURE — 21400001 HC TELEMETRY ROOM

## 2021-12-26 PROCEDURE — 93005 ELECTROCARDIOGRAM TRACING: CPT

## 2021-12-26 PROCEDURE — 82550 ASSAY OF CK (CPK): CPT | Performed by: EMERGENCY MEDICINE

## 2021-12-26 PROCEDURE — 99291 CRITICAL CARE FIRST HOUR: CPT | Mod: 25

## 2021-12-26 PROCEDURE — 84145 PROCALCITONIN (PCT): CPT | Performed by: EMERGENCY MEDICINE

## 2021-12-26 PROCEDURE — 63600175 PHARM REV CODE 636 W HCPCS: Performed by: EMERGENCY MEDICINE

## 2021-12-26 PROCEDURE — 25000003 PHARM REV CODE 250: Performed by: EMERGENCY MEDICINE

## 2021-12-26 PROCEDURE — 83605 ASSAY OF LACTIC ACID: CPT | Performed by: EMERGENCY MEDICINE

## 2021-12-26 PROCEDURE — U0002 COVID-19 LAB TEST NON-CDC: HCPCS | Performed by: EMERGENCY MEDICINE

## 2021-12-26 PROCEDURE — 83880 ASSAY OF NATRIURETIC PEPTIDE: CPT | Performed by: EMERGENCY MEDICINE

## 2021-12-26 PROCEDURE — 93010 ELECTROCARDIOGRAM REPORT: CPT | Mod: ,,, | Performed by: INTERNAL MEDICINE

## 2021-12-26 PROCEDURE — 84484 ASSAY OF TROPONIN QUANT: CPT | Performed by: EMERGENCY MEDICINE

## 2021-12-26 PROCEDURE — 82728 ASSAY OF FERRITIN: CPT | Performed by: EMERGENCY MEDICINE

## 2021-12-26 PROCEDURE — 93010 EKG 12-LEAD: ICD-10-PCS | Mod: 76,,, | Performed by: INTERNAL MEDICINE

## 2021-12-26 PROCEDURE — 27000221 HC OXYGEN, UP TO 24 HOURS

## 2021-12-26 PROCEDURE — 83615 LACTATE (LD) (LDH) ENZYME: CPT | Performed by: EMERGENCY MEDICINE

## 2021-12-26 PROCEDURE — 87040 BLOOD CULTURE FOR BACTERIA: CPT | Performed by: EMERGENCY MEDICINE

## 2021-12-26 PROCEDURE — 80053 COMPREHEN METABOLIC PANEL: CPT | Performed by: EMERGENCY MEDICINE

## 2021-12-26 PROCEDURE — 87502 INFLUENZA DNA AMP PROBE: CPT

## 2021-12-26 PROCEDURE — 85025 COMPLETE CBC W/AUTO DIFF WBC: CPT | Performed by: EMERGENCY MEDICINE

## 2021-12-26 PROCEDURE — 12000002 HC ACUTE/MED SURGE SEMI-PRIVATE ROOM

## 2021-12-26 RX ORDER — BENZONATATE 100 MG/1
100 CAPSULE ORAL ONCE
Status: COMPLETED | OUTPATIENT
Start: 2021-12-26 | End: 2021-12-26

## 2021-12-26 RX ORDER — ALBUTEROL SULFATE 90 UG/1
4 AEROSOL, METERED RESPIRATORY (INHALATION) ONCE
Status: COMPLETED | OUTPATIENT
Start: 2021-12-26 | End: 2021-12-26

## 2021-12-26 RX ORDER — DEXAMETHASONE SODIUM PHOSPHATE 4 MG/ML
6 INJECTION, SOLUTION INTRA-ARTICULAR; INTRALESIONAL; INTRAMUSCULAR; INTRAVENOUS; SOFT TISSUE
Status: COMPLETED | OUTPATIENT
Start: 2021-12-26 | End: 2021-12-26

## 2021-12-26 RX ORDER — ACETAMINOPHEN 500 MG
1000 TABLET ORAL
Status: COMPLETED | OUTPATIENT
Start: 2021-12-26 | End: 2021-12-26

## 2021-12-26 RX ADMIN — SODIUM CHLORIDE 1000 ML: 0.9 INJECTION, SOLUTION INTRAVENOUS at 09:12

## 2021-12-26 RX ADMIN — BENZONATATE 100 MG: 100 CAPSULE ORAL at 08:12

## 2021-12-26 RX ADMIN — DEXAMETHASONE SODIUM PHOSPHATE 6 MG: 4 INJECTION INTRA-ARTICULAR; INTRALESIONAL; INTRAMUSCULAR; INTRAVENOUS; SOFT TISSUE at 10:12

## 2021-12-26 RX ADMIN — VANCOMYCIN HYDROCHLORIDE 2000 MG: 10 INJECTION, POWDER, LYOPHILIZED, FOR SOLUTION INTRAVENOUS at 11:12

## 2021-12-26 RX ADMIN — ALBUTEROL SULFATE 4 PUFF: 108 INHALANT RESPIRATORY (INHALATION) at 08:12

## 2021-12-26 RX ADMIN — PIPERACILLIN AND TAZOBACTAM 4.5 G: 4; .5 INJECTION, POWDER, LYOPHILIZED, FOR SOLUTION INTRAVENOUS; PARENTERAL at 09:12

## 2021-12-26 RX ADMIN — ACETAMINOPHEN 1000 MG: 500 TABLET ORAL at 08:12

## 2021-12-27 PROBLEM — U07.1 COVID-19: Status: ACTIVE | Noted: 2021-12-27

## 2021-12-27 LAB
ESTIMATED AVG GLUCOSE: 137 MG/DL (ref 68–131)
HBA1C MFR BLD: 6.4 % (ref 4–5.6)
POCT GLUCOSE: 125 MG/DL (ref 70–110)
POCT GLUCOSE: 130 MG/DL (ref 70–110)
POCT GLUCOSE: 183 MG/DL (ref 70–110)
POCT GLUCOSE: 192 MG/DL (ref 70–110)

## 2021-12-27 PROCEDURE — 63600175 PHARM REV CODE 636 W HCPCS: Performed by: EMERGENCY MEDICINE

## 2021-12-27 PROCEDURE — 25000003 PHARM REV CODE 250: Performed by: EMERGENCY MEDICINE

## 2021-12-27 PROCEDURE — 83036 HEMOGLOBIN GLYCOSYLATED A1C: CPT | Performed by: INTERNAL MEDICINE

## 2021-12-27 PROCEDURE — 36415 COLL VENOUS BLD VENIPUNCTURE: CPT | Performed by: INTERNAL MEDICINE

## 2021-12-27 PROCEDURE — 21400001 HC TELEMETRY ROOM

## 2021-12-27 PROCEDURE — 25000003 PHARM REV CODE 250: Performed by: INTERNAL MEDICINE

## 2021-12-27 PROCEDURE — 25000242 PHARM REV CODE 250 ALT 637 W/ HCPCS: Performed by: STUDENT IN AN ORGANIZED HEALTH CARE EDUCATION/TRAINING PROGRAM

## 2021-12-27 PROCEDURE — C9399 UNCLASSIFIED DRUGS OR BIOLOG: HCPCS | Performed by: STUDENT IN AN ORGANIZED HEALTH CARE EDUCATION/TRAINING PROGRAM

## 2021-12-27 PROCEDURE — 25000003 PHARM REV CODE 250: Performed by: STUDENT IN AN ORGANIZED HEALTH CARE EDUCATION/TRAINING PROGRAM

## 2021-12-27 PROCEDURE — 27000207 HC ISOLATION

## 2021-12-27 PROCEDURE — 63600175 PHARM REV CODE 636 W HCPCS: Performed by: STUDENT IN AN ORGANIZED HEALTH CARE EDUCATION/TRAINING PROGRAM

## 2021-12-27 RX ORDER — ATORVASTATIN CALCIUM 10 MG/1
20 TABLET, FILM COATED ORAL NIGHTLY
Status: DISCONTINUED | OUTPATIENT
Start: 2021-12-27 | End: 2021-12-29 | Stop reason: HOSPADM

## 2021-12-27 RX ORDER — IBUPROFEN 200 MG
24 TABLET ORAL
Status: DISCONTINUED | OUTPATIENT
Start: 2021-12-27 | End: 2021-12-29 | Stop reason: HOSPADM

## 2021-12-27 RX ORDER — INSULIN ASPART 100 [IU]/ML
0-5 INJECTION, SOLUTION INTRAVENOUS; SUBCUTANEOUS
Status: DISCONTINUED | OUTPATIENT
Start: 2021-12-27 | End: 2021-12-29 | Stop reason: HOSPADM

## 2021-12-27 RX ORDER — METFORMIN HYDROCHLORIDE 500 MG/1
500 TABLET ORAL
Status: DISCONTINUED | OUTPATIENT
Start: 2021-12-27 | End: 2021-12-28

## 2021-12-27 RX ORDER — IBUPROFEN 200 MG
16 TABLET ORAL
Status: DISCONTINUED | OUTPATIENT
Start: 2021-12-27 | End: 2021-12-29 | Stop reason: HOSPADM

## 2021-12-27 RX ORDER — ALBUTEROL SULFATE 90 UG/1
2 AEROSOL, METERED RESPIRATORY (INHALATION) EVERY 6 HOURS PRN
Status: DISCONTINUED | OUTPATIENT
Start: 2021-12-27 | End: 2021-12-29 | Stop reason: HOSPADM

## 2021-12-27 RX ORDER — TRAZODONE HYDROCHLORIDE 50 MG/1
150 TABLET ORAL NIGHTLY
Status: DISCONTINUED | OUTPATIENT
Start: 2021-12-27 | End: 2021-12-29 | Stop reason: HOSPADM

## 2021-12-27 RX ORDER — FLUOXETINE HYDROCHLORIDE 20 MG/1
40 CAPSULE ORAL DAILY
Status: DISCONTINUED | OUTPATIENT
Start: 2021-12-27 | End: 2021-12-29 | Stop reason: HOSPADM

## 2021-12-27 RX ORDER — BENZTROPINE MESYLATE 1 MG/1
1 TABLET ORAL DAILY
Status: DISCONTINUED | OUTPATIENT
Start: 2021-12-27 | End: 2021-12-29 | Stop reason: HOSPADM

## 2021-12-27 RX ORDER — RISPERIDONE 1 MG/1
3 TABLET ORAL 2 TIMES DAILY
Status: DISCONTINUED | OUTPATIENT
Start: 2021-12-27 | End: 2021-12-29 | Stop reason: HOSPADM

## 2021-12-27 RX ORDER — QUETIAPINE FUMARATE 25 MG/1
50 TABLET, FILM COATED ORAL NIGHTLY PRN
Status: DISCONTINUED | OUTPATIENT
Start: 2021-12-27 | End: 2021-12-29 | Stop reason: HOSPADM

## 2021-12-27 RX ORDER — HALOPERIDOL 5 MG/1
10 TABLET ORAL NIGHTLY
Status: DISCONTINUED | OUTPATIENT
Start: 2021-12-27 | End: 2021-12-29 | Stop reason: HOSPADM

## 2021-12-27 RX ORDER — AMLODIPINE BESYLATE 5 MG/1
5 TABLET ORAL DAILY
Status: DISCONTINUED | OUTPATIENT
Start: 2021-12-27 | End: 2021-12-29 | Stop reason: HOSPADM

## 2021-12-27 RX ORDER — GLUCAGON 1 MG
1 KIT INJECTION
Status: DISCONTINUED | OUTPATIENT
Start: 2021-12-27 | End: 2021-12-29 | Stop reason: HOSPADM

## 2021-12-27 RX ADMIN — BENZTROPINE MESYLATE 1 MG: 1 TABLET ORAL at 10:12

## 2021-12-27 RX ADMIN — RISPERIDONE 3 MG: 1 TABLET ORAL at 09:12

## 2021-12-27 RX ADMIN — PIPERACILLIN AND TAZOBACTAM 4.5 G: 4; .5 INJECTION, POWDER, LYOPHILIZED, FOR SOLUTION INTRAVENOUS; PARENTERAL at 05:12

## 2021-12-27 RX ADMIN — FLUOXETINE 40 MG: 20 CAPSULE ORAL at 10:12

## 2021-12-27 RX ADMIN — REMDESIVIR 200 MG: 100 INJECTION, POWDER, LYOPHILIZED, FOR SOLUTION INTRAVENOUS at 10:12

## 2021-12-27 RX ADMIN — RISPERIDONE 3 MG: 1 TABLET ORAL at 10:12

## 2021-12-27 RX ADMIN — AMLODIPINE BESYLATE 5 MG: 5 TABLET ORAL at 10:12

## 2021-12-27 RX ADMIN — ATORVASTATIN CALCIUM 20 MG: 10 TABLET, FILM COATED ORAL at 03:12

## 2021-12-27 RX ADMIN — HALOPERIDOL 10 MG: 5 TABLET ORAL at 03:12

## 2021-12-27 RX ADMIN — ATORVASTATIN CALCIUM 20 MG: 10 TABLET, FILM COATED ORAL at 09:12

## 2021-12-27 RX ADMIN — TRAZODONE HYDROCHLORIDE 150 MG: 50 TABLET ORAL at 03:12

## 2021-12-27 RX ADMIN — HALOPERIDOL 10 MG: 5 TABLET ORAL at 09:12

## 2021-12-27 RX ADMIN — TRAZODONE HYDROCHLORIDE 150 MG: 50 TABLET ORAL at 09:12

## 2021-12-27 RX ADMIN — DEXAMETHASONE 6 MG: 2 TABLET ORAL at 10:12

## 2021-12-27 RX ADMIN — METFORMIN HYDROCHLORIDE 500 MG: 500 TABLET, FILM COATED ORAL at 10:12

## 2021-12-27 NOTE — NURSING
Patient arrived to floor in stable condition. Visualized patient and assessed patient's overall condition and appearance. No complaints. Telemetry and continuous pulse ox applied. NAD noted. Will continue to monitor.

## 2021-12-27 NOTE — HOSPITAL COURSE
Admitted with sepsis secondary to COVID infection. Started on dexamethasone and remdesivir. Clinically improved and remained stable at room air. Was ambulatory and independent. Afebrile. Able to speak in full sentences. Patient felt well and requested discharge. This was granted since she is clinically stable to do so. SW to arrange f/u with PCP. COVID precautions as outpatient to protect others and loved ones. I discussed this with patient prior to discharge.

## 2021-12-27 NOTE — H&P
[facility] Medicine  History & Physical    Patient Name: Jossie Crowley  MRN: [id number]  Patient Class: IP- Inpatient  Admission Date: 12/26/2021  Attending Physician: Sai Noe MD  Primary Care Provider: CAM Gifford         Patient information was obtained from patient and ER records.     Subjective:     Principal Problem:COVID-19    Chief Complaint:   Chief Complaint   Patient presents with    Fatigue     Here via [facility] with c/o chills, fatigue, cough x 2 weeks    Shortness of Breath        HPI: Mr. Crowley is a 61 YOAAF with PMH of DM, active tobacco smoking,Schizophrenia, COPD, who presented to the ER c/o respiratory failure with worsening hypoxia and found. She also reported to have subjective fever and fatigue. The patient denied any chest pain, reported no palpitation, reported to have cut down on her tobacco smoking, still continued to be short of breath.  In the ER COVID-19 positive, CRP elevated, and so were WBC elevated. The patient was started on steroid for treatment for COVID-19 pneumonia.     On the floor she does not have new symtpoms or worsening of her existing symptoms.           Past Medical History:   Diagnosis Date    Breast cancer     Diabetes mellitus     History of psychiatric hospitalization     Hx of psychiatric care     Hyperlipidemia     Psychiatric problem     Schizophrenia     Therapy     Thyroid disease     thyroid surgery       Past Surgical History:   Procedure Laterality Date    BREAST BIOPSY      BREAST LUMPECTOMY      BREAST SURGERY      THYROIDECTOMY  2005       Review of patient's allergies indicates:  No Known Allergies    No current facility-administered medications on file prior to encounter.     Current Outpatient Medications on File Prior to Encounter   Medication Sig    albuterol (PROVENTIL/VENTOLIN HFA) 90 mcg/actuation inhaler Inhale 1-2 puffs into the lungs every 6 (six) hours as needed for Wheezing or Shortness of Breath.  Rescue    atorvastatin (LIPITOR) 20 MG tablet Take 20 mg by mouth.    benztropine (COGENTIN) 1 MG tablet     FLUoxetine 40 MG capsule Take 40 mg by mouth once daily.    haloperidoL (HALDOL) 10 MG tablet TAKE 2 TABLETS BY MOUTH ONCE DAILY AT BEDTIME    metFORMIN (GLUCOPHAGE) 1000 MG tablet Take 500 mg by mouth daily with breakfast.    amLODIPine (NORVASC) 5 MG tablet Take 1 tablet (5 mg total) by mouth once daily.    paliperidone palmitate (INVEGA SUSTENNA) 156 mg/mL Syrg injection Inject 1 mL (156 mg total) into the muscle every 28 days.    QUEtiapine (SEROQUEL) 50 MG tablet Take 1 tablet (50 mg total) by mouth nightly as needed (insomnia).    risperiDONE (RISPERDAL) 3 MG Tab Take 1 tablet (3 mg total) by mouth 2 (two) times daily.    traZODone (DESYREL) 150 MG tablet Take 1 tablet (150 mg total) by mouth every evening.    UNKNOWN TO PATIENT      Family History    None       Tobacco Use    Smoking status: Current Every Day Smoker     Packs/day: 0.50     Years: 37.00     Pack years: 18.50     Types: Cigarettes    Smokeless tobacco: Former User     Quit date: 12/27/2014   Substance and Sexual Activity    Alcohol use: Not Currently     Comment: occasionally    Drug use: Yes     Frequency: 1.0 times per week     Types: Marijuana    Sexual activity: Not Currently     Review of Systems   Constitutional: Positive for activity change and fatigue.   HENT: Positive for congestion.    Respiratory: Positive for cough and shortness of breath.    Cardiovascular: Negative.    Gastrointestinal: Negative.    Psychiatric/Behavioral: Positive for behavioral problems.     Objective:     Vital Signs (Most Recent):  Temp: 98 °F (36.7 °C) (12/27/21 0013)  Pulse: 99 (12/27/21 0013)  Resp: 18 (12/27/21 0013)  BP: (!) 120/58 (12/27/21 0013)  SpO2: 96 % (12/27/21 0013) Vital Signs (24h Range):  Temp:  [98 °F (36.7 °C)-102.8 °F (39.3 °C)] 98 °F (36.7 °C)  Pulse:  [] 99  Resp:  [18-36] 18  SpO2:  [83 %-98 %] 96 %  BP:  (118-137)/(56-84) 120/58     Weight: 72.8 kg (160 lb 7.9 oz)  Body mass index is 25.14 kg/m².    Physical Exam  Constitutional:       Appearance: She is ill-appearing.   HENT:      Head: Normocephalic and atraumatic.      Mouth/Throat:      Mouth: Mucous membranes are moist.   Eyes:      Pupils: Pupils are equal, round, and reactive to light.   Cardiovascular:      Rate and Rhythm: Normal rate and regular rhythm.      Pulses: Normal pulses.      Heart sounds: Normal heart sounds.   Pulmonary:      Effort: Pulmonary effort is normal.      Breath sounds: Normal breath sounds.   Abdominal:      General: Abdomen is flat.      Palpations: Abdomen is soft.   Musculoskeletal:         General: Normal range of motion.   Skin:     General: Skin is warm and dry.      Capillary Refill: Capillary refill takes less than 2 seconds.   Neurological:      General: No focal deficit present.      Mental Status: She is alert. She is disoriented.   Psychiatric:         Judgment: Judgment normal.           CRANIAL NERVES     CN III, IV, VI   Pupils are equal, round, and reactive to light.       Significant Labs:   All pertinent labs within the past 24 hours have been reviewed.  CBC:   Recent Labs   Lab 12/26/21 2028   WBC 13.07*   HGB 10.8*   HCT 32.2*   PLT SEE COMMENT     CMP:   Recent Labs   Lab 12/26/21 2028   *   K 3.6      CO2 19*   *   BUN 8   CREATININE 0.8   CALCIUM 8.8   PROT 7.2   ALBUMIN 3.4*   BILITOT 0.2   ALKPHOS 68   AST 19   ALT 15   ANIONGAP 12   EGFRNONAA >60       Significant Imaging: I have reviewed all pertinent imaging results/findings within the past 24 hours.  I have reviewed and interpreted all pertinent imaging results/findings within the past 24 hours.    Assessment/Plan:     * COVID-19  All of the patient's symptoms are suggestive of COVID-19 Infection   Continue Dexamethason 6 mg IV x 10day.   Continue with isolation protocol  Continue to monitor the progress.       Respiratory  failure  Patient with Hypoxic Respiratory failure which is Acute on chronic.  she is on home oxygen at 2 LPM. Supplemental oxygen was provided and noted-  .   Signs/symptoms of respiratory failure include- respiratory distress. Contributing diagnoses includes - Pneumonia Labs and images were reviewed. Patient Has not had a recent ABG. Will treat underlying causes and adjust management of respiratory failure as follows-     Treatment for COVID-19 infection.     Tobacco abuse  Still smoking tobacco   The patient reported to have started  Trying to quit.         Type 2 diabetes mellitus  Holding off the oral hypoglycemic  Starting with Insulin regimen.       Schizophrenia  Continue home dose of Haldol for agitation.         VTE Risk Mitigation (From admission, onward)    None             Sai Noe MD  Department of Hospital Medicine   [facility] - Telemetry

## 2021-12-27 NOTE — ED PROVIDER NOTES
Encounter Date: 12/26/2021       History     Chief Complaint   Patient presents with    Fatigue     Here via [facility] with c/o chills, fatigue, cough x 2 weeks     HPI     61-year-old female with past medical history of schizophrenia, tobacco abuse, diabetes, presents with shortness of breath, fatigue and chills.  Patient reports having fever and a cough for the last week.  Reports she has been feeling more short of breath over last 3 weeks.  She reports she has significantly decreased smoking, and has been too sick to smoke recently.  Reports there have been a couple of sick contacts recently.  Reports her cough has continued and is currently nonproductive.  She denies any hemoptysis, abdominal pain, nausea/vomiting/diarrhea, chest pain, numbness/tingling, or any further complaint.    Review of patient's allergies indicates:  No Known Allergies  Past Medical History:   Diagnosis Date    Breast cancer     Diabetes mellitus     History of psychiatric hospitalization     Hx of psychiatric care     Hyperlipidemia     Psychiatric problem     Schizophrenia     Therapy     Thyroid disease     thyroid surgery     Past Surgical History:   Procedure Laterality Date    BREAST BIOPSY      BREAST LUMPECTOMY      BREAST SURGERY      THYROIDECTOMY  2005     Family History   Problem Relation Age of Onset    Cancer Neg Hx      Social History     Tobacco Use    Smoking status: Current Every Day Smoker     Packs/day: 0.50     Years: 37.00     Pack years: 18.50     Types: Cigarettes    Smokeless tobacco: Former User     Quit date: 12/27/2014   Substance Use Topics    Alcohol use: Not Currently     Comment: occasionally    Drug use: Yes     Frequency: 1.0 times per week     Types: Marijuana     Review of Systems   Constitutional: Positive for fatigue and fever.   HENT: Negative.    Eyes: Negative.    Respiratory: Positive for cough and shortness of breath.    Cardiovascular: Negative.    Gastrointestinal: Negative.     Genitourinary: Negative.    Musculoskeletal: Negative.    Skin: Negative.    Neurological: Negative.        Physical Exam     Initial Vitals [12/26/21 1913]   BP Pulse Resp Temp SpO2   132/60 (!) 133 20 (!) 102.8 °F (39.3 °C) 95 %      MAP       --         Physical Exam    Nursing note and vitals reviewed.  Constitutional: She is not diaphoretic. She appears distressed ( mildly).   HENT:   Head: Normocephalic and atraumatic.   Nose: Nose normal.   Eyes: EOM are normal. Pupils are equal, round, and reactive to light.   Neck: Neck supple. No JVD present.   Normal range of motion.  Cardiovascular: Regular rhythm, normal heart sounds and intact distal pulses.   Tachycardic   Pulmonary/Chest: No stridor. She is in respiratory distress (Mildly tachypneic). She has no wheezes. She has no rhonchi. She has no rales.   Abdominal: Abdomen is soft. Bowel sounds are normal. She exhibits no distension. There is no abdominal tenderness.   Musculoskeletal:         General: No tenderness or edema. Normal range of motion.      Cervical back: Normal range of motion and neck supple.     Neurological: She is alert and oriented to person, place, and time. She has normal strength.   Skin: Skin is warm and dry. Capillary refill takes less than 2 seconds. No rash noted. No erythema.         ED Course   Critical Care    Date/Time: 12/27/2021 8:10 PM  Performed by: Wyatt Garcia MD  Authorized by: Wyatt Garcia MD   Direct patient critical care time: 15 minutes  Additional history critical care time: 5 minutes  Ordering / reviewing critical care time: 5 minutes  Documentation critical care time: 5 minutes  Consulting other physicians critical care time: 5 minutes  Total critical care time (exclusive of procedural time) : 35 minutes  Critical care time was exclusive of separately billable procedures and treating other patients and teaching time.  Critical care was necessary to treat or prevent imminent or life-threatening  deterioration of the following conditions: circulatory failure, shock and respiratory failure.  Critical care was time spent personally by me on the following activities: development of treatment plan with patient or surrogate, discussions with consultants, evaluation of patient's response to treatment, obtaining history from patient or surrogate, ordering and performing treatments and interventions, ordering and review of laboratory studies, ordering and review of radiographic studies, re-evaluation of patient's condition, review of old charts, examination of patient and pulse oximetry.        Labs Reviewed   CBC W/ AUTO DIFFERENTIAL - Abnormal; Notable for the following components:       Result Value    WBC 13.07 (*)     RBC 3.97 (*)     Hemoglobin 10.8 (*)     Hematocrit 32.2 (*)     MCV 81 (*)     RDW 14.6 (*)     Gran # (ANC) 11.4 (*)     Immature Grans (Abs) 0.07 (*)     Lymph # 0.6 (*)     Mono # 1.1 (*)     Gran % 86.8 (*)     Lymph % 4.2 (*)     Platelet Estimate Clumped (*)     All other components within normal limits   COMPREHENSIVE METABOLIC PANEL - Abnormal; Notable for the following components:    Sodium 135 (*)     CO2 19 (*)     Glucose 147 (*)     Albumin 3.4 (*)     All other components within normal limits   C-REACTIVE PROTEIN - Abnormal; Notable for the following components:    CRP 32.2 (*)     All other components within normal limits   SARS-COV-2 RDRP GENE - Abnormal; Notable for the following components:    POC Rapid COVID Positive (*)     All other components within normal limits   CULTURE, BLOOD   CULTURE, BLOOD   TROPONIN I   B-TYPE NATRIURETIC PEPTIDE   PROCALCITONIN   CK   LACTIC ACID, PLASMA   POCT INFLUENZA A/B MOLECULAR          Imaging Results          X-Ray Chest 1 View (Final result)  Result time 12/26/21 21:07:00    Final result by Mikey Beltrán MD (12/26/21 21:07:00)                 Impression:      New pulmonary interstitial opacities with predominant mid and lower lung zone  distribution.  The findings may be seen with pneumonitis or evolving multifocal pneumonia.      Electronically signed by: Mikey Beltrán MD  Date:    12/26/2021  Time:    21:07             Narrative:    EXAMINATION:  XR CHEST 1 VIEW    CLINICAL HISTORY:  Cough, unspecified    TECHNIQUE:  Single frontal view of the chest was performed.    COMPARISON:  09/26/2021.    FINDINGS:  Monitoring EKG leads are present.  There are postoperative changes in the right axillary region.    The trachea is unremarkable.  There are calcifications of the aortic knob.  The cardiomediastinal silhouette is within normal limits.  The hemidiaphragms are unremarkable.  There are no pleural effusions.  There is no evidence of a pneumothorax.  There is no evidence of pneumomediastinum.  There are new pulmonary interstitial opacities with predominant mid and lower lung zone distribution.  The osseous structures are unremarkable.                                 Medications   vancomycin (VANCOCIN) 2,000 mg in dextrose 5 % 500 mL IVPB (2,000 mg Intravenous New Bag 12/26/21 2035)   piperacillin-tazobactam 4.5 g in dextrose 5 % 100 mL IVPB (ready to mix system) (has no administration in time range)   acetaminophen tablet 1,000 mg (1,000 mg Oral Given 12/26/21 2027)   benzonatate capsule 100 mg (100 mg Oral Given 12/26/21 2027)   albuterol inhaler 4 puff (4 puffs Inhalation Given 12/26/21 2027)   sodium chloride 0.9% bolus 1,000 mL (0 mLs Intravenous Stopped 12/26/21 2302)   dexamethasone injection 6 mg (6 mg Intravenous Given 12/26/21 2246)                       MDM:      61-year-old female with past medical history of schizophrenia, tobacco abuse, diabetes, presents with shortness of breath, fatigue and chills.  Physical exam as noted above.  ED workup notable for lactate 1.6, WBC-13.07, CMP unremarkable, troponin negative, COVID positive, flu negative, chest x-ray with pulmonary interstitial opacities in bilateral mid and lower lung zone.  Patient  presentation concerning for multifocal pneumonia with leukocytosis here.  Also notably COVID positive.  Given leukocytosis here today, pending procalcitonin will treat with vanc and Zosyn.  Blood cultures pending.  Patient hypoxic, improving on 3 L via nasal cannula.  Will admit patient to hospitalist team for further management. Discussed diagnosis and further treatment with patient at bedside. All questions answered, patient transferred to floor improved and stable.       Clinical Impression:   Final diagnoses:  [R53.83] Fatigue  [R05.9] Cough  [R09.02] Hypoxia  [U07.1] COVID-19 virus infection          ED Disposition Condition    Admit               Wyatt Garcia MD  12/27/21 0049

## 2021-12-27 NOTE — HPI
Mr. Crowley is a 61 YOAAF with PMH of DM, active tobacco smoking,Schizophrenia, COPD, who presented to the ER c/o respiratory failure with worsening hypoxia and found. She also reported to have subjective fever and fatigue. The patient denied any chest pain, reported no palpitation, reported to have cut down on her tobacco smoking, still continued to be short of breath.  In the ER COVID-19 positive, CRP elevated, and so were WBC elevated. The patient was started on steroid for treatment for COVID-19 pneumonia.     On the floor she does not have new symtpoms or worsening of her existing symptoms.

## 2021-12-27 NOTE — ASSESSMENT & PLAN NOTE
All of the patient's symptoms are suggestive of COVID-19 Infection   Continue Dexamethason 6 mg IV x 10day.   Continue with isolation protocol  Continue to monitor the progress.      Group Topic: BH Group OT    Date: 8/12/2020  Start Time: 1030  End Time: 1130  Facilitators: Marcella Robles OT    Focus: OT Group Therapy   Number in attendance: 6    Pt attended am OT group focused on discussion of attention and participated in guided meditation activity.      Method: Group  Attendance: Present  Participation: Active  Patient Response: Attentive  Mood: Normal  Affect: Type: Appropriate   Range: Restricted   Congruency: Congruent   Stability: Stable  Behavior/Socialization: Cooperative  Thought Process: [place] thinking  Task Performance: Follows directions  Patient Evaluation: Independent - full participation

## 2021-12-27 NOTE — SUBJECTIVE & OBJECTIVE
Past Medical History:   Diagnosis Date    Breast cancer     Diabetes mellitus     History of psychiatric hospitalization     Hx of psychiatric care     Hyperlipidemia     Psychiatric problem     Schizophrenia     Therapy     Thyroid disease     thyroid surgery       Past Surgical History:   Procedure Laterality Date    BREAST BIOPSY      BREAST LUMPECTOMY      BREAST SURGERY      THYROIDECTOMY  2005       Review of patient's allergies indicates:  No Known Allergies    No current facility-administered medications on file prior to encounter.     Current Outpatient Medications on File Prior to Encounter   Medication Sig    albuterol (PROVENTIL/VENTOLIN HFA) 90 mcg/actuation inhaler Inhale 1-2 puffs into the lungs every 6 (six) hours as needed for Wheezing or Shortness of Breath. Rescue    atorvastatin (LIPITOR) 20 MG tablet Take 20 mg by mouth.    benztropine (COGENTIN) 1 MG tablet     FLUoxetine 40 MG capsule Take 40 mg by mouth once daily.    haloperidoL (HALDOL) 10 MG tablet TAKE 2 TABLETS BY MOUTH ONCE DAILY AT BEDTIME    metFORMIN (GLUCOPHAGE) 1000 MG tablet Take 500 mg by mouth daily with breakfast.    amLODIPine (NORVASC) 5 MG tablet Take 1 tablet (5 mg total) by mouth once daily.    paliperidone palmitate (INVEGA SUSTENNA) 156 mg/mL Syrg injection Inject 1 mL (156 mg total) into the muscle every 28 days.    QUEtiapine (SEROQUEL) 50 MG tablet Take 1 tablet (50 mg total) by mouth nightly as needed (insomnia).    risperiDONE (RISPERDAL) 3 MG Tab Take 1 tablet (3 mg total) by mouth 2 (two) times daily.    traZODone (DESYREL) 150 MG tablet Take 1 tablet (150 mg total) by mouth every evening.    UNKNOWN TO PATIENT      Family History    None       Tobacco Use    Smoking status: Current Every Day Smoker     Packs/day: 0.50     Years: 37.00     Pack years: 18.50     Types: Cigarettes    Smokeless tobacco: Former User     Quit date: 12/27/2014   Substance and Sexual Activity    Alcohol use:  Not Currently     Comment: occasionally    Drug use: Yes     Frequency: 1.0 times per week     Types: Marijuana    Sexual activity: Not Currently     Review of Systems   Constitutional: Positive for activity change and fatigue.   HENT: Positive for congestion.    Respiratory: Positive for cough and shortness of breath.    Cardiovascular: Negative.    Gastrointestinal: Negative.    Psychiatric/Behavioral: Positive for behavioral problems.     Objective:     Vital Signs (Most Recent):  Temp: 98 °F (36.7 °C) (12/27/21 0013)  Pulse: 99 (12/27/21 0013)  Resp: 18 (12/27/21 0013)  BP: (!) 120/58 (12/27/21 0013)  SpO2: 96 % (12/27/21 0013) Vital Signs (24h Range):  Temp:  [98 °F (36.7 °C)-102.8 °F (39.3 °C)] 98 °F (36.7 °C)  Pulse:  [] 99  Resp:  [18-36] 18  SpO2:  [83 %-98 %] 96 %  BP: (118-137)/(56-84) 120/58     Weight: 72.8 kg (160 lb 7.9 oz)  Body mass index is 25.14 kg/m².    Physical Exam  Constitutional:       Appearance: She is ill-appearing.   HENT:      Head: Normocephalic and atraumatic.      Mouth/Throat:      Mouth: Mucous membranes are moist.   Eyes:      Pupils: Pupils are equal, round, and reactive to light.   Cardiovascular:      Rate and Rhythm: Normal rate and regular rhythm.      Pulses: Normal pulses.      Heart sounds: Normal heart sounds.   Pulmonary:      Effort: Pulmonary effort is normal.      Breath sounds: Normal breath sounds.   Abdominal:      General: Abdomen is flat.      Palpations: Abdomen is soft.   Musculoskeletal:         General: Normal range of motion.   Skin:     General: Skin is warm and dry.      Capillary Refill: Capillary refill takes less than 2 seconds.   Neurological:      General: No focal deficit present.      Mental Status: She is alert. She is disoriented.   Psychiatric:         Judgment: Judgment normal.           CRANIAL NERVES     CN III, IV, VI   Pupils are equal, round, and reactive to light.       Significant Labs:   All pertinent labs within the past 24  hours have been reviewed.  CBC:   Recent Labs   Lab 12/26/21 2028   WBC 13.07*   HGB 10.8*   HCT 32.2*   PLT SEE COMMENT     CMP:   Recent Labs   Lab 12/26/21 2028   *   K 3.6      CO2 19*   *   BUN 8   CREATININE 0.8   CALCIUM 8.8   PROT 7.2   ALBUMIN 3.4*   BILITOT 0.2   ALKPHOS 68   AST 19   ALT 15   ANIONGAP 12   EGFRNONAA >60       Significant Imaging: I have reviewed all pertinent imaging results/findings within the past 24 hours.  I have reviewed and interpreted all pertinent imaging results/findings within the past 24 hours.

## 2021-12-27 NOTE — ASSESSMENT & PLAN NOTE
Patient with Hypoxic Respiratory failure which is Acute on chronic.  she is on home oxygen at 2 LPM. Supplemental oxygen was provided and noted-  .   Signs/symptoms of respiratory failure include- respiratory distress. Contributing diagnoses includes - Pneumonia Labs and images were reviewed. Patient Has not had a recent ABG. Will treat underlying causes and adjust management of respiratory failure as follows-     Treatment for COVID-19 infection.

## 2021-12-27 NOTE — CARE UPDATE
H&P reviewed. Patient seen and examined by myself. Updates below.    Ms. Crowley is a 60 yo F with hx of DM, tobacco use and COPD who presents to the Ed for fatigue and cough. COVID positive with evidence of pneumonia and found to be hypoxic. Started on dexamethasone/remdesivir and admitted. Stable, oxygen is weaning down. Stable for [facility] medicine.    Gerardo Wolfe MD  Department of Hospital Medicine  [facility]

## 2021-12-27 NOTE — CONSULTS
Thank you for your consult to [facility]. We have reviewed the patient chart. This patient does not meet criteria for [facility] service at this time due to Patient is a new admission. Will hand back to In-house service.     Mel Jimenez MD  [facility]

## 2021-12-28 PROBLEM — A41.89 SEPSIS DUE TO COVID-19: Status: ACTIVE | Noted: 2021-12-27

## 2021-12-28 LAB
ALBUMIN SERPL BCP-MCNC: 2.9 G/DL (ref 3.5–5.2)
ALP SERPL-CCNC: 60 U/L (ref 55–135)
ALT SERPL W/O P-5'-P-CCNC: 15 U/L (ref 10–44)
ANION GAP SERPL CALC-SCNC: 8 MMOL/L (ref 8–16)
AST SERPL-CCNC: 21 U/L (ref 10–40)
BILIRUB SERPL-MCNC: 0.1 MG/DL (ref 0.1–1)
BUN SERPL-MCNC: 16 MG/DL (ref 8–23)
CALCIUM SERPL-MCNC: 9.1 MG/DL (ref 8.7–10.5)
CHLORIDE SERPL-SCNC: 108 MMOL/L (ref 95–110)
CO2 SERPL-SCNC: 24 MMOL/L (ref 23–29)
CREAT SERPL-MCNC: 0.7 MG/DL (ref 0.5–1.4)
EST. GFR  (AFRICAN AMERICAN): >60 ML/MIN/1.73 M^2
EST. GFR  (NON AFRICAN AMERICAN): >60 ML/MIN/1.73 M^2
GLUCOSE SERPL-MCNC: 132 MG/DL (ref 70–110)
POCT GLUCOSE: 101 MG/DL (ref 70–110)
POCT GLUCOSE: 104 MG/DL (ref 70–110)
POCT GLUCOSE: 191 MG/DL (ref 70–110)
POTASSIUM SERPL-SCNC: 4 MMOL/L (ref 3.5–5.1)
PROT SERPL-MCNC: 6.6 G/DL (ref 6–8.4)
SODIUM SERPL-SCNC: 140 MMOL/L (ref 136–145)

## 2021-12-28 PROCEDURE — 27000207 HC ISOLATION

## 2021-12-28 PROCEDURE — 63600175 PHARM REV CODE 636 W HCPCS: Performed by: INTERNAL MEDICINE

## 2021-12-28 PROCEDURE — 21400001 HC TELEMETRY ROOM

## 2021-12-28 PROCEDURE — C9399 UNCLASSIFIED DRUGS OR BIOLOG: HCPCS | Performed by: STUDENT IN AN ORGANIZED HEALTH CARE EDUCATION/TRAINING PROGRAM

## 2021-12-28 PROCEDURE — 25000003 PHARM REV CODE 250: Performed by: STUDENT IN AN ORGANIZED HEALTH CARE EDUCATION/TRAINING PROGRAM

## 2021-12-28 PROCEDURE — 36415 COLL VENOUS BLD VENIPUNCTURE: CPT | Performed by: STUDENT IN AN ORGANIZED HEALTH CARE EDUCATION/TRAINING PROGRAM

## 2021-12-28 PROCEDURE — 63600175 PHARM REV CODE 636 W HCPCS: Performed by: STUDENT IN AN ORGANIZED HEALTH CARE EDUCATION/TRAINING PROGRAM

## 2021-12-28 PROCEDURE — 25000003 PHARM REV CODE 250: Performed by: INTERNAL MEDICINE

## 2021-12-28 PROCEDURE — 80053 COMPREHEN METABOLIC PANEL: CPT | Performed by: STUDENT IN AN ORGANIZED HEALTH CARE EDUCATION/TRAINING PROGRAM

## 2021-12-28 PROCEDURE — 25000242 PHARM REV CODE 250 ALT 637 W/ HCPCS: Performed by: STUDENT IN AN ORGANIZED HEALTH CARE EDUCATION/TRAINING PROGRAM

## 2021-12-28 RX ORDER — ENOXAPARIN SODIUM 100 MG/ML
1 INJECTION SUBCUTANEOUS EVERY 12 HOURS
Status: DISCONTINUED | OUTPATIENT
Start: 2021-12-28 | End: 2021-12-29 | Stop reason: HOSPADM

## 2021-12-28 RX ORDER — SODIUM CHLORIDE 0.9 % (FLUSH) 0.9 %
10 SYRINGE (ML) INJECTION
Status: DISCONTINUED | OUTPATIENT
Start: 2021-12-28 | End: 2021-12-29 | Stop reason: HOSPADM

## 2021-12-28 RX ORDER — ENOXAPARIN SODIUM 100 MG/ML
1 INJECTION SUBCUTANEOUS 2 TIMES DAILY
Status: DISCONTINUED | OUTPATIENT
Start: 2021-12-28 | End: 2021-12-28

## 2021-12-28 RX ADMIN — RISPERIDONE 3 MG: 1 TABLET ORAL at 09:12

## 2021-12-28 RX ADMIN — BENZTROPINE MESYLATE 1 MG: 1 TABLET ORAL at 09:12

## 2021-12-28 RX ADMIN — TRAZODONE HYDROCHLORIDE 150 MG: 50 TABLET ORAL at 08:12

## 2021-12-28 RX ADMIN — REMDESIVIR 100 MG: 100 INJECTION, POWDER, LYOPHILIZED, FOR SOLUTION INTRAVENOUS at 09:12

## 2021-12-28 RX ADMIN — FLUOXETINE 40 MG: 20 CAPSULE ORAL at 09:12

## 2021-12-28 RX ADMIN — RISPERIDONE 3 MG: 1 TABLET ORAL at 08:12

## 2021-12-28 RX ADMIN — AMLODIPINE BESYLATE 5 MG: 5 TABLET ORAL at 09:12

## 2021-12-28 RX ADMIN — METFORMIN HYDROCHLORIDE 500 MG: 500 TABLET, FILM COATED ORAL at 09:12

## 2021-12-28 RX ADMIN — ENOXAPARIN SODIUM 70 MG: 100 INJECTION SUBCUTANEOUS at 08:12

## 2021-12-28 RX ADMIN — ATORVASTATIN CALCIUM 20 MG: 10 TABLET, FILM COATED ORAL at 08:12

## 2021-12-28 RX ADMIN — DEXAMETHASONE 6 MG: 2 TABLET ORAL at 09:12

## 2021-12-28 RX ADMIN — HALOPERIDOL 10 MG: 5 TABLET ORAL at 08:12

## 2021-12-28 NOTE — NURSING
Report received from night nurse ALEX Whaley. Visualized patient and assessed patient's overall condition and appearance. No acute distress noted. Will continue to monitor

## 2021-12-28 NOTE — NURSING
End of shift bedside report given to ALEX Chambers. Patient in no apparent distress.     12 hour chart check complete.

## 2021-12-28 NOTE — SUBJECTIVE & OBJECTIVE
Interval History: feeling better.     Review of Systems   Respiratory: Negative.    Cardiovascular: Negative.    Gastrointestinal: Negative.      Objective:     Vital Signs (Most Recent):  Temp: 98.8 °F (37.1 °C) (12/28/21 1235)  Pulse: (!) 111 (12/28/21 1235)  Resp: 18 (12/28/21 1235)  BP: 119/60 (12/28/21 1235)  SpO2: (!) 94 % (12/28/21 1235) Vital Signs (24h Range):  Temp:  [97.9 °F (36.6 °C)-99.1 °F (37.3 °C)] 98.8 °F (37.1 °C)  Pulse:  [] 111  Resp:  [18-20] 18  SpO2:  [94 %-100 %] 94 %  BP: (100-119)/(50-70) 119/60     Weight: 72.8 kg (160 lb 7.9 oz)  Body mass index is 25.14 kg/m².    Intake/Output Summary (Last 24 hours) at 12/28/2021 1250  Last data filed at 12/28/2021 0546  Gross per 24 hour   Intake 960 ml   Output --   Net 960 ml      Physical Exam  Vitals and nursing note reviewed.   Constitutional:       General: She is not in acute distress.     Appearance: She is not toxic-appearing.   Cardiovascular:      Rate and Rhythm: Normal rate and regular rhythm.   Pulmonary:      Effort: Pulmonary effort is normal.      Breath sounds: No wheezing or rales.   Abdominal:      Palpations: Abdomen is soft.   Musculoskeletal:      Right lower leg: No edema.      Left lower leg: No edema.   Neurological:      Mental Status: She is alert and oriented to person, place, and time.   Psychiatric:         Mood and Affect: Mood normal.         Behavior: Behavior normal.         Thought Content: Thought content normal.         Judgment: Judgment normal.         Significant Labs: All pertinent labs within the past 24 hours have been reviewed.    Significant Imaging: I have reviewed all pertinent imaging results/findings within the past 24 hours.  I have reviewed and interpreted all pertinent imaging results/findings within the past 24 hours.

## 2021-12-28 NOTE — PLAN OF CARE
[facility] - Telemetry  Initial Discharge Assessment  Update as able. Deena Crowley (Daughter)   [phone] (Mobile)     Primary Care Provider: CAM Gifford    Admission Diagnosis: Cough [R05.9]  Fatigue [R53.83]  Hypoxia [R09.02]  COVID-19 virus infection [U07.1]    Admission Date: 12/26/2021  Expected Discharge Date: 12/29/2021         Payor: [employer] MEDICARE / Plan: [employer] (SPECIAL NEEDS PLAN) / Product Type: Medicare Advantage /     Extended Emergency Contact Information  Primary Emergency Contact: KeoDeena   [facility]  Home Phone: [phone]  Mobile Phone: [phone]  Relation: Daughter  Secondary Emergency Contact: Sindi Crowley   [facility]  Home Phone: [phone]  Mobile Phone: [phone]  Relation: Sister    Discharge Plan A: Home with family  Discharge Plan B: Home with family      [facility] [address], [place] - [address]  [address] [place] [address]  Phone: [phone] Fax: [phone]      Initial Assessment (most recent)     Adult Discharge Assessment - 12/27/21 1356        Discharge Assessment    Assessment Type Discharge Planning Assessment     Source of Information health record     Communicated EILEEN with patient/caregiver Date not available/Unable to determine     Reason For Admission Covid     Lives With other relative(s);sibling(s)     Do you have help at home or someone to help you manage your care at home? Yes     Who are your caregiver(s) and their phone number(s)? Deena Crowley (Daughter)   [phone] (Mobile)     Prior to hospitilization cognitive status: Alert/Oriented     Current cognitive status: Alert/Oriented     Readmission within 30 days? No     Do you take prescription medications? Yes     Do you have any problems affording any of your prescribed medications? TBD     Who is going to help you get home at discharge? Deena Crowley (Daughter)   [phone] (Mobile)     How do you get to  doctors appointments? family or friend will provide     Discharge Plan A Home with family     Discharge Plan B Home with family     DME Needed Upon Discharge  other (see comments)        Relationship/Environment    Name(s) of Who Lives With Patient Deena Crowley (Daughter)   [phone] (Mobile)

## 2021-12-28 NOTE — PROGRESS NOTES
[facility] Medicine  Progress Note    Patient Name: Jossie Crowley  MRN: [id number]  Patient Class: IP- Inpatient   Admission Date: 12/26/2021  Length of Stay: 2 days  Attending Physician: Estephanie Murguia MD  Primary Care Provider: CAM Gifford        Subjective:     Principal Problem:Sepsis due to COVID-19        HPI:  Mr. Crowley is a 61 YOAAF with PMH of DM, active tobacco smoking,Schizophrenia, COPD, who presented to the ER c/o respiratory failure with worsening hypoxia and found. She also reported to have subjective fever and fatigue. The patient denied any chest pain, reported no palpitation, reported to have cut down on her tobacco smoking, still continued to be short of breath.  In the ER COVID-19 positive, CRP elevated, and so were WBC elevated. The patient was started on steroid for treatment for COVID-19 pneumonia.     On the floor she does not have new symtpoms or worsening of her existing symptoms.           Overview/Hospital Course:  Admitted with COVID Pneumonia. Started on dexamethasone and remdesivir.       Interval History: feeling better.     Review of Systems   Respiratory: Negative.    Cardiovascular: Negative.    Gastrointestinal: Negative.      Objective:     Vital Signs (Most Recent):  Temp: 98.8 °F (37.1 °C) (12/28/21 1235)  Pulse: (!) 111 (12/28/21 1235)  Resp: 18 (12/28/21 1235)  BP: 119/60 (12/28/21 1235)  SpO2: (!) 94 % (12/28/21 1235) Vital Signs (24h Range):  Temp:  [97.9 °F (36.6 °C)-99.1 °F (37.3 °C)] 98.8 °F (37.1 °C)  Pulse:  [] 111  Resp:  [18-20] 18  SpO2:  [94 %-100 %] 94 %  BP: (100-119)/(50-70) 119/60     Weight: 72.8 kg (160 lb 7.9 oz)  Body mass index is 25.14 kg/m².    Intake/Output Summary (Last 24 hours) at 12/28/2021 1250  Last data filed at 12/28/2021 0546  Gross per 24 hour   Intake 960 ml   Output --   Net 960 ml      Physical Exam  Vitals and nursing note reviewed.   Constitutional:       General: She is not in acute distress.      Appearance: She is not toxic-appearing.   Cardiovascular:      Rate and Rhythm: Normal rate and regular rhythm.   Pulmonary:      Effort: Pulmonary effort is normal.      Breath sounds: No wheezing or rales.   Abdominal:      Palpations: Abdomen is soft.   Musculoskeletal:      Right lower leg: No edema.      Left lower leg: No edema.   Neurological:      Mental Status: She is alert and oriented to person, place, and time.   Psychiatric:         Mood and Affect: Mood normal.         Behavior: Behavior normal.         Thought Content: Thought content normal.         Judgment: Judgment normal.         Significant Labs: All pertinent labs within the past 24 hours have been reviewed.    Significant Imaging: I have reviewed all pertinent imaging results/findings within the past 24 hours.  I have reviewed and interpreted all pertinent imaging results/findings within the past 24 hours.      Assessment/Plan:      * Sepsis due to COVID-19  Met criteria with tachycardia + leukocytosis + positive COVID   Mildly hypoxic. Met criteria for Remdesivir  So far improving with dexamethasone and Remdesivir  Labs reviewed and look good. Diabetes controlled  BP at goal. Is stable at room air. Afebrile  No clinical signs of heart failure. Procalcitnin is negative  Watch overnight. Discharge tomorrow if remains stable. Tobacco cessation  Will ask about vaccination status  On appropriate isolation precautions      Type 2 diabetes mellitus, without long-term current use of insulin  Glucose at goal on sliding scale insulin  Hold metformin while inpatient      Tobacco abuse  Still smoking tobacco. I will discuss importance of smoking cessation        Schizophrenia  Continue home antipsychotics. Patient is calm, oriented and cooperative with treatment        VTE Risk Mitigation (From admission, onward)         Ordered     enoxaparin injection 70 mg  Every 12 hours         12/28/21 1257                Discharge Planning   EILEEN: 12/29/2021      Code Status: Prior   Is the patient medically ready for discharge?:     Reason for patient still in hospital (select all that apply): Treatment  Discharge Plan A: Home with family        Discussed with patient at bedside.           Estephanie Bartlett MD  Department of Hospital Medicine   [facility]

## 2021-12-28 NOTE — ASSESSMENT & PLAN NOTE
Met criteria with tachycardia + leukocytosis + positive COVID   Mildly hypoxic. Met criteria for Remdesivir  So far improving with dexamethasone and Remdesivir  Labs reviewed and look good. Diabetes controlled  Will start DVT prophylaxis  BP at goal. Is stable at room air. Afebrile  No clinical signs of heart failure. Procalcitnin is negative  Watch overnight. Discharge tomorrow if remains stable. Tobacco cessation  Will ask about vaccination status  On appropriate isolation precautions

## 2021-12-28 NOTE — PLAN OF CARE
Problem: Diabetes Comorbidity  Goal: Blood Glucose Level Within Targeted Range  Outcome: Ongoing, Progressing  Intervention: Monitor and Manage Glycemia  Flowsheets (Taken 12/27/2021 1942)  Glycemic Management: blood glucose monitored

## 2021-12-28 NOTE — PLAN OF CARE
Problem: Adult Inpatient Plan of Care  Goal: Plan of Care Review  Outcome: Ongoing, Progressing     Problem: Diabetes Comorbidity  Goal: Blood Glucose Level Within Targeted Range  Outcome: Ongoing, Progressing     Problem: Infection (Pneumonia)  Goal: Resolution of Infection Signs and Symptoms  Outcome: Ongoing, Progressing

## 2021-12-29 VITALS
DIASTOLIC BLOOD PRESSURE: 71 MMHG | BODY MASS INDEX: 25.19 KG/M2 | RESPIRATION RATE: 18 BRPM | HEART RATE: 97 BPM | SYSTOLIC BLOOD PRESSURE: 138 MMHG | TEMPERATURE: 99 F | WEIGHT: 160.5 LBS | OXYGEN SATURATION: 93 % | HEIGHT: 67 IN

## 2021-12-29 LAB — POCT GLUCOSE: 94 MG/DL (ref 70–110)

## 2021-12-29 PROCEDURE — C9399 UNCLASSIFIED DRUGS OR BIOLOG: HCPCS | Performed by: INTERNAL MEDICINE

## 2021-12-29 PROCEDURE — 63600175 PHARM REV CODE 636 W HCPCS: Performed by: INTERNAL MEDICINE

## 2021-12-29 PROCEDURE — 25000003 PHARM REV CODE 250: Performed by: INTERNAL MEDICINE

## 2021-12-29 PROCEDURE — 25000242 PHARM REV CODE 250 ALT 637 W/ HCPCS: Performed by: INTERNAL MEDICINE

## 2021-12-29 RX ADMIN — REMDESIVIR 100 MG: 100 INJECTION, POWDER, LYOPHILIZED, FOR SOLUTION INTRAVENOUS at 10:12

## 2021-12-29 RX ADMIN — BENZTROPINE MESYLATE 1 MG: 1 TABLET ORAL at 08:12

## 2021-12-29 RX ADMIN — FLUOXETINE 40 MG: 20 CAPSULE ORAL at 08:12

## 2021-12-29 RX ADMIN — RISPERIDONE 3 MG: 1 TABLET ORAL at 08:12

## 2021-12-29 RX ADMIN — AMLODIPINE BESYLATE 5 MG: 5 TABLET ORAL at 08:12

## 2021-12-29 RX ADMIN — DEXAMETHASONE 6 MG: 2 TABLET ORAL at 08:12

## 2021-12-29 RX ADMIN — ENOXAPARIN SODIUM 70 MG: 100 INJECTION SUBCUTANEOUS at 08:12

## 2021-12-29 NOTE — NURSING
All discharge instructions, education, and pulse ox monitor given to pt and pt v/u. IV d/c with jelco intact. Instructed to call for transporter and w/c when family arrives.

## 2021-12-29 NOTE — PLAN OF CARE
12/29/21 0740   Medicare Message   Important Message from Medicare regarding Discharge Appeal Rights Given to patient/caregiver;Explained to patient/caregiver;Signed/date by patient/caregiver;Other (comments)   Date IMM was signed 12/29/21   Time IMM was signed 0740   [facility] mail [phone]

## 2021-12-29 NOTE — NURSING
Pt refusing to wear telemetry after educating on importance of wearing telemetry for monitory of heart rate and rhythm.

## 2021-12-29 NOTE — DISCHARGE SUMMARY
[facility] Medicine  Discharge Summary      Patient Name: Jossie Crowley  MRN: [id number]  Patient Class: IP- Inpatient  Admission Date: 12/26/2021  Hospital Length of Stay: 3 days  Discharge Date and Time:  12/29/2021 1:07 PM  Attending Physician: Estephanie Murguia MD   Discharging Provider: Estephanie Bartlett MD  Primary Care Provider: CAM Gifford      HPI:   Mr. Crowley is a 61 YOAAF with PMH of DM, active tobacco smoking,Schizophrenia, COPD, who presented to the ER c/o respiratory failure with worsening hypoxia and found. She also reported to have subjective fever and fatigue. The patient denied any chest pain, reported no palpitation, reported to have cut down on her tobacco smoking, still continued to be short of breath.  In the ER COVID-19 positive, CRP elevated, and so were WBC elevated. The patient was started on steroid for treatment for COVID-19 pneumonia.     On the floor she does not have new symtpoms or worsening of her existing symptoms.           * No surgery found *      Hospital Course:   Admitted with sepsis secondary to COVID infection. Started on dexamethasone and remdesivir. Clinically improved and remained stable at room air. Was ambulatory and independent. Afebrile. Able to speak in full sentences. Patient felt well and requested discharge. This was granted since she is clinically stable to do so. SW to arrange f/u with PCP. COVID precautions as outpatient to protect others and loved ones. I discussed this with patient prior to discharge.        Goals of Care Treatment Preferences:  Code Status: Full Code      Consults:   Consults (From admission, onward)        Status Ordering Provider     Inpatient virtual consult to Hospital Medicine  Once        Provider:  (Not yet assigned)    Completed JUAREZ GATICA          Final Active Diagnoses:    Diagnosis Date Noted POA    PRINCIPAL PROBLEM:  Sepsis due to COVID-19 [U07.1, A41.89] 12/27/2021 Yes    Type 2 diabetes mellitus,  without long-term current use of insulin [E11.9] 06/24/2020 Yes    Tobacco abuse [Z72.0] 01/04/2015 Yes     Chronic    Schizophrenia [F20.9]  Yes      Problems Resolved During this Admission:       Discharged Condition: good    Disposition: Home or Self Care    Follow Up:   Follow-up Information     [facility] On 12/30/2021.    Why: phone call at 9:00AM [facility] Ms. Dan, hospital follow up  Contact information:  [address] [place] [id number]  [phone]                       Patient Instructions:      COVID-19 Surveillance Program     Order Specific Question Answer Comments   Does patient have a smartphone? Yes    Does patient have the [facility]ina on their smartphone? Yes    While in surveillance program, will patient be using home oxygen? No      Medications:  Reconciled Home Medications:      Medication List      START taking these medications    pulse oximeter device  Commonly known as: pulse oximeter  by Apply Externally route 2 (two) times a day. Use twice daily at 8 AM and 3 PM and record the value in MyChart as directed.        CONTINUE taking these medications    albuterol 90 mcg/actuation inhaler  Commonly known as: PROVENTIL/VENTOLIN HFA  Inhale 1-2 puffs into the lungs every 6 (six) hours as needed for Wheezing or Shortness of Breath. Rescue     atorvastatin 20 MG tablet  Commonly known as: LIPITOR  Take 20 mg by mouth.     benztropine 1 MG tablet  Commonly known as: COGENTIN     FLUoxetine 40 MG capsule  Take 40 mg by mouth once daily.     haloperidoL 10 MG tablet  Commonly known as: HALDOL  TAKE 2 TABLETS BY MOUTH ONCE DAILY AT BEDTIME     metFORMIN 1000 MG tablet  Commonly known as: GLUCOPHAGE  Take 500 mg by mouth daily with breakfast.     traZODone 150 MG tablet  Commonly known as: DESYREL  Take 1 tablet (150 mg total) by mouth every evening.          Indwelling Lines/Drains at time of discharge:   Lines/Drains/Airways     None                  Time spent on the discharge of patient: > 35 minutes         Estephanie Bartlett MD  Department of Hospital Medicine  [facility] - Telemetry

## 2021-12-29 NOTE — NURSING
Bedside Report given to night nurse ALEX Acosta. Walking rounds completed. Visualized and assessed patient NAD noted. Safety precautions maintained and call light within reach.     Chart check completed.

## 2021-12-29 NOTE — PLAN OF CARE
[facility] - Telemetry  Discharge Final Note  TN sent telemetry nurseTasia secure chat that patient is cleared fro discharge from case management's standpoint.  Primary Care Provider: CAM Gifford    Expected Discharge Date: 12/29/2021    Final Discharge Note (most recent)     Final Note - 12/29/21 1156        Final Note    Assessment Type Final Discharge Note     Anticipated Discharge Disposition Home or Self Care     What phone number can be called within the next 1-3 days to see how you are doing after discharge? --   see chart    Hospital Resources/Appts/Education Provided Provided patient/caregiver with written discharge plan information;Appointments scheduled and added to AVS;Provided education on problems/symptoms using teachback;Appointments scheduled by Navigator/Coordinator        Post-Acute Status    Discharge Delays None known at this time                 Important Message from Medicare  Important Message from Medicare regarding Discharge Appeal Rights: Given to patient/caregiver,Explained to patient/caregiver,Signed/date by patient/caregiver,Other (comments)     Date IMM was signed: 12/29/21  Time IMM was signed: 4722    Contact [facility] - [address] [address]   Phone: [phone]       Next Steps: Follow up on 12/30/2021    Instructions: phone call at 9:00AM jacob Dan, [facility] follow up

## 2021-12-30 ENCOUNTER — TELEPHONE (OUTPATIENT)
Dept: ADMINISTRATIVE | Facility: CLINIC | Age: 61
End: 2021-12-30
Payer: MEDICAID

## 2021-12-30 LAB
BACTERIA BLD CULT: NORMAL
BACTERIA BLD CULT: NORMAL

## 2022-01-01 ENCOUNTER — NURSE TRIAGE (OUTPATIENT)
Dept: ADMINISTRATIVE | Facility: CLINIC | Age: 62
End: 2022-01-01
Payer: MEDICAID

## 2022-01-02 NOTE — TELEPHONE ENCOUNTER
2nd surveillance program enrollment attempt.  No answer. Unable to leave VM message.  Unable to send [employer] System[employer] message. Pt enrolled in HSM program.  Tasks remain in place.    Reason for Disposition   Unable to complete triage due to phone connection issues    Additional Information   Negative: Caller is angry or rude (e.g., hangs up, verbally abusive, yelling)   Negative: Caller hangs up   Negative: Caller has already spoken with the PCP and has no further questions.   Negative: Caller has already spoken with another triager and has no further questions.   Negative: Caller has already spoken with another triager or PCP AND has further questions AND triager able to answer questions.   Negative: Busy signal.  First attempt to contact caller.  Follow-up call scheduled within 15 minutes.   Negative: No answer.  First attempt to contact caller.  Follow-up call scheduled within 15 minutes.   Negative: Message left on identified voice mail   Negative: Message left on unidentified voice mail.  Phone number verified.   Negative: Message left with person in household.   Negative: Wrong number reached.  Phone number verified.   Negative: Second attempt to contact caller AND no contact made. Phone number verified.   Negative: Third attempt to contact caller AND no contact made. Phone number verified.   Negative: Cell phone out of range.  Phone number verified.   Negative: Pager number given.  Answering service notified.   Negative: Patient already left for the hospital/clinic.   Negative: Caller has cancelled the call before the first contact    Protocols used: NO CONTACT OR DUPLICATE CONTACT CALL-A-

## 2022-01-03 NOTE — PHYSICIAN QUERY
PT Name: Jossie Crowley  MR #: [id number]     DOCUMENTATION CLARIFICATION     CDS/: Rosana Bui RN              Contact information: [email or web address]  This form is a permanent document in the medical record.     Query Date: January 3, 2022    By submitting this query, we are merely seeking further clarification of documentation.  Please utilize your independent clinical judgment when addressing the question(s) below.  The Medical Record contains the following   Indicators   Supporting Clinical Findings Location in Medical Record   x SOB, NJ, Wheezing, Productive Cough, Use of Accessory Muscles, etc. ...Presents with shortness of breath, fatigue and chills  -positive for Shortness of breath and cough  - mildly distressed  She is in respiratory distress (Mildly tachypneic)     12/26 ED MD PN   x RR         ABGs         O2 sat O2 sat 83%room air   O2 sat 94% 3L NC     12/26 Flowsheet     x Hypoxia/Hypercapnia  Patient hypoxic, improving on 3 L via nasal cannula.   12/26 ED MD PN      BiPAP/Intubation/Mechanical Ventilation     x Supplemental O2 O2 2-3L NC 12/26-27 FLowsheet     x Home O2, Oxygen Dependence  she is on home oxygen at 2 LPM    x Respiratory distress or failure  Hypoxic Respiratory failure which is Acute on chronic.   she is on home oxygen at 2 LPM  Signs/symptoms of respiratory failure include- respiratory distress.     12/27 HP   x Radiology findings  chest x-ray with pulmonary interstitial opacities in bilateral mid and lower lung zone.    Patient presentation concerning for multifocal pneumonia  12/26 ED MD PN         x Acute/Chronic Illness presented to the ER c/o respiratory failure with worsening hypoxia and found  Admitted with sepsis secondary to COVID infection.   Started on dexamethasone and remdesivir  Sepsis due to COVID 19   12/27 DC summary       x Treatment Oxygen  Pulse oximetry  CXR  Cardiac monitor    Piperacillin IVPB 12/26-27  NS 1000mL bolus 12/26  Vancomycin IVPB  12/26  Remdesivir IVPB 12/27- 29 12/26 NSG orders          MAR    Other          The noted clinical guidelines are only system guidelines and do not replace the providers clinical judgment.    Provider, please clarify conflicting diagnosis as it relates to Respiratory failure:     [    ] Acute and (on) Chronic Respiratory Failure with Hypoxia -   pO2 >10 mmHg below baseline or SpO2 < 91% on usual home O2 or O2 ? 2L/min over baseline home O2 and respiratory symptoms documented     [    ] Chronic Respiratory Failure with Hypoxia - Continuous home oxygen use     [    ] Hypoxia Only and Chronic Respiratory failure (on 2L at home)     [    ] Other Respiratory Diagnosis (please specify): _________________     [   ] Clinically Undetermined         Please document in your progress notes daily for the duration of treatment until resolved and include in your discharge summary.     Reference:    NAVARRO Wheeler MD. (2020, March 13). Acute respiratory distress syndrome: Clinical features, diagnosis, and complications in adults ([id number] SOCORRO Linares MD & [id number] CARLTON Serrano MD, Eds.). Retrieved November 13, 2020, from [email or web address]/[email or web address]-respiratory-distress-syndrome-clinical-features-diagnosis-and-complications-in-adults?search=ards&source=search_result&selectedTitle=1~150&usage_type=default&display_rank=1  Form No. [id number]    Please defer to Dr Gerardo Wolfe who evaluated patient at the time

## 2022-01-04 NOTE — PHYSICIAN QUERY
PT Name: Jossie Crowley  MR #: [id number]     DOCUMENTATION CLARIFICATION     CDS/: Rosana Bui RN              Contact information: [email or web address]  This form is a permanent document in the medical record.     Query Date: January 4, 2022    By submitting this query, we are merely seeking further clarification of documentation.  Please utilize your independent clinical judgment when addressing the question(s) below.  The Medical Record contains the following   Indicators   Supporting Clinical Findings Location in Medical Record   x SOB, [place], Wheezing, Productive Cough, Use of Accessory Muscles, etc. ...Presents with shortness of breath, fatigue and chills  -positive for Shortness of breath and cough  - mildly distressed  She is in respiratory distress (Mildly tachypneic)     12/26 ED MD PN   x RR         ABGs         O2 sat O2 sat 83%room air   O2 sat 94% 3L NC     12/26 Flowsheet     x Hypoxia/Hypercapnia  Patient hypoxic, improving on 3 L via nasal cannula.   12/26 ED MD PN      BiPAP/Intubation/Mechanical Ventilation     x Supplemental O2 O2 2-3L NC 12/26-27 FLowsheet     x Home O2, Oxygen Dependence  she is on home oxygen at 2 LPM    x Respiratory distress or failure  Hypoxic Respiratory failure which is Acute on chronic.   she is on home oxygen at 2 LPM  Signs/symptoms of respiratory failure include- respiratory distress.     12/27 HP   x Radiology findings  chest x-ray with pulmonary interstitial opacities in bilateral mid and lower lung zone.    Patient presentation concerning for multifocal pneumonia  12/26 ED MD PN         x Acute/Chronic Illness presented to the ER c/o respiratory failure with worsening hypoxia and found  Admitted with sepsis secondary to COVID infection.   Started on dexamethasone and remdesivir  Sepsis due to COVID 19   12/27 DC summary       x Treatment Oxygen  Pulse oximetry  CXR  Cardiac monitor    Piperacillin IVPB 12/26-27  NS 1000mL bolus 12/26  Vancomycin IVPB  12/26  Remdesivir IVPB 12/27- 29 12/26 NSG orders          MAR    Other          The noted clinical guidelines are only system guidelines and do not replace the providers clinical judgment.    Provider, please clarify conflicting diagnosis as it relates to Respiratory failure:     [    ] Acute and (on) Chronic Respiratory Failure with Hypoxia -   pO2 >10 mmHg below baseline or SpO2 < 91% on usual home O2 or O2 ? 2L/min over baseline home O2 and respiratory symptoms documented     [   x ] Chronic Respiratory Failure with Hypoxia   - Continuous home oxygen use     [    ] Hypoxia Only      [    ] Other Respiratory Diagnosis (please specify): _________________     [   ] Clinically Undetermined         Please document in your progress notes daily for the duration of treatment until resolved and include in your discharge summary.     Reference:    NAVARRO Wheeler MD. (2020, March 13). Acute respiratory distress syndrome: Clinical features, diagnosis, and complications in adults ([id number] SOCORRO Linares MD & [id number] CARLTON Serrano MD, Eds.). Retrieved November 13, 2020, from [email or web address]=ards&source=search_result&selectedTitle=1~150&usage_type=default&display_rank=1  Form No. 78481    Please defer to Dr Gerardo Wolfe who evaluated patient at the time

## 2022-04-09 ENCOUNTER — HOSPITAL ENCOUNTER (INPATIENT)
Facility: HOSPITAL | Age: 62
LOS: 2 days | Discharge: HOME | DRG: 871 | End: 2022-04-11
Attending: EMERGENCY MEDICINE | Admitting: INTERNAL MEDICINE
Payer: MEDICARE

## 2022-04-09 DIAGNOSIS — R00.0 TACHYCARDIA: ICD-10-CM

## 2022-04-09 DIAGNOSIS — R05.9 COUGH: ICD-10-CM

## 2022-04-09 DIAGNOSIS — J18.9 PNEUMONIA DUE TO INFECTIOUS ORGANISM: ICD-10-CM

## 2022-04-09 DIAGNOSIS — J96.91 RESPIRATORY FAILURE WITH HYPOXIA: ICD-10-CM

## 2022-04-09 PROBLEM — R09.02 HYPOXIA: Status: ACTIVE | Noted: 2022-04-09

## 2022-04-09 PROBLEM — E87.20 METABOLIC ACIDOSIS: Status: ACTIVE | Noted: 2022-04-09

## 2022-04-09 PROBLEM — D63.8 ANEMIA OF CHRONIC DISEASE: Status: ACTIVE | Noted: 2022-04-09

## 2022-04-09 PROBLEM — M25.559 HIP PAIN: Status: ACTIVE | Noted: 2022-04-09

## 2022-04-09 LAB
ALBUMIN SERPL BCP-MCNC: 3.2 G/DL (ref 3.5–5.2)
ALLENS TEST: ABNORMAL
ALP SERPL-CCNC: 93 U/L (ref 55–135)
ALT SERPL W/O P-5'-P-CCNC: 60 U/L (ref 10–44)
AMPHET+METHAMPHET UR QL: NEGATIVE
ANION GAP SERPL CALC-SCNC: 11 MMOL/L (ref 8–16)
AST SERPL-CCNC: 34 U/L (ref 10–40)
BARBITURATES UR QL SCN>200 NG/ML: NEGATIVE
BASOPHILS # BLD AUTO: 0.06 K/UL (ref 0–0.2)
BASOPHILS NFR BLD: 0.3 % (ref 0–1.9)
BENZODIAZ UR QL SCN>200 NG/ML: NEGATIVE
BILIRUB SERPL-MCNC: 0.2 MG/DL (ref 0.1–1)
BILIRUB UR QL STRIP: NEGATIVE
BILIRUB UR QL STRIP: NEGATIVE
BNP SERPL-MCNC: <10 PG/ML (ref 0–99)
BUN SERPL-MCNC: 9 MG/DL (ref 8–23)
BZE UR QL SCN: NEGATIVE
CALCIUM SERPL-MCNC: 9.8 MG/DL (ref 8.7–10.5)
CANNABINOIDS UR QL SCN: NEGATIVE
CHLORIDE SERPL-SCNC: 101 MMOL/L (ref 95–110)
CLARITY UR: CLEAR
CLARITY UR: CLEAR
CO2 SERPL-SCNC: 22 MMOL/L (ref 23–29)
COLOR UR: COLORLESS
COLOR UR: COLORLESS
CREAT SERPL-MCNC: 0.8 MG/DL (ref 0.5–1.4)
CREAT UR-MCNC: 25.8 MG/DL (ref 15–325)
CTP QC/QA: YES
CTP QC/QA: YES
DELSYS: ABNORMAL
DIFFERENTIAL METHOD: ABNORMAL
EOSINOPHIL # BLD AUTO: 0 K/UL (ref 0–0.5)
EOSINOPHIL NFR BLD: 0.2 % (ref 0–8)
ERYTHROCYTE [DISTWIDTH] IN BLOOD BY AUTOMATED COUNT: 15.2 % (ref 11.5–14.5)
EST. GFR  (AFRICAN AMERICAN): >60 ML/MIN/1.73 M^2
EST. GFR  (NON AFRICAN AMERICAN): >60 ML/MIN/1.73 M^2
ETHANOL SERPL-MCNC: <10 MG/DL
GLUCOSE SERPL-MCNC: 181 MG/DL (ref 70–110)
GLUCOSE UR QL STRIP: NEGATIVE
GLUCOSE UR QL STRIP: NEGATIVE
HCO3 UR-SCNC: 27.3 MMOL/L (ref 24–28)
HCT VFR BLD AUTO: 35 % (ref 37–48.5)
HGB BLD-MCNC: 11.7 G/DL (ref 12–16)
HGB UR QL STRIP: NEGATIVE
HGB UR QL STRIP: NEGATIVE
HIV1+2 IGG SERPL QL IA.RAPID: NORMAL
IMM GRANULOCYTES # BLD AUTO: 0.29 K/UL (ref 0–0.04)
IMM GRANULOCYTES NFR BLD AUTO: 1.4 % (ref 0–0.5)
KETONES UR QL STRIP: NEGATIVE
KETONES UR QL STRIP: NEGATIVE
LACTATE SERPL-SCNC: 2.4 MMOL/L (ref 0.5–2.2)
LEUKOCYTE ESTERASE UR QL STRIP: NEGATIVE
LEUKOCYTE ESTERASE UR QL STRIP: NEGATIVE
LYMPHOCYTES # BLD AUTO: 2 K/UL (ref 1–4.8)
LYMPHOCYTES NFR BLD: 9.6 % (ref 18–48)
MCH RBC QN AUTO: 26.8 PG (ref 27–31)
MCHC RBC AUTO-ENTMCNC: 33.4 G/DL (ref 32–36)
MCV RBC AUTO: 80 FL (ref 82–98)
METHADONE UR QL SCN>300 NG/ML: NEGATIVE
MODE: ABNORMAL
MONOCYTES # BLD AUTO: 1 K/UL (ref 0.3–1)
MONOCYTES NFR BLD: 4.8 % (ref 4–15)
NEUTROPHILS # BLD AUTO: 17.3 K/UL (ref 1.8–7.7)
NEUTROPHILS NFR BLD: 83.7 % (ref 38–73)
NITRITE UR QL STRIP: NEGATIVE
NITRITE UR QL STRIP: NEGATIVE
NRBC BLD-RTO: 0 /100 WBC
OPIATES UR QL SCN: NEGATIVE
PCO2 BLDA: 44.9 MMHG (ref 35–45)
PCP UR QL SCN>25 NG/ML: NEGATIVE
PH SMN: 7.39 [PH] (ref 7.35–7.45)
PH UR STRIP: 7 [PH] (ref 5–8)
PH UR STRIP: 7 [PH] (ref 5–8)
PLATELET # BLD AUTO: 411 K/UL (ref 150–450)
PMV BLD AUTO: 9.6 FL (ref 9.2–12.9)
PO2 BLDA: 47 MMHG (ref 40–60)
POC BE: 2 MMOL/L
POC MOLECULAR INFLUENZA A AGN: NEGATIVE
POC MOLECULAR INFLUENZA B AGN: NEGATIVE
POC SATURATED O2: 82 % (ref 95–100)
POC TCO2: 29 MMOL/L (ref 24–29)
POCT GLUCOSE: 225 MG/DL (ref 70–110)
POCT GLUCOSE: 235 MG/DL (ref 70–110)
POTASSIUM SERPL-SCNC: 4.3 MMOL/L (ref 3.5–5.1)
PROCALCITONIN SERPL IA-MCNC: 0.31 NG/ML
PROT SERPL-MCNC: 8.3 G/DL (ref 6–8.4)
PROT UR QL STRIP: NEGATIVE
PROT UR QL STRIP: NEGATIVE
RBC # BLD AUTO: 4.36 M/UL (ref 4–5.4)
SAMPLE: ABNORMAL
SARS-COV-2 RDRP RESP QL NAA+PROBE: NEGATIVE
SITE: ABNORMAL
SODIUM SERPL-SCNC: 134 MMOL/L (ref 136–145)
SP GR UR STRIP: 1 (ref 1–1.03)
SP GR UR STRIP: 1 (ref 1–1.03)
TOXICOLOGY INFORMATION: NORMAL
TROPONIN I SERPL DL<=0.01 NG/ML-MCNC: 0.01 NG/ML (ref 0–0.03)
URN SPEC COLLECT METH UR: ABNORMAL
URN SPEC COLLECT METH UR: ABNORMAL
UROBILINOGEN UR STRIP-ACNC: NEGATIVE EU/DL
UROBILINOGEN UR STRIP-ACNC: NEGATIVE EU/DL
WBC # BLD AUTO: 20.69 K/UL (ref 3.9–12.7)

## 2022-04-09 PROCEDURE — 83036 HEMOGLOBIN GLYCOSYLATED A1C: CPT | Performed by: INTERNAL MEDICINE

## 2022-04-09 PROCEDURE — 84484 ASSAY OF TROPONIN QUANT: CPT | Performed by: EMERGENCY MEDICINE

## 2022-04-09 PROCEDURE — 63600175 PHARM REV CODE 636 W HCPCS: Performed by: EMERGENCY MEDICINE

## 2022-04-09 PROCEDURE — 83880 ASSAY OF NATRIURETIC PEPTIDE: CPT | Performed by: EMERGENCY MEDICINE

## 2022-04-09 PROCEDURE — 82077 ASSAY SPEC XCP UR&BREATH IA: CPT | Performed by: EMERGENCY MEDICINE

## 2022-04-09 PROCEDURE — 25000242 PHARM REV CODE 250 ALT 637 W/ HCPCS: Performed by: EMERGENCY MEDICINE

## 2022-04-09 PROCEDURE — 86703 HIV-1/HIV-2 1 RESULT ANTBDY: CPT | Performed by: EMERGENCY MEDICINE

## 2022-04-09 PROCEDURE — 99291 CRITICAL CARE FIRST HOUR: CPT | Mod: 25

## 2022-04-09 PROCEDURE — 94640 AIRWAY INHALATION TREATMENT: CPT

## 2022-04-09 PROCEDURE — 87502 INFLUENZA DNA AMP PROBE: CPT

## 2022-04-09 PROCEDURE — 80053 COMPREHEN METABOLIC PANEL: CPT | Performed by: EMERGENCY MEDICINE

## 2022-04-09 PROCEDURE — 21400001 HC TELEMETRY ROOM

## 2022-04-09 PROCEDURE — 82803 BLOOD GASES ANY COMBINATION: CPT

## 2022-04-09 PROCEDURE — 84145 PROCALCITONIN (PCT): CPT | Performed by: EMERGENCY MEDICINE

## 2022-04-09 PROCEDURE — 80307 DRUG TEST PRSMV CHEM ANLYZR: CPT | Performed by: EMERGENCY MEDICINE

## 2022-04-09 PROCEDURE — 93010 ELECTROCARDIOGRAM REPORT: CPT | Mod: ,,, | Performed by: INTERNAL MEDICINE

## 2022-04-09 PROCEDURE — 63600175 PHARM REV CODE 636 W HCPCS: Performed by: INTERNAL MEDICINE

## 2022-04-09 PROCEDURE — 93010 EKG 12-LEAD: ICD-10-PCS | Mod: ,,, | Performed by: INTERNAL MEDICINE

## 2022-04-09 PROCEDURE — 96361 HYDRATE IV INFUSION ADD-ON: CPT

## 2022-04-09 PROCEDURE — 96374 THER/PROPH/DIAG INJ IV PUSH: CPT | Mod: 59

## 2022-04-09 PROCEDURE — 83605 ASSAY OF LACTIC ACID: CPT | Performed by: EMERGENCY MEDICINE

## 2022-04-09 PROCEDURE — 99900035 HC TECH TIME PER 15 MIN (STAT)

## 2022-04-09 PROCEDURE — 25000003 PHARM REV CODE 250: Performed by: EMERGENCY MEDICINE

## 2022-04-09 PROCEDURE — 93005 ELECTROCARDIOGRAM TRACING: CPT

## 2022-04-09 PROCEDURE — 81003 URINALYSIS AUTO W/O SCOPE: CPT | Mod: 59 | Performed by: EMERGENCY MEDICINE

## 2022-04-09 PROCEDURE — 27000221 HC OXYGEN, UP TO 24 HOURS

## 2022-04-09 PROCEDURE — 25000003 PHARM REV CODE 250: Performed by: INTERNAL MEDICINE

## 2022-04-09 PROCEDURE — 85025 COMPLETE CBC W/AUTO DIFF WBC: CPT | Performed by: EMERGENCY MEDICINE

## 2022-04-09 PROCEDURE — 96375 TX/PRO/DX INJ NEW DRUG ADDON: CPT

## 2022-04-09 PROCEDURE — 96365 THER/PROPH/DIAG IV INF INIT: CPT

## 2022-04-09 PROCEDURE — 87040 BLOOD CULTURE FOR BACTERIA: CPT | Mod: 59 | Performed by: EMERGENCY MEDICINE

## 2022-04-09 PROCEDURE — 94761 N-INVAS EAR/PLS OXIMETRY MLT: CPT

## 2022-04-09 PROCEDURE — U0002 COVID-19 LAB TEST NON-CDC: HCPCS | Performed by: EMERGENCY MEDICINE

## 2022-04-09 RX ORDER — IBUPROFEN 200 MG
16 TABLET ORAL
Status: DISCONTINUED | OUTPATIENT
Start: 2022-04-09 | End: 2022-04-11 | Stop reason: HOSPADM

## 2022-04-09 RX ORDER — HYDROXYZINE PAMOATE 50 MG/1
50 CAPSULE ORAL 3 TIMES DAILY PRN
COMMUNITY
Start: 2022-04-05

## 2022-04-09 RX ORDER — IBUPROFEN 200 MG
200 TABLET ORAL EVERY 6 HOURS PRN
COMMUNITY

## 2022-04-09 RX ORDER — SODIUM CHLORIDE 9 MG/ML
INJECTION, SOLUTION INTRAVENOUS CONTINUOUS
Status: DISCONTINUED | OUTPATIENT
Start: 2022-04-09 | End: 2022-04-11 | Stop reason: HOSPADM

## 2022-04-09 RX ORDER — GLUCAGON 1 MG
1 KIT INJECTION
Status: DISCONTINUED | OUTPATIENT
Start: 2022-04-09 | End: 2022-04-11 | Stop reason: HOSPADM

## 2022-04-09 RX ORDER — CETIRIZINE HYDROCHLORIDE 10 MG/1
10 TABLET ORAL DAILY
COMMUNITY
Start: 2022-01-21

## 2022-04-09 RX ORDER — MELOXICAM 15 MG/1
15 TABLET ORAL DAILY
COMMUNITY
Start: 2021-10-23

## 2022-04-09 RX ORDER — IPRATROPIUM BROMIDE AND ALBUTEROL SULFATE 2.5; .5 MG/3ML; MG/3ML
3 SOLUTION RESPIRATORY (INHALATION)
Status: COMPLETED | OUTPATIENT
Start: 2022-04-09 | End: 2022-04-09

## 2022-04-09 RX ORDER — INSULIN ASPART 100 [IU]/ML
1-10 INJECTION, SOLUTION INTRAVENOUS; SUBCUTANEOUS
Status: DISCONTINUED | OUTPATIENT
Start: 2022-04-09 | End: 2022-04-11 | Stop reason: HOSPADM

## 2022-04-09 RX ORDER — ACETAMINOPHEN 500 MG
1000 TABLET ORAL
Status: COMPLETED | OUTPATIENT
Start: 2022-04-09 | End: 2022-04-09

## 2022-04-09 RX ORDER — ALBUTEROL SULFATE 2.5 MG/.5ML
2.5 SOLUTION RESPIRATORY (INHALATION) EVERY 4 HOURS PRN
Status: DISCONTINUED | OUTPATIENT
Start: 2022-04-09 | End: 2022-04-11 | Stop reason: HOSPADM

## 2022-04-09 RX ORDER — ENOXAPARIN SODIUM 100 MG/ML
40 INJECTION SUBCUTANEOUS EVERY 24 HOURS
Status: DISCONTINUED | OUTPATIENT
Start: 2022-04-09 | End: 2022-04-11 | Stop reason: HOSPADM

## 2022-04-09 RX ORDER — TRAZODONE HYDROCHLORIDE 50 MG/1
150 TABLET ORAL NIGHTLY
Status: DISCONTINUED | OUTPATIENT
Start: 2022-04-09 | End: 2022-04-11 | Stop reason: HOSPADM

## 2022-04-09 RX ORDER — HALOPERIDOL 5 MG/1
20 TABLET ORAL NIGHTLY
Status: DISCONTINUED | OUTPATIENT
Start: 2022-04-09 | End: 2022-04-11 | Stop reason: HOSPADM

## 2022-04-09 RX ORDER — ATORVASTATIN CALCIUM 10 MG/1
20 TABLET, FILM COATED ORAL DAILY
Status: DISCONTINUED | OUTPATIENT
Start: 2022-04-09 | End: 2022-04-11 | Stop reason: HOSPADM

## 2022-04-09 RX ORDER — FLUOXETINE HYDROCHLORIDE 20 MG/1
40 CAPSULE ORAL DAILY
Status: DISCONTINUED | OUTPATIENT
Start: 2022-04-09 | End: 2022-04-11 | Stop reason: HOSPADM

## 2022-04-09 RX ORDER — IBUPROFEN 200 MG
24 TABLET ORAL
Status: DISCONTINUED | OUTPATIENT
Start: 2022-04-09 | End: 2022-04-11 | Stop reason: HOSPADM

## 2022-04-09 RX ORDER — UBIDECARENONE 75 MG
500 CAPSULE ORAL DAILY
COMMUNITY

## 2022-04-09 RX ORDER — RISPERIDONE 1 MG/1
3 TABLET ORAL 2 TIMES DAILY
Status: DISCONTINUED | OUTPATIENT
Start: 2022-04-09 | End: 2022-04-11 | Stop reason: HOSPADM

## 2022-04-09 RX ORDER — METHYLPREDNISOLONE SOD SUCC 125 MG
80 VIAL (EA) INJECTION
Status: COMPLETED | OUTPATIENT
Start: 2022-04-09 | End: 2022-04-09

## 2022-04-09 RX ADMIN — ENOXAPARIN SODIUM 40 MG: 100 INJECTION SUBCUTANEOUS at 05:04

## 2022-04-09 RX ADMIN — HALOPERIDOL 20 MG: 5 TABLET ORAL at 08:04

## 2022-04-09 RX ADMIN — TRAZODONE HYDROCHLORIDE 150 MG: 50 TABLET ORAL at 08:04

## 2022-04-09 RX ADMIN — IPRATROPIUM BROMIDE AND ALBUTEROL SULFATE 3 ML: 2.5; .5 SOLUTION RESPIRATORY (INHALATION) at 01:04

## 2022-04-09 RX ADMIN — PIPERACILLIN AND TAZOBACTAM 4.5 G: 4; .5 INJECTION, POWDER, LYOPHILIZED, FOR SOLUTION INTRAVENOUS; PARENTERAL at 01:04

## 2022-04-09 RX ADMIN — SODIUM CHLORIDE: 0.9 INJECTION, SOLUTION INTRAVENOUS at 03:04

## 2022-04-09 RX ADMIN — ACETAMINOPHEN 1000 MG: 500 TABLET ORAL at 12:04

## 2022-04-09 RX ADMIN — METHYLPREDNISOLONE SODIUM SUCCINATE 80 MG: 125 INJECTION, POWDER, FOR SOLUTION INTRAMUSCULAR; INTRAVENOUS at 02:04

## 2022-04-09 RX ADMIN — PIPERACILLIN AND TAZOBACTAM 4.5 G: 4; .5 INJECTION, POWDER, LYOPHILIZED, FOR SOLUTION INTRAVENOUS; PARENTERAL at 08:04

## 2022-04-09 RX ADMIN — FLUOXETINE 40 MG: 20 CAPSULE ORAL at 05:04

## 2022-04-09 RX ADMIN — SODIUM CHLORIDE, SODIUM LACTATE, POTASSIUM CHLORIDE, AND CALCIUM CHLORIDE 1000 ML: .6; .31; .03; .02 INJECTION, SOLUTION INTRAVENOUS at 12:04

## 2022-04-09 RX ADMIN — INSULIN ASPART 2 UNITS: 100 INJECTION, SOLUTION INTRAVENOUS; SUBCUTANEOUS at 08:04

## 2022-04-09 RX ADMIN — RISPERIDONE 3 MG: 1 TABLET ORAL at 08:04

## 2022-04-09 RX ADMIN — ATORVASTATIN CALCIUM 20 MG: 10 TABLET, FILM COATED ORAL at 05:04

## 2022-04-09 NOTE — PROGRESS NOTES
Pharmacokinetic Initial Assessment: IV Vancomycin    Assessment/Plan:    Initiate intravenous vancomycin with loading dose of 1500 mg once followed by a maintenance dose of vancomycin 750 mg IV every 12 hours  Desired empiric serum trough concentration is 10 to 20 mcg/mL  Draw vancomycin trough level 60 min prior to fourth dose on 4th at approximately 1400  Pharmacy will continue to follow and monitor vancomycin.      Please contact pharmacy at extension [phone] with any questions regarding this assessment.     Thank you for the consult,   Marianne Arcos       Patient brief summary:  Jossie Crowley is a 61 y.o. female initiated on antimicrobial therapy with IV Vancomycin for treatment of suspected sepsis    Drug Allergies:   Review of patient's allergies indicates:  No Known Allergies    Actual Body Weight:   63.5 kg    Renal Function:   Estimated Creatinine Clearance: 71.8 mL/min (based on SCr of 0.8 mg/dL).,     Dialysis Method (if applicable):  N/A    CBC (last 72 hours):  Recent Labs   Lab Result Units 04/09/22  1225   WBC K/uL 20.69*   Hemoglobin g/dL 11.7*   Hematocrit % 35.0*   Platelets K/uL 411   Gran % % 83.7*   Lymph % % 9.6*   Mono % % 4.8   Eosinophil % % 0.2   Basophil % % 0.3   Differential Method  Automated       Metabolic Panel (last 72 hours):  Recent Labs   Lab Result Units 04/09/22  1225 04/09/22  1436   Sodium mmol/L 134*  --    Potassium mmol/L 4.3  --    Chloride mmol/L 101  --    CO2 mmol/L 22*  --    Glucose mg/dL 181*  --    Glucose, UA   --  Negative  Negative   BUN mg/dL 9  --    Creatinine mg/dL 0.8  --    Creatinine, Urine mg/dL  --  25.8   Albumin g/dL 3.2*  --    Total Bilirubin mg/dL 0.2  --    Alkaline Phosphatase U/L 93  --    AST U/L 34  --    ALT U/L 60*  --        Drug levels (last 3 results):  No results for input(s): VANCOMYCINRA, VANCOMYCINPE, VANCOMYCINTR in the last 72 hours.    Microbiologic Results:  Microbiology Results (last 7 days)       Procedure Component Value Units  Date/Time    Blood Culture #2 **CANNOT BE ORDERED STAT** [[id number]] Collected: 04/09/22 1223    Order Status: Sent Specimen: Blood from Peripheral, Hand, Right Updated: 04/09/22 1237    Blood Culture #1 **CANNOT BE ORDERED STAT** [[id number]] Collected: 04/09/22 1223    Order Status: Sent Specimen: Blood from Peripheral, Wrist, Left Updated: 04/09/22 1237

## 2022-04-09 NOTE — ED PROVIDER NOTES
Encounter Date: 4/9/2022       History     Chief Complaint   Patient presents with    Abdominal Pain     Patient come sin with multiple complaints states right flank abdominal pain , pain in the right leg that goes numb at time s causing her to lass, weight loss over the past few months, and cough. Pulse ox noted to be low in triage 87% denies being on oxygen at home , denies SOB at present.      HPI     61-year-old female with past medical history of psychiatric care, hyperlipidemia, schizophrenia, diabetes presents with abdominal pain, shortness of breath.  Patient reports recent weight loss, states she has had worsening shortness of breath and cough.  Reports having to stop to catch her breath with walking.  She reports fever over last couple of days.  She reports no significant sputum production, states she is concerned about her right hip as well as it continues to give out on her as she is walking.  She denies any drug use, alcohol use, states she smokes around 1 pack per day of cigarettes.  She denies any further associated complaints.    Review of patient's allergies indicates:  No Known Allergies  Past Medical History:   Diagnosis Date    Breast cancer     Diabetes mellitus     History of psychiatric hospitalization     Hx of psychiatric care     Hyperlipidemia     Psychiatric problem     Schizophrenia     Therapy     Thyroid disease     thyroid surgery     Past Surgical History:   Procedure Laterality Date    BREAST BIOPSY      BREAST LUMPECTOMY      BREAST SURGERY      THYROIDECTOMY  2005     Family History   Problem Relation Age of Onset    Cancer Neg Hx      Social History     Tobacco Use    Smoking status: Current Every Day Smoker     Packs/day: 0.50     Years: 37.00     Pack years: 18.50     Types: Cigarettes    Smokeless tobacco: Former User     Quit date: 12/27/2014   Substance Use Topics    Alcohol use: Not Currently     Comment: occasionally    Drug use: Yes     Frequency: 1.0  times per week     Types: Marijuana     Review of Systems   Constitutional: Positive for activity change and unexpected weight change.   HENT: Negative.    Eyes: Negative.    Respiratory: Positive for cough and shortness of breath.    Cardiovascular: Negative.    Gastrointestinal: Negative.    Genitourinary: Negative.    Musculoskeletal: Negative.    Skin: Negative.    Neurological: Negative.        Physical Exam     Initial Vitals [04/09/22 1146]   BP Pulse Resp Temp SpO2   136/62 (!) 131 18 (!) 100.5 °F (38.1 °C) (!) 88 %      MAP       --         Physical Exam    Nursing note and vitals reviewed.  Constitutional: She appears well-developed. She is not diaphoretic. She appears distressed (mildly distressed).   HENT:   Head: Normocephalic and atraumatic.   Nose: Nose normal.   Eyes: EOM are normal. Pupils are equal, round, and reactive to light.   Neck: Neck supple. No JVD present.   Normal range of motion.  Cardiovascular: Normal rate, regular rhythm, normal heart sounds and intact distal pulses.   Pulmonary/Chest: No stridor. She is in respiratory distress (Tachypneic, diffuse wheezing). She has wheezes.   Abdominal: Abdomen is soft. Bowel sounds are normal. She exhibits no distension. There is no abdominal tenderness.   Musculoskeletal:         General: No tenderness or edema. Normal range of motion.      Cervical back: Normal range of motion and neck supple.     Neurological: She is alert and oriented to person, place, and time. She has normal strength.   Skin: Skin is warm and dry. Capillary refill takes less than 2 seconds. No rash noted. No erythema.         ED Course   Critical Care    Date/Time: 4/10/2022 6:56 AM  Performed by: Wyatt Garcia MD  Authorized by: Wyatt Garcia MD   Direct patient critical care time: 15 minutes  Additional history critical care time: 5 minutes  Ordering / reviewing critical care time: 5 minutes  Documentation critical care time: 5 minutes  Consulting other  physicians critical care time: 5 minutes  Total critical care time (exclusive of procedural time) : 35 minutes  Critical care time was exclusive of separately billable procedures and treating other patients and teaching time.  Critical care was necessary to treat or prevent imminent or life-threatening deterioration of the following conditions: shock and respiratory failure.  Critical care was time spent personally by me on the following activities: development of treatment plan with patient or surrogate, discussions with consultants, evaluation of patient's response to treatment, examination of patient, obtaining history from patient or surrogate, ordering and performing treatments and interventions, ordering and review of laboratory studies, ordering and review of radiographic studies, re-evaluation of patient's condition and review of old charts.        Labs Reviewed   CBC W/ AUTO DIFFERENTIAL - Abnormal; Notable for the following components:       Result Value    WBC 20.69 (*)     Hemoglobin 11.7 (*)     Hematocrit 35.0 (*)     MCV 80 (*)     MCH 26.8 (*)     RDW 15.2 (*)     Immature Granulocytes 1.4 (*)     Gran # (ANC) 17.3 (*)     Immature Grans (Abs) 0.29 (*)     Gran % 83.7 (*)     Lymph % 9.6 (*)     All other components within normal limits   COMPREHENSIVE METABOLIC PANEL - Abnormal; Notable for the following components:    Sodium 134 (*)     CO2 22 (*)     Glucose 181 (*)     Albumin 3.2 (*)     ALT 60 (*)     All other components within normal limits   URINALYSIS, REFLEX TO URINE CULTURE - Abnormal; Notable for the following components:    Color, UA Colorless (*)     All other components within normal limits    Narrative:     Specimen Source->Urine   PROCALCITONIN - Abnormal; Notable for the following components:    Procalcitonin 0.31 (*)     All other components within normal limits   LACTIC ACID, PLASMA - Abnormal; Notable for the following components:    Lactate (Lactic Acid) 2.4 (*)     All other  components within normal limits   URINALYSIS, REFLEX TO URINE CULTURE - Abnormal; Notable for the following components:    Color, UA Colorless (*)     All other components within normal limits    Narrative:     Specimen Source->Urine   ISTAT PROCEDURE - Abnormal; Notable for the following components:    POC SATURATED O2 82 (*)     All other components within normal limits   TROPONIN I   B-TYPE NATRIURETIC PEPTIDE   DRUG SCREEN PANEL, URINE EMERGENCY    Narrative:     Specimen Source->Urine   ALCOHOL,MEDICAL (ETHANOL)   RAPID HIV   HEMOGLOBIN A1C   DRUG SCREEN PANEL, URINE EMERGENCY   POCT INFLUENZA A/B MOLECULAR   SARS-COV-2 RDRP GENE    Narrative:     This test utilizes isothermal nucleic acid amplification   technology to detect the SARS-CoV-2 RdRp nucleic acid segment.   The analytical sensitivity (limit of detection) is 125 genome   equivalents/mL.   A POSITIVE result implies infection with the SARS-CoV-2 virus;   the patient is presumed to be contagious.     A NEGATIVE result means that SARS-CoV-2 nucleic acids are not   present above the limit of detection. A NEGATIVE result should be   treated as presumptive. It does not rule out the possibility of   COVID-19 and should not be the sole basis for treatment decisions.   If COVID-19 is strongly suspected based on clinical and exposure   history, re-testing using an alternate molecular assay should be   considered.   This test is only for use under the Food and Drug   Administration s Emergency Use Authorization (EUA).   Commercial kits are provided by [employer].   Performance characteristics of the EUA have been independently   verified by [facility] Department of   Pathology and Laboratory Medicine.   _________________________________________________________________   The authorized Fact Sheet for Healthcare Providers and the authorized Fact   Sheet for Patients of the ID NOW COVID-19 are available on the FDA   website:      https://www.fda.gov/media/294530/download  https://www.fda.gov/media/582234/download          POCT GLUCOSE MONITORING CONTINUOUS          Imaging Results          X-Ray Hip 2 or 3 views Right (with Pelvis when performed) (Final result)  Result time 04/09/22 15:13:58    Final result by Rosana Belcher MD (04/09/22 15:13:58)                 Impression:      Mild stable osteoarthritis of the hips.    Stable dystrophic calcification in the soft tissues adjacent to the left hip.      Electronically signed by: Rosana Belcher MD  Date:    04/09/2022  Time:    15:13             Narrative:    EXAMINATION:  XR HIP WITH PELVIS WHEN PERFORMED, 2 OR 3  VIEWS RIGHT    CLINICAL HISTORY:  R hip pain;    TECHNIQUE:  AP view of the pelvis and frog leg lateral view of the right hip were performed.    COMPARISON:  Prior hip radiograph dated 10/15/2021    FINDINGS:  There is no acute fracture, dislocation, or bone destruction.  Hip joint spaces are fairly well preserved.  Minimal marginal osteophytosis is noted.  Small stable fragmented osteophyte is seen lateral to the left acetabulum and soft tissue calcifications adjacent to the left hip are unchanged.                               X-Ray Chest 1 View (Final result)  Result time 04/09/22 12:41:18    Final result by Rosana Belcher MD (04/09/22 12:41:18)                 Impression:      No significant change in bilateral ground-glass pulmonary opacities which could be due to edema, pneumonia or pneumonitis.      Electronically signed by: Rosana Belcher MD  Date:    04/09/2022  Time:    12:41             Narrative:    EXAMINATION:  XR CHEST 1 VIEW    CLINICAL HISTORY:  Cough, unspecified    TECHNIQUE:  Single frontal view of the chest was performed.    COMPARISON:  Prior dated 12/26/2021    FINDINGS:  The mediastinal structures are midline.  The cardiac silhouette is prominent, likely magnified by AP technique.  Bilateral ground-glass pulmonary opacities appear  similar to prior exam.  Findings could be due to edema or pneumonia.    No osseous abnormalities are seen.                                 Medications   vancomycin 1.5 g in dextrose 5 % 250 mL IVPB (ready to mix) ( Intravenous Restarted 4/9/22 1423)   vancomycin - pharmacy to dose (has no administration in time range)   piperacillin-tazobactam 4.5 g in dextrose 5 % 100 mL IVPB (ready to mix system) (4.5 g Intravenous New Bag 4/10/22 0509)   enoxaparin injection 40 mg (40 mg Subcutaneous Given 4/9/22 1720)   0.9%  NaCl infusion ( Intravenous New Bag 4/10/22 0326)   atorvastatin tablet 20 mg (20 mg Oral Given 4/9/22 1720)   FLUoxetine capsule 40 mg (40 mg Oral Given 4/9/22 1720)   haloperidoL tablet 20 mg (20 mg Oral Given 4/9/22 2045)   risperiDONE tablet 3 mg (3 mg Oral Given 4/9/22 2045)   traZODone tablet 150 mg (150 mg Oral Given 4/9/22 2045)   glucose chewable tablet 16 g (has no administration in time range)   glucose chewable tablet 24 g (has no administration in time range)   glucagon (human recombinant) injection 1 mg (has no administration in time range)   insulin aspart U-100 pen 1-10 Units (2 Units Subcutaneous Given 4/9/22 2045)   dextrose 10% bolus 125 mL (has no administration in time range)   dextrose 10% bolus 250 mL (has no administration in time range)   albuterol sulfate nebulizer solution 2.5 mg (has no administration in time range)   vancomycin 750 mg in dextrose 5 % 250 mL IVPB (ready to mix system) (750 mg Intravenous Trough Due As Scheduled Before Dose 4/11/22 1400)   pneumoc 13-mary conj-dip cr(PF) (PREVNAR 13 (PF)) 0.5 mL (has no administration in time range)   guaiFENesin 100 mg/5 ml syrup 200 mg (200 mg Oral Given 4/10/22 0612)   albuterol-ipratropium 2.5 mg-0.5 mg/3 mL nebulizer solution 3 mL (3 mLs Nebulization Given 4/9/22 1312)   acetaminophen tablet 1,000 mg (1,000 mg Oral Given 4/9/22 1215)   lactated ringers bolus 1,000 mL (0 mLs Intravenous Stopped 4/9/22 6050)   methylPREDNISolone  sodium succinate injection 80 mg (80 mg Intravenous Given 4/9/22 1423)                       MDM:    61 y.o.female with PMHx as noted above, presents with SOB. Physical exam as noted above.  ED workup remarkable for EKG - sinus tachy, rate 134bpm, no ischemic pattern noted, no STEMI, trop - 0.011, BNP - neg, CBC - WBC 20.69, CMP - wnl, procal 0.31, CXR - diffuse ground-glass opacities.  Pt presentation consistent with   Pneumonia, evidence of septic presentation, given small IV fluid bolus, Tylenol, DuoNeb, Solu-Medrol given her COPD history, antibiotics also ordered and given as well.  Patient's tachycardia and oxygenation improving with supplemental O2.  Will admit to inpatient medicine for further evaluation and management. Discussed diagnosis and further treatment with patient at bedside. All questions answered, patient transferred to floor improved and stable.         Clinical Impression:   Final diagnoses:  [R00.0] Tachycardia  [R05.9] Cough  [J96.91] Respiratory failure with hypoxia          ED Disposition Condition    Admit               Wyatt Garcia MD  04/10/22 0657

## 2022-04-09 NOTE — ASSESSMENT & PLAN NOTE
On R side. This is not new- she has had this for months. Notes sometimes her leg goes numb. Will order R hip x-rays and consult ortho as this actually was her main complaint.

## 2022-04-09 NOTE — H&P
"[facility] Medicine  History & Physical    Patient Name: Jossie Crowley  MRN: [id number]  Patient Class: IP- Inpatient  Admission Date: 4/9/2022  Attending Physician: Wyatt Garcia MD   Primary Care Provider: CAM Gifford         Patient information was obtained from patient and ER records.     Subjective:     Principal Problem:Pneumonia due to infectious organism    Chief Complaint:   Chief Complaint   Patient presents with    Abdominal Pain     Patient come sin with multiple complaints states right flank abdominal pain , pain in the right leg that goes numb at time s causing her to lass, weight loss over the past few months, and cough. Pulse ox noted to be low in triage 87% denies being on oxygen at home , denies SOB at present.         HPI: Mrs. Crowley is a 62 yo F who presents with a CC of hip pain "for months"  She in general is a poor historian. She states that sometimes her R leg goes numb. She was found in the ED to be septic with likely pneumonia. She was also found to have an 02 sat of 83% of RA but stated she was never SOB.  She does note sputum production that is not new. She continues to smoke. She had a low grade temp in the ED. She presents with a   WBC count of 20K.  She denies sick contacts. She is non-ill appearing.  She was started on Vanc and Zosyn. Procal slightly elevated. Blood cultures were NG.      Past Medical History:   Diagnosis Date    Breast cancer     Diabetes mellitus     History of psychiatric hospitalization     Hx of psychiatric care     Hyperlipidemia     Psychiatric problem     Schizophrenia     Therapy     Thyroid disease     thyroid surgery       Past Surgical History:   Procedure Laterality Date    BREAST BIOPSY      BREAST LUMPECTOMY      BREAST SURGERY      THYROIDECTOMY  2005       Review of patient's allergies indicates:  No Known Allergies    No current facility-administered medications on file prior to encounter. "     Current Outpatient Medications on File Prior to Encounter   Medication Sig    albuterol (PROVENTIL/VENTOLIN HFA) 90 mcg/actuation inhaler Inhale 1-2 puffs into the lungs every 6 (six) hours as needed for Wheezing or Shortness of Breath. Rescue    amLODIPine (NORVASC) 5 MG tablet Take 1 tablet (5 mg total) by mouth once daily.    atorvastatin (LIPITOR) 20 MG tablet Take 20 mg by mouth.    benztropine (COGENTIN) 1 MG tablet     FLUoxetine 40 MG capsule Take 40 mg by mouth once daily.    haloperidoL (HALDOL) 10 MG tablet TAKE 2 TABLETS BY MOUTH ONCE DAILY AT BEDTIME    metFORMIN (GLUCOPHAGE) 1000 MG tablet Take 500 mg by mouth daily with breakfast.    paliperidone palmitate (INVEGA SUSTENNA) 156 mg/mL Syrg injection Inject 1 mL (156 mg total) into the muscle every 28 days.    pulse oximeter (PULSE OXIMETER) device by Apply Externally route 2 (two) times a day. Use twice daily at 8 AM and 3 PM and record the value in [employer] as directed.    QUEtiapine (SEROQUEL) 50 MG tablet Take 1 tablet (50 mg total) by mouth nightly as needed (insomnia).    risperiDONE (RISPERDAL) 3 MG Tab Take 1 tablet (3 mg total) by mouth 2 (two) times daily.    traZODone (DESYREL) 150 MG tablet Take 1 tablet (150 mg total) by mouth every evening.     Family History    None       Tobacco Use    Smoking status: Current Every Day Smoker     Packs/day: 0.50     Years: 37.00     Pack years: 18.50     Types: Cigarettes    Smokeless tobacco: Former User     Quit date: 12/27/2014   Substance and Sexual Activity    Alcohol use: Not Currently     Comment: occasionally    Drug use: Yes     Frequency: 1.0 times per week     Types: Marijuana    Sexual activity: Not Currently     Review of Systems   Constitutional:  Negative for activity change, appetite change, chills, diaphoresis and fatigue.   HENT:  Negative for congestion.    Eyes:  Negative for discharge and itching.   Respiratory:  Positive for cough. Negative for apnea, choking,  chest tightness and shortness of breath.    Cardiovascular:  Negative for chest pain and leg swelling.   Gastrointestinal:  Negative for abdominal pain, constipation, nausea and vomiting.   Endocrine: Negative for cold intolerance.   Genitourinary:  Negative for difficulty urinating.   Musculoskeletal:  Positive for arthralgias.   Neurological:  Negative for dizziness and headaches.   Psychiatric/Behavioral:  Negative for agitation and behavioral problems.    Objective:     Vital Signs (Most Recent):  Temp: (!) 100.4 °F (38 °C) (04/09/22 1215)  Pulse: (!) 115 (04/09/22 1347)  Resp: (!) 29 (04/09/22 1347)  BP: (!) 123/107 (04/09/22 1202)  SpO2: (!) 90 % (04/09/22 1347)   Vital Signs (24h Range):  Temp:  [100.4 °F (38 °C)-100.5 °F (38.1 °C)] 100.4 °F (38 °C)  Pulse:  [115-131] 115  Resp:  [18-32] 29  SpO2:  [84 %-100 %] 90 %  BP: (123-136)/() 123/107     Weight: 63.5 kg (140 lb)  Body mass index is 21.93 kg/m².    Physical Exam  Vitals and nursing note reviewed.   Constitutional:       General: She is not in acute distress.     Appearance: Normal appearance. She is not ill-appearing, toxic-appearing or diaphoretic.   HENT:      Head: Normocephalic and atraumatic.   Eyes:      Conjunctiva/sclera: Conjunctivae normal.   Cardiovascular:      Rate and Rhythm: Regular rhythm. Tachycardia present.      Heart sounds:     No gallop.   Pulmonary:      Effort: No respiratory distress.      Breath sounds: No wheezing or rales.   Abdominal:      General: Abdomen is flat. There is no distension.      Palpations: Abdomen is soft.   Skin:     General: Skin is warm and dry.   Neurological:      Mental Status: She is alert and oriented to person, place, and time.   Psychiatric:         Mood and Affect: Mood normal.         Behavior: Behavior normal.         Thought Content: Thought content normal.           Significant Labs: All pertinent labs within the past 24 hours have been reviewed.  BMP:   Recent Labs   Lab 04/09/22  1224    *   *   K 4.3      CO2 22*   BUN 9   CREATININE 0.8   CALCIUM 9.8     CBC:   Recent Labs   Lab 04/09/22  1225   WBC 20.69*   HGB 11.7*   HCT 35.0*          Significant Imaging:   Xray chest- possible pneumonia        Assessment/Plan:     * Pneumonia due to infectious organism  Presents with sepsis- WBC count of 20K and tachycardia.  Fever in the ED. CXR suggest pneumonia. Patient stated she was not SOB but found to have 02 sats in the ED of 83%.  She was started on Vanc and Zosyn.  Blood cultures were ordered.       Hypoxia  Likely has underlying COPD and hypoxia is chronic for her.  She likely will need 02 on discharge. Pneumonia could be contributing here as well.       Hip pain  On R side. This is not new- she has had this for months. Notes sometimes her leg goes numb. Will order R hip x-rays and consult ortho as this actually was her main complaint.       Anemia of chronic disease  No acute issues       Metabolic acidosis  Will check BMP in am. Likely from sepsis       Elevated LFTs  Minimal. Will follow       Malnutrition of mild degree  No acute issues       Type 2 diabetes mellitus, without long-term current use of insulin  Well controlled. Without other known manifestations.  [facility] coverage for now.  A1c ordered.       Paranoid schizophrenia  Patient confirmed that she takes multiple psych meds.      History of breast cancer in female  No acute issues       Tobacco abuse  Smoking cessation education > 3 minutes       Hyperlipidemia  Resume statin from home         VTE Risk Mitigation (From admission, onward)         Ordered     enoxaparin injection 40 mg  Daily         04/09/22 5757                   Ryne Moody MD  Department of Hospital Medicine   [facility] [place] - Emergency Dept

## 2022-04-09 NOTE — ASSESSMENT & PLAN NOTE
Likely has underlying COPD and hypoxia is chronic for her.  She likely will need 02 on discharge. Pneumonia could be contributing here as well.

## 2022-04-09 NOTE — SUBJECTIVE & OBJECTIVE
Past Medical History:   Diagnosis Date    Breast cancer     Diabetes mellitus     History of psychiatric hospitalization     Hx of psychiatric care     Hyperlipidemia     Psychiatric problem     Schizophrenia     Therapy     Thyroid disease     thyroid surgery       Past Surgical History:   Procedure Laterality Date    BREAST BIOPSY      BREAST LUMPECTOMY      BREAST SURGERY      THYROIDECTOMY  2005       Review of patient's allergies indicates:  No Known Allergies    No current facility-administered medications on file prior to encounter.     Current Outpatient Medications on File Prior to Encounter   Medication Sig    albuterol (PROVENTIL/VENTOLIN HFA) 90 mcg/actuation inhaler Inhale 1-2 puffs into the lungs every 6 (six) hours as needed for Wheezing or Shortness of Breath. Rescue    amLODIPine (NORVASC) 5 MG tablet Take 1 tablet (5 mg total) by mouth once daily.    atorvastatin (LIPITOR) 20 MG tablet Take 20 mg by mouth.    benztropine (COGENTIN) 1 MG tablet     FLUoxetine 40 MG capsule Take 40 mg by mouth once daily.    haloperidoL (HALDOL) 10 MG tablet TAKE 2 TABLETS BY MOUTH ONCE DAILY AT BEDTIME    metFORMIN (GLUCOPHAGE) 1000 MG tablet Take 500 mg by mouth daily with breakfast.    paliperidone palmitate (INVEGA SUSTENNA) 156 mg/mL Syrg injection Inject 1 mL (156 mg total) into the muscle every 28 days.    pulse oximeter (PULSE OXIMETER) device by Apply Externally route 2 (two) times a day. Use twice daily at 8 AM and 3 PM and [facility] the [facility] as directed.    QUEtiapine (SEROQUEL) 50 MG tablet Take 1 tablet (50 mg total) by mouth nightly as needed (insomnia).    risperiDONE (RISPERDAL) 3 MG Tab Take 1 tablet (3 mg total) by mouth 2 (two) times daily.    traZODone (DESYREL) 150 MG tablet Take 1 tablet (150 mg total) by mouth every evening.     Family History    None       Tobacco Use    Smoking status: Current Every Day Smoker     Packs/day: 0.50     Years: 37.00     Pack years: 18.50     Types:  Cigarettes    Smokeless tobacco: Former User     Quit date: 12/27/2014   Substance and Sexual Activity    Alcohol use: Not Currently     Comment: occasionally    Drug use: Yes     Frequency: 1.0 times per week     Types: Marijuana    Sexual activity: Not Currently     Review of Systems   Constitutional:  Negative for activity change, appetite change, chills, diaphoresis and fatigue.   HENT:  Negative for congestion.    Eyes:  Negative for discharge and itching.   Respiratory:  Positive for cough. Negative for apnea, choking, chest tightness and shortness of breath.    Cardiovascular:  Negative for chest pain and leg swelling.   Gastrointestinal:  Negative for abdominal pain, constipation, nausea and vomiting.   Endocrine: Negative for cold intolerance.   Genitourinary:  Negative for difficulty urinating.   Musculoskeletal:  Positive for arthralgias.   Neurological:  Negative for dizziness and headaches.   Psychiatric/Behavioral:  Negative for agitation and behavioral problems.    Objective:     Vital Signs (Most Recent):  Temp: (!) 100.4 °F (38 °C) (04/09/22 1215)  Pulse: (!) 115 (04/09/22 1347)  Resp: (!) 29 (04/09/22 1347)  BP: (!) 123/107 (04/09/22 1202)  SpO2: (!) 90 % (04/09/22 1347)   Vital Signs (24h Range):  Temp:  [100.4 °F (38 °C)-100.5 °F (38.1 °C)] 100.4 °F (38 °C)  Pulse:  [115-131] 115  Resp:  [18-32] 29  SpO2:  [84 %-100 %] 90 %  BP: (123-136)/() 123/107     Weight: 63.5 kg (140 lb)  Body mass index is 21.93 kg/m².    Physical Exam  Vitals and nursing note reviewed.   Constitutional:       General: She is not in acute distress.     Appearance: Normal appearance. She is not ill-appearing, toxic-appearing or diaphoretic.   HENT:      Head: Normocephalic and atraumatic.   Eyes:      Conjunctiva/sclera: Conjunctivae normal.   Cardiovascular:      Rate and Rhythm: Regular rhythm. Tachycardia present.      Heart sounds:     No gallop.   Pulmonary:      Effort: No respiratory distress.      Breath  sounds: No wheezing or rales.   Abdominal:      General: Abdomen is flat. There is no distension.      Palpations: Abdomen is soft.   Skin:     General: Skin is warm and dry.   Neurological:      Mental Status: She is alert and oriented to person, place, and time.   Psychiatric:         Mood and Affect: Mood normal.         Behavior: Behavior normal.         Thought Content: Thought content normal.           Significant Labs: All pertinent labs within the past 24 hours have been reviewed.  BMP:   Recent Labs   Lab 04/09/22  1225   *   *   K 4.3      CO2 22*   BUN 9   CREATININE 0.8   CALCIUM 9.8     CBC:   Recent Labs   Lab 04/09/22  1225   WBC 20.69*   HGB 11.7*   HCT 35.0*          Significant Imaging:   Xray chest- possible pneumonia

## 2022-04-09 NOTE — ASSESSMENT & PLAN NOTE
Presents with sepsis- WBC count of 20K and tachycardia.  Fever in the ED. CXR suggest pneumonia. Patient stated she was not SOB but found to have 02 sats in the ED of 83%.  She was started on Vanc and Zosyn.  Blood cultures were ordered.

## 2022-04-09 NOTE — PHARMACY MED REC
"Admission Medication History     The home medication history was taken by Gayla Crowley.    You may go to "Admission" then "Reconcile Home Medications" tabs to review and/or act upon these items.      The home medication list has been updated by the Pharmacy department.    Please read ALL comments highlighted in yellow.    Please address this information as you see fit.     Feel free to contact us if you have any questions or require assistance.    Medications listed below were obtained from: Patient/family and Analytic software- [employer] and added to home medications:   Invega Sustenna   Cetirizine   Hydroxyzine   Meloxicam   Advil   Iron   Stool Softener   Vitamin D3   Goody Powder   Voltaren gel    The medication reconciliation was completed at the patient's bedside.    Gayla Crowley  [phone]                      .          "

## 2022-04-09 NOTE — ED NOTES
pulses.   Pulmonary/Chest: No stridor. She is in respiratory distress (Tachypneic, diffuse wheezing). She has wheezes.   Abdominal: Abdomen is soft. Bowel sounds are normal. She exhibits no distension. There is no abdominal tenderness.   Musculoskeletal:         General: No tenderness or edema. Normal range of motion.      Cervical back: Normal range of motion and neck supple.     Neurological: She is alert and oriented to person, place, and time. She has normal strength.   Skin: Skin is warm and dry. Capillary refill takes less than 2 seconds. No rash noted

## 2022-04-09 NOTE — HPI
"Mrs. Crowley is a 62 yo F who presents with a CC of hip pain "for months"  She in general is a poor historian. She states that sometimes her R leg goes numb. She was found in the ED to be septic with likely pneumonia. She was also found to have an 02 sat of 83% of RA but stated she was never SOB.  She does note sputum production that is not new. She continues to smoke. She had a low grade temp in the ED. She presents with a   WBC count of 20K.  She denies sick contacts. She is non-ill appearing.  She was started on Vanc and Zosyn. Procal slightly elevated. Blood cultures were NG.  "

## 2022-04-10 LAB
ANION GAP SERPL CALC-SCNC: 8 MMOL/L (ref 8–16)
BASOPHILS # BLD AUTO: 0.05 K/UL (ref 0–0.2)
BASOPHILS NFR BLD: 0.3 % (ref 0–1.9)
BUN SERPL-MCNC: 11 MG/DL (ref 8–23)
CALCIUM SERPL-MCNC: 9.1 MG/DL (ref 8.7–10.5)
CHLORIDE SERPL-SCNC: 111 MMOL/L (ref 95–110)
CO2 SERPL-SCNC: 22 MMOL/L (ref 23–29)
CREAT SERPL-MCNC: 0.7 MG/DL (ref 0.5–1.4)
DIFFERENTIAL METHOD: ABNORMAL
EOSINOPHIL # BLD AUTO: 0 K/UL (ref 0–0.5)
EOSINOPHIL NFR BLD: 0.1 % (ref 0–8)
ERYTHROCYTE [DISTWIDTH] IN BLOOD BY AUTOMATED COUNT: 15.6 % (ref 11.5–14.5)
EST. GFR  (AFRICAN AMERICAN): >60 ML/MIN/1.73 M^2
EST. GFR  (NON AFRICAN AMERICAN): >60 ML/MIN/1.73 M^2
ESTIMATED AVG GLUCOSE: 131 MG/DL (ref 68–131)
GLUCOSE SERPL-MCNC: 143 MG/DL (ref 70–110)
HBA1C MFR BLD: 6.2 % (ref 4–5.6)
HCT VFR BLD AUTO: 31.9 % (ref 37–48.5)
HGB BLD-MCNC: 10.5 G/DL (ref 12–16)
IMM GRANULOCYTES # BLD AUTO: 0.18 K/UL (ref 0–0.04)
IMM GRANULOCYTES NFR BLD AUTO: 1.2 % (ref 0–0.5)
LYMPHOCYTES # BLD AUTO: 1.1 K/UL (ref 1–4.8)
LYMPHOCYTES NFR BLD: 7.8 % (ref 18–48)
MCH RBC QN AUTO: 26.8 PG (ref 27–31)
MCHC RBC AUTO-ENTMCNC: 32.9 G/DL (ref 32–36)
MCV RBC AUTO: 81 FL (ref 82–98)
MONOCYTES # BLD AUTO: 0.6 K/UL (ref 0.3–1)
MONOCYTES NFR BLD: 3.8 % (ref 4–15)
NEUTROPHILS # BLD AUTO: 12.6 K/UL (ref 1.8–7.7)
NEUTROPHILS NFR BLD: 86.8 % (ref 38–73)
NRBC BLD-RTO: 0 /100 WBC
PLATELET # BLD AUTO: 400 K/UL (ref 150–450)
PMV BLD AUTO: 9.6 FL (ref 9.2–12.9)
POCT GLUCOSE: 105 MG/DL (ref 70–110)
POCT GLUCOSE: 130 MG/DL (ref 70–110)
POCT GLUCOSE: 97 MG/DL (ref 70–110)
POCT GLUCOSE: 99 MG/DL (ref 70–110)
POTASSIUM SERPL-SCNC: 4.4 MMOL/L (ref 3.5–5.1)
RBC # BLD AUTO: 3.92 M/UL (ref 4–5.4)
SODIUM SERPL-SCNC: 141 MMOL/L (ref 136–145)
WBC # BLD AUTO: 14.58 K/UL (ref 3.9–12.7)

## 2022-04-10 PROCEDURE — 25000003 PHARM REV CODE 250: Performed by: INTERNAL MEDICINE

## 2022-04-10 PROCEDURE — 36415 COLL VENOUS BLD VENIPUNCTURE: CPT | Performed by: INTERNAL MEDICINE

## 2022-04-10 PROCEDURE — 25000003 PHARM REV CODE 250: Performed by: NURSE PRACTITIONER

## 2022-04-10 PROCEDURE — 21400001 HC TELEMETRY ROOM

## 2022-04-10 PROCEDURE — 85025 COMPLETE CBC W/AUTO DIFF WBC: CPT | Performed by: INTERNAL MEDICINE

## 2022-04-10 PROCEDURE — 63600175 PHARM REV CODE 636 W HCPCS: Performed by: INTERNAL MEDICINE

## 2022-04-10 PROCEDURE — 36410 VNPNXR 3YR/> PHY/QHP DX/THER: CPT

## 2022-04-10 PROCEDURE — 80048 BASIC METABOLIC PNL TOTAL CA: CPT | Performed by: INTERNAL MEDICINE

## 2022-04-10 PROCEDURE — C1751 CATH, INF, PER/CENT/MIDLINE: HCPCS

## 2022-04-10 RX ORDER — GUAIFENESIN 100 MG/5ML
200 SOLUTION ORAL EVERY 4 HOURS PRN
Status: DISCONTINUED | OUTPATIENT
Start: 2022-04-10 | End: 2022-04-11 | Stop reason: HOSPADM

## 2022-04-10 RX ORDER — SODIUM CHLORIDE 0.9 % (FLUSH) 0.9 %
10 SYRINGE (ML) INJECTION
Status: DISCONTINUED | OUTPATIENT
Start: 2022-04-10 | End: 2022-04-11 | Stop reason: HOSPADM

## 2022-04-10 RX ORDER — SODIUM CHLORIDE 0.9 % (FLUSH) 0.9 %
10 SYRINGE (ML) INJECTION EVERY 6 HOURS
Status: DISCONTINUED | OUTPATIENT
Start: 2022-04-10 | End: 2022-04-11 | Stop reason: HOSPADM

## 2022-04-10 RX ORDER — LEVOFLOXACIN 750 MG/1
750 TABLET ORAL DAILY
Status: DISCONTINUED | OUTPATIENT
Start: 2022-04-10 | End: 2022-04-11 | Stop reason: HOSPADM

## 2022-04-10 RX ADMIN — ATORVASTATIN CALCIUM 20 MG: 10 TABLET, FILM COATED ORAL at 08:04

## 2022-04-10 RX ADMIN — SODIUM CHLORIDE: 0.9 INJECTION, SOLUTION INTRAVENOUS at 03:04

## 2022-04-10 RX ADMIN — HALOPERIDOL 20 MG: 5 TABLET ORAL at 08:04

## 2022-04-10 RX ADMIN — PIPERACILLIN AND TAZOBACTAM 4.5 G: 4; .5 INJECTION, POWDER, LYOPHILIZED, FOR SOLUTION INTRAVENOUS; PARENTERAL at 05:04

## 2022-04-10 RX ADMIN — ENOXAPARIN SODIUM 40 MG: 100 INJECTION SUBCUTANEOUS at 05:04

## 2022-04-10 RX ADMIN — RISPERIDONE 3 MG: 1 TABLET ORAL at 08:04

## 2022-04-10 RX ADMIN — LEVOFLOXACIN 750 MG: 750 TABLET, FILM COATED ORAL at 12:04

## 2022-04-10 RX ADMIN — GUAIFENESIN 200 MG: 100 SOLUTION ORAL at 06:04

## 2022-04-10 RX ADMIN — SODIUM CHLORIDE: 0.9 INJECTION, SOLUTION INTRAVENOUS at 05:04

## 2022-04-10 RX ADMIN — TRAZODONE HYDROCHLORIDE 150 MG: 50 TABLET ORAL at 08:04

## 2022-04-10 RX ADMIN — VANCOMYCIN HYDROCHLORIDE 750 MG: 750 INJECTION, POWDER, LYOPHILIZED, FOR SOLUTION INTRAVENOUS at 02:04

## 2022-04-10 RX ADMIN — FLUOXETINE 40 MG: 20 CAPSULE ORAL at 08:04

## 2022-04-10 NOTE — PROGRESS NOTES
"[facility] Medicine  Progress Note    Patient Name: Jossie Crowley  MRN: [id number]  Patient Class: IP- Inpatient   Admission Date: 4/9/2022  Length of Stay: 1 days  Attending Physician: Ryne Rodriguez MD  Primary Care Provider: [facility]        Subjective:     Principal Problem:Pneumonia due to infectious organism        HPI:  Mrs. Crowley is a 60 yo F who presents with a CC of hip pain "for months"  She in general is a poor historian. She states that sometimes her R leg goes numb. She was found in the ED to be septic with likely pneumonia. She was also found to have an 02 sat of 83% of RA but stated she was never SOB.  She does note sputum production that is not new. She continues to smoke. She had a low grade temp in the ED. She presents with a   WBC count of 20K.  She denies sick contacts. She is non-ill appearing.  She was started on Vanc and Zosyn. Procal slightly elevated. Blood cultures were NG.      Overview/Hospital Course:  Patient admitted to the hospital for pneumonia. She was started on Abx.  Blood cultures were NG. She clinically improved.        Interval History:  No new issues     Review of Systems   Constitutional:  Negative for activity change, appetite change, chills, diaphoresis and fatigue.   HENT:  Negative for congestion.    Eyes:  Negative for discharge and itching.   Respiratory:  Positive for cough. Negative for apnea, choking, chest tightness and shortness of breath.    Cardiovascular:  Negative for chest pain and leg swelling.   Gastrointestinal:  Negative for abdominal pain, constipation, nausea and vomiting.   Endocrine: Negative for cold intolerance.   Genitourinary:  Negative for difficulty urinating.   Musculoskeletal:  Positive for arthralgias.   Neurological:  Negative for dizziness and headaches.   Psychiatric/Behavioral:  Negative for agitation and behavioral problems.    Objective:     Vital Signs (Most Recent):  Temp: 98.2 °F (36.8 °C) " (04/10/22 0737)  Pulse: 109 (04/10/22 0737)  Resp: 18 (04/10/22 0737)  BP: (!) 121/56 (04/10/22 0737)  SpO2: (!) 92 % (04/10/22 0737)   Vital Signs (24h Range):  Temp:  [98.2 °F (36.8 °C)-100.5 °F (38.1 °C)] 98.2 °F (36.8 °C)  Pulse:  [] 109  Resp:  [18-32] 18  SpO2:  [84 %-100 %] 92 %  BP: (113-143)/() 121/56     Weight: 72 kg (158 lb 11.7 oz)  Body mass index is 24.86 kg/m².    Intake/Output Summary (Last 24 hours) at 4/10/2022 1000  Last data filed at 4/10/2022 0737  Gross per 24 hour   Intake 600 ml   Output 1 ml   Net 599 ml      Physical Exam  Vitals and nursing note reviewed.   Constitutional:       General: She is not in acute distress.     Appearance: Normal appearance. She is not ill-appearing, toxic-appearing or diaphoretic.   HENT:      Head: Normocephalic and atraumatic.   Eyes:      Conjunctiva/sclera: Conjunctivae normal.   Cardiovascular:      Rate and Rhythm: Regular rhythm. Tachycardia present.      Heart sounds:     No gallop.   Pulmonary:      Effort: No respiratory distress.      Breath sounds: No wheezing or rales.   Abdominal:      General: Abdomen is flat. There is no distension.      Palpations: Abdomen is soft.   Skin:     General: Skin is warm and dry.   Neurological:      Mental Status: She is alert and oriented to person, place, and time.   Psychiatric:         Mood and Affect: Mood normal.         Behavior: Behavior normal.         Thought Content: Thought content normal.       Significant Labs: All pertinent labs within the past 24 hours have been reviewed.  BMP:   Recent Labs   Lab 04/10/22  0224   *      K 4.4   *   CO2 22*   BUN 11   CREATININE 0.7   CALCIUM 9.1     CBC:   Recent Labs   Lab 04/09/22  1225 04/10/22  0224   WBC 20.69* 14.58*   HGB 11.7* 10.5*   HCT 35.0* 31.9*    400       Significant Imaging:       Assessment/Plan:      * Pneumonia due to infectious organism  Presents with sepsis- WBC count of 20K and tachycardia.  Fever in the  ED. CXR suggest pneumonia. Patient stated she was not SOB but found to have 02 sats in the ED of 83%.  She was started on Vanc and Zosyn.  Blood cultures were ordered. NG.      WBC trending down. Continue Abx. Will switch to levaquin     Hypoxia  Likely has underlying COPD and hypoxia is chronic for her.  She likely will need 02 on discharge. Pneumonia could be contributing here as well.       Hip pain  On R side. This is not new- she has had this for months. Notes sometimes her leg goes numb. Will order R hip x-rays and consult ortho as this actually was her main complaint.     Mild OA.       Anemia of chronic disease  No acute issues       Metabolic acidosis  Will check BMP in am. Likely from sepsis       Elevated LFTs  Minimal. Will follow       Malnutrition of mild degree  No acute issues       Type 2 diabetes mellitus, without long-term current use of insulin  Well controlled. Without other known manifestations.  [facility] coverage for now.  A1c ordered.       Paranoid schizophrenia  Patient confirmed that she takes multiple psych meds.      History of breast cancer in female  No acute issues       Tobacco abuse  Smoking cessation education > 3 minutes       Hyperlipidemia  Resume statin from home         VTE Risk Mitigation (From admission, onward)         Ordered     enoxaparin injection 40 mg  Daily         04/09/22 1418                Discharge Planning   EILEEN:      Code Status: Prior   Is the patient medically ready for discharge?:     Reason for patient still in hospital (select all that apply): Patient unstable  Discharge Plan A: Home with family                  Ryne Moody MD  Department of Hospital Medicine   [facility] [place] [facility]

## 2022-04-10 NOTE — PLAN OF CARE
[facility] [place] - Telemetry  Initial Discharge Assessment       Primary Care Provider: [facility] [place]    Admission Diagnosis: Cough [R05.9]  Tachycardia [R00.0]  Respiratory failure with hypoxia [J96.91]    Admission Date: 4/9/2022  Expected Discharge Date:     Discharge Barriers Identified: None    Payor: MEDICAID / Plan: MEDICAID [place] / Product Type: Government /     Extended Emergency Contact Information  Primary Emergency Contact: Deena Crowley   [facility]  Home Phone: [phone]  Mobile Phone: [phone]  Relation: Daughter  Secondary Emergency Contact: Sindi Crowley   [facility]  Home Phone: [phone]  Mobile Phone: [phone]  Relation: Sister    Discharge Plan A: Home with family  Discharge Plan B: Other (TBD)      [facility] [address] [place], [place] - [address]  [address] [place] [address]  Phone: [phone] Fax: [phone]      Initial Assessment (most recent)     Adult Discharge Assessment - 04/10/22 0859        Discharge Assessment    Assessment Type Discharge Planning Assessment     Confirmed/corrected address, phone number and insurance Yes     Confirmed Demographics Correct on Facesheet     Source of Information patient;health record     Reason For Admission Cough     Lives With sibling(s);friend(s)     Facility Arrived From: Home     Do you expect to return to your current living situation? Yes     Do you have help at home or someone to help you manage your care at home? Yes     Who are your caregiver(s) and their phone number(s)? Sindi-sister: [phone]; Deena-daughter: [phone]; friend     Prior to hospitilization cognitive status: Alert/Oriented     Current cognitive status: Alert/Oriented     Walking or Climbing Stairs Difficulty none     Dressing/Bathing Difficulty none     Do you have any problems with: Errands/Grocery;Needs other help     Specify other help Deena-daughter transports; family and friend  assists if needed     Home Layout Able to live on 1st floor     Equipment Currently Used at Home none     Readmission within 30 days? No     Patient currently being followed by outpatient case management? No     Do you take prescription medications? Yes     Do you have prescription coverage? Yes     Coverage Medicaid; [employer]     Do you have any problems affording any of your prescribed medications? No     Is the patient taking medications as prescribed? yes     Who is going to help you get home at discharge? Deena-daughter; Sindi-sister     Are you on dialysis? No     Do you take coumadin? No     Discharge Plan A Home with family     Discharge Plan B Other   TBD    DME Needed Upon Discharge  other (see comments)   TBD    Discharge Plan discussed with: Patient     Discharge Barriers Identified None        Relationship/Environment    Name(s) of Who Lives With Patient Deena-daughter; Sindi-sister; friend

## 2022-04-10 NOTE — ASSESSMENT & PLAN NOTE
Presents with sepsis- WBC count of 20K and tachycardia.  Fever in the ED. CXR suggest pneumonia. Patient stated she was not SOB but found to have 02 sats in the ED of 83%.  She was started on Vanc and Zosyn.  Blood cultures were ordered. NG.      WBC trending down. Continue Abx. Will switch to levaquin

## 2022-04-10 NOTE — PLAN OF CARE
Problem: Adult Inpatient Plan of Care  Goal: Plan of Care Review  Outcome: Ongoing, Progressing  Goal: Patient-Specific Goal (Individualized)  Outcome: Ongoing, Progressing  Goal: Absence of Hospital-Acquired Illness or Injury  Outcome: Ongoing, Progressing  Goal: Optimal Comfort and Wellbeing  Outcome: Ongoing, Progressing     Problem: Diabetes Comorbidity  Goal: Blood Glucose Level Within Targeted Range  Outcome: Ongoing, Progressing

## 2022-04-10 NOTE — PLAN OF CARE
Problem: Adult Inpatient Plan of Care  Goal: Patient-Specific Goal (Individualized)  Outcome: Ongoing, Progressing  Goal: Absence of Hospital-Acquired Illness or Injury  Outcome: Ongoing, Progressing  Goal: Optimal Comfort and Wellbeing  Outcome: Ongoing, Progressing  Goal: Readiness for Transition of Care  Outcome: Ongoing, Progressing     Problem: Diabetes Comorbidity  Goal: Blood Glucose Level Within Targeted Range  Outcome: Ongoing, Progressing     Problem: Adjustment to Illness (Sepsis/Septic Shock)  Goal: Optimal Coping  Outcome: Ongoing, Progressing     Problem: Bleeding (Sepsis/Septic Shock)  Goal: Absence of Bleeding  Outcome: Met     Problem: Glycemic Control Impaired (Sepsis/Septic Shock)  Goal: Blood Glucose Level Within Desired Range  Outcome: Ongoing, Progressing     Problem: Infection Progression (Sepsis/Septic Shock)  Goal: Absence of Infection Signs and Symptoms  Outcome: Ongoing, Progressing   No acute events to report. Pt remains free form falls or injuries. Midline placed to Right arm. Pt off IV abx. Awaiting Ortho consult. [facility] compliant.

## 2022-04-10 NOTE — SUBJECTIVE & OBJECTIVE
Interval History:  No new issues     Review of Systems   Constitutional:  Negative for activity change, appetite change, chills, diaphoresis and fatigue.   HENT:  Negative for congestion.    Eyes:  Negative for discharge and itching.   Respiratory:  Positive for cough. Negative for apnea, choking, chest tightness and shortness of breath.    Cardiovascular:  Negative for chest pain and leg swelling.   Gastrointestinal:  Negative for abdominal pain, constipation, nausea and vomiting.   Endocrine: Negative for cold intolerance.   Genitourinary:  Negative for difficulty urinating.   Musculoskeletal:  Positive for arthralgias.   Neurological:  Negative for dizziness and headaches.   Psychiatric/Behavioral:  Negative for agitation and behavioral problems.    Objective:     Vital Signs (Most Recent):  Temp: 98.2 °F (36.8 °C) (04/10/22 0737)  Pulse: 109 (04/10/22 0737)  Resp: 18 (04/10/22 0737)  BP: (!) 121/56 (04/10/22 0737)  SpO2: (!) 92 % (04/10/22 0737)   Vital Signs (24h Range):  Temp:  [98.2 °F (36.8 °C)-100.5 °F (38.1 °C)] 98.2 °F (36.8 °C)  Pulse:  [] 109  Resp:  [18-32] 18  SpO2:  [84 %-100 %] 92 %  BP: (113-143)/() 121/56     Weight: 72 kg (158 lb 11.7 oz)  Body mass index is 24.86 kg/m².    Intake/Output Summary (Last 24 hours) at 4/10/2022 1000  Last data filed at 4/10/2022 0737  Gross per 24 hour   Intake 600 ml   Output 1 ml   Net 599 ml      Physical Exam  Vitals and nursing note reviewed.   Constitutional:       General: She is not in acute distress.     Appearance: Normal appearance. She is not ill-appearing, toxic-appearing or diaphoretic.   HENT:      Head: Normocephalic and atraumatic.   Eyes:      Conjunctiva/sclera: Conjunctivae normal.   Cardiovascular:      Rate and Rhythm: Regular rhythm. Tachycardia present.      Heart sounds:     No gallop.   Pulmonary:      Effort: No respiratory distress.      Breath sounds: No wheezing or rales.   Abdominal:      General: Abdomen is flat. There is  no distension.      Palpations: Abdomen is soft.   Skin:     General: Skin is warm and dry.   Neurological:      Mental Status: She is alert and oriented to person, place, and time.   Psychiatric:         Mood and Affect: Mood normal.         Behavior: Behavior normal.         Thought Content: Thought content normal.       Significant Labs: All pertinent labs within the past 24 hours have been reviewed.  BMP:   Recent Labs   Lab 04/10/22  0224   *      K 4.4   *   CO2 22*   BUN 11   CREATININE 0.7   CALCIUM 9.1     CBC:   Recent Labs   Lab 04/09/22  1225 04/10/22  0224   WBC 20.69* 14.58*   HGB 11.7* 10.5*   HCT 35.0* 31.9*    400       Significant Imaging:

## 2022-04-10 NOTE — NURSING
Patient received from the day nurse.  Is on the bed is not in distress and pain.  IV site assessed.  Introduced myself and safety maintained.  Bed in lowest locked position.personal items and call bell in reach.  IV fluid is going on.  She didn't try to get involved in conversation.  She appears asleep.  Will continue to monitor.

## 2022-04-10 NOTE — ASSESSMENT & PLAN NOTE
On R side. This is not new- she has had this for months. Notes sometimes her leg goes numb. Will order R hip x-rays and consult ortho as this actually was her main complaint.     Mild OA.

## 2022-04-10 NOTE — NURSING
As per doctors note, she is a poor historian. I tried to ask few questions related to assessment process and charted what she answered me at that point of time.

## 2022-04-10 NOTE — NURSING
Patient on the bed.  Not in any distress. IV site flushing well.  Assessment and vital signs are charted on the flow sheet.  Slept well during night.  Safety maintained in every ways.  Medication given per order and charting done accordingly.  Shift was uneventful.    Was not able to communicate whole night.  Patient had frequent cough in the morning . Got order and gave syrup guafenesin to her.

## 2022-04-10 NOTE — HOSPITAL COURSE
Patient admitted to the hospital for pneumonia. She was started on Abx.  Blood cultures were NG. She clinically improved.  She likely has some degree of emphysema but did not drop below 89% with ambulation.  Ortho saw her for R hip/leg issues and will follow up with ortho as out patient.  She will go home on 7 days of Levaquin. Follow up with PCP, ortho and pulmonary in one week. Activity as tolerated. Diet- low NA, ADA 2000 esmer diet.

## 2022-04-11 VITALS
SYSTOLIC BLOOD PRESSURE: 133 MMHG | DIASTOLIC BLOOD PRESSURE: 65 MMHG | BODY MASS INDEX: 24.92 KG/M2 | HEART RATE: 97 BPM | OXYGEN SATURATION: 93 % | WEIGHT: 158.75 LBS | TEMPERATURE: 98 F | RESPIRATION RATE: 18 BRPM | HEIGHT: 67 IN

## 2022-04-11 PROBLEM — E87.20 METABOLIC ACIDOSIS: Status: RESOLVED | Noted: 2022-04-09 | Resolved: 2022-04-11

## 2022-04-11 PROBLEM — R09.02 HYPOXIA: Status: RESOLVED | Noted: 2022-04-09 | Resolved: 2022-04-11

## 2022-04-11 LAB
BASOPHILS # BLD AUTO: 0.05 K/UL (ref 0–0.2)
BASOPHILS NFR BLD: 0.5 % (ref 0–1.9)
DIFFERENTIAL METHOD: ABNORMAL
EOSINOPHIL # BLD AUTO: 0.4 K/UL (ref 0–0.5)
EOSINOPHIL NFR BLD: 3.9 % (ref 0–8)
ERYTHROCYTE [DISTWIDTH] IN BLOOD BY AUTOMATED COUNT: 15.9 % (ref 11.5–14.5)
HCT VFR BLD AUTO: 29.7 % (ref 37–48.5)
HGB BLD-MCNC: 9.8 G/DL (ref 12–16)
IMM GRANULOCYTES # BLD AUTO: 0.09 K/UL (ref 0–0.04)
IMM GRANULOCYTES NFR BLD AUTO: 0.9 % (ref 0–0.5)
LYMPHOCYTES # BLD AUTO: 2 K/UL (ref 1–4.8)
LYMPHOCYTES NFR BLD: 21 % (ref 18–48)
MCH RBC QN AUTO: 27.1 PG (ref 27–31)
MCHC RBC AUTO-ENTMCNC: 33 G/DL (ref 32–36)
MCV RBC AUTO: 82 FL (ref 82–98)
MONOCYTES # BLD AUTO: 0.8 K/UL (ref 0.3–1)
MONOCYTES NFR BLD: 8 % (ref 4–15)
NEUTROPHILS # BLD AUTO: 6.3 K/UL (ref 1.8–7.7)
NEUTROPHILS NFR BLD: 65.7 % (ref 38–73)
NRBC BLD-RTO: 0 /100 WBC
PLATELET # BLD AUTO: 397 K/UL (ref 150–450)
PMV BLD AUTO: 9.4 FL (ref 9.2–12.9)
POCT GLUCOSE: 107 MG/DL (ref 70–110)
POCT GLUCOSE: 79 MG/DL (ref 70–110)
RBC # BLD AUTO: 3.62 M/UL (ref 4–5.4)
WBC # BLD AUTO: 9.65 K/UL (ref 3.9–12.7)

## 2022-04-11 PROCEDURE — 25000003 PHARM REV CODE 250: Performed by: INTERNAL MEDICINE

## 2022-04-11 PROCEDURE — 85025 COMPLETE CBC W/AUTO DIFF WBC: CPT | Performed by: INTERNAL MEDICINE

## 2022-04-11 PROCEDURE — A4216 STERILE WATER/SALINE, 10 ML: HCPCS | Performed by: INTERNAL MEDICINE

## 2022-04-11 PROCEDURE — 36415 COLL VENOUS BLD VENIPUNCTURE: CPT | Performed by: INTERNAL MEDICINE

## 2022-04-11 RX ORDER — LEVOFLOXACIN 750 MG/1
750 TABLET ORAL DAILY
Qty: 7 TABLET | Refills: 0 | Status: SHIPPED | OUTPATIENT
Start: 2022-04-12 | End: 2022-04-19

## 2022-04-11 RX ORDER — ACETAMINOPHEN 325 MG/1
650 TABLET ORAL EVERY 6 HOURS PRN
Status: DISCONTINUED | OUTPATIENT
Start: 2022-04-11 | End: 2022-04-11 | Stop reason: HOSPADM

## 2022-04-11 RX ADMIN — SODIUM CHLORIDE: 0.9 INJECTION, SOLUTION INTRAVENOUS at 02:04

## 2022-04-11 RX ADMIN — FLUOXETINE 40 MG: 20 CAPSULE ORAL at 08:04

## 2022-04-11 RX ADMIN — LEVOFLOXACIN 750 MG: 750 TABLET, FILM COATED ORAL at 08:04

## 2022-04-11 RX ADMIN — ATORVASTATIN CALCIUM 20 MG: 10 TABLET, FILM COATED ORAL at 08:04

## 2022-04-11 RX ADMIN — RISPERIDONE 3 MG: 1 TABLET ORAL at 08:04

## 2022-04-11 RX ADMIN — Medication 10 ML: at 01:04

## 2022-04-11 RX ADMIN — ACETAMINOPHEN 650 MG: 325 TABLET ORAL at 09:04

## 2022-04-11 NOTE — NURSING
Patient discharged to home today. Patient given discharge instructions and medication record educated  On the importance of completing all antibiotics even if she start of feel better.. Verbalized understanding all instructions. SL removed tele returned.Patie off unit in wheelchair to awaiting transportation .

## 2022-04-11 NOTE — PLAN OF CARE
[place] - Telemetry  Discharge Final Note    Primary Care Provider: [facility]    Expected Discharge Date: 4/11/2022    Final Discharge Note (most recent)       Final Note - 04/11/22 1133          Final Note    Assessment Type Final Discharge Note     Anticipated Discharge Disposition Home or Self Care     What phone number can be called within the next 1-3 days to see how you are doing after discharge? [phone]     Hospital Resources/Appts/Education Provided Provided patient/caregiver with written discharge plan information;Appointments scheduled and added to AVS        Post-Acute Status    Post-Acute Authorization Other     Coverage [employer] Dual Plan     Other Status No Post-Acute Service Needs     Discharge Delays None known at this time                     Important Message from Medicare             Contact Info       [facility]   Relationship: PCP - General    [address] [facility] [address]   Phone: [phone]       Next Steps: Schedule an appointment as soon as possible for a visit in 1 week(s)    Instructions: Primary Care follow up and to get Pulmonology referral.    Juvenal Berg MD   Specialty: Orthopedic Surgery    [address]  [facility] [address]   Phone: [phone]       Next Steps: Schedule an appointment as soon as possible for a visit on 4/25/2022    Instructions: Ortho follow up, please arrive at 9:45 AM.          SW spoke with patient's nurse Cora, informing her that patient is cleared from case management standpoint.

## 2022-04-11 NOTE — ASSESSMENT & PLAN NOTE
Presents with sepsis- WBC count of 20K and tachycardia.  Fever in the ED. CXR suggest pneumonia. Patient stated she was not SOB but found to have 02 sats in the ED of 83%.  She was started on Vanc and Zosyn.  Blood cultures were ordered. NG.      WBC trending down. Continue Abx. Will switch to levaquin     Doing well. Wants to go home. Will d/c to home with pulmonary follow up.

## 2022-04-11 NOTE — DISCHARGE SUMMARY
"[facility] Medicine  Discharge Summary      Patient Name: Jossie Crowley  MRN: [id number]  Patient Class: IP- Inpatient  Admission Date: 4/9/2022  Hospital Length of Stay: 2 days  Discharge Date and Time:  04/11/2022 10:30 AM  Attending Physician: Ryne Rodriguez MD   Discharging Provider: Ryne Rodriguez MD  Primary Care Provider: [facility]      HPI:   Mrs. Crowley is a 60 yo F who presents with a CC of hip pain "for months"  She in general is a poor historian. She states that sometimes her R leg goes numb. She was found in the ED to be septic with likely pneumonia. She was also found to have an 02 sat of 83% of RA but stated she was never SOB.  She does note sputum production that is not new. She continues to smoke. She had a low grade temp in the ED. She presents with a   WBC count of 20K.  She denies sick contacts. She is non-ill appearing.  She was started on Vanc and Zosyn. Procal slightly elevated. Blood cultures were NG.      * No surgery found *      Hospital Course:   Patient admitted to the hospital for pneumonia. She was started on Abx.  Blood cultures were NG. She clinically improved.  She likely has some degree of emphysema but did not drop below 89% with ambulation.  Ortho saw her for R hip/leg issues and will follow up with ortho as out patient.  She will go home on 7 days of Levaquin. Follow up with PCP, ortho and pulmonary in one week. Activity as tolerated. Diet- low NA, ADA 2000 esmer diet.        Goals of Care Treatment Preferences:  Code Status: Full Code      Consults:   Consults (From admission, onward)        Status Ordering Provider     Inpatient consult to [facility]ICC team ([facility])  Once        Provider:  (Not yet assigned)    Acknowledged RYNE RODRIGUEZ     Inpatient consult to Orthopedic Surgery  Once        Provider:  Juvenal Berg MD    Completed RYNE RODRIGUEZ          No new Assessment & Plan notes have been filed under this hospital " service since the last note was generated.  Service: Hospital Medicine    Final Active Diagnoses:    Diagnosis Date Noted POA    PRINCIPAL PROBLEM:  Pneumonia due to infectious organism [J18.9] 04/09/2022 Yes    Anemia of chronic disease [D63.8] 04/09/2022 Yes    Hip pain [M25.559] 04/09/2022 Yes    Malnutrition of mild degree [E44.1] 06/24/2020 Yes    Type 2 diabetes mellitus, without long-term current use of insulin [E11.9] 06/24/2020 Yes    Elevated LFTs [R79.89] 06/24/2020 Yes    History of breast cancer in female [Z85.3] 01/04/2015 Not Applicable     Chronic    Paranoid schizophrenia [F20.0] 01/04/2015 Yes    Tobacco abuse [Z72.0] 01/04/2015 Yes     Chronic    Hyperlipidemia [E78.5]  Yes      Problems Resolved During this Admission:    Diagnosis Date Noted Date Resolved POA    Metabolic acidosis [E87.2] 04/09/2022 04/11/2022 Yes    Hypoxia [R09.02] 04/09/2022 04/11/2022 Yes       Discharged Condition: good    Disposition: Home or Self Care    Follow Up:   Follow-up Information     [facility] Follow up in 1 week(s).    Contact information:  [address] [id number] [phone]             Juvenal Berg MD Follow up in 1 week(s).    Specialty: Orthopedic Surgery  Contact information:  [address]  [address]  [place] [address]  [phone]             Juvenal Berg MD .    Specialty: Orthopedic Surgery  Contact information:  [address] [facility] [address]  [phone]             Selwyn Springer MD Follow up in 1 week(s).    Specialties: Pulmonary Disease, Sleep Medicine  Contact information:  [address]  [address]  [place] [address]  [phone]                       Patient Instructions:   No discharge procedures on file.      Pending Diagnostic Studies:     Procedure Component Value Units Date/Time    CBC auto differential [[id number]]     Order Status: Sent Lab Status: No result     Specimen: Blood     VANCOMYCIN, TROUGH [[id number]]     Order  Status: Sent Lab Status: No result     Specimen: Blood          Medications:  Reconciled Home Medications:      Medication List      START taking these medications    levoFLOXacin 750 MG tablet  Commonly known as: LEVAQUIN  Take 1 tablet (750 mg total) by mouth once daily. for 7 days  Start taking on: April 12, 2022        CONTINUE taking these medications    albuterol 90 mcg/actuation inhaler  Commonly known as: PROVENTIL/VENTOLIN HFA  Inhale 1-2 puffs into the lungs every 6 (six) hours as needed for Wheezing or Shortness of Breath. Rescue     amLODIPine 5 MG tablet  Commonly known as: NORVASC  Take 1 tablet (5 mg total) by mouth once daily.     atorvastatin 20 MG tablet  Commonly known as: LIPITOR  Take 20 mg by mouth.     benztropine 1 MG tablet  Commonly known as: COGENTIN     cetirizine 10 MG tablet  Commonly known as: ZYRTEC  Take 10 mg by mouth once daily.     cyanocobalamin 500 MCG tablet  Take 500 mcg by mouth once daily.     docusate sodium 50 MG capsule  Commonly known as: COLACE  Take by mouth 2 (two) times daily.     FLUoxetine 40 MG capsule  Take 40 mg by mouth once daily.     GOODY'S HEADACHE POWDER ORAL  Take by mouth.     haloperidoL 10 MG tablet  Commonly known as: HALDOL  TAKE 2 TABLETS BY MOUTH ONCE DAILY AT BEDTIME     hydrOXYzine pamoate 50 MG Cap  Commonly known as: VISTARIL  Take 50 mg by mouth 3 (three) times daily as needed.     ibuprofen 200 MG tablet  Commonly known as: ADVIL,MOTRIN  Take 200 mg by mouth every 6 (six) hours as needed for Pain. Every 8 hours prn     meloxicam 15 MG tablet  Commonly known as: MOBIC  Take 15 mg by mouth once daily.     metFORMIN 1000 MG tablet  Commonly known as: GLUCOPHAGE  Take 500 mg by mouth daily with breakfast.     paliperidone palmitate 156 mg/mL Syrg injection  Commonly known as: INVEGA SUSTENNA  Inject 1 mL (156 mg total) into the muscle every 28 days.     pulse oximeter device  Commonly known as: pulse oximeter  by Apply Externally route 2 (two)  times a day. Use twice daily at 8 AM and 3 PM and record the value in MyChart as directed.     QUEtiapine 50 MG tablet  Commonly known as: SEROQUEL  Take 1 tablet (50 mg total) by mouth nightly as needed (insomnia).     risperiDONE 3 MG Tab  Commonly known as: RISPERDAL  Take 1 tablet (3 mg total) by mouth 2 (two) times daily.     traZODone 150 MG tablet  Commonly known as: DESYREL  Take 1 tablet (150 mg total) by mouth every evening.     VOLTAREN TOP  Apply topically.            Indwelling Lines/Drains at time of discharge:   Lines/Drains/Airways     None                 Time spent on the discharge of patient: > 35 minutes         Ryne Moody MD  Department of Hospital Medicine  [facility]

## 2022-04-11 NOTE — NURSING
Patient received from the day nurse.  Is on the bed is not in distress and pain.  IV site assessed.  Introduced myself and safety maintained.  Bed in lowest locked position.personal items and call bell in reach.  O2 2L via nasal cannula.  Will continue to monitor.

## 2022-04-11 NOTE — CONSULTS
C.C. right lower extremity pain consistent with radiculopathy    HPI: Jossie Cornell61 y.o. complaining of right lower extremity pain which appears to radiate from her upper thigh and lower back.  She has pain which may be consistent with bursitis on the lateral aspect of the hip however is no severe pain on palpation.  She has no pain with any range of motion of the hip.  She states she has pain when she ambulates to the grocery and the pain radiates down her leg causes numbness in her foot.  Is likely radicular type pain.  She denies any weakness.  She is admitted this point for pneumonia.  I would recommend the lumbar spine workup as an outpatient.    ROS:   Pertinent positives:  Right hip and leg pain   Negatives: F/C, N/V, CP, SOB,    All other 14 point ROS negative    PMH:   Past Medical History:   Diagnosis Date    Breast cancer     Diabetes mellitus     History of psychiatric hospitalization     Hx of psychiatric care     Hyperlipidemia     Psychiatric problem     Schizophrenia     Therapy     Thyroid disease     thyroid surgery       PSH:   Past Surgical History:   Procedure Laterality Date    BREAST BIOPSY      BREAST LUMPECTOMY      BREAST SURGERY      THYROIDECTOMY  2005       Social Hx:   Social History     Occupational History    Not on file   Tobacco Use    Smoking status: Current Every Day Smoker     Packs/day: 0.50     Years: 37.00     Pack years: 18.50     Types: Cigarettes    Smokeless tobacco: Former User     Quit date: 12/27/2014   Substance and Sexual Activity    Alcohol use: Not Currently     Comment: occasionally    Drug use: Yes     Frequency: 1.0 times per week     Types: Marijuana    Sexual activity: Not Currently       Medications:    No current facility-administered medications on file prior to encounter.     Current Outpatient Medications on File Prior to Encounter   Medication Sig Dispense Refill    albuterol (PROVENTIL/VENTOLIN HFA) 90 mcg/actuation inhaler Inhale  1-2 puffs into the lungs every 6 (six) hours as needed for Wheezing or Shortness of Breath. Rescue 6.7 g 0    ASA/acetaminophen/caffeine/pot (GOODY'S HEADACHE POWDER ORAL) Take by mouth.      atorvastatin (LIPITOR) 20 MG tablet Take 20 mg by mouth.      benztropine (COGENTIN) 1 MG tablet       cetirizine (ZYRTEC) 10 MG tablet Take 10 mg by mouth once daily.      cyanocobalamin 500 MCG tablet Take 500 mcg by mouth once daily.      diclofenac sodium (VOLTAREN TOP) Apply topically.      docusate sodium (COLACE) 50 MG capsule Take by mouth 2 (two) times daily.      FLUoxetine 40 MG capsule Take 40 mg by mouth once daily.      haloperidoL (HALDOL) 10 MG tablet TAKE 2 TABLETS BY MOUTH ONCE DAILY AT BEDTIME      hydrOXYzine pamoate (VISTARIL) 50 MG Cap Take 50 mg by mouth 3 (three) times daily as needed.      ibuprofen (ADVIL,MOTRIN) 200 MG tablet Take 200 mg by mouth every 6 (six) hours as needed for Pain. Every 8 hours prn      meloxicam (MOBIC) 15 MG tablet Take 15 mg by mouth once daily.      metFORMIN (GLUCOPHAGE) 1000 MG tablet Take 500 mg by mouth daily with breakfast.      paliperidone palmitate (INVEGA SUSTENNA) 156 mg/mL Syrg injection Inject 1 mL (156 mg total) into the muscle every 28 days. 1 mL 5    amLODIPine (NORVASC) 5 MG tablet Take 1 tablet (5 mg total) by mouth once daily. 30 tablet 2    pulse oximeter (PULSE OXIMETER) device by Apply Externally route 2 (two) times a day. Use twice daily at 8 AM and 3 PM and record the value in [facility] as directed. 1 each 0    QUEtiapine (SEROQUEL) 50 MG tablet Take 1 tablet (50 mg total) by mouth nightly as needed (insomnia). 30 tablet 2    risperiDONE (RISPERDAL) 3 MG Tab Take 1 tablet (3 mg total) by mouth 2 (two) times daily. 60 tablet 2    traZODone (DESYREL) 150 MG tablet Take 1 tablet (150 mg total) by mouth every evening. 30 tablet 2         PE:         Vitals:    04/11/22 0349   BP: (!) 151/72   Pulse: 98   Resp: 20   Temp: 98.8 °F (37.1 °C)  "      Estimated body mass index is 24.86 kg/m² as calculated from the following:    Height as of this encounter: 5' 7" (1.702 m).    Weight as of this encounter: 72 kg (158 lb 11.7 oz).     General WDWN, NAD     Extremity:  Examination of the right hip patient has no pain range of motion.  She has no severe pain palpation lateral aspect of the hip.  There is no cellulitis erythema worrisome signs for infection.    She has pain that radiates down the right leg with straight leg raise.    Strength intact 5/5 in all motor distributions in the right lower extremity.  And sensation appears to be intact as well.  Labs:    Lab Results   Component Value Date    WBC 14.58 (H) 04/10/2022    HGB 10.5 (L) 04/10/2022    HCT 31.9 (L) 04/10/2022    MCV 81 (L) 04/10/2022     04/10/2022           BMP  Lab Results   Component Value Date     04/10/2022    K 4.4 04/10/2022     (H) 04/10/2022    CO2 22 (L) 04/10/2022    BUN 11 04/10/2022    CREATININE 0.7 04/10/2022    CALCIUM 9.1 04/10/2022    ANIONGAP 8 04/10/2022    ESTGFRAFRICA >60 04/10/2022    EGFRNONAA >60 04/10/2022       Lab Results   Component Value Date    INR 1.0 01/04/2015    INR 1.0 01/03/2015    INR 0.9 02/02/2013       Lab Results   Component Value Date    SEDRATE 45 (H) 12/06/2017       Lab Results   Component Value Date    CRP 32.2 (H) 12/26/2021       Radiography:  Film    Interpretation    X-rays of the right hip AP and lateral shows no fracture dislocations.  No severe arthrosis.    A/P  61 y.o.female with right lower extremity radiculopathy type pain.    This pain is likely consistent with lumbar spine pathology possible herniated nucleus polyposis.  I recommend workup for lumbar spine as an outpatient.  Is not appear she has any severe bursitis on the lateral aspect of the hip.  It does not appear to be any pathology in the femoral acetabular joint.  She can be weight-bearing as tolerated.      Juvenal Berg MD   "

## 2022-04-11 NOTE — ASSESSMENT & PLAN NOTE
Likely has underlying COPD and hypoxia is chronic for her.  She likely will need 02 on discharge. Pneumonia could be contributing here as well.     No need for home 02.

## 2022-04-11 NOTE — PROGRESS NOTES
"[facility] Medicine  Progress Note    Patient Name: Jossie Crowley  MRN: [id number]  Patient Class: IP- Inpatient   Admission Date: 4/9/2022  Length of Stay: 2 days  Attending Physician: Ryne Rodriguez MD  Primary Care Provider: [facility]        Subjective:     Principal Problem:Pneumonia due to infectious organism        HPI:  Mrs. Crowley is a 62 yo F who presents with a CC of hip pain "for months"  She in general is a poor historian. She states that sometimes her R leg goes numb. She was found in the ED to be septic with likely pneumonia. She was also found to have an 02 sat of 83% of RA but stated she was never SOB.  She does note sputum production that is not new. She continues to smoke. She had a low grade temp in the ED. She presents with a   WBC count of 20K.  She denies sick contacts. She is non-ill appearing.  She was started on Vanc and Zosyn. Procal slightly elevated. Blood cultures were NG.      Overview/Hospital Course:  Patient admitted to the hospital for pneumonia. She was started on Abx.  Blood cultures were NG. She clinically improved.  She likely has some degree of emphysema but did not drop below 89% with ambulation.  Ortho saw her for R hip/leg issues and will follow up with ortho as out patient.  She will go home on 7 days of Levaquin. Follow up with PCP, ortho and pulmonary in one week. Activity as tolerated. Diet      Interval History:  doing great. Would like to go home.       Review of Systems   Constitutional:  Negative for activity change, appetite change, chills, diaphoresis and fatigue.   HENT:  Negative for congestion.    Eyes:  Negative for discharge and itching.   Respiratory:  Negative for apnea, cough, choking, chest tightness and shortness of breath.    Cardiovascular:  Negative for chest pain and leg swelling.   Gastrointestinal:  Negative for abdominal pain, constipation, nausea and vomiting.   Endocrine: Negative for cold intolerance. "   Genitourinary:  Negative for difficulty urinating.   Musculoskeletal:  Negative for arthralgias.   Neurological:  Negative for dizziness and headaches.   Psychiatric/Behavioral:  Negative for agitation and behavioral problems.    Objective:     Vital Signs (Most Recent):  Temp: 98.5 °F (36.9 °C) (04/11/22 0739)  Pulse: 95 (04/11/22 0739)  Resp: 16 (04/11/22 0739)  BP: (!) 148/65 (04/11/22 0739)  SpO2: 98 % (04/11/22 0739)   Vital Signs (24h Range):  Temp:  [97.8 °F (36.6 °C)-98.8 °F (37.1 °C)] 98.5 °F (36.9 °C)  Pulse:  [] 95  Resp:  [16-20] 16  SpO2:  [88 %-100 %] 98 %  BP: (125-151)/(64-73) 148/65     Weight: 72 kg (158 lb 11.7 oz)  Body mass index is 24.86 kg/m².    Intake/Output Summary (Last 24 hours) at 4/11/2022 1024  Last data filed at 4/11/2022 0900  Gross per 24 hour   Intake 840 ml   Output --   Net 840 ml      Physical Exam  Vitals and nursing note reviewed.   Constitutional:       General: She is not in acute distress.     Appearance: Normal appearance. She is normal weight. She is not ill-appearing, toxic-appearing or diaphoretic.   HENT:      Head: Normocephalic and atraumatic.   Eyes:      Conjunctiva/sclera: Conjunctivae normal.   Cardiovascular:      Rate and Rhythm: Regular rhythm. Tachycardia present.      Heart sounds:     No gallop.   Pulmonary:      Effort: No respiratory distress.      Breath sounds: No wheezing or rales.   Abdominal:      General: Abdomen is flat. There is no distension.      Palpations: Abdomen is soft.   Skin:     General: Skin is warm and dry.   Neurological:      Mental Status: She is alert and oriented to person, place, and time.   Psychiatric:         Mood and Affect: Mood normal.         Behavior: Behavior normal.         Thought Content: Thought content normal.       Significant Labs: All pertinent labs within the past 24 hours have been reviewed.  BMP:   Recent Labs   Lab 04/10/22  0224   *      K 4.4   *   CO2 22*   BUN 11   CREATININE  0.7   CALCIUM 9.1     CBC:   Recent Labs   Lab 04/09/22  1225 04/10/22  0224   WBC 20.69* 14.58*   HGB 11.7* 10.5*   HCT 35.0* 31.9*    400       Significant Imaging:       Assessment/Plan:      * Pneumonia due to infectious organism  Presents with sepsis- WBC count of 20K and tachycardia.  Fever in the ED. CXR suggest pneumonia. Patient stated she was not SOB but found to have 02 sats in the ED of 83%.  She was started on Vanc and Zosyn.  Blood cultures were ordered. NG.      WBC trending down. Continue Abx. Will switch to levaquin     Doing well. Wants to go home. Will d/c to home with pulmonary follow up.    Hypoxia  Likely has underlying COPD and hypoxia is chronic for her.  She likely will need 02 on discharge. Pneumonia could be contributing here as well.     No need for home 02.       Hip pain  On R side. This is not new- she has had this for months. Notes sometimes her leg goes numb. Will order R hip x-rays and consult ortho as this actually was her main complaint.     Mild OA.       Anemia of chronic disease  No acute issues       Metabolic acidosis  Will check BMP in am. Likely from sepsis       Elevated LFTs  Minimal. Will follow       Malnutrition of mild degree  No acute issues       Type 2 diabetes mellitus, without long-term current use of insulin  Well controlled. Without other known manifestations.  [facility] coverage for now.  A1c ordered.       Paranoid schizophrenia  Patient confirmed that she takes multiple psych meds.      History of breast cancer in female  No acute issues       Tobacco abuse  Smoking cessation education > 3 minutes       Hyperlipidemia  Resume statin from home       R hip pain, neuropathy- will follow up with ortho   VTE Risk Mitigation (From admission, onward)         Ordered     enoxaparin injection 40 mg  Daily         04/09/22 1418                Discharge Planning   EILEEN: 4/11/2022     Code Status: Prior   Is the patient medically ready for discharge?:     Reason for  patient still in hospital (select all that apply): Patient unstable  Discharge Plan A: Home with family                  Ryne Moody MD  Department of [facility] Medicine   [facility]

## 2022-04-11 NOTE — SUBJECTIVE & OBJECTIVE
Interval History:  doing great. Would like to go home.       Review of Systems   Constitutional:  Negative for activity change, appetite change, chills, diaphoresis and fatigue.   HENT:  Negative for congestion.    Eyes:  Negative for discharge and itching.   Respiratory:  Negative for apnea, cough, choking, chest tightness and shortness of breath.    Cardiovascular:  Negative for chest pain and leg swelling.   Gastrointestinal:  Negative for abdominal pain, constipation, nausea and vomiting.   Endocrine: Negative for cold intolerance.   Genitourinary:  Negative for difficulty urinating.   Musculoskeletal:  Negative for arthralgias.   Neurological:  Negative for dizziness and headaches.   Psychiatric/Behavioral:  Negative for agitation and behavioral problems.    Objective:     Vital Signs (Most Recent):  Temp: 98.5 °F (36.9 °C) (04/11/22 0739)  Pulse: 95 (04/11/22 0739)  Resp: 16 (04/11/22 0739)  BP: (!) 148/65 (04/11/22 0739)  SpO2: 98 % (04/11/22 0739)   Vital Signs (24h Range):  Temp:  [97.8 °F (36.6 °C)-98.8 °F (37.1 °C)] 98.5 °F (36.9 °C)  Pulse:  [] 95  Resp:  [16-20] 16  SpO2:  [88 %-100 %] 98 %  BP: (125-151)/(64-73) 148/65     Weight: 72 kg (158 lb 11.7 oz)  Body mass index is 24.86 kg/m².    Intake/Output Summary (Last 24 hours) at 4/11/2022 1024  Last data filed at 4/11/2022 0900  Gross per 24 hour   Intake 840 ml   Output --   Net 840 ml      Physical Exam  Vitals and nursing note reviewed.   Constitutional:       General: She is not in acute distress.     Appearance: Normal appearance. She is normal weight. She is not ill-appearing, toxic-appearing or diaphoretic.   HENT:      Head: Normocephalic and atraumatic.   Eyes:      Conjunctiva/sclera: Conjunctivae normal.   Cardiovascular:      Rate and Rhythm: Regular rhythm. Tachycardia present.      Heart sounds:     No gallop.   Pulmonary:      Effort: No respiratory distress.      Breath sounds: No wheezing or rales.   Abdominal:      General:  Abdomen is flat. There is no distension.      Palpations: Abdomen is soft.   Skin:     General: Skin is warm and dry.   Neurological:      Mental Status: She is alert and oriented to person, place, and time.   Psychiatric:         Mood and Affect: Mood normal.         Behavior: Behavior normal.         Thought Content: Thought content normal.       Significant Labs: All pertinent labs within the past 24 hours have been reviewed.  BMP:   Recent Labs   Lab 04/10/22  0224   *      K 4.4   *   CO2 22*   BUN 11   CREATININE 0.7   CALCIUM 9.1     CBC:   Recent Labs   Lab 04/09/22  1225 04/10/22  0224   WBC 20.69* 14.58*   HGB 11.7* 10.5*   HCT 35.0* 31.9*    400       Significant Imaging:

## 2022-04-11 NOTE — PLAN OF CARE
Problem: Adult Inpatient Plan of Care  Goal: Plan of Care Review  Outcome: Met  Goal: Patient-Specific Goal (Individualized)  Outcome: Met  Goal: Absence of Hospital-Acquired Illness or Injury  Outcome: Met  Goal: Optimal Comfort and Wellbeing  Outcome: Met     Problem: Diabetes Comorbidity  Goal: Blood Glucose Level Within Targeted Range  Outcome: Met     Problem: Adjustment to Illness (Sepsis/Septic Shock)  Goal: Optimal Coping  Outcome: Met     Problem: Glycemic Control Impaired (Sepsis/Septic Shock)  Goal: Blood Glucose Level Within Desired Range  Outcome: Met     Problem: Infection Progression (Sepsis/Septic Shock)  Goal: Absence of Infection Signs and Symptoms  Outcome: Met     Problem: Nutrition Impaired (Sepsis/Septic Shock)  Goal: Optimal Nutrition Intake  Outcome: Met     Problem: Fluid Imbalance (Pneumonia)  Goal: Fluid Balance  Outcome: Met     Problem: Infection (Pneumonia)  Goal: Resolution of Infection Signs and Symptoms  Outcome: Adequate for Care Transition     Problem: Respiratory Compromise (Pneumonia)  Goal: Effective Oxygenation and Ventilation  Outcome: Adequate for Care Transition     Problem: Infection  Goal: Absence of Infection Signs and Symptoms  Outcome: Adequate for Care Transition

## 2022-04-12 ENCOUNTER — PATIENT OUTREACH (OUTPATIENT)
Dept: ADMINISTRATIVE | Facility: CLINIC | Age: 62
End: 2022-04-12
Payer: MEDICARE

## 2022-04-12 DIAGNOSIS — J18.9 PNEUMONIA DUE TO INFECTIOUS ORGANISM, UNSPECIFIED LATERALITY, UNSPECIFIED PART OF LUNG: Primary | ICD-10-CM

## 2022-04-12 NOTE — PROGRESS NOTES
C3 nurse attempted to contact Jossie Crowley for a TCC post hospital discharge follow up call. No answer. Left message with callback information. The patient does not have a scheduled HOSFU appointment, and the pt does not have an [facility] PCP.    C3 nurse attempted to contact Jossie Crowley for a TCC post hospital discharge follow up call. No answer. Voicemail full.  The patient does not have a scheduled HOSFU appointment, and the pt does not have an [facility] PCP.    C3 nurse spoke with Jossie Crowley for a TCC post hospital discharge follow up call. The patient reports does not have a scheduled HOSFU appointment. C3 nurse was unable to schedule HOSFU appointment for Non-[facility] PCP. Patient advised to contact their PCP to schedule a HOSPFU within 7 days. NP home referral submitted.

## 2022-04-13 LAB
BACTERIA BLD CULT: NORMAL
BACTERIA BLD CULT: NORMAL

## 2022-04-19 ENCOUNTER — PES CALL (OUTPATIENT)
Dept: ADMINISTRATIVE | Facility: CLINIC | Age: 62
End: 2022-04-19
Payer: MEDICARE

## 2022-04-20 ENCOUNTER — PES CALL (OUTPATIENT)
Dept: ADMINISTRATIVE | Facility: CLINIC | Age: 62
End: 2022-04-20
Payer: MEDICARE

## 2022-04-27 ENCOUNTER — OFFICE VISIT (OUTPATIENT)
Dept: HOME HEALTH SERVICES | Facility: CLINIC | Age: 62
End: 2022-04-27
Payer: MEDICARE

## 2022-04-27 DIAGNOSIS — E78.5 HYPERLIPIDEMIA, UNSPECIFIED HYPERLIPIDEMIA TYPE: ICD-10-CM

## 2022-04-27 DIAGNOSIS — F20.9 SCHIZOPHRENIA, UNSPECIFIED TYPE: Primary | ICD-10-CM

## 2022-04-27 DIAGNOSIS — J18.9 PNEUMONIA DUE TO INFECTIOUS ORGANISM, UNSPECIFIED LATERALITY, UNSPECIFIED PART OF LUNG: ICD-10-CM

## 2022-04-27 PROCEDURE — 3074F PR MOST RECENT SYSTOLIC BLOOD PRESSURE < 130 MM HG: ICD-10-PCS | Mod: CPTII,S$GLB,, | Performed by: NURSE PRACTITIONER

## 2022-04-27 PROCEDURE — 99344 PR HOME VISIT,NEW PATIENT,LEVEL IV: ICD-10-PCS | Mod: S$GLB,,, | Performed by: NURSE PRACTITIONER

## 2022-04-27 PROCEDURE — 3044F PR MOST RECENT HEMOGLOBIN A1C LEVEL <7.0%: ICD-10-PCS | Mod: CPTII,S$GLB,, | Performed by: NURSE PRACTITIONER

## 2022-04-27 PROCEDURE — 3074F SYST BP LT 130 MM HG: CPT | Mod: CPTII,S$GLB,, | Performed by: NURSE PRACTITIONER

## 2022-04-27 PROCEDURE — 1159F MED LIST DOCD IN RCRD: CPT | Mod: CPTII,S$GLB,, | Performed by: NURSE PRACTITIONER

## 2022-04-27 PROCEDURE — 1160F PR REVIEW ALL MEDS BY PRESCRIBER/CLIN PHARMACIST DOCUMENTED: ICD-10-PCS | Mod: CPTII,S$GLB,, | Performed by: NURSE PRACTITIONER

## 2022-04-27 PROCEDURE — 1160F RVW MEDS BY RX/DR IN RCRD: CPT | Mod: CPTII,S$GLB,, | Performed by: NURSE PRACTITIONER

## 2022-04-27 PROCEDURE — 3078F DIAST BP <80 MM HG: CPT | Mod: CPTII,S$GLB,, | Performed by: NURSE PRACTITIONER

## 2022-04-27 PROCEDURE — 1159F PR MEDICATION LIST DOCUMENTED IN MEDICAL RECORD: ICD-10-PCS | Mod: CPTII,S$GLB,, | Performed by: NURSE PRACTITIONER

## 2022-04-27 PROCEDURE — 99344 HOME/RES VST NEW MOD MDM 60: CPT | Mod: S$GLB,,, | Performed by: NURSE PRACTITIONER

## 2022-04-27 PROCEDURE — 3078F PR MOST RECENT DIASTOLIC BLOOD PRESSURE < 80 MM HG: ICD-10-PCS | Mod: CPTII,S$GLB,, | Performed by: NURSE PRACTITIONER

## 2022-04-27 PROCEDURE — 3044F HG A1C LEVEL LT 7.0%: CPT | Mod: CPTII,S$GLB,, | Performed by: NURSE PRACTITIONER

## 2022-04-28 VITALS
RESPIRATION RATE: 16 BRPM | OXYGEN SATURATION: 97 % | DIASTOLIC BLOOD PRESSURE: 70 MMHG | TEMPERATURE: 97 F | HEART RATE: 105 BPM | SYSTOLIC BLOOD PRESSURE: 125 MMHG

## 2022-04-28 NOTE — PROGRESS NOTES
"  [facility]  Transition of Care Home Visit    Visit Date: 4/27/2022  Encounter Provider: Topher Lewis   PCP:  [facility]    PRESENTING HISTORY      Patient ID: Jossie Crowley is a 61 y.o. female.    Consult Requested By:  Dr. Carter Mccoy  Reason for Consult:  Hospital Follow Up.    Jossie is being seen at home due to being seen at home due to physical debility that presents a taxing effort to leave the home, to mitigate high risk of hospital readmission and/or due to the limited availability of reliable or safe options for transportation to the point of access to the provider. Prior to treatment on this visit the chart was reviewed and patient verbal consent was obtained.      Chief Complaint: Transitional Care        History of Present Illness: Ms. Jossie Crowley is a 61 y.o. female who was recently admitted to the hospital.      Patient Name: Jossie Crowley  MRN: [id number]  Patient Class: IP- Inpatient  Admission Date: 4/9/2022  Hospital Length of Stay: 2 days  Discharge Date and Time:  04/11/2022 10:30 AM  Attending Physician: Ryne Rodriguez MD   Discharging Provider: Ryne Rodriguez MD  Primary Care Provider: [facility]        HPI:   Mrs. Crowley is a 62 yo F who presents with a CC of hip pain "for months"  She in general is a poor historian. She states that sometimes her R leg goes numb. She was found in the ED to be septic with likely pneumonia. She was also found to have an 02 sat of 83% of RA but stated she was never SOB.  She does note sputum production that is not new. She continues to smoke. She had a low grade temp in the ED. She presents with a   WBC count of 20K.  She denies sick contacts. She is non-ill appearing.  She was started on Vanc and Zosyn. Procal slightly elevated. Blood cultures were NG.        * No surgery found *       Hospital Course:   Patient admitted to the hospital for pneumonia. She was started on Abx.  Blood cultures were NG. She " clinically improved.  She likely has some degree of emphysema but did not drop below 89% with ambulation.  Ortho saw her for R hip/leg issues and will follow up with ortho as out patient.  She will go home on 7 days of Levaquin. Follow up with PCP, ortho and pulmonary in one week. Activity as tolerated. Diet- low NA, ADA 2000 esmer diet.         Goals of Care Treatment Preferences:  Code Status: Full Code  ___________________________________________________________________    Today: With this visit today patient is found ambulating around her home, appears to be doing well. Patient is AAOx3, able to verify her name and . Verbalizes she has completed Levaquin course. Denies SOB/cough. Endorses planning to attend her PCP f/up appt. Patient endorses ability to perform ADLs. Endorses eating x 3 meals per day, daily BMs, and adequate sleep pattern. Reports compliance with all medications. No additional needs at this this time. All of my information was given to the patient and family if any questions or concerns arise.     VSS. Denies fever, chest pain, shortness of breath, nausea, vomiting, diarrhea. Risks of environmental exposure to coronavirus discussed including: social distancing, hand hygiene, and limiting departures from the home for necessities only.  Reports understanding and willingness to comply.  All hospital discharge orders reviewed and being followed, all medications reconciled and reviewed, patient and family verbalized understanding. No other needs identified at this time.      Attestation: Screening criteria to assess the level of the patient's risk for infection with COVID-19 as recommended by the CDC at the time of the above documented home visit concluded appropriateness to proceed. Universal precautions were maintained at all times, including provider use of 60% alcohol gel hand  immediately prior to entry and upon departing the patient's home.    Review of Systems   Constitutional:  Negative for activity change and appetite change.   HENT: Negative for congestion and dental problem.    Eyes: Negative for discharge and itching.   Respiratory: Negative for choking and chest tightness.    Cardiovascular: Negative for chest pain and palpitations.   Gastrointestinal: Negative for rectal pain and vomiting.   Endocrine: Negative for cold intolerance and heat intolerance.   Genitourinary: Negative for enuresis and flank pain.   Musculoskeletal: Negative for myalgias and neck pain.   Skin: Negative for color change and wound.   Allergic/Immunologic: Negative for environmental allergies and food allergies.   Neurological: Negative for tremors and syncope.   Hematological: Does not bruise/bleed easily.   Psychiatric/Behavioral: Negative for decreased concentration and dysphoric mood.       Assessments:  · Environmental: clean, apartment  · Functional Status: independent  · Safety: low fr  · Nutritional: adequate per patient  · Home Health/DME/Supplies: none    PAST HISTORY:     Past Medical History:   Diagnosis Date    Breast cancer     Diabetes mellitus     History of psychiatric hospitalization     Hx of psychiatric care     Hyperlipidemia     Psychiatric problem     Schizophrenia     Therapy     Thyroid disease     thyroid surgery       Past Surgical History:   Procedure Laterality Date    BREAST BIOPSY      BREAST LUMPECTOMY      BREAST SURGERY      THYROIDECTOMY  2005       Family History   Problem Relation Age of Onset    Cancer Neg Hx        Social History     Socioeconomic History    Marital status: Single    Number of children: 1   Tobacco Use    Smoking status: Current Every Day Smoker     Packs/day: 0.50     Years: 37.00     Pack years: 18.50     Types: Cigarettes    Smokeless tobacco: Former User     Quit date: 12/27/2014   Substance and Sexual Activity    Alcohol use: Not Currently     Comment: occasionally    Drug use: Yes     Frequency: 1.0 times per week     Types:  Marijuana    Sexual activity: Not Currently       MEDICATIONS & ALLERGIES:     Current Outpatient Medications on File Prior to Visit   Medication Sig Dispense Refill    albuterol (PROVENTIL/VENTOLIN HFA) 90 mcg/actuation inhaler Inhale 1-2 puffs into the lungs every 6 (six) hours as needed for Wheezing or Shortness of Breath. Rescue (Patient not taking: Reported on 4/13/2022) 6.7 g 0    amLODIPine (NORVASC) 5 MG tablet Take 1 tablet (5 mg total) by mouth once daily. 30 tablet 2    ASA/acetaminophen/caffeine/pot (GOODY'S HEADACHE POWDER ORAL) Take by mouth.      atorvastatin (LIPITOR) 20 MG tablet Take 20 mg by mouth.      benztropine (COGENTIN) 1 MG tablet       cetirizine (ZYRTEC) 10 MG tablet Take 10 mg by mouth once daily.      cyanocobalamin 500 MCG tablet Take 500 mcg by mouth once daily.      diclofenac sodium (VOLTAREN TOP) Apply topically.      docusate sodium (COLACE) 50 MG capsule Take by mouth 2 (two) times daily.      FLUoxetine 40 MG capsule Take 40 mg by mouth once daily.      haloperidoL (HALDOL) 10 MG tablet TAKE 2 TABLETS BY MOUTH ONCE DAILY AT BEDTIME      hydrOXYzine pamoate (VISTARIL) 50 MG Cap Take 50 mg by mouth 3 (three) times daily as needed.      ibuprofen (ADVIL,MOTRIN) 200 MG tablet Take 200 mg by mouth every 6 (six) hours as needed for Pain. Every 8 hours prn      meloxicam (MOBIC) 15 MG tablet Take 15 mg by mouth once daily.      metFORMIN (GLUCOPHAGE) 1000 MG tablet Take 500 mg by mouth daily with breakfast.      paliperidone palmitate (INVEGA SUSTENNA) 156 mg/mL Syrg injection Inject 1 mL (156 mg total) into the muscle every 28 days. 1 mL 5    pulse oximeter (PULSE OXIMETER) device by Apply Externally route 2 (two) times a day. Use twice daily at 8 AM and 3 PM and record the value in MyChart as directed. (Patient not taking: Reported on 4/13/2022) 1 each 0    QUEtiapine (SEROQUEL) 50 MG tablet Take 1 tablet (50 mg total) by mouth nightly as needed (insomnia). 30  tablet 2    risperiDONE (RISPERDAL) 3 MG Tab Take 1 tablet (3 mg total) by mouth 2 (two) times daily. 60 tablet 2    traZODone (DESYREL) 150 MG tablet Take 1 tablet (150 mg total) by mouth every evening. 30 tablet 2     No current facility-administered medications on file prior to visit.        Review of patient's allergies indicates:  No Known Allergies    OBJECTIVE:     Vital Signs:  Vitals:    04/28/22 1345   BP: 125/70   Pulse: 105   Resp: 16   Temp: 97.4 °F (36.3 °C)     There is no height or weight on file to calculate BMI.     Physical Exam:  Physical Exam  Vitals reviewed.   Constitutional:       Appearance: She is well-developed.   HENT:      Head: Normocephalic and atraumatic.   Eyes:      Pupils: Pupils are equal, round, and reactive to light.   Cardiovascular:      Rate and Rhythm: Tachycardia present.   Pulmonary:      Effort: Pulmonary effort is normal.      Breath sounds: Normal breath sounds.   Abdominal:      General: Bowel sounds are normal.      Palpations: Abdomen is soft.   Musculoskeletal:         General: Normal range of motion.      Cervical back: Normal range of motion and neck supple.   Skin:     General: Skin is warm and dry.   Neurological:      Mental Status: She is alert.         Laboratory  Lab Results   Component Value Date    WBC 9.65 04/11/2022    HGB 9.8 (L) 04/11/2022    HCT 29.7 (L) 04/11/2022    MCV 82 04/11/2022     04/11/2022     Lab Results   Component Value Date    INR 1.0 01/04/2015    INR 1.0 01/03/2015    INR 0.9 02/02/2013     Lab Results   Component Value Date    HGBA1C 6.2 (H) 04/09/2022     No results for input(s): POCTGLUCOSE in the last 72 hours.    TRANSITION OF CARE:     [facility] Contact Note: 4/12/22    Family and/or Caretaker present at visit?  No.  Diagnostic tests reviewed/disposition: No diagnosic tests pending after this hospitalization.  Disease/illness education: Importance of compliance with all prescribed medication and treatments, COVID  precautions/Social Distancing/Mask Use  Home health/community services discussion/referrals: Patient does not have home health established from hospital visit.  They do not need home health.  If needed, we will set up home health for the patient.   Establishment or re-establishment of referral orders for community resources: No other necessary community resources.   Discussion with other health care providers: No discussion with other health care providers necessary.     Transition of Care Visit:  I have reviewed and updated the history and problem list.  I have reconciled the medication list.  I have discussed the hospitalization and current medical issues, prognosis and plans with the patient/family.  I  spent more than 50% of time discussing the care with the patient/family.  Total Face-to-Face Encounter: 60 minutes.    Medications Reconciliation:   I have reconciled the patient's home medications and discharge medications with the patient/family. I have updated all changes.  Refer to After-Visit Medication List.    I have discussed discharge plans, follow-up instructions, future appointments, provider contact information, indicators to seek medical emergency treatment, and advisement to call with additional questions or concerns. Patient and caregiver verbalize understanding.    ASSESSMENT & PLAN:       Problem List Items Addressed This Visit        Psychiatric    Schizophrenia - Primary    Current Assessment & Plan     --continue home antipsychotics  --appears compliant with medications              Pulmonary    Pneumonia due to infectious organism    Current Assessment & Plan     --pulm f/up  --no sob/residual cough  --completed Levaquin course                Cardiac/Vascular    Hyperlipidemia    Current Assessment & Plan     --compliant with statin therapy                  Were controlled substances prescribed?  No    Instructions for the patient:    Scheduled Follow-up :  No future appointments.    After  Visit Medication List :     Medication List          Accurate as of April 27, 2022 11:59 PM. If you have any questions, ask your nurse or doctor.            CONTINUE taking these medications    albuterol 90 mcg/actuation inhaler  Commonly known as: PROVENTIL/VENTOLIN HFA  Inhale 1-2 puffs into the lungs every 6 (six) hours as needed for Wheezing or Shortness of Breath. Rescue     amLODIPine 5 MG tablet  Commonly known as: NORVASC  Take 1 tablet (5 mg total) by mouth once daily.     atorvastatin 20 MG tablet  Commonly known as: LIPITOR     benztropine 1 MG tablet  Commonly known as: COGENTIN     cetirizine 10 MG tablet  Commonly known as: ZYRTEC     cyanocobalamin 500 MCG tablet     docusate sodium 50 MG capsule  Commonly known as: COLACE     FLUoxetine 40 MG capsule     GOODY'S HEADACHE POWDER ORAL     haloperidoL 10 MG tablet  Commonly known as: HALDOL     hydrOXYzine pamoate 50 MG Cap  Commonly known as: VISTARIL     ibuprofen 200 MG tablet  Commonly known as: ADVIL,MOTRIN     meloxicam 15 MG tablet  Commonly known as: MOBIC     metFORMIN 1000 MG tablet  Commonly known as: GLUCOPHAGE     paliperidone palmitate 156 mg/mL Syrg injection  Commonly known as: INVEGA SUSTENNA  Inject 1 mL (156 mg total) into the muscle every 28 days.     pulse oximeter device  Commonly known as: pulse oximeter  by Apply Externally route 2 (two) times a day. Use twice daily at 8 AM and 3 PM and [facility].     QUEtiapine 50 MG tablet  Commonly known as: SEROQUEL  Take 1 tablet (50 mg total) by mouth nightly as needed (insomnia).     risperiDONE 3 MG Tab  Commonly known as: RISPERDAL  Take 1 tablet (3 mg total) by mouth 2 (two) times daily.     traZODone 150 MG tablet  Commonly known as: DESYREL  Take 1 tablet (150 mg total) by mouth every evening.     VOLTAREN TOP            Signature: Topher Lewis NP

## 2022-08-17 NOTE — ASSESSMENT & PLAN NOTE
----- Message from Reynaldo Llanes, [address] [place] sent at 8/16/2022  2:31 PM EDT -----  Regarding: Care Gap Request  08/16/22 2:31 PM    Hello, our patient Kota Vargas has had Chlamydia completed/performed  Please assist in updating the patient chart by pulling the Care Everywhere (CE) document  The date of service is 2/24/22       Thank you,  Reynaldo Llanes MA  [facility] Smoking cessation education > 3 minutes

## 2022-10-23 ENCOUNTER — NURSE TRIAGE (OUTPATIENT)
Dept: ADMINISTRATIVE | Facility: CLINIC | Age: 62
End: 2022-10-23
Payer: MEDICARE

## 2022-10-23 NOTE — TELEPHONE ENCOUNTER
"Pt calling stating that she is having a moving sensation in her abd everytime she eats for a while now. Denies pain, but states uncomfortable. Pt is concerned about possible tumor. Per protocol advised to be seen within 4 hours. verbalized understanding. Denies any further questions or concerns at this time, advised to call back if they have any that come up. Advised pt to call back with any other concerns or worsening symptoms. Verbalized understanding and will route message to provider.     Reason for Disposition   Age > 60 years    Additional Information   Negative: Shock suspected (e.g., cold/pale/clammy skin, too weak to stand, low BP, rapid pulse)   Negative: Difficult to awaken or acting confused (e.g., disoriented, slurred speech)   Negative: Passed out (i.e., lost consciousness, collapsed and was not responding)   Negative: Sounds like a life-threatening emergency to the triager   Negative: [1] SEVERE pain (e.g., excruciating) AND [2] present > 1 hour   Negative: [1] SEVERE pain AND [2] age > 60 years   Negative: [1] Vomiting AND [2] contains red blood or black ("coffee ground") material  (Exception: few red streaks in vomit that only happened once)   Negative: Blood in bowel movements (Exception: blood on surface of BM with constipation)   Negative: Black or tarry bowel movements (Exception: chronic-unchanged black-grey bowel movements AND is taking iron pills or Pepto-bismol)   Negative: Patient sounds very sick or weak to the triager   Negative: [1] MILD-MODERATE pain AND [2] constant AND [3] present > 2 hours   Negative: [1] Vomiting AND [2] abdomen looks much more swollen than usual   Negative: [1] Vomiting AND [2] contains bile (green color)   Negative: White of the eyes have turned yellow (i.e., jaundice)   Negative: Fever > 103 F (39.4 C)   Negative: [1] Fever > 101 F (38.3 C) AND [2] age > 60 years   Negative: [1] Fever > 100.0 F (37.8 C) AND [2] bedridden (e.g., nursing home patient, CVA, chronic " illness, recovering from surgery)   Negative: [1] Fever > 100.0 F (37.8 C) AND [2] diabetes mellitus or weak immune system (e.g., HIV positive, cancer chemo, splenectomy, organ transplant, chronic steroids)   Negative: [1] SEVERE pain AND [2] present < 1 hour   Negative: [1] MODERATE pain (e.g., interferes with normal activities) AND [2] pain comes and goes (cramps) AND [3] present > 24 hours  (Exception: pain with Vomiting or Diarrhea - see that Guideline)   Negative: [1] MILD pain (e.g., does not interfere with normal activities) AND [2] pain comes and goes (cramps) AND [3] present > 48 hours  (Exception: this same abdominal pain is a chronic symptom recurrent or ongoing AND present > 4 weeks)    Protocols used: Abdominal Pain - Female-A-AH

## 2022-10-24 ENCOUNTER — HOSPITAL ENCOUNTER (EMERGENCY)
Facility: HOSPITAL | Age: 62
Discharge: HOME | End: 2022-10-24
Attending: STUDENT IN AN ORGANIZED HEALTH CARE EDUCATION/TRAINING PROGRAM
Payer: MEDICARE

## 2022-10-24 VITALS
BODY MASS INDEX: 23.54 KG/M2 | RESPIRATION RATE: 16 BRPM | SYSTOLIC BLOOD PRESSURE: 164 MMHG | DIASTOLIC BLOOD PRESSURE: 74 MMHG | OXYGEN SATURATION: 96 % | TEMPERATURE: 98 F | WEIGHT: 150 LBS | HEART RATE: 79 BPM | HEIGHT: 67 IN

## 2022-10-24 DIAGNOSIS — J40 BRONCHITIS: ICD-10-CM

## 2022-10-24 DIAGNOSIS — N63.0 BREAST MASS IN FEMALE: ICD-10-CM

## 2022-10-24 DIAGNOSIS — K59.00 CONSTIPATION, UNSPECIFIED CONSTIPATION TYPE: ICD-10-CM

## 2022-10-24 DIAGNOSIS — C50.919 MALIGNANT NEOPLASM OF FEMALE BREAST, UNSPECIFIED ESTROGEN RECEPTOR STATUS, UNSPECIFIED LATERALITY, UNSPECIFIED SITE OF BREAST: ICD-10-CM

## 2022-10-24 DIAGNOSIS — R92.8 OTHER ABNORMAL AND INCONCLUSIVE FINDINGS ON DIAGNOSTIC IMAGING OF BREAST: ICD-10-CM

## 2022-10-24 DIAGNOSIS — R10.33 PERIUMBILICAL ABDOMINAL PAIN: Primary | ICD-10-CM

## 2022-10-24 LAB
ALBUMIN SERPL BCP-MCNC: 3.6 G/DL (ref 3.5–5.2)
ALP SERPL-CCNC: 73 U/L (ref 55–135)
ALT SERPL W/O P-5'-P-CCNC: 17 U/L (ref 10–44)
AMPHET+METHAMPHET UR QL: NEGATIVE
ANION GAP SERPL CALC-SCNC: 6 MMOL/L (ref 8–16)
AST SERPL-CCNC: 14 U/L (ref 10–40)
BARBITURATES UR QL SCN>200 NG/ML: NEGATIVE
BASOPHILS # BLD AUTO: 0.05 K/UL (ref 0–0.2)
BASOPHILS NFR BLD: 0.6 % (ref 0–1.9)
BENZODIAZ UR QL SCN>200 NG/ML: NEGATIVE
BILIRUB SERPL-MCNC: 0.2 MG/DL (ref 0.1–1)
BILIRUB UR QL STRIP: NEGATIVE
BUN SERPL-MCNC: 7 MG/DL (ref 8–23)
BZE UR QL SCN: NEGATIVE
CALCIUM SERPL-MCNC: 9.4 MG/DL (ref 8.7–10.5)
CANNABINOIDS UR QL SCN: NEGATIVE
CHLORIDE SERPL-SCNC: 106 MMOL/L (ref 95–110)
CLARITY UR: CLEAR
CO2 SERPL-SCNC: 26 MMOL/L (ref 23–29)
COLOR UR: YELLOW
CREAT SERPL-MCNC: 0.7 MG/DL (ref 0.5–1.4)
CREAT UR-MCNC: 33.9 MG/DL (ref 15–325)
DIFFERENTIAL METHOD: ABNORMAL
EOSINOPHIL # BLD AUTO: 0.2 K/UL (ref 0–0.5)
EOSINOPHIL NFR BLD: 1.9 % (ref 0–8)
ERYTHROCYTE [DISTWIDTH] IN BLOOD BY AUTOMATED COUNT: 16.2 % (ref 11.5–14.5)
EST. GFR  (NO RACE VARIABLE): >60 ML/MIN/1.73 M^2
ETHANOL SERPL-MCNC: <10 MG/DL
GLUCOSE SERPL-MCNC: 104 MG/DL (ref 70–110)
GLUCOSE UR QL STRIP: NEGATIVE
HCT VFR BLD AUTO: 37.7 % (ref 37–48.5)
HGB BLD-MCNC: 12.4 G/DL (ref 12–16)
HGB UR QL STRIP: NEGATIVE
IMM GRANULOCYTES # BLD AUTO: 0.06 K/UL (ref 0–0.04)
IMM GRANULOCYTES NFR BLD AUTO: 0.7 % (ref 0–0.5)
KETONES UR QL STRIP: NEGATIVE
LEUKOCYTE ESTERASE UR QL STRIP: NEGATIVE
LIPASE SERPL-CCNC: 34 U/L (ref 4–60)
LYMPHOCYTES # BLD AUTO: 3.9 K/UL (ref 1–4.8)
LYMPHOCYTES NFR BLD: 48.6 % (ref 18–48)
MCH RBC QN AUTO: 27.1 PG (ref 27–31)
MCHC RBC AUTO-ENTMCNC: 32.9 G/DL (ref 32–36)
MCV RBC AUTO: 83 FL (ref 82–98)
METHADONE UR QL SCN>300 NG/ML: NEGATIVE
MONOCYTES # BLD AUTO: 0.7 K/UL (ref 0.3–1)
MONOCYTES NFR BLD: 9.1 % (ref 4–15)
NEUTROPHILS # BLD AUTO: 3.2 K/UL (ref 1.8–7.7)
NEUTROPHILS NFR BLD: 39.1 % (ref 38–73)
NITRITE UR QL STRIP: NEGATIVE
NRBC BLD-RTO: 0 /100 WBC
OPIATES UR QL SCN: NEGATIVE
PCP UR QL SCN>25 NG/ML: NEGATIVE
PH UR STRIP: 7 [PH] (ref 5–8)
PLATELET # BLD AUTO: 306 K/UL (ref 150–450)
PMV BLD AUTO: 9.4 FL (ref 9.2–12.9)
POTASSIUM SERPL-SCNC: 4.1 MMOL/L (ref 3.5–5.1)
PROT SERPL-MCNC: 7.6 G/DL (ref 6–8.4)
PROT UR QL STRIP: NEGATIVE
RBC # BLD AUTO: 4.57 M/UL (ref 4–5.4)
SODIUM SERPL-SCNC: 138 MMOL/L (ref 136–145)
SP GR UR STRIP: 1 (ref 1–1.03)
TOXICOLOGY INFORMATION: NORMAL
URN SPEC COLLECT METH UR: NORMAL
UROBILINOGEN UR STRIP-ACNC: NEGATIVE EU/DL
WBC # BLD AUTO: 8.09 K/UL (ref 3.9–12.7)

## 2022-10-24 PROCEDURE — 25500020 PHARM REV CODE 255: Performed by: EMERGENCY MEDICINE

## 2022-10-24 PROCEDURE — 99285 EMERGENCY DEPT VISIT HI MDM: CPT | Mod: 25

## 2022-10-24 PROCEDURE — 82077 ASSAY SPEC XCP UR&BREATH IA: CPT | Performed by: EMERGENCY MEDICINE

## 2022-10-24 PROCEDURE — 83690 ASSAY OF LIPASE: CPT | Performed by: EMERGENCY MEDICINE

## 2022-10-24 PROCEDURE — 81003 URINALYSIS AUTO W/O SCOPE: CPT | Mod: 59 | Performed by: EMERGENCY MEDICINE

## 2022-10-24 PROCEDURE — 80053 COMPREHEN METABOLIC PANEL: CPT | Performed by: EMERGENCY MEDICINE

## 2022-10-24 PROCEDURE — 80307 DRUG TEST PRSMV CHEM ANLYZR: CPT | Performed by: EMERGENCY MEDICINE

## 2022-10-24 PROCEDURE — 85025 COMPLETE CBC W/AUTO DIFF WBC: CPT | Performed by: EMERGENCY MEDICINE

## 2022-10-24 RX ORDER — AMOXICILLIN 500 MG/1
500 CAPSULE ORAL 3 TIMES DAILY
Qty: 21 CAPSULE | Refills: 0 | Status: SHIPPED | OUTPATIENT
Start: 2022-10-24 | End: 2022-10-31

## 2022-10-24 RX ADMIN — IOHEXOL 75 ML: 350 INJECTION, SOLUTION INTRAVENOUS at 02:10

## 2022-10-24 NOTE — ED PROVIDER NOTES
"SCRIBE #1 NOTE: I, Dilma Owen, am scribing for, and in the presence of,  Kg Hernandez MD. I have scribed the following portions of the note - Other sections scribed: HPI, ROS, PE.         EM PHYSICIAN NOTE       This patient presents with a complaint of   Chief Complaint   Patient presents with    Abdominal Injury     63 yo female to triage for abdominal motion. Pt says every time she eat or drinks, she feels motion in her entire abdomen. Pt denies abd pain, N/V/D, Constipation. Says she had a tape worm when she was 15. VSS, NAD, AAOx4. Pt says she takes Schizophrenia medication and she has been compliant with all her meds. Denies Si/HI/AH/VH.        HPI: A 62 y.o. female with a pertinent PMHx of breast cancer, DM, psychiatric hospitalization, HLP, schizophrenia, and thyroid disease, presents to the ED for evaluation of intermittent generalized abdominal motion that began "a couple of months" ago. Patient reports that "something is jumping in my stomach". She has an associated symptom of weight loss. Her symptoms are exacerbated when eating or drinking. Her last bowel movement was 3 days ago, but she states that she normally goes every 2-3 days. She has not seen a doctor for this issue in the past. She has not had a menstrual period since she was in her 30s. Patient states that she is compliant with her schizophrenia medication. She smokes cigarettes and occasionally drinks EtOH. No other exacerbating or alleviating factors. Patient denies abdominal pain, nausea, vomiting, diarrhea, or any other associated symptoms.      REVIEW of PMH, SOC History and Family History:  Past Medical History:   Diagnosis Date    Breast cancer     Diabetes mellitus     History of psychiatric hospitalization     Hx of psychiatric care     Hyperlipidemia     Psychiatric problem     Schizophrenia     Therapy     Thyroid disease     thyroid surgery     Social history noncontributory  Family history noncontributory  Review of " "patient's allergies indicates:  No Known Allergies        REVIEW of SYSTEMS  Source: Patient  The nurse's notes and triage vital signs were reviewed.  GENERAL/CONSTITUTIONAL: There is no report of fever, fatigue, weakness, or unexplained weight loss.  CARDIOVASCULAR: There is no report of chest pain   RESPIRATORY: There is chronic cough and shortness of breath.  She has had a increase in sputum production.  GASTROINTESTINAL: SEE HPI. There is no report of nausea, vomiting, diarrhea  MUSCULOSKELETAL: There is no report of joint or muscle pain. No muscle weakness or tenderness.  SKIN AND BREASTS: There is no report of easy bruising, skin redness, skin rash.  HEMATOLOGIC/LYMPHATIC: There is no report of anemia, bleeding or clotting defects. There is no report of anticoagulant use.  The remainder of the ROS is negative.        PHYSICAL EXAMINATION    ED Triage Vitals [10/24/22 1046]   Enc Vitals Group      /62      Pulse 106      Resp 17      Temp 98 °F (36.7 °C)      Temp src Oral      SpO2 95 %      Weight 150 lb      Height 5' 7"      Head Circumference       Peak Flow       Pain Score       Pain Loc       Pain Edu?       Excl. in GC?      Vital signs and Pulse Ox reviewed in clinical context. Abnormalities noted: none:  Heart rate, blood pressure, temperature, respiratory rate and pulse ox are wnl  Pt's level of consciousness is alert, and the patient is in mild distress.  Skin: warm, pink and dry.  Capillary refill is less than 2 seconds.  Mucosa:moist  Head and Neck: WNL  Cardiac exam: RRR, +2 pulses.  Pulmonary exam:  Very few high pitched wheezing anteriorly.  Slightly tight, breath sounds in the bilateral bases.  Abd Exam: soft nontender. Mild protrusion.   Musculoskeletal: no joint tenderness, deformity or swelling.  Neurologic: GCS: GCS 15; 5 over 5 strength, cranial nerves intact, neck supple       Initial Impression:  Abdominal pain  Plan:  Labs, imaging, reassess  Micelle VLADIMIR Hernandez MD, 12:55 PM " 10/24/2022      Medical decision making:   Nurses notes and Vital Signs reviewed.  Orders Placed This Encounter   Procedures    CT Abdomen Pelvis With Contrast    Mammo Digital Diagnostic Bilat    Urinalysis, Reflex to Urine Culture Urine, Clean Catch    Comprehensive Metabolic Panel    CBC Auto Differential    Lipase    Ethanol    Drug screen panel, in-house    Ambulatory referral/consult to Gastroenterology    Saline lock IV       ED Course as of 10/24/22 1701   Mon Oct 24, 2022   1630 Lipase is normal.  CBC is normal.  CMP is normal []   1630 UA is normal []   1631 1. Moderate colonic and rectal stool burden which may reflect constipation without evidence of bowel obstruction or acute inflammation.  2. Bibasilar patchy nonspecific pulmonary mosaic attenuation which can be seen with small vessels disease including pulmonary edema or small airways disease.  No consolidation.  3. Moderate to advanced calcific atherosclerosis of the aorta extending to its mesenteric and iliac branches. []   1649 Discussed with the patient the findings of her workup.  She is requesting antibiotics for a cough related to her bronchitis. []      ED Course User Index  [] Kg Hernandez MD       [facility]  Number of Diagnoses or Management Options  Bronchitis: established, worsening  Constipation, unspecified constipation type: new, needed workup  Periumbilical abdominal pain: new, needed workup     Amount and/or Complexity of Data Reviewed  Clinical lab tests: ordered and reviewed  Tests in the radiology section of CPT®: ordered and reviewed    Risk of Complications, Morbidity, and/or Mortality  Presenting problems: moderate  Diagnostic procedures: moderate  Management options: moderate    Critical Care  Total time providing critical care: < 30 minutes    Patient Progress  Patient progress: stable       Diagnoses that have been ruled out:   None   Diagnoses that are still under consideration:   None   Final diagnoses:    Periumbilical abdominal pain   Constipation, unspecified constipation type   Bronchitis   Breast mass in female   Malignant neoplasm of female breast, unspecified estrogen receptor status, unspecified laterality, unspecified site of breast   Other abnormal and inconclusive findings on diagnostic imaging of breast            Follow-up Information       Follow up With Specialties Details Why Contact [facility]  Schedule an appointment as soon as possible for a visit  Call to schedule an appointment [address] [facility] [address]  [phone]            ED Prescriptions       Medication Sig Dispense Start Date End Date Auth. Provider    amoxicillin (AMOXIL) 500 MG capsule Take 1 capsule (500 mg total) by mouth 3 (three) times daily. for 7 days 21 capsule 10/24/2022 10/31/2022 Kg Hernandez MD    docusate sodium (COLACE) 50 MG capsule Take 2 capsules (100 mg total) by mouth 2 (two) times daily as needed for Constipation. 60 capsule 10/24/2022 11/23/2022 Kg Hernandez MD            This note was created using Dictation Software.  This program may occasionally misinterpret certain words and phrases.      SCRIBE ATTESTATION NOTE:   I attest that I personally performed the services documented by the scribe and acknowledged and confirm the content of the note.   Nurses notes were reviewed.  Kg Hernandez      Nurses notes were reviewed.      CRITICAL CARE TIME:  These services included the following: chart data review, reviewing nursing notes and researching old charts from internal and external sources, documentation time, consultant collaboration regarding findings and treatment options, medication orders and management, direct patient care, vital sign assessments, physical exam reassessments, and ordering, interpreting and reviewing diagnostic studies/lab tests.    Aggregate critical care time was approximately 15 minutes, which includes only time during which I was engaged in  work directly related to the patient's care, as described above, whether at the bedside or elsewhere in the Emergency Department.  It did not include time spent performing other reported procedures or the services of residents, students, nurses or physician assistants.             ED Disposition Condition    Discharge Stable            Scribe Attestation:   Scribe #1: I performed the above scribed service and the documentation accurately describes the services I performed. I attest to the accuracy of the note.                      Kg Hernandez MD  10/24/22 5204

## 2022-10-24 NOTE — ED TRIAGE NOTES
Pt reports to ED for abdominal motion. Pt says every time she eat or drinks, she feels motion in her entire abdomen. Pt reports having a tape worm when she was 15.   Pt denies abd pain, N/V/D, Constipation, bladder or bowel issues, trauma or injury. Pt has hx of  Schizophrenia and reports being complaint with medication and she has been compliant with all her meds.   Pt AAOX4

## 2022-12-09 ENCOUNTER — HOSPITAL ENCOUNTER (EMERGENCY)
Facility: HOSPITAL | Age: 62
Discharge: HOME | End: 2022-12-09
Attending: EMERGENCY MEDICINE
Payer: MEDICARE

## 2022-12-09 ENCOUNTER — NURSE TRIAGE (OUTPATIENT)
Dept: ADMINISTRATIVE | Facility: CLINIC | Age: 62
End: 2022-12-09
Payer: MEDICARE

## 2022-12-09 VITALS
OXYGEN SATURATION: 95 % | SYSTOLIC BLOOD PRESSURE: 105 MMHG | TEMPERATURE: 98 F | WEIGHT: 150 LBS | BODY MASS INDEX: 23.54 KG/M2 | RESPIRATION RATE: 18 BRPM | HEART RATE: 101 BPM | DIASTOLIC BLOOD PRESSURE: 59 MMHG | HEIGHT: 67 IN

## 2022-12-09 DIAGNOSIS — R00.0 TACHYCARDIA: ICD-10-CM

## 2022-12-09 DIAGNOSIS — K64.9 HEMORRHOIDS, UNSPECIFIED HEMORRHOID TYPE: Primary | ICD-10-CM

## 2022-12-09 PROCEDURE — 93010 ELECTROCARDIOGRAM REPORT: CPT | Mod: ,,, | Performed by: INTERNAL MEDICINE

## 2022-12-09 PROCEDURE — 99283 EMERGENCY DEPT VISIT LOW MDM: CPT

## 2022-12-09 PROCEDURE — 93010 EKG 12-LEAD: ICD-10-PCS | Mod: ,,, | Performed by: INTERNAL MEDICINE

## 2022-12-09 PROCEDURE — 93005 ELECTROCARDIOGRAM TRACING: CPT

## 2022-12-09 NOTE — TELEPHONE ENCOUNTER
Tingling sensation in her butt. She feels it before and afterwards. Janette are brown. Used a suppository and had a bowel movement but still feel the sensation like something is in there. Pt stated its not a hemorrhoid. No pain. Feels like something is in her butt and wants come out. Care advice recommend pt go to Er. Pt verbalized understanding.   Reason for Disposition   Rectal foreign body    Additional Information   Negative: Shock suspected (e.g., cold/pale/clammy skin, too weak to stand, low BP, rapid pulse)   Negative: Sounds like a life-threatening emergency to the triager   Negative: Foreign body (FB) is sharp   Negative: Bleeding from the rectum   Negative: Foreign body causes rectal pain or discomfort   Negative: Abdominal pain   Negative: Fever   Negative: Possibility of sexual assault    Protocols used: Rectum - Foreign Body-A-AH

## 2022-12-09 NOTE — ED TRIAGE NOTES
"Patient reports that "something keeps wiggling in my butt and in my abdomen" x 1 week. She also reports that she has been having unexplained weight loss x "a couple of months". Denies abdominal pain, nausea, vomiting, diarrhea. Pt states she has a hx of tapeworms at 15yr old and schizophrenia  "

## 2022-12-09 NOTE — ED PROVIDER NOTES
"Encounter Date: 12/9/2022       History     Chief Complaint   Patient presents with    Weight Loss    Other     Patient states that "something keeps wiggling in my butt and in my abdomen" x 1 week. She also reports that she has been having unexplained weight loss x "a couple of months". Denies abdominal pain, nausea, vomiting, diarrhea.      61 yo female w/ hx of   Diabetes, schizophrenia presenting with concern for something in her rectum.  Patient reports she occasionally feels like something is "wiggling".  Denies diarrhea, constipation, black or bloody stool.  Notes weight loss over the last few months.  Also states she feels like something sometimes is moving around in her abdomen after she eats.  Denies fevers, chills.  Previously   In her usual state of health.    Review of patient's allergies indicates:  No Known Allergies  Past Medical History:   Diagnosis Date    Breast cancer     Diabetes mellitus     History of psychiatric hospitalization     Hx of psychiatric care     Hyperlipidemia     Psychiatric problem     Schizophrenia     Therapy     Thyroid disease     thyroid surgery     Past Surgical History:   Procedure Laterality Date    BREAST BIOPSY      BREAST LUMPECTOMY      BREAST SURGERY      THYROIDECTOMY  2005     Family History   Problem Relation Age of Onset    Cancer Neg Hx      Social History     Tobacco Use    Smoking status: Every Day     Packs/day: 1.00     Years: 37.00     Pack years: 37.00     Types: Cigarettes    Smokeless tobacco: Former     Quit date: 12/27/2014   Substance Use Topics    Alcohol use: Not Currently     Comment: occasionally    Drug use: Yes     Frequency: 1.0 times per week     Types: Marijuana     Comment: sometimes     Review of Systems   Constitutional:  Positive for unexpected weight change. Negative for fever.   HENT:  Negative for sore throat.    Respiratory:  Negative for shortness of breath.    Cardiovascular:  Negative for chest pain.   Gastrointestinal:  Negative " for abdominal pain, blood in stool, nausea and vomiting.   Genitourinary:  Negative for dysuria.   Musculoskeletal:  Negative for back pain.   Skin:  Negative for rash.   Neurological:  Negative for weakness.     Physical Exam     Initial Vitals [12/09/22 0903]   BP Pulse Resp Temp SpO2   (!) 105/59 (!) 141 18 97.2 °F (36.2 °C) 95 %      MAP       --         Physical Exam    Nursing note and vitals reviewed.  Constitutional: She appears well-developed and well-nourished. She is not diaphoretic. No distress.   HENT:   Head: Normocephalic and atraumatic.   Nose: Nose normal.   Eyes: EOM are normal. Pupils are equal, round, and reactive to light. No scleral icterus.   Neck: Neck supple.   Normal range of motion.  Cardiovascular:  Normal rate, regular rhythm, normal heart sounds and intact distal pulses.     Exam reveals no gallop and no friction rub.       No murmur heard.  Pulmonary/Chest: Breath sounds normal. No stridor. No respiratory distress. She has no wheezes. She has no rhonchi. She has no rales.   Abdominal: Abdomen is soft. Bowel sounds are normal. She exhibits no distension. There is no abdominal tenderness. There is no rebound and no guarding.   Genitourinary:    Genitourinary Comments: Small non thrombosed external hemorrhoid     Musculoskeletal:         General: No tenderness or edema. Normal range of motion.      Cervical back: Normal range of motion and neck supple.     Neurological: She is oriented to person, place, and time. No cranial nerve deficit.   Skin: Skin is warm and dry. No rash noted.   Psychiatric: She has a normal mood and affect. Her behavior is normal.       ED Course   Procedures  Labs Reviewed - No data to display         Imaging Results    None          Medications - No data to display  Medical Decision Making:   Initial Assessment:     62-year-old female presenting with abnormal feeling in her rectum.  On exam well-appearing and in no acute distress.  Rectal exam reveals small non  thrombosed hemorrhoid.  No tenderness or pain.  No obvious parasites.  Suspect symptoms may be due to hemorrhoid.  Discussed need to follow-up with primary care provider.  Patient unable to provide stool sample for ova and parasites.  Have placed outpatient order an requested she drop it off at lab.  Otherwise safe for discharge home.  Abdominal exam benign.                        Clinical Impression:   Final diagnoses:  [K64.9] Hemorrhoids, unspecified hemorrhoid type (Primary)  [R00.0] Tachycardia      ED Disposition Condition    Discharge Stable          ED Prescriptions    None       Follow-up Information       Follow up With Specialties Details Why Contact Info    [facility]  Schedule an appointment as soon as possible for a visit   [address] [facility] [id number] [phone]      [facility]  As needed [address] [facility] [address] [phone]             Kyree Walker MD  12/09/22 4624

## 2022-12-09 NOTE — DISCHARGE INSTRUCTIONS
You were seen in the emergency department for an abnormal feeling in your  butt.  We believe it is due to a hemorrhoid.  We have asked for you to provide a stool sample   However that can be dropped off at the lab after collection.   Please follow-up with your primary care provider.   Please return for any new or worsening of abdominal pain, nausea, vomiting, fevers, or you become concerned in any other way.

## 2023-01-31 ENCOUNTER — HOSPITAL ENCOUNTER (EMERGENCY)
Facility: HOSPITAL | Age: 63
Discharge: HOME | End: 2023-01-31
Attending: EMERGENCY MEDICINE
Payer: MEDICARE

## 2023-01-31 VITALS
TEMPERATURE: 98 F | WEIGHT: 150 LBS | OXYGEN SATURATION: 98 % | BODY MASS INDEX: 23.54 KG/M2 | HEART RATE: 96 BPM | HEIGHT: 67 IN | DIASTOLIC BLOOD PRESSURE: 83 MMHG | RESPIRATION RATE: 20 BRPM | SYSTOLIC BLOOD PRESSURE: 138 MMHG

## 2023-01-31 DIAGNOSIS — R10.9 ABDOMINAL PAIN: ICD-10-CM

## 2023-01-31 DIAGNOSIS — K59.00 CONSTIPATION, UNSPECIFIED CONSTIPATION TYPE: Primary | ICD-10-CM

## 2023-01-31 LAB
ALBUMIN SERPL BCP-MCNC: 3.7 G/DL (ref 3.5–5.2)
ALP SERPL-CCNC: 96 U/L (ref 55–135)
ALT SERPL W/O P-5'-P-CCNC: 24 U/L (ref 10–44)
ANION GAP SERPL CALC-SCNC: 9 MMOL/L (ref 8–16)
AST SERPL-CCNC: 17 U/L (ref 10–40)
BASOPHILS # BLD AUTO: 0.04 K/UL (ref 0–0.2)
BASOPHILS NFR BLD: 0.4 % (ref 0–1.9)
BILIRUB SERPL-MCNC: 0.2 MG/DL (ref 0.1–1)
BILIRUB UR QL STRIP: NEGATIVE
BUN SERPL-MCNC: 6 MG/DL (ref 8–23)
CALCIUM SERPL-MCNC: 9.6 MG/DL (ref 8.7–10.5)
CHLORIDE SERPL-SCNC: 107 MMOL/L (ref 95–110)
CLARITY UR: CLEAR
CO2 SERPL-SCNC: 26 MMOL/L (ref 23–29)
COLOR UR: COLORLESS
CREAT SERPL-MCNC: 0.7 MG/DL (ref 0.5–1.4)
DIFFERENTIAL METHOD: ABNORMAL
EOSINOPHIL # BLD AUTO: 0.1 K/UL (ref 0–0.5)
EOSINOPHIL NFR BLD: 1 % (ref 0–8)
ERYTHROCYTE [DISTWIDTH] IN BLOOD BY AUTOMATED COUNT: 15.7 % (ref 11.5–14.5)
EST. GFR  (NO RACE VARIABLE): >60 ML/MIN/1.73 M^2
GLUCOSE SERPL-MCNC: 95 MG/DL (ref 70–110)
GLUCOSE UR QL STRIP: NEGATIVE
HCT VFR BLD AUTO: 38.3 % (ref 37–48.5)
HGB BLD-MCNC: 12.8 G/DL (ref 12–16)
HGB UR QL STRIP: NEGATIVE
IMM GRANULOCYTES # BLD AUTO: 0.05 K/UL (ref 0–0.04)
IMM GRANULOCYTES NFR BLD AUTO: 0.5 % (ref 0–0.5)
KETONES UR QL STRIP: NEGATIVE
LEUKOCYTE ESTERASE UR QL STRIP: NEGATIVE
LIPASE SERPL-CCNC: 31 U/L (ref 4–60)
LYMPHOCYTES # BLD AUTO: 3.4 K/UL (ref 1–4.8)
LYMPHOCYTES NFR BLD: 35.1 % (ref 18–48)
MCH RBC QN AUTO: 27.5 PG (ref 27–31)
MCHC RBC AUTO-ENTMCNC: 33.4 G/DL (ref 32–36)
MCV RBC AUTO: 82 FL (ref 82–98)
MONOCYTES # BLD AUTO: 0.9 K/UL (ref 0.3–1)
MONOCYTES NFR BLD: 8.7 % (ref 4–15)
NEUTROPHILS # BLD AUTO: 5.3 K/UL (ref 1.8–7.7)
NEUTROPHILS NFR BLD: 54.3 % (ref 38–73)
NITRITE UR QL STRIP: NEGATIVE
NRBC BLD-RTO: 0 /100 WBC
PH UR STRIP: 8 [PH] (ref 5–8)
PLATELET # BLD AUTO: 282 K/UL (ref 150–450)
PMV BLD AUTO: 9.2 FL (ref 9.2–12.9)
POTASSIUM SERPL-SCNC: 4.6 MMOL/L (ref 3.5–5.1)
PROT SERPL-MCNC: 8.2 G/DL (ref 6–8.4)
PROT UR QL STRIP: NEGATIVE
RBC # BLD AUTO: 4.65 M/UL (ref 4–5.4)
SODIUM SERPL-SCNC: 142 MMOL/L (ref 136–145)
SP GR UR STRIP: 1.01 (ref 1–1.03)
URN SPEC COLLECT METH UR: ABNORMAL
UROBILINOGEN UR STRIP-ACNC: NEGATIVE EU/DL
WBC # BLD AUTO: 9.78 K/UL (ref 3.9–12.7)

## 2023-01-31 PROCEDURE — 83690 ASSAY OF LIPASE: CPT | Performed by: NURSE PRACTITIONER

## 2023-01-31 PROCEDURE — 85025 COMPLETE CBC W/AUTO DIFF WBC: CPT | Performed by: NURSE PRACTITIONER

## 2023-01-31 PROCEDURE — 81003 URINALYSIS AUTO W/O SCOPE: CPT | Performed by: NURSE PRACTITIONER

## 2023-01-31 PROCEDURE — 99284 EMERGENCY DEPT VISIT MOD MDM: CPT

## 2023-01-31 PROCEDURE — 80053 COMPREHEN METABOLIC PANEL: CPT | Performed by: NURSE PRACTITIONER

## 2023-01-31 RX ORDER — POLYETHYLENE GLYCOL 3350 17 G/17G
17 POWDER, FOR SOLUTION ORAL DAILY
Qty: 116 G | Refills: 0 | Status: SHIPPED | OUTPATIENT
Start: 2023-01-31

## 2023-01-31 NOTE — ED PROVIDER NOTES
"Encounter Date: 1/31/2023    SCRIBE #1 NOTE: I, Thelma Pineda, am scribing for, and in the presence of,  Flor Mclain NP. Other sections scribed: HPI, ROS.     History     Chief Complaint   Patient presents with    Abdominal Pain     Patient reports abdominal pain for over a month and when coughs feels something moving in her stomach, denies difficulty with bowel or bladder, denies fever.      CC: Abdominal pain    HPI: This is a 62 y.o. F who has DM, HLD, Thyroid disease, and Schizophrenia who presents to the ED for emergent evaluation of sensation of "something moving around in the abdomen" when coughing, or eating for the last 2 months. She reports episodes of vomiting since the initial onset 2 months ago, but she currently denies nausea, or vomiting. Pt denies abdominal pain and only reports concerns for movement in the abdomen. Pt states, "I think the movement is due to pork worms because I have been eating too much pork." She does not have a Hx of abdominal surgeries. She has never been seen by a GI specialist. Pt states that she takes DM for Metformin. She endorses tobacco use. Pt denies difficulty urinating, dysuria, or hematuria.    The history is provided by the patient. No  was used.   Review of patient's allergies indicates:  No Known Allergies  Past Medical History:   Diagnosis Date    Breast cancer     Diabetes mellitus     History of psychiatric hospitalization     Hx of psychiatric care     Hyperlipidemia     Psychiatric problem     Schizophrenia     Therapy     Thyroid disease     thyroid surgery     Past Surgical History:   Procedure Laterality Date    BREAST BIOPSY      BREAST LUMPECTOMY      BREAST SURGERY      THYROIDECTOMY  2005     Family History   Problem Relation Age of Onset    Cancer Neg Hx      Social History     Tobacco Use    Smoking status: Every Day     Packs/day: 1.00     Years: 37.00     Pack years: 37.00     Types: Cigarettes    Smokeless tobacco: Former     " Quit date: 12/27/2014   Substance Use Topics    Alcohol use: Not Currently     Comment: occasionally    Drug use: Yes     Frequency: 1.0 times per week     Types: Marijuana     Comment: sometimes     Review of Systems   Constitutional:  Negative for chills and fever.   HENT:  Negative for sore throat.    Eyes:  Negative for visual disturbance.   Respiratory:  Negative for cough and shortness of breath.    Cardiovascular:  Negative for chest pain.   Gastrointestinal:  Negative for abdominal pain, diarrhea, nausea and vomiting.   Genitourinary:  Negative for difficulty urinating, dysuria and hematuria.        (-) Malodorous urine   Musculoskeletal:  Negative for back pain.   Skin:  Negative for rash.   Neurological:  Negative for weakness.   Hematological:  Does not bruise/bleed easily.     Physical Exam     Initial Vitals [01/31/23 0945]   BP Pulse Resp Temp SpO2   (!) 144/65 106 20 98 °F (36.7 °C) 97 %      MAP       --         Physical Exam    Constitutional: She appears well-developed and well-nourished. She is not diaphoretic. No distress.   Pleasant.  Well-appearing.   HENT:   Head: Normocephalic and atraumatic.   Neck:   Normal range of motion.  Cardiovascular:  Normal rate, regular rhythm, normal heart sounds and intact distal pulses.           Pulmonary/Chest: Breath sounds normal. No respiratory distress.   Abdominal: Abdomen is soft. Bowel sounds are normal. There is no abdominal tenderness.   Abdomen is soft.  Nontender.  No guarding or rigidity.   Musculoskeletal:         General: Normal range of motion.      Cervical back: Normal range of motion.     Neurological: She is alert and oriented to person, place, and time.   Skin: Skin is warm and dry.   Psychiatric: She has a normal mood and affect. Her behavior is normal.       ED Course   Procedures  Labs Reviewed   URINALYSIS, REFLEX TO URINE CULTURE - Abnormal; Notable for the following components:       Result Value    Color, UA Colorless (*)     All  "other components within normal limits    Narrative:     Specimen Source->Urine   CBC W/ AUTO DIFFERENTIAL - Abnormal; Notable for the following components:    RDW 15.7 (*)     Immature Grans (Abs) 0.05 (*)     All other components within normal limits   COMPREHENSIVE METABOLIC PANEL - Abnormal; Notable for the following components:    BUN 6 (*)     All other components within normal limits   LIPASE          Imaging Results              X-Ray Abdomen Flat And Erect (Final result)  Result time 01/31/23 13:09:51      Final result by Sunny Arboleda MD (01/31/23 13:09:51)                   Impression:      Unremarkable intestinal gas pattern.      Electronically signed by: Sunny Arboleda MD  Date:    01/31/2023  Time:    13:09               Narrative:    EXAMINATION:  XR ABDOMEN FLAT AND ERECT    CLINICAL HISTORY:  Unspecified abdominal pain    TECHNIQUE:  Flat and erect AP views of the abdomen were performed.    COMPARISON:  12/19/2019.    FINDINGS:  The intestinal gas pattern is grossly unremarkable with no dilated loops of bowel evident.  There is a moderate to large stool burden present.  There are multiple phleboliths within the pelvis.  No other abnormal intra-abdominal calcifications are identified.  Bony structures appear grossly intact.                                       Medications - No data to display  Medical Decision Making:   ED Management:  62-year-old female presenting to the ED for evaluation of "moving" sensation in the abdomen.  Denies pain, nausea, vomiting, diarrhea.  On my exam, she is pleasant.  Well-appearing.  Nondistressed.  Abdominal exam is benign.  No tenderness, guarding, rigidity.  Labs reassuring.  KUB with unremarkable intestinal gas pattern.  Moderate to large stool burden present.  Discussed findings with the patient.  Will start on MiraLax for constipation.  She will follow-up with gastroenterology.  Return precautions discussed with the patient verbalized understanding.    Based " on my clinical evaluation, I do not appreciate any immediate, emergent, or life threatening condition or etiology that warrants additional workup today.  I feel the patient can be discharged with close follow-up care.         Scribe Attestation:   Scribe #1: I performed the above scribed service and the documentation accurately describes the services I performed. I attest to the accuracy of the note.                 I, GT Mclain, personally performed the services described in this documentation. All medical record entries made by the scribe were at my direction and in my presence. I have reviewed the chart and agree that the record reflects my personal performance and is accurate and complete.    Clinical Impression:   Final diagnoses:  [R10.9] Abdominal pain  [K59.00] Constipation, unspecified constipation type (Primary)        ED Disposition Condition    Discharge Stable          ED Prescriptions       Medication Sig Dispense Start Date End Date Auth. Provider    polyethylene glycol (GLYCOLAX) 17 gram/dose powder Take 17 g by mouth once daily. 116 g 1/31/2023 -- Flor Mclain NP          Follow-up Information       Follow up With Specialties Details Why Contact Info    Roland Acuña MD Gastroenterology Schedule an appointment as soon as possible for a visit  For follow-up [address]  [address]  [facility] [address]  [phone]      [facility]  Schedule an appointment as soon as possible for a visit  For follow-up [address] [facility] [address]  [phone]      [facility] Emergency Dept Emergency Medicine Go to  If symptoms worsen [address] [facility] [address] [phone]             Flor Mclain NP  01/31/23 1717

## 2023-01-31 NOTE — ED TRIAGE NOTES
"Pt. Reports she fells something moving in her abd. Pt. Reports she fells this after eating. Pt. Denies movement is gas and reports she believes she has " worms".   "

## 2023-01-31 NOTE — DISCHARGE INSTRUCTIONS
Thank you for coming to our Emergency Department today. It is important to remember that some problems or medical conditions are difficult to diagnose and may not be found during your Emergency Department visit.     Be sure to follow up with your primary care doctor and review all labs/imaging/tests that were performed during your ER visit with them. Some labs/tests may be outside of the normal range and require non-emergent follow-up and further investigation to help diagnose/exclude/prevent complications or other potentially serious medical conditions that were not addressed during your ER visit.    If you do not have a primary care doctor, you may contact the one listed on your discharge paperwork or you may also call the [facility] Appointment Desk at [phone] to schedule an appointment and establish care with one. It is important to your health that you have a primary care doctor.    Please take all medications as directed. All medications may potentially have side-effects and it is impossible to predict which medications may give you side-effects or what side-effects (if any) they will give you.. If you feel that you are having a negative effect or side-effect of any medication you should immediately stop taking them and seek medical attention. If you feel that you are having a life-threatening reaction call 911.    Return to the ER with any questions/concerns, new/concerning symptoms, worsening or failure to improve.     Do not drive, swim, climb to height, take a bath, operate heavy machinery, drink alcohol or take potentially sedating medications, sign any legal documents or make any important decisions for 24 hours if you have received any pain medications, sedatives or mood altering drugs during your ER visit or within 24 hours of taking them if they have been prescribed to you.     You can find additional resources for Dentists, hearing aids, durable medical equipment, low cost pharmacies and  other resources at https://[facility].org    BELOW THIS LINE ONLY APPLIES IF YOU HAVE A COVID TEST PENDING OR IF YOU HAVE BEEN DIAGNOSED WITH COVID:  Please access [facility] to review the results of your test. Until the results of your COVID test return, you should isolate yourself so as not to potentially spread illness to others.   If your COVID test returns positive, you should isolate yourself so as not to spread illness to others. After five full days, if you are feeling better and you have not had fever for 24 hours, you can return to your typical daily activities, but you must wear a mask around others for an additional 5 days.   If your COVID test returns negative and you are either unvaccinated or more than six months out from your two-dose vaccine and are not yet boosted, you should still quarantine for 5 full days followed by strict mask use for an additional 5 full days.   If your COVID test returns negative and you have received your 2-dose initial vaccine as well as a booster, you should continue strict mask use for 10 full days after the exposure.  For all those exposed, best practice includes a test at day 5 after the exposure. This can be a home test or a test through one of the many testing centers throughout our community.   Masking is always advised to limit the spread of COVID. Cdc.gov is an excellent site to obtain the latest up to date recommendations regarding COVID and COVID testing.     CDC Testing and Quarantine Guidelines for patients with exposure to a known-positive COVID-19 person:  A close exposure is defined as anyone who has had an exposure (masked or unmasked) to a known COVID -19 positive person within 6 feet of someone for a cumulative total of 15 minutes or more over a 24-hour period.   Vaccinated and/or if you recently had a positive covid test within 90 days do NOT need to quarantine after contact with someone who had COVID-19 unless you develop symptoms.   Fully vaccinated  people who have not had a positive test within 90 days, should get tested 3-5 days after their exposure, even if they don't have symptoms and wear a mask indoors in public for 14 days following exposure or until their test result is negative.      Unvaccinated and/or NOT had a positive test within 90 days and meet close exposure  You are required by CDC guidelines to quarantine for at least 5 days from time of exposure followed by 5 days of strict masking. It is recommended, but not required to test after 5 days, unless you develop symptoms, in which case you should test at that time.  If you get tested after 5 days and your test is positive, your 5 day period of isolation starts the day of the positive test.    If your exposure does not meet the above definition, you can return to your normal daily activities to include social distancing, wearing a mask and frequent handwashing.      Here is a link to guidance from the CDC:  https://www.cdc.gov/media/releases/2021/s1227-isolation-quarantine-guidance.html      [place] Dept Of Health Testing Sites:  https://ldh.la.gov/page/3934      [facility] website with testing locations and guidance:  [email or web address]

## 2023-05-20 ENCOUNTER — HOSPITAL ENCOUNTER (EMERGENCY)
Facility: HOSPITAL | Age: 63
Discharge: HOME | End: 2023-05-20
Attending: EMERGENCY MEDICINE
Payer: MEDICARE

## 2023-05-20 VITALS
SYSTOLIC BLOOD PRESSURE: 127 MMHG | TEMPERATURE: 98 F | RESPIRATION RATE: 16 BRPM | WEIGHT: 162 LBS | HEIGHT: 67 IN | BODY MASS INDEX: 25.43 KG/M2 | OXYGEN SATURATION: 97 % | DIASTOLIC BLOOD PRESSURE: 74 MMHG | HEART RATE: 85 BPM

## 2023-05-20 DIAGNOSIS — R73.9 HYPERGLYCEMIA: ICD-10-CM

## 2023-05-20 DIAGNOSIS — R53.1 GENERALIZED WEAKNESS: Primary | ICD-10-CM

## 2023-05-20 DIAGNOSIS — R42 DIZZINESS: ICD-10-CM

## 2023-05-20 LAB
ALBUMIN SERPL BCP-MCNC: 4.1 G/DL (ref 3.5–5.2)
ALP SERPL-CCNC: 87 U/L (ref 55–135)
ALT SERPL W/O P-5'-P-CCNC: 31 U/L (ref 10–44)
ANION GAP SERPL CALC-SCNC: 13 MMOL/L (ref 8–16)
AST SERPL-CCNC: 22 U/L (ref 10–40)
BASOPHILS # BLD AUTO: 0.03 K/UL (ref 0–0.2)
BASOPHILS NFR BLD: 0.4 % (ref 0–1.9)
BILIRUB SERPL-MCNC: 0.3 MG/DL (ref 0.1–1)
BILIRUB UR QL STRIP: NEGATIVE
BUN SERPL-MCNC: 10 MG/DL (ref 8–23)
CALCIUM SERPL-MCNC: 9.7 MG/DL (ref 8.7–10.5)
CHLORIDE SERPL-SCNC: 104 MMOL/L (ref 95–110)
CLARITY UR: CLEAR
CO2 SERPL-SCNC: 23 MMOL/L (ref 23–29)
COLOR UR: COLORLESS
CREAT SERPL-MCNC: 0.8 MG/DL (ref 0.5–1.4)
DIFFERENTIAL METHOD: ABNORMAL
EOSINOPHIL # BLD AUTO: 0.1 K/UL (ref 0–0.5)
EOSINOPHIL NFR BLD: 0.7 % (ref 0–8)
ERYTHROCYTE [DISTWIDTH] IN BLOOD BY AUTOMATED COUNT: 15.8 % (ref 11.5–14.5)
EST. GFR  (NO RACE VARIABLE): >60 ML/MIN/1.73 M^2
GLUCOSE SERPL-MCNC: 197 MG/DL (ref 70–110)
GLUCOSE UR QL STRIP: NEGATIVE
HCT VFR BLD AUTO: 39.4 % (ref 37–48.5)
HGB BLD-MCNC: 12.9 G/DL (ref 12–16)
HGB UR QL STRIP: NEGATIVE
IMM GRANULOCYTES # BLD AUTO: 0.04 K/UL (ref 0–0.04)
IMM GRANULOCYTES NFR BLD AUTO: 0.5 % (ref 0–0.5)
KETONES UR QL STRIP: NEGATIVE
LEUKOCYTE ESTERASE UR QL STRIP: NEGATIVE
LYMPHOCYTES # BLD AUTO: 3.3 K/UL (ref 1–4.8)
LYMPHOCYTES NFR BLD: 39.3 % (ref 18–48)
MAGNESIUM SERPL-MCNC: 1.7 MG/DL (ref 1.6–2.6)
MCH RBC QN AUTO: 26.8 PG (ref 27–31)
MCHC RBC AUTO-ENTMCNC: 32.7 G/DL (ref 32–36)
MCV RBC AUTO: 82 FL (ref 82–98)
MONOCYTES # BLD AUTO: 0.8 K/UL (ref 0.3–1)
MONOCYTES NFR BLD: 9.6 % (ref 4–15)
NEUTROPHILS # BLD AUTO: 4.2 K/UL (ref 1.8–7.7)
NEUTROPHILS NFR BLD: 49.5 % (ref 38–73)
NITRITE UR QL STRIP: NEGATIVE
NRBC BLD-RTO: 0 /100 WBC
PH UR STRIP: 7 [PH] (ref 5–8)
PLATELET # BLD AUTO: 278 K/UL (ref 150–450)
PMV BLD AUTO: 9.1 FL (ref 9.2–12.9)
POCT GLUCOSE: 182 MG/DL (ref 70–110)
POTASSIUM SERPL-SCNC: 3.5 MMOL/L (ref 3.5–5.1)
PROT SERPL-MCNC: 7.9 G/DL (ref 6–8.4)
PROT UR QL STRIP: ABNORMAL
RBC # BLD AUTO: 4.81 M/UL (ref 4–5.4)
SODIUM SERPL-SCNC: 140 MMOL/L (ref 136–145)
SP GR UR STRIP: 1.01 (ref 1–1.03)
URN SPEC COLLECT METH UR: ABNORMAL
UROBILINOGEN UR STRIP-ACNC: NEGATIVE EU/DL
WBC # BLD AUTO: 8.45 K/UL (ref 3.9–12.7)

## 2023-05-20 PROCEDURE — 80053 COMPREHEN METABOLIC PANEL: CPT | Performed by: EMERGENCY MEDICINE

## 2023-05-20 PROCEDURE — 96374 THER/PROPH/DIAG INJ IV PUSH: CPT

## 2023-05-20 PROCEDURE — 85025 COMPLETE CBC W/AUTO DIFF WBC: CPT | Performed by: EMERGENCY MEDICINE

## 2023-05-20 PROCEDURE — 82962 GLUCOSE BLOOD TEST: CPT

## 2023-05-20 PROCEDURE — 25000003 PHARM REV CODE 250: Performed by: EMERGENCY MEDICINE

## 2023-05-20 PROCEDURE — 81003 URINALYSIS AUTO W/O SCOPE: CPT | Performed by: EMERGENCY MEDICINE

## 2023-05-20 PROCEDURE — 93010 ELECTROCARDIOGRAM REPORT: CPT | Mod: ,,, | Performed by: INTERNAL MEDICINE

## 2023-05-20 PROCEDURE — 93005 ELECTROCARDIOGRAM TRACING: CPT

## 2023-05-20 PROCEDURE — 99284 EMERGENCY DEPT VISIT MOD MDM: CPT | Mod: 25

## 2023-05-20 PROCEDURE — 63600175 PHARM REV CODE 636 W HCPCS: Performed by: EMERGENCY MEDICINE

## 2023-05-20 PROCEDURE — 83735 ASSAY OF MAGNESIUM: CPT | Performed by: EMERGENCY MEDICINE

## 2023-05-20 PROCEDURE — 93010 EKG 12-LEAD: ICD-10-PCS | Mod: ,,, | Performed by: INTERNAL MEDICINE

## 2023-05-20 PROCEDURE — 96361 HYDRATE IV INFUSION ADD-ON: CPT

## 2023-05-20 RX ORDER — ONDANSETRON 2 MG/ML
8 INJECTION INTRAMUSCULAR; INTRAVENOUS
Status: COMPLETED | OUTPATIENT
Start: 2023-05-20 | End: 2023-05-20

## 2023-05-20 RX ADMIN — ONDANSETRON 8 MG: 2 INJECTION INTRAMUSCULAR; INTRAVENOUS at 12:05

## 2023-05-20 RX ADMIN — SODIUM CHLORIDE 1000 ML: 9 INJECTION, SOLUTION INTRAVENOUS at 02:05

## 2023-05-20 RX ADMIN — SODIUM CHLORIDE 2000 ML: 9 INJECTION, SOLUTION INTRAVENOUS at 12:05

## 2023-05-20 NOTE — DISCHARGE INSTRUCTIONS
Lots of liquids without sugar.  Please return immediately if you get worse or if new problems develop.  Please follow your diabetic diet.  Metformin as directed.  Please follow-up with your primary care doctor this week.

## 2023-05-20 NOTE — ED PROVIDER NOTES
"Encounter Date: 5/20/2023    SCRIBE #1 NOTE: I, Shania Lyle, am scribing for, and in the presence of,  Vance Sandoval MD. I have scribed the following portions of the note - Other sections scribed: HPI, ROS.     History     Chief Complaint   Patient presents with    Dizziness     61 yo female to EMS triage via [facility] unit [facility]257 for dizziness and generalized weakness that started about an hour ago. Pt denies CP, SOB, slurred speech, Numbness/tingling, cough/fevers, HA, and vision changes. Pt says she feels unsteady and very weak when ambulating.  per EMS. VSS, NAD, AAOx4. Hx of DM, HTN, and Schizophrenia.     Mary Crowley is a 62 y.o. female, with a PMHx of HLD and DM (type 2), who presents to the ED with dizziness and feeling unbalanced starting at approximately 11 or 11:30 AM today. Patient describes the dizziness as generalized weakness and feels like she is "about to pass out". Patient states she is unable to ambulate but endorses ambulating this AM. Patient denies previously experiencing these symptoms before. Patient denies any history of stroke. Patient vomited immediately upon arrival. Patient denies melena, numbness, tingling, vertigo, headache, difficulty with vision, speech, or swallowing, or other associated symptoms.       The history is provided by the patient. No  was used.   Review of patient's allergies indicates:  No Known Allergies  Past Medical History:   Diagnosis Date    Breast cancer     Diabetes mellitus     History of psychiatric hospitalization     Hx of psychiatric care     Hyperlipidemia     Psychiatric problem     Schizophrenia     Therapy     Thyroid disease     thyroid surgery     Past Surgical History:   Procedure Laterality Date    BREAST BIOPSY      BREAST LUMPECTOMY      BREAST SURGERY      THYROIDECTOMY  2005     Family History   Problem Relation Age of Onset    Cancer Neg Hx      Social History     Tobacco Use    Smoking status: Every Day " "    Packs/day: 1.00     Years: 37.00     Pack years: 37.00     Types: Cigarettes    Smokeless tobacco: Former     Quit date: 12/27/2014   Substance Use Topics    Alcohol use: Not Currently     Comment: occasionally    Drug use: Yes     Frequency: 1.0 times per week     Types: Marijuana     Comment: sometimes     Review of Systems   Constitutional:  Negative for chills, diaphoresis and fever.   HENT:  Negative for ear pain, sore throat and trouble swallowing.    Eyes:  Negative for pain and visual disturbance.   Respiratory:  Negative for cough and shortness of breath.    Cardiovascular:  Negative for chest pain.   Gastrointestinal:  Positive for vomiting. Negative for abdominal pain, diarrhea and nausea.   Genitourinary:  Negative for dysuria.   Musculoskeletal:  Negative for back pain.        (-) Arm or leg trouble.    Skin:  Negative for rash.   Neurological:  Positive for dizziness. Negative for speech difficulty, numbness and headaches.        (-) Vertigo.  (+) Feeling "unbalanced".   Psychiatric/Behavioral:  Negative for confusion.      Physical Exam     Initial Vitals [05/20/23 1213]   BP Pulse Resp Temp SpO2   135/75 106 16 98 °F (36.7 °C) 95 %      MAP       --         Physical Exam  The patient was examined specifically for the following:   General:No significant distress, Good color, Warm and dry. Head and neck:Scalp atraumatic, Neck supple. Neurological:Appropriate conversation, Gross motor deficits. Eyes:Conjugate gaze, Clear corneas. ENT: No epistaxis. Cardiac: Regular rate and rhythm, Grossly normal heart tones. Pulmonary: Wheezing, Rales. Gastrointestinal: Abdominal tenderness, Abdominal distention. Musculoskeletal: Extremity deformity, Apparent pain with range of motion of the joints. Skin: Rash.   The findings on examination were normal except for the following:  The patient has normal finger-to-nose.  She walks without ataxia.  She seems generally weak.  She is slightly cool.  Her heart rate is " 106.  Mental status examination, cranial nerves, motor and sensory examination grossly unremarkable.  The patient vomited immediately upon arrival to her stretcher.  After vomiting the patient feels much improved.  Her dizziness is much improved.  It is almost gone.  The patient could do heel-to-shin without difficulty on both sides.  ED Course   Procedures  Labs Reviewed   COMPREHENSIVE METABOLIC PANEL - Abnormal; Notable for the following components:       Result Value    Glucose 197 (*)     All other components within normal limits   CBC W/ AUTO DIFFERENTIAL - Abnormal; Notable for the following components:    MCH 26.8 (*)     RDW 15.8 (*)     MPV 9.1 (*)     All other components within normal limits   URINALYSIS, REFLEX TO URINE CULTURE - Abnormal; Notable for the following components:    Color, UA Colorless (*)     Protein, UA Trace (*)     All other components within normal limits    Narrative:     Specimen Source->Urine   POCT GLUCOSE - Abnormal; Notable for the following components:    POCT Glucose 182 (*)     All other components within normal limits   MAGNESIUM     EKG Readings: (Independently Interpreted)   This patient is in a normal sinus rhythm heart rate in 96.  There are no significant ST segment or T-wave changes.  There is poor R-wave progression across the precordium.  The patient has left axis deviation.  There is no definite evidence of acute myocardial infarction or malignant arrhythmia.   ECG Results              EKG 12-lead (Final result)  Result time 05/21/23 11:17:27      Final result by Interface, Lab In [facility] (05/21/23 11:17:27)                   Narrative:    Test Reason : R53.1,    Vent. Rate : 096 BPM     Atrial Rate : 096 BPM     P-R Int : 170 ms          QRS Dur : 084 ms      QT Int : 396 ms       P-R-T Axes : 070 -33 060 degrees     QTc Int : 500 ms    Normal sinus rhythm  Left axis deviation  Septal infarct (cited on or before 03-SEP-2019)  Abnormal ECG  When compared with ECG of  09-DEC-2022 09:14,  Significant changes have occurred  Confirmed by Janis AHMADI, José ([id number]) on 5/21/2023 11:17:20 AM    Referred By: System System           Confirmed By:José Bruce MD                                  Imaging Results    None          Medications   ondansetron injection 8 mg (8 mg Intravenous Given 5/20/23 1242)   sodium chloride 0.9% bolus 2,000 mL 2,000 mL (0 mLs Intravenous Stopped 5/20/23 1417)   sodium chloride 0.9% bolus 1,000 mL 1,000 mL (0 mLs Intravenous Stopped 5/20/23 1559)     Medical Decision Making:   History:   Old Medical Records: I decided to obtain old medical records.  Independently Interpreted Test(s):   I have ordered and independently interpreted EKG Reading(s) - see prior notes  Clinical Tests:   Lab Tests: Ordered and Reviewed  Medical Tests: Ordered and Reviewed     Given the above this patient presents to the emergency room with generalized weakness.  She reports that she was dizzy.  She was carefully screen for stroke.  She was treated with IV fluids.  All of her symptoms resolved.  She is otherwise well without other injuries or problems she does have history of diabetes.  Her blood sugar is only 200.  I am afraid she is not drinking much in the way of liquids.  Her white blood count is normal against significant infection.  She has a good hemoglobin and hematocrit against significant anemia.  She has normal electrolytes and good renal function this is not the cause of her dizziness.  There is no transaminitis.  The urine is not concentrated.  The patient was rehydrated with 2 L of fluid in the emergency room.  She is normal and asymptomatic on discharge.     Scribe Attestation:   Scribe #1: I performed the above scribed service and the documentation accurately describes the services I performed. I attest to the accuracy of the note.                 I personally performed the services described in this documentation.  All medical record  entries made by the scribe  are at my direction and in my presence.  Signed, Dr. Sandoval  Clinical Impression:   Final diagnoses:  [R53.1] Generalized weakness (Primary)  [R42] Dizziness  [R73.9] Hyperglycemia        ED Disposition Condition    Discharge Stable          ED Prescriptions    None       Follow-up Information       Follow up With Specialties Details Why Contact Info    [facility]  In 3 days  [address] [address]  [phone]               Vance Sandoval MD  05/21/23 5369

## 2023-05-20 NOTE — ED TRIAGE NOTES
Pt reports to the ED via EMS for CC of dizziness and fatigue. Pt states she was outside and had just finished eating a bag of potato chips when she began to feel dizzy and lightheaded. Pt states she then became very tired and weak. Pt denies fever, chills, cough, diarrhea. Pt states she doesn't drink much water and she did feel overheated. Pt actively vomiting upon room arrival and appears lethargic. Pt RR is even and unlabored, VS WDL, she is AAOX4, and appears to be in no apparent distress at this time.

## 2023-06-06 ENCOUNTER — TELEPHONE (OUTPATIENT)
Dept: GASTROENTEROLOGY | Facility: CLINIC | Age: 63
End: 2023-06-06
Payer: MEDICARE

## 2023-06-06 NOTE — TELEPHONE ENCOUNTER
Spoke with patient.  Aware we are not contracted with her insurance.  She is aware if she chooses to schedule an appointment with us the cost will be more out of pocket.   Patient expressed understanding.   Kira

## 2023-06-06 NOTE — TELEPHONE ENCOUNTER
----- Message from Jonatan Rodas sent at 6/6/2023 11:02 AM CDT -----  Contact: @[phone]  Pt is calling in to get an appt scheduled, please call to discuss further.

## 2023-07-26 ENCOUNTER — TELEPHONE (OUTPATIENT)
Dept: GASTROENTEROLOGY | Facility: CLINIC | Age: 63
End: 2023-07-26
Payer: MEDICARE

## 2023-07-26 NOTE — TELEPHONE ENCOUNTER
Patient was contacted.  Patient was scheduled for 10/05/23 @ 9:30 am for a follow up.  A reminder will be sent to patient.

## 2023-07-26 NOTE — TELEPHONE ENCOUNTER
----- Message from Dede Singh sent at 7/26/2023 10:41 AM CDT -----  Regarding: [facility] NP appt  Contact: @ [phone]  Pt is calling to [facility] an appt with this provider from the discharge papers she received on 1/31/2023. Pts dx is [R10.9] Abdominal pain, [K59.00] Constipation, unspecified constipation type (Primary). Pt is wanting an appt on the [facility] since that is closer to home for her. Pt thought she was [facility] for today. Please call pt to discuss further.

## 2023-10-04 ENCOUNTER — TELEPHONE (OUTPATIENT)
Dept: GASTROENTEROLOGY | Facility: CLINIC | Age: 63
End: 2023-10-04
Payer: MEDICARE

## 2023-10-04 NOTE — TELEPHONE ENCOUNTER
Patient was notified her insurance is out of network. MA was not able to answer patient's questions about her insurance. Message routed back to pre-service.

## 2024-07-08 ENCOUNTER — HOSPITAL ENCOUNTER (EMERGENCY)
Facility: HOSPITAL | Age: 64
Discharge: HOME | End: 2024-07-09
Attending: STUDENT IN AN ORGANIZED HEALTH CARE EDUCATION/TRAINING PROGRAM
Payer: MEDICARE

## 2024-07-08 DIAGNOSIS — R07.9 CHEST PAIN: ICD-10-CM

## 2024-07-08 DIAGNOSIS — K29.70 GASTRITIS, PRESENCE OF BLEEDING UNSPECIFIED, UNSPECIFIED CHRONICITY, UNSPECIFIED GASTRITIS TYPE: Primary | ICD-10-CM

## 2024-07-08 LAB
ALBUMIN SERPL BCP-MCNC: 3.5 G/DL (ref 3.5–5.2)
ALP SERPL-CCNC: 76 U/L (ref 55–135)
ALT SERPL W/O P-5'-P-CCNC: 14 U/L (ref 10–44)
ANION GAP SERPL CALC-SCNC: 9 MMOL/L (ref 8–16)
AST SERPL-CCNC: 14 U/L (ref 10–40)
BASOPHILS # BLD AUTO: 0.08 K/UL (ref 0–0.2)
BASOPHILS NFR BLD: 0.8 % (ref 0–1.9)
BILIRUB SERPL-MCNC: 0.2 MG/DL (ref 0.1–1)
BILIRUB UR QL STRIP: NEGATIVE
BNP SERPL-MCNC: 11 PG/ML (ref 0–99)
BUN SERPL-MCNC: 7 MG/DL (ref 8–23)
CALCIUM SERPL-MCNC: 9.3 MG/DL (ref 8.7–10.5)
CHLORIDE SERPL-SCNC: 108 MMOL/L (ref 95–110)
CLARITY UR: CLEAR
CO2 SERPL-SCNC: 23 MMOL/L (ref 23–29)
COLOR UR: COLORLESS
CREAT SERPL-MCNC: 0.7 MG/DL (ref 0.5–1.4)
DIFFERENTIAL METHOD BLD: ABNORMAL
EOSINOPHIL # BLD AUTO: 0.3 K/UL (ref 0–0.5)
EOSINOPHIL NFR BLD: 3.1 % (ref 0–8)
ERYTHROCYTE [DISTWIDTH] IN BLOOD BY AUTOMATED COUNT: 15.9 % (ref 11.5–14.5)
EST. GFR  (NO RACE VARIABLE): >60 ML/MIN/1.73 M^2
GLUCOSE SERPL-MCNC: 125 MG/DL (ref 70–110)
GLUCOSE UR QL STRIP: NEGATIVE
HCT VFR BLD AUTO: 33.2 % (ref 37–48.5)
HGB BLD-MCNC: 11 G/DL (ref 12–16)
HGB UR QL STRIP: NEGATIVE
IMM GRANULOCYTES # BLD AUTO: 0.03 K/UL (ref 0–0.04)
IMM GRANULOCYTES NFR BLD AUTO: 0.3 % (ref 0–0.5)
KETONES UR QL STRIP: NEGATIVE
LEUKOCYTE ESTERASE UR QL STRIP: NEGATIVE
LIPASE SERPL-CCNC: 30 U/L (ref 4–60)
LYMPHOCYTES # BLD AUTO: 3.9 K/UL (ref 1–4.8)
LYMPHOCYTES NFR BLD: 40.4 % (ref 18–48)
MCH RBC QN AUTO: 27.6 PG (ref 27–31)
MCHC RBC AUTO-ENTMCNC: 33.1 G/DL (ref 32–36)
MCV RBC AUTO: 83 FL (ref 82–98)
MONOCYTES # BLD AUTO: 0.9 K/UL (ref 0.3–1)
MONOCYTES NFR BLD: 9.2 % (ref 4–15)
NEUTROPHILS # BLD AUTO: 4.5 K/UL (ref 1.8–7.7)
NEUTROPHILS NFR BLD: 46.2 % (ref 38–73)
NITRITE UR QL STRIP: NEGATIVE
NRBC BLD-RTO: 0 /100 WBC
PH UR STRIP: 7 [PH] (ref 5–8)
PLATELET # BLD AUTO: 324 K/UL (ref 150–450)
PMV BLD AUTO: 9.1 FL (ref 9.2–12.9)
POCT GLUCOSE: 144 MG/DL (ref 70–110)
POTASSIUM SERPL-SCNC: 4.2 MMOL/L (ref 3.5–5.1)
PROT SERPL-MCNC: 7.1 G/DL (ref 6–8.4)
PROT UR QL STRIP: NEGATIVE
RBC # BLD AUTO: 3.98 M/UL (ref 4–5.4)
SODIUM SERPL-SCNC: 140 MMOL/L (ref 136–145)
SP GR UR STRIP: 1.01 (ref 1–1.03)
TROPONIN I SERPL DL<=0.01 NG/ML-MCNC: 0.01 NG/ML (ref 0–0.03)
URN SPEC COLLECT METH UR: ABNORMAL
UROBILINOGEN UR STRIP-ACNC: NEGATIVE EU/DL
WBC # BLD AUTO: 9.76 K/UL (ref 3.9–12.7)

## 2024-07-08 PROCEDURE — 96360 HYDRATION IV INFUSION INIT: CPT

## 2024-07-08 PROCEDURE — 83880 ASSAY OF NATRIURETIC PEPTIDE: CPT

## 2024-07-08 PROCEDURE — 25000003 PHARM REV CODE 250

## 2024-07-08 PROCEDURE — 85025 COMPLETE CBC W/AUTO DIFF WBC: CPT

## 2024-07-08 PROCEDURE — 80053 COMPREHEN METABOLIC PANEL: CPT

## 2024-07-08 PROCEDURE — 83690 ASSAY OF LIPASE: CPT

## 2024-07-08 PROCEDURE — 82962 GLUCOSE BLOOD TEST: CPT

## 2024-07-08 PROCEDURE — 84484 ASSAY OF TROPONIN QUANT: CPT

## 2024-07-08 PROCEDURE — 93005 ELECTROCARDIOGRAM TRACING: CPT

## 2024-07-08 PROCEDURE — 99285 EMERGENCY DEPT VISIT HI MDM: CPT | Mod: 25

## 2024-07-08 PROCEDURE — 93010 ELECTROCARDIOGRAM REPORT: CPT | Mod: ,,, | Performed by: INTERNAL MEDICINE

## 2024-07-08 PROCEDURE — 81003 URINALYSIS AUTO W/O SCOPE: CPT

## 2024-07-08 RX ADMIN — SODIUM CHLORIDE 1000 ML: 9 INJECTION, SOLUTION INTRAVENOUS at 10:07

## 2024-07-09 VITALS
WEIGHT: 152 LBS | TEMPERATURE: 98 F | SYSTOLIC BLOOD PRESSURE: 186 MMHG | HEART RATE: 89 BPM | RESPIRATION RATE: 20 BRPM | BODY MASS INDEX: 23.86 KG/M2 | DIASTOLIC BLOOD PRESSURE: 85 MMHG | HEIGHT: 67 IN | OXYGEN SATURATION: 99 %

## 2024-07-09 LAB
OHS QRS DURATION: 72 MS
OHS QTC CALCULATION: 430 MS

## 2024-07-09 PROCEDURE — 25000003 PHARM REV CODE 250: Performed by: STUDENT IN AN ORGANIZED HEALTH CARE EDUCATION/TRAINING PROGRAM

## 2024-07-09 RX ORDER — SUCRALFATE 1 G/10ML
1 SUSPENSION ORAL 4 TIMES DAILY
Qty: 414 ML | Refills: 0 | Status: SHIPPED | OUTPATIENT
Start: 2024-07-09

## 2024-07-09 RX ORDER — AMLODIPINE BESYLATE 5 MG/1
10 TABLET ORAL
Status: COMPLETED | OUTPATIENT
Start: 2024-07-09 | End: 2024-07-09

## 2024-07-09 RX ORDER — HYOSCYAMINE SULFATE 0.12 MG/1
0.12 TABLET SUBLINGUAL EVERY 4 HOURS PRN
Qty: 28 TABLET | Refills: 0 | Status: SHIPPED | OUTPATIENT
Start: 2024-07-09 | End: 2024-07-16

## 2024-07-09 RX ORDER — PANTOPRAZOLE SODIUM 40 MG/1
40 TABLET, DELAYED RELEASE ORAL DAILY
Qty: 30 TABLET | Refills: 0 | Status: SHIPPED | OUTPATIENT
Start: 2024-07-09 | End: 2024-08-08

## 2024-07-09 RX ADMIN — AMLODIPINE BESYLATE 10 MG: 5 TABLET ORAL at 12:07

## 2024-07-09 NOTE — ED PROVIDER NOTES
Encounter Date: 7/8/2024    SCRIBE #1 NOTE: I, Belinda Parrish, am scribing for, and in the presence of,  Mando Menchaca MD. I have scribed the following portions of the note - Other sections scribed: HPI, ROS, PE.       History     Chief Complaint   Patient presents with    Abdominal Pain     To upper abdomen and thru to back x a couple of days, vomited once today     63 y.o. female, with a PMHx of HLD, DM Type 2, tobacco abuse, previous breast cancer, pneumonia, partial thyroidectomy, who presents to the ED with intermittent epigastric abdominal pain for 2 days. Patient reports the pain radiates to her back and is sometimes brought on by eating, and also endorses N/V today. Notes sometimes her abdomen appears swollen. Grandson at bedside also reports pt has been experiencing more dyspnea of exertion and intermittent episodes of worsening shortness of breath, last episode 2 days ago. Pt denies endorsing ETOH. No other exacerbating or alleviating factors. Denies melena, hematemesis, diarrhea, constipation, chest pain, SOB, or other associated symptoms.       The history is provided by the patient and a relative. No  was used.     Review of patient's allergies indicates:  No Known Allergies  Past Medical History:   Diagnosis Date    Breast cancer     Diabetes mellitus     History of psychiatric hospitalization     Hx of psychiatric care     Hyperlipidemia     Psychiatric problem     Schizophrenia     Therapy     Thyroid disease     thyroid surgery     Past Surgical History:   Procedure Laterality Date    BREAST BIOPSY      BREAST LUMPECTOMY      BREAST SURGERY      THYROIDECTOMY  2005     Family History   Problem Relation Name Age of Onset    Cancer Neg Hx       Social History     Tobacco Use    Smoking status: Every Day     Current packs/day: 1.00     Average packs/day: 1 pack/day for 37.0 years (37.0 ttl pk-yrs)     Types: Cigarettes    Smokeless tobacco: Former     Quit date: 12/27/2014    Substance Use Topics    Alcohol use: Not Currently     Comment: occasionally    Drug use: Not Currently     Types: Marijuana     Comment: denies current drug     Review of Systems   Constitutional:  Negative for chills and fever.   HENT:  Negative for facial swelling and sore throat.    Eyes:  Negative for visual disturbance.   Respiratory:  Positive for shortness of breath. Negative for cough.    Cardiovascular:  Negative for chest pain and palpitations.   Gastrointestinal:  Positive for abdominal distention (intermittent), abdominal pain, nausea and vomiting. Negative for constipation and diarrhea.   Genitourinary:  Negative for dysuria and hematuria.   Musculoskeletal:  Positive for back pain.   Skin:  Negative for rash.   Neurological:  Negative for weakness and headaches.   Hematological:  Does not bruise/bleed easily.   Psychiatric/Behavioral: Negative.         Physical Exam     Initial Vitals [07/08/24 2106]   BP Pulse Resp Temp SpO2   137/61 (!) 112 18 97.6 °F (36.4 °C) 97 %      MAP       --         Physical Exam    Nursing note and vitals reviewed.  Constitutional: She appears well-developed and well-nourished. She is not diaphoretic. No distress.   HENT:   Head: Normocephalic and atraumatic.   Right Ear: External ear normal.   Left Ear: External ear normal.   Nose: Nose normal.   Eyes: Conjunctivae are normal. No scleral icterus.   Neck: Neck supple. No tracheal deviation present.   Normal range of motion.  Cardiovascular:  Normal rate, regular rhythm and normal heart sounds.           Pulmonary/Chest: Breath sounds normal. No respiratory distress.   Abdominal: Abdomen is soft. Bowel sounds are normal. She exhibits distension. There is no abdominal tenderness.   Musculoskeletal:      Cervical back: Normal range of motion and neck supple.      Comments: No CVA tenderness.      Neurological: She is alert and oriented to person, place, and time.   Skin: Skin is warm and dry.   Psychiatric: She has a normal  mood and affect. Thought content normal.         ED Course   Procedures  Labs Reviewed   CBC W/ AUTO DIFFERENTIAL - Abnormal; Notable for the following components:       Result Value    RBC 3.98 (*)     Hemoglobin 11.0 (*)     Hematocrit 33.2 (*)     RDW 15.9 (*)     MPV 9.1 (*)     All other components within normal limits   COMPREHENSIVE METABOLIC PANEL - Abnormal; Notable for the following components:    Glucose 125 (*)     BUN 7 (*)     All other components within normal limits   URINALYSIS, REFLEX TO URINE CULTURE - Abnormal; Notable for the following components:    Color, UA Colorless (*)     All other components within normal limits    Narrative:     Specimen Source->Urine   POCT GLUCOSE - Abnormal; Notable for the following components:    POCT Glucose 144 (*)     All other components within normal limits   TROPONIN I   B-TYPE NATRIURETIC PEPTIDE   LIPASE   POCT GLUCOSE MONITORING CONTINUOUS          Imaging Results              X-Ray Chest PA And Lateral (Final result)  Result time 07/08/24 22:47:35      Final result by Nathaniel Patel DO (07/08/24 22:47:35)                   Impression:      No acute abnormality.      Electronically signed by: Nathaniel Patel  Date:    07/08/2024  Time:    22:47               Narrative:    EXAMINATION:  XR CHEST PA AND LATERAL    CLINICAL HISTORY:  Chest pain, unspecified    TECHNIQUE:  PA and lateral views of the chest were performed.    COMPARISON:  04/09/2022.    FINDINGS:  The lungs are well expanded and clear.  No focal opacities are seen.  The pleural spaces are clear.  The cardiac silhouette is unremarkable.  The osseous structures are intact                                       Medications   sodium chloride 0.9% bolus 1,000 mL 1,000 mL (0 mLs Intravenous Stopped 7/8/24 9044)   amLODIPine tablet 10 mg (10 mg Oral Given 7/9/24 0051)     Medical Decision Making  Amount and/or Complexity of Data Reviewed  Independent Historian:      Details: See HPI.   Labs: ordered.  Decision-making details documented in ED Course.  Radiology: ordered and independent interpretation performed. Decision-making details documented in ED Course.  ECG/medicine tests: ordered and independent interpretation performed. Decision-making details documented in ED Course.    Risk  Prescription drug management.            Scribe Attestation:   Scribe #1: I performed the above scribed service and the documentation accurately describes the services I performed. I attest to the accuracy of the note.        ED Course as of 07/09/24 0117   Mon Jul 08, 2024   8374 EKG 12-lead  EKG: Rate 84, left axis deviation, regular rhythm, sinus rhythm, intervals within normal limits, no ST elevations or depressions noted.  Similar to previous.  Interpreted by me, reviewed by me. [CC]   3067 Differential Diagnosis includes, but is not limited to:  AAA, aortic dissection, mesenteric ischemia, perforated viscous, MI/ACS, SBO/volvulus, incarcerated/strangulated hernia, intussusception, ileus, appendicitis, cholecystitis, cholangitis, diverticulitis, esophagitis, hepatitis, nephrolithiasis, pancreatitis, gastroenteritis, colitis, IBD/IBS, biliary colic, GERD, PUD, constipation, UTI/pyelonephritis,  disorder.    [CC]   Tue Jul 09, 2024   0114 Patient was a 63-year-old female presenting to the emergency department for evaluation of intermittent abdominal pain typically postprandial, no pain here in the emergency department.  Serial abdominal exams reveal soft nontender abdomen.  Mildly distended no fluid wave.  EKG is nonischemic.  She denies chest pain troponin is normal, doubt ACS.  BNP is normal, chest x-ray is unremarkable without evidence of pneumothorax, congestive heart failure.  Doubt these etiologies.  UA isn't indicative of UTI.  Electrolytes within normal limits, doubt derangement.  Liver function is within normal limits.  No right upper quadrant tenderness, doubt gallbladder pathology.  Symptoms concerning for gastritis  versus peptic ulcer disorder.  Starting patient on Protonix, Carafate, Levsin and I have referred to GI further evaluation and likely endoscopy.  Patient hypertensive in the emergency department.  Improved with her home medication.  No other symptoms concerning for hypertensive emergency.  Doubt hypertensive emergency.  Strict return precautions given. I believe patient is appropriate for discharge and continued outpatient evaulation/treatment.  I discussed with the patient/family the diagnosis, treatment plan, indications for return to the emergency department, and for expected follow-up. The patient/family verbalized an understanding. The patient/family  asked if there are any questions or concerns. We discuss the case, until all issues are addressed to the patient/family's satisfaction. Patient/family understands and is agreeable to the plan. Patient is stable and ready for discharge.   [CC]      ED Course User Index  [CC] Mando Menchaca MD                             Clinical Impression:  Final diagnoses:  [R07.9] Chest pain  [K29.70] Gastritis, presence of bleeding unspecified, unspecified chronicity, unspecified gastritis type (Primary)          ED Disposition Condition    Discharge Stable          ED Prescriptions       Medication Sig Dispense Start Date End Date Auth. Provider    pantoprazole (PROTONIX) 40 MG tablet Take 1 tablet (40 mg total) by mouth once daily. 30 tablet 7/9/2024 8/8/2024 Mando Menchaca MD    hyoscyamine 0.125 mg Subl Place 1 tablet (0.125 mg total) under the tongue every 4 (four) hours as needed (abdominal cramping). 28 tablet 7/9/2024 7/16/2024 Mando Menchaca MD    sucralfate (CARAFATE) 100 mg/mL suspension Take 10 mLs (1 g total) by mouth 4 (four) times daily. 414 mL 7/9/2024 -- Mando Menchaca MD          Follow-up Information       Follow up With Specialties Details Why Contact [facility]  Schedule an appointment as soon as possible for a  visit in 2 days  [address] [facility] [address]  [phone]      [facility] [place] Go to  If symptoms worsen [address] [facility] [place] [address] [phone]          I, Mando Menchaca MD, personally performed the services described in this documentation. All medical record entries made by the scribe were at my direction and in my presence.  I have reviewed the chart and agree that the record reflects my personal performance and is accurate and complete.      Mando Menchaca MD  07/09/24 0117

## 2024-07-09 NOTE — DISCHARGE INSTRUCTIONS

## 2024-07-15 ENCOUNTER — HOSPITAL ENCOUNTER (EMERGENCY)
Facility: HOSPITAL | Age: 64
Discharge: HOME | End: 2024-07-15
Attending: EMERGENCY MEDICINE
Payer: MEDICARE

## 2024-07-15 VITALS
OXYGEN SATURATION: 95 % | DIASTOLIC BLOOD PRESSURE: 79 MMHG | SYSTOLIC BLOOD PRESSURE: 174 MMHG | BODY MASS INDEX: 20.72 KG/M2 | HEART RATE: 100 BPM | TEMPERATURE: 99 F | WEIGHT: 132 LBS | RESPIRATION RATE: 18 BRPM | HEIGHT: 67 IN

## 2024-07-15 DIAGNOSIS — R10.13 EPIGASTRIC PAIN: ICD-10-CM

## 2024-07-15 DIAGNOSIS — Z87.891 HISTORY OF TOBACCO ABUSE: ICD-10-CM

## 2024-07-15 DIAGNOSIS — K21.9 GASTRIC REFLUX: ICD-10-CM

## 2024-07-15 DIAGNOSIS — K21.00 GASTROESOPHAGEAL REFLUX DISEASE WITH ESOPHAGITIS, UNSPECIFIED WHETHER HEMORRHAGE: Primary | ICD-10-CM

## 2024-07-15 LAB
ALBUMIN SERPL BCP-MCNC: 3.7 G/DL (ref 3.5–5.2)
ALP SERPL-CCNC: 84 U/L (ref 55–135)
ALT SERPL W/O P-5'-P-CCNC: 17 U/L (ref 10–44)
ANION GAP SERPL CALC-SCNC: 12 MMOL/L (ref 8–16)
AST SERPL-CCNC: 20 U/L (ref 10–40)
BASOPHILS # BLD AUTO: 0.04 K/UL (ref 0–0.2)
BASOPHILS NFR BLD: 0.5 % (ref 0–1.9)
BILIRUB SERPL-MCNC: 0.1 MG/DL (ref 0.1–1)
BILIRUB UR QL STRIP: NEGATIVE
BUN SERPL-MCNC: 10 MG/DL (ref 8–23)
CALCIUM SERPL-MCNC: 9.5 MG/DL (ref 8.7–10.5)
CHLORIDE SERPL-SCNC: 106 MMOL/L (ref 95–110)
CLARITY UR: CLEAR
CO2 SERPL-SCNC: 20 MMOL/L (ref 23–29)
COLOR UR: COLORLESS
CREAT SERPL-MCNC: 0.9 MG/DL (ref 0.5–1.4)
DIFFERENTIAL METHOD BLD: ABNORMAL
EOSINOPHIL # BLD AUTO: 0.6 K/UL (ref 0–0.5)
EOSINOPHIL NFR BLD: 7.4 % (ref 0–8)
ERYTHROCYTE [DISTWIDTH] IN BLOOD BY AUTOMATED COUNT: 15.4 % (ref 11.5–14.5)
EST. GFR  (NO RACE VARIABLE): >60 ML/MIN/1.73 M^2
GLUCOSE SERPL-MCNC: 100 MG/DL (ref 70–110)
GLUCOSE UR QL STRIP: NEGATIVE
HCT VFR BLD AUTO: 36.6 % (ref 37–48.5)
HGB BLD-MCNC: 12.4 G/DL (ref 12–16)
HGB UR QL STRIP: NEGATIVE
IMM GRANULOCYTES # BLD AUTO: 0.03 K/UL (ref 0–0.04)
IMM GRANULOCYTES NFR BLD AUTO: 0.4 % (ref 0–0.5)
KETONES UR QL STRIP: NEGATIVE
LEUKOCYTE ESTERASE UR QL STRIP: NEGATIVE
LIPASE SERPL-CCNC: 39 U/L (ref 4–60)
LYMPHOCYTES # BLD AUTO: 4.2 K/UL (ref 1–4.8)
LYMPHOCYTES NFR BLD: 55.5 % (ref 18–48)
MCH RBC QN AUTO: 28.1 PG (ref 27–31)
MCHC RBC AUTO-ENTMCNC: 33.9 G/DL (ref 32–36)
MCV RBC AUTO: 83 FL (ref 82–98)
MONOCYTES # BLD AUTO: 0.7 K/UL (ref 0.3–1)
MONOCYTES NFR BLD: 8.9 % (ref 4–15)
NEUTROPHILS # BLD AUTO: 2.1 K/UL (ref 1.8–7.7)
NEUTROPHILS NFR BLD: 27.3 % (ref 38–73)
NITRITE UR QL STRIP: NEGATIVE
NRBC BLD-RTO: 0 /100 WBC
PH UR STRIP: 6 [PH] (ref 5–8)
PLATELET # BLD AUTO: 291 K/UL (ref 150–450)
PMV BLD AUTO: 9.6 FL (ref 9.2–12.9)
POTASSIUM SERPL-SCNC: 4.3 MMOL/L (ref 3.5–5.1)
PROT SERPL-MCNC: 7.9 G/DL (ref 6–8.4)
PROT UR QL STRIP: NEGATIVE
RBC # BLD AUTO: 4.42 M/UL (ref 4–5.4)
SODIUM SERPL-SCNC: 138 MMOL/L (ref 136–145)
SP GR UR STRIP: 1.01 (ref 1–1.03)
TROPONIN I SERPL DL<=0.01 NG/ML-MCNC: 0.01 NG/ML (ref 0–0.03)
URN SPEC COLLECT METH UR: ABNORMAL
UROBILINOGEN UR STRIP-ACNC: NEGATIVE EU/DL
WBC # BLD AUTO: 7.57 K/UL (ref 3.9–12.7)

## 2024-07-15 PROCEDURE — 84484 ASSAY OF TROPONIN QUANT: CPT

## 2024-07-15 PROCEDURE — 83690 ASSAY OF LIPASE: CPT

## 2024-07-15 PROCEDURE — 25000003 PHARM REV CODE 250

## 2024-07-15 PROCEDURE — 85025 COMPLETE CBC W/AUTO DIFF WBC: CPT

## 2024-07-15 PROCEDURE — 25500020 PHARM REV CODE 255: Performed by: EMERGENCY MEDICINE

## 2024-07-15 PROCEDURE — 81003 URINALYSIS AUTO W/O SCOPE: CPT

## 2024-07-15 PROCEDURE — 93010 ELECTROCARDIOGRAM REPORT: CPT | Mod: ,,, | Performed by: INTERNAL MEDICINE

## 2024-07-15 PROCEDURE — 80053 COMPREHEN METABOLIC PANEL: CPT

## 2024-07-15 PROCEDURE — 99285 EMERGENCY DEPT VISIT HI MDM: CPT | Mod: 25

## 2024-07-15 PROCEDURE — 93005 ELECTROCARDIOGRAM TRACING: CPT

## 2024-07-15 RX ORDER — ALUMINUM HYDROXIDE, MAGNESIUM HYDROXIDE, AND SIMETHICONE 1200; 120; 1200 MG/30ML; MG/30ML; MG/30ML
30 SUSPENSION ORAL ONCE
Status: COMPLETED | OUTPATIENT
Start: 2024-07-15 | End: 2024-07-15

## 2024-07-15 RX ORDER — ALUMINUM HYDROXIDE, MAGNESIUM HYDROXIDE, AND SIMETHICONE 2400; 240; 2400 MG/30ML; MG/30ML; MG/30ML
15 SUSPENSION ORAL EVERY 6 HOURS PRN
Qty: 335 ML | Refills: 0 | Status: SHIPPED | OUTPATIENT
Start: 2024-07-15 | End: 2025-07-15

## 2024-07-15 RX ORDER — LIDOCAINE HYDROCHLORIDE 20 MG/ML
15 SOLUTION OROPHARYNGEAL ONCE
Status: COMPLETED | OUTPATIENT
Start: 2024-07-15 | End: 2024-07-15

## 2024-07-15 RX ADMIN — IOHEXOL 75 ML: 350 INJECTION, SOLUTION INTRAVENOUS at 07:07

## 2024-07-15 RX ADMIN — LIDOCAINE HYDROCHLORIDE 15 ML: 20 SOLUTION ORAL at 05:07

## 2024-07-15 RX ADMIN — ALUMINUM HYDROXIDE, MAGNESIUM HYDROXIDE, AND SIMETHICONE 30 ML: 200; 200; 20 SUSPENSION ORAL at 05:07

## 2024-07-15 NOTE — ED PROVIDER NOTES
Encounter Date: 7/15/2024    SCRIBE #1 NOTE: I, Nitesh Wiggins, am scribing for, and in the presence of,  Bobby Amaro PA-C. I have scribed the following portions of the note - Other sections scribed: HPI.       History     Chief Complaint   Patient presents with    Abdominal Pain     Pt was seen here on 7/9 for same chief complaint and was sent home with hyoscyamine, protonix and carafate. Pt reports not relief and reports abdominal pain that begins at the epigastric region and radiates to her lower quadrants and right flank.      63 y.o. female, with a PMHx of DM, HLD, thyroid disease, chronic back pain, presents to the ED with epigastric abdominal pain x approx 1 month. Patient reports abdominal pain is exacerbated with food intake. Per Chart Review: 07/08/24, patient was evaluated for same CC and Dx with gastritis. Patient is compliant with Rx sucralfate, pantoprazole, and hyoscyamine. She states medications provided no relief and is seeking another evaluation.  Patient also reports associated right flank pain that radiates to her groin. She notes flank pain sensation is similar to back pain.  No other exacerbating or alleviating factors. She denies onset of new symptoms. Denies fever, chest pain, dyspnea, n/v/d, urinary concerns, or other associated symptoms.      The history is provided by the patient. No  was used.     Review of patient's allergies indicates:  No Known Allergies  Past Medical History:   Diagnosis Date    Breast cancer     Diabetes mellitus     History of psychiatric hospitalization     Hx of psychiatric care     Hyperlipidemia     Psychiatric problem     Schizophrenia     Therapy     Thyroid disease     thyroid surgery     Past Surgical History:   Procedure Laterality Date    BREAST BIOPSY      BREAST LUMPECTOMY      BREAST SURGERY      THYROIDECTOMY  2005     Family History   Problem Relation Name Age of Onset    Cancer Neg Hx       Social History     Tobacco Use     Smoking status: Every Day     Current packs/day: 1.00     Average packs/day: 1 pack/day for 37.0 years (37.0 ttl pk-yrs)     Types: Cigarettes    Smokeless tobacco: Former     Quit date: 12/27/2014   Substance Use Topics    Alcohol use: Not Currently     Comment: occasionally    Drug use: Not Currently     Types: Marijuana     Comment: denies current drug     Review of Systems   Constitutional:  Negative for chills, diaphoresis, fatigue and fever.   HENT:  Negative for congestion, rhinorrhea and sore throat.    Eyes:  Negative for redness and visual disturbance.   Respiratory:  Negative for cough and shortness of breath.    Cardiovascular:  Negative for chest pain, palpitations and leg swelling.   Gastrointestinal:  Positive for abdominal pain (epigastric and lower) and nausea. Negative for diarrhea and vomiting.   Genitourinary:  Positive for flank pain (right). Negative for difficulty urinating, dysuria, frequency, vaginal bleeding and vaginal discharge.   Musculoskeletal:  Negative for arthralgias, back pain, myalgias, neck pain and neck stiffness.   Skin:  Negative for rash.   Neurological:  Negative for dizziness, weakness, light-headedness and headaches.   Hematological:  Does not bruise/bleed easily.       Physical Exam     Initial Vitals [07/15/24 1640]   BP Pulse Resp Temp SpO2   (!) 110/56 (!) 121 18 98 °F (36.7 °C) 96 %      MAP       --         Physical Exam    Nursing note and vitals reviewed.  Constitutional: She appears well-developed and well-nourished. She is not diaphoretic. No distress.   HENT:   Head: Normocephalic and atraumatic.   Right Ear: External ear normal.   Left Ear: External ear normal.   Nose: Nose normal.   Mouth/Throat: Oropharynx is clear and moist.   Eyes: Conjunctivae and EOM are normal. Pupils are equal, round, and reactive to light. Right eye exhibits no discharge. Left eye exhibits no discharge.   Neck: Neck supple. No JVD present.   Normal range of motion.   Full passive range  of motion without pain.     Cardiovascular:  Regular rhythm, normal heart sounds and normal pulses.   Tachycardia present.   Exam reveals no distant heart sounds and no friction rub.       Pulmonary/Chest: Effort normal and breath sounds normal. No respiratory distress.   Abdominal: Abdomen is soft. Bowel sounds are normal. She exhibits no distension, no pulsatile midline mass and no mass. There is no splenomegaly or hepatomegaly. There is abdominal tenderness in the epigastric area.   No CVA tenderness bilaterally.  Epigastric tenderness with light deep palpation.  There was no distention, no rigidity, no guarding, no masses.   No right CVA tenderness.  No left CVA tenderness. There is no rebound and no guarding.   Musculoskeletal:         General: Normal range of motion.      Right shoulder: Normal.      Left shoulder: Normal.      Right elbow: Normal.      Left elbow: Normal.      Right wrist: Normal.      Left wrist: Normal.      Right hand: Normal.      Left hand: Normal.      Cervical back: Normal, full passive range of motion without pain, normal range of motion and neck supple.      Thoracic back: Normal.      Lumbar back: Normal.      Right hip: Normal.      Left hip: Normal.      Right knee: Normal.      Left knee: Normal.      Right lower leg: Normal.      Left lower leg: Normal.      Right ankle: Normal.      Left ankle: Normal.      Right foot: Normal.      Left foot: Normal.     Neurological: She is alert and oriented to person, place, and time. She has normal strength. No cranial nerve deficit or sensory deficit. Gait normal.   Skin: Skin is warm and dry. Capillary refill takes less than 2 seconds. No bruising, no ecchymosis and no rash noted. No pallor.   Psychiatric: She has a normal mood and affect. Her speech is normal and behavior is normal. Thought content normal.         ED Course   Procedures  Labs Reviewed   CBC W/ AUTO DIFFERENTIAL - Abnormal; Notable for the following components:        Result Value    Hematocrit 36.6 (*)     RDW 15.4 (*)     Eos # 0.6 (*)     Gran % 27.3 (*)     Lymph % 55.5 (*)     All other components within normal limits   COMPREHENSIVE METABOLIC PANEL - Abnormal; Notable for the following components:    CO2 20 (*)     All other components within normal limits   URINALYSIS, REFLEX TO URINE CULTURE - Abnormal; Notable for the following components:    Color, UA Colorless (*)     All other components within normal limits    Narrative:     Specimen Source->Urine   LIPASE   TROPONIN I     EKG Readings: (Independently Interpreted)   Initial Reading: No STEMI. Rhythm: Sinus Arrhythmia. Ectopy: No Ectopy. Conduction: Normal. ST Segments: Normal ST Segments. T Waves: Normal. Axis: Left Axis Deviation. Clinical Impression: Normal Sinus Rhythm   Normal sinus rhythm with sinus arrhythmia at a rate of 87.  W[place] interval 164.  .  Left axis.  Left atrial enlargement.  No STEMI.  Seen by Dr. Truong       Imaging Results               CT Abdomen Pelvis With IV Contrast NO Oral Contrast (Final result)  Result time 07/15/24 20:58:17      Final result by Elkin Rivera MD (07/15/24 20:58:17)                   Impression:      1. Apparent mild circumferential wall thickening of the distal esophagus which may relate to nonspecific esophagitis.  2. No large radiopaque calculus seen within urinary tract allowing for post contrast administration limiting sensitivity for detecting renal calculi, and no obstructive uropathy.  3. Moderate colonic stool burden may reflect constipation.  No bowel obstruction or acute bowel inflammation.  4. Patchy nonspecific pulmonary mosaic attenuation of the imaged lung bases increased from prior.  Differential considerations can include small vessel disease including worsening pulmonary edema versus small airways process.  No honeycombing or traction bronchiectasis and no consolidative process.  5. 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  6. Moderate to advanced calcific atherosclerosis of the aorta extending into its iliac and mesenteric branches.  7. Few additional findings as above.  This report was flagged in Epic as abnormal.      Electronically signed by: Elkin Rivera MD  Date:    07/15/2024  Time:    20:58               Narrative:    EXAMINATION:  CT ABDOMEN PELVIS WITH IV CONTRAST    CLINICAL HISTORY:  Epigastric pain;Flank pain, kidney stone suspected;Nausea/vomiting;    TECHNIQUE:  Low dose axial images, sagittal and coronal reformations were obtained from the lung bases to the pubic symphysis following the IV administration of 75 mL of Omnipaque 350 .  Oral contrast was not given.    COMPARISON:  Chest radiograph 07/08/2024, CT abdomen and pelvis 10/24/2022    FINDINGS:  Imaged lung bases show scattered linear opacities consistent with platelike scarring versus atelectasis similar prior.  There is also patchy nonspecific pulmonary mosaic attenuation which has slightly increased from prior.  No consolidation or pleural effusion.    Base of the heart is normal in size without significant pericardial fluid.    There is apparent mild circumferential wall thickening of the distal esophagus.    Portal vasculature is patent.  Liver is normal in size noting small geographic area of focal fatty infiltration at the anterior left lobe.    Pancreas is slightly atrophic without discrete mass or adjacent inflammatory change.  Similar slight prominence of the pancreatic duct.  Gallbladder, spleen, stomach, duodenum and bilateral adrenal glands are within normal limits.  No significant biliary ductal dilatation.    Bilateral kidneys are normal in size, shape and location with symmetric normal enhancement.  No hydronephrosis or significant perinephric stranding.  Ureters are normal in course and caliber.  Urinary bladder is well distended without wall thickening.  Uterus and bilateral adnexa are within normal limits.  No significant  volume free pelvic fluid.  Pelvic phleboliths noted.    Appendix and terminal ileum are within normal limits.  Moderate amount of scattered fecal material throughout the colon.  No evidence of bowel obstruction or acute inflammation.  No pneumatosis or portal venous gas.    No ascites, free air or lymphadenopathy by CT criteria.    Scattered moderate to advanced calcific atherosclerosis of the abdominal aorta extending into its mesenteric and iliac branches.  Aorta is mildly ectatic without aneurysm or dissection.    Grossly stable 9 mm peripherally calcified focus of enhancement along the distal right renal artery projecting anteriorly suggestive of aneurysm.  No adjacent inflammatory change or significant enlargement from prior.    Extraperitoneal soft tissues and osseous structures appear stable without acute process seen.                                       Medications   aluminum-magnesium hydroxide-simethicone 200-200-20 mg/5 mL suspension 30 mL (30 mLs Oral Given 7/15/24 1735)     And   LIDOcaine viscous HCl 2% oral solution 15 mL (15 mLs Oral Given 7/15/24 1735)   iohexoL (OMNIPAQUE 350) injection 75 mL (75 mLs Intravenous Given 7/15/24 1929)     Medical Decision Making  63 y.o. female, with a PMHx of DM, HLD, thyroid disease, chronic back pain, presents to the ED with epigastric abdominal pain x approx 1 month. Patient reports abdominal pain is exacerbated with food intake. Per Chart Review: 07/08/24, patient was evaluated for same CC and Dx with gastritis. Patient is compliant with Rx sucralfate, pantoprazole, and hyoscyamine. She states medications provided no relief and is seeking another evaluation.  Patient also reports associated right flank pain that radiates to her groin. She notes flank pain sensation is similar to back pain.  No other exacerbating or alleviating factors. She denies onset of new symptoms. Denies fever, chest pain, dyspnea, n/v/d, urinary concerns, or other associated symptoms.     Patient's chart and medical history reviewed.  Patient's vitals reviewed.  They are afebrile, no respiratory distress, nontoxic-appearing in the ED.  Differential diagnosis is considered as following  - GERD: considered likely with recent Dx of gastritis during her recent visit. Patient states medications aren't working and present only after eating/drinking. GI cocktail given for symptoms. More per ED course.  - Gastroenteritis: considered with abdominal pain although unlikely without V/D  - ulcer/perforation:  Unlikely with No history of alcohol abuse, no history of H pylori, no history of chronic NSAID use.  - cholecystitis:  Unlikely with No right upper quadrant pain, negative Wooten's sign.   - cholangitis:  Unlikely with No fever, no right upper quadrant pain, no jaundice, unlikely  - pancreatitis:  considered with epigastric pain or tenderness. Lipase for evaluation. CT ordered for evaluation of continued pain.  - small bowel obstruction: Unlikely with no history of abdominal surgeries  - appendicitis:  Unlikely with No right lower quadrant pain, negative McBurney's point tenderness, negative Rovsing.   - AAA/Dissection:  Unlikely with 2+ pulses upper and lower extremities bilaterally, stable vital signs, no abdominal pulsating masses.  - Ischemic Bowel:  Unlikely with no pain out of proportion to exam, no Hx of a-fib, no hematochezia or melena.  - diverticulitis, Epiploic Appendagitis: unlikely with no LLQ pain or TTP   - UTI:  UA ordered for further evaluation due to presentation and unlikely with no evidence on UA  - Pyelonephritis: unlikely with no CVA tenderness bilaterally  - Pneumonia/Pneumothorax: considered with abdominal pain although Unlikely with clear lung sounds of all fields bilaterally.   - ACS: unlikely with no STEMI on ECG   - Nephrolithiasis/Urolithiasis: considered with flank pain   CT results with evidence of esophagitis correlating with patient GERD symptoms. Patient has a GI referral  already made and states she is still waiting for them to contact her. Will provide GI contact information. Also discussed other incidental findings on CT with patient and patient will f/u with her PCP within the week for re-evaluation and discussion regarding these findings. Patient instructed to continue her gastritis medications from previous visit and will add maalox for acute relief. Patient will f/u with GI.     During patient visit discussed tobacco use and ways to stop smoking for over 3 minutes.  At this time I'll discharge home to follow up with primary care physician in the next 1-2 days for further evaluation.  If the pain continues the pt will need to see GI for further evaluation.  The patient is comfortable with this plan and comfortable going home at this time. After taking into careful account the historical factors and physical exam findings of the patient's presentation today, in conjunction with the empirical and objective data obtained on ED workup, no acute emergent medical condition has been identified. The patient appears to be low risk for an emergent medical condition and I feel it is safe and appropriate at this time for the patient to be discharged to follow-up as detailed in their discharge instructions for reevaluation and possible continued outpatient workup and management. I have discussed the specifics of the workup with the patient and the patient has verbalized understanding of the details of the workup, the diagnosis, the treatment plan, and the need for outpatient follow-up.  Although the patient has no emergent etiology today this does not preclude the development of an emergent condition so in addition, I have advised the patient that they can return to the ED and/or activate EMS at any time with worsening of their symptoms, change of their symptoms, new symptoms, continuing of symptoms, or with any other medical complaint.  The patient remained comfortable and stable during  their visit in the ED.  Discharge and follow-up instructions discussed with the patient who expressed understanding and willingness to comply with my recommendations. I discussed with the patient/family the diagnosis, treatment plan, indications for return to the emergency department, and for expected follow-up. The patient/family is asked if there are any questions or concerns. We discuss the case, until all issues are addressed to the patient/family's satisfaction. Patient/family understands and is agreeable to the plan.    BOBBY GIBBONS PA-C.    DISCLAIMER: This note was prepared with [employer] voice recognition transcription software. Garbled syntax, mangled pronouns, and other bizarre constructions may be attributed to that software system.      Amount and/or Complexity of Data Reviewed  External Data Reviewed: notes.     Details: See HPI  Labs: ordered. Decision-making details documented in ED Course.  Radiology: ordered. Decision-making details documented in ED Course.  ECG/medicine tests: ordered and independent interpretation performed.    Risk  OTC drugs.  Prescription drug management.            Scribe Attestation:   Scribe #1: I performed the above scribed service and the documentation accurately describes the services I performed. I attest to the accuracy of the note.        ED Course as of 07/15/24 2113   Mon Jul 15, 2024   1710 During chart review, it is noted that patient HR is always tachycardic in the 110's back several years. No CP, no SOB. [AF]   1711 Pulse(!): 111 [AF]   1853 Patient states all of her symptoms have resolved after GI cocktail [AF]      ED Course User Index  [AF] Bobby Gibbons PA-C I, Alain David Flexer, PA-C, personally performed the services described in this documentation. All medical record entries made by the scribe were at my direction and in my presence. I have reviewed the chart and agree that the record reflects my personal performance and is  accurate and complete.     Clinical Impression:  Final diagnoses:  [R10.13] Epigastric pain  [K21.9] Gastric reflux  [Z87.891] History of tobacco abuse  [K21.00] Gastroesophageal reflux disease with esophagitis, unspecified whether hemorrhage (Primary)          ED Disposition Condition    Discharge Stable          ED Prescriptions       Medication Sig Dispense Start Date End Date Auth. Provider    aluminum & magnesium hydroxide-simethicone (MAALOX MAXIMUM STRENGTH) 400-400-40 mg/5 mL suspension Take 15 mLs by mouth every 6 (six) hours as needed for Indigestion. 335 mL 7/15/2024 7/15/2025 Bobby Andrea PA-C          Follow-up Information       Follow up With Specialties Details Why Contact [facility]  Schedule an appointment as soon as possible for a visit in 1 day for follow up [address] [facility] [address]  [phone]      [facility] GA[facility] Gastroenterology Call in 2 days As needed [address] [facility] [place] [address] [phone]             Bobby Andrea PA-C  07/15/24 4032

## 2024-07-15 NOTE — DISCHARGE INSTRUCTIONS
Please follow up with your gastroenterology referral.     Thank you for coming to our Emergency Department today. It is important to remember that some problems or medical conditions are difficult to diagnose and may not be found or addressed during your Emergency Department visit.  These conditions often start with non-specific symptoms and can only be diagnosed on follow up visits with your primary care physician or specialist when the symptoms continue or change. Please remember that all medical conditions can change, and we cannot predict how you will be feeling tomorrow or the next day. Return to the ER with any questions/concerns, new/concerning symptoms including fever, chest pain, shortness of breath, loss of consciousness, dizziness, weakness, worsening symptoms, failure to improve, or any other concerns. Also, please follow up with your Primary Care Physician and/or Pediatrician in the next 1-2 days to review your ED visit in entirety and for re-evaluation.   Be sure to follow up with your primary care doctor and review all labs/imaging/tests that were performed during your ER visit with them. It is very common for us to identify non-emergent incidental findings which must be followed up with your primary care physician.  Some labs/imaging/tests may be outside of the normal range, and require non-emergent follow-up and/or further investigation/treatment/procedures/testing to help diagnose/exclude/prevent complications or other potentially serious medical conditions. Some abnormalities may not have been discussed or addressed during your ER visit. Some lab results may not return during your ER visit but can be accessible by downloading the free [facility] serina or by visiting [email or web address] . It is important for you to review all labs/imaging/tests which are outside of the normal range with your physician.  An ER visit does not replace a primary care visit, and many screening tests or follow-up  tests cannot be ordered by an ER doctor or performed by the ER. Some tests may even require pre-approval.  If you do not have a primary care doctor, you may contact the one listed on your discharge paperwork or you may also call the [facility] Appointment Desk at [phone] , or [facility] at  [phone] to schedule an appointment, or establish care with a primary care doctor or even a specialist and to obtain information about local resources. It is important to your health that you have a primary care doctor.  Please take all medications as directed. We have done our best to select a medication for you that will treat your condition however, all medications may potentially have side-effects and it is impossible to predict which medications may give you side-effects or what those side-effects (if any) those medications may give you.  If you feel that you are having a negative effect or side-effect of any medication you should stop taking those medications immediately and seek medical attention. If you feel that you are having a life-threatening reaction call 911.  Do not drive, swim, climb to height, take a bath, operate heavy machinery, drink alcohol or take potentially sedating medications, sign any legal documents or make any important decisions for 24 hours if you have received any pain medications, sedatives or mood altering drugs during your ER visit or within 24 hours of taking them if they have been prescribed to you.   You can find additional resources for Dentists, hearing aids, durable medical equipment, low cost pharmacies and other resources at [email or web address]  Patient agrees with this plan. Discussed with her strict return precautions, they verbalized understanding. Patient is stable for discharge.   § Please take all medication as prescribed.

## 2024-07-15 NOTE — ED TRIAGE NOTES
Pt c/o right sided flank pain that radiates to her groin and epigastric abdominal pain that worsens with PO intake x 1 month. Pt was dx w/ gastritis on 07/08/24 and sent home with prescriptions for hyoscyamine, pantoprazole, and sucralfate with no relief. PMHx of DM, HLD, thyroid disease, chronic back pain. Pt denies fever, chest pain, dyspnea, n/v/d, urinary concerns, or other associated symptoms.

## 2024-07-19 LAB
OHS QRS DURATION: 78 MS
OHS QTC CALCULATION: 440 MS

## 2024-11-06 ENCOUNTER — HOSPITAL ENCOUNTER (OUTPATIENT)
Facility: HOSPITAL | Age: 64
Discharge: LEFT BEFORE DISCHARGE | End: 2024-11-07
Attending: EMERGENCY MEDICINE | Admitting: STUDENT IN AN ORGANIZED HEALTH CARE EDUCATION/TRAINING PROGRAM
Payer: MEDICARE

## 2024-11-06 DIAGNOSIS — R94.31 PROLONGED Q-T INTERVAL ON ECG: ICD-10-CM

## 2024-11-06 DIAGNOSIS — R07.9 CHEST PAIN: ICD-10-CM

## 2024-11-06 DIAGNOSIS — R41.82 AMS (ALTERED MENTAL STATUS): Primary | ICD-10-CM

## 2024-11-06 DIAGNOSIS — R09.02 HYPOXIA: ICD-10-CM

## 2024-11-06 LAB
ALBUMIN SERPL BCP-MCNC: 3.7 G/DL (ref 3.5–5.2)
ALP SERPL-CCNC: 92 U/L (ref 40–150)
ALT SERPL W/O P-5'-P-CCNC: 21 U/L (ref 10–44)
AMMONIA PLAS-SCNC: 45 UMOL/L (ref 10–50)
AMPHET+METHAMPHET UR QL: NEGATIVE
ANION GAP SERPL CALC-SCNC: 11 MMOL/L (ref 8–16)
APAP SERPL-MCNC: <3 UG/ML (ref 10–20)
AST SERPL-CCNC: 21 U/L (ref 10–40)
BARBITURATES UR QL SCN>200 NG/ML: NEGATIVE
BASOPHILS # BLD AUTO: 0.03 K/UL (ref 0–0.2)
BASOPHILS NFR BLD: 0.6 % (ref 0–1.9)
BENZODIAZ UR QL SCN>200 NG/ML: NEGATIVE
BILIRUB SERPL-MCNC: 0.3 MG/DL (ref 0.1–1)
BILIRUB UR QL STRIP: NEGATIVE
BNP SERPL-MCNC: <10 PG/ML (ref 0–99)
BUN SERPL-MCNC: 8 MG/DL (ref 8–23)
BZE UR QL SCN: NEGATIVE
CALCIUM SERPL-MCNC: 9.8 MG/DL (ref 8.7–10.5)
CANNABINOIDS UR QL SCN: NEGATIVE
CHLORIDE SERPL-SCNC: 107 MMOL/L (ref 95–110)
CLARITY UR: CLEAR
CO2 SERPL-SCNC: 25 MMOL/L (ref 23–29)
COLOR UR: YELLOW
CREAT SERPL-MCNC: 0.8 MG/DL (ref 0.5–1.4)
CREAT UR-MCNC: 103.1 MG/DL (ref 15–325)
DIFFERENTIAL METHOD BLD: ABNORMAL
EOSINOPHIL # BLD AUTO: 0.1 K/UL (ref 0–0.5)
EOSINOPHIL NFR BLD: 1.2 % (ref 0–8)
ERYTHROCYTE [DISTWIDTH] IN BLOOD BY AUTOMATED COUNT: 15.9 % (ref 11.5–14.5)
EST. GFR  (NO RACE VARIABLE): >60 ML/MIN/1.73 M^2
ETHANOL SERPL-MCNC: <10 MG/DL
GLUCOSE SERPL-MCNC: 102 MG/DL (ref 70–110)
GLUCOSE UR QL STRIP: NEGATIVE
HCT VFR BLD AUTO: 38.8 % (ref 37–48.5)
HGB BLD-MCNC: 12.5 G/DL (ref 12–16)
HGB UR QL STRIP: NEGATIVE
IMM GRANULOCYTES # BLD AUTO: 0.02 K/UL (ref 0–0.04)
IMM GRANULOCYTES NFR BLD AUTO: 0.4 % (ref 0–0.5)
KETONES UR QL STRIP: NEGATIVE
LACTATE SERPL-SCNC: 0.9 MMOL/L (ref 0.5–2.2)
LEUKOCYTE ESTERASE UR QL STRIP: NEGATIVE
LYMPHOCYTES # BLD AUTO: 2.2 K/UL (ref 1–4.8)
LYMPHOCYTES NFR BLD: 45.8 % (ref 18–48)
MCH RBC QN AUTO: 27 PG (ref 27–31)
MCHC RBC AUTO-ENTMCNC: 32.2 G/DL (ref 32–36)
MCV RBC AUTO: 84 FL (ref 82–98)
METHADONE UR QL SCN>300 NG/ML: NEGATIVE
MONOCYTES # BLD AUTO: 0.4 K/UL (ref 0.3–1)
MONOCYTES NFR BLD: 8.2 % (ref 4–15)
NEUTROPHILS # BLD AUTO: 2.1 K/UL (ref 1.8–7.7)
NEUTROPHILS NFR BLD: 43.8 % (ref 38–73)
NITRITE UR QL STRIP: NEGATIVE
NRBC BLD-RTO: 0 /100 WBC
OPIATES UR QL SCN: NEGATIVE
PCP UR QL SCN>25 NG/ML: NEGATIVE
PH UR STRIP: 7 [PH] (ref 5–8)
PLATELET # BLD AUTO: 336 K/UL (ref 150–450)
PMV BLD AUTO: 9.6 FL (ref 9.2–12.9)
POCT GLUCOSE: 112 MG/DL (ref 70–110)
POTASSIUM SERPL-SCNC: 5 MMOL/L (ref 3.5–5.1)
PROT SERPL-MCNC: 8.4 G/DL (ref 6–8.4)
PROT UR QL STRIP: NEGATIVE
RBC # BLD AUTO: 4.63 M/UL (ref 4–5.4)
SALICYLATES SERPL-MCNC: <5 MG/DL (ref 15–30)
SODIUM SERPL-SCNC: 143 MMOL/L (ref 136–145)
SP GR UR STRIP: 1.01 (ref 1–1.03)
TOXICOLOGY INFORMATION: NORMAL
TROPONIN I SERPL DL<=0.01 NG/ML-MCNC: <0.006 NG/ML (ref 0–0.03)
TSH SERPL DL<=0.005 MIU/L-ACNC: 0.47 UIU/ML (ref 0.4–4)
URN SPEC COLLECT METH UR: ABNORMAL
UROBILINOGEN UR STRIP-ACNC: ABNORMAL EU/DL
WBC # BLD AUTO: 4.85 K/UL (ref 3.9–12.7)

## 2024-11-06 PROCEDURE — 83880 ASSAY OF NATRIURETIC PEPTIDE: CPT

## 2024-11-06 PROCEDURE — 99285 EMERGENCY DEPT VISIT HI MDM: CPT | Mod: 25

## 2024-11-06 PROCEDURE — 93010 ELECTROCARDIOGRAM REPORT: CPT | Mod: ,,, | Performed by: INTERNAL MEDICINE

## 2024-11-06 PROCEDURE — 80053 COMPREHEN METABOLIC PANEL: CPT

## 2024-11-06 PROCEDURE — 80179 DRUG ASSAY SALICYLATE: CPT | Performed by: EMERGENCY MEDICINE

## 2024-11-06 PROCEDURE — 63600175 PHARM REV CODE 636 W HCPCS: Performed by: NURSE PRACTITIONER

## 2024-11-06 PROCEDURE — 82077 ASSAY SPEC XCP UR&BREATH IA: CPT

## 2024-11-06 PROCEDURE — 80143 DRUG ASSAY ACETAMINOPHEN: CPT | Performed by: EMERGENCY MEDICINE

## 2024-11-06 PROCEDURE — 82140 ASSAY OF AMMONIA: CPT | Performed by: EMERGENCY MEDICINE

## 2024-11-06 PROCEDURE — 81003 URINALYSIS AUTO W/O SCOPE: CPT | Mod: 59

## 2024-11-06 PROCEDURE — 83605 ASSAY OF LACTIC ACID: CPT

## 2024-11-06 PROCEDURE — 80307 DRUG TEST PRSMV CHEM ANLYZR: CPT

## 2024-11-06 PROCEDURE — 85025 COMPLETE CBC W/AUTO DIFF WBC: CPT

## 2024-11-06 PROCEDURE — 82962 GLUCOSE BLOOD TEST: CPT

## 2024-11-06 PROCEDURE — 84484 ASSAY OF TROPONIN QUANT: CPT

## 2024-11-06 PROCEDURE — 87040 BLOOD CULTURE FOR BACTERIA: CPT

## 2024-11-06 PROCEDURE — 93005 ELECTROCARDIOGRAM TRACING: CPT

## 2024-11-06 PROCEDURE — 84443 ASSAY THYROID STIM HORMONE: CPT | Performed by: EMERGENCY MEDICINE

## 2024-11-06 RX ORDER — NALOXONE HCL 0.4 MG/ML
0.4 VIAL (ML) INJECTION
Status: DISCONTINUED | OUTPATIENT
Start: 2024-11-06 | End: 2024-11-07 | Stop reason: HOSPADM

## 2024-11-06 RX ADMIN — NALXONE HYDROCHLORIDE 0.4 MG: 0.4 INJECTION INTRAMUSCULAR; INTRAVENOUS; SUBCUTANEOUS at 03:11

## 2024-11-06 NOTE — ED PROVIDER NOTES
Encounter Date: 11/6/2024       History     Chief Complaint   Patient presents with    over medicated     Pt BIB grandson stating his grandmother is probably over medicated. He says went to her home after a phone conversation and he couldn't understand with pt. He says pt is in a and out of sleep, has been vomiting since this am. Pt takes a lot of pain meds and grandson believes she has taken more than she should have. Denies knowing names of meds. Pt very lethargic in triage area. Pt does not open her eyes to answer questions and is only mumbling her words.      Patient is a 64-year-old female past medical history of diabetes, thyroid disease, hyperlipidemia, schizophrenia is presenting for altered mental status.  History taken by family Patient is accompanied by her grandson who states he she called him today accidentally and was not responding of the valve and only mumbling.  Reports that when he got to her house she was very lethargic on the couch and not responding.  He states that her friend there reported that she has been vomiting on and off all day and unresponsive on the couch.  Grandson states she has been mumbling.  He states that she takes a lot medication, the believes that she may have taken too much today.  Family unaware of medication patient takes.  Patient family reports that she takes to sleeping medications.        Review of patient's allergies indicates:  No Known Allergies  Past Medical History:   Diagnosis Date    Breast cancer     Diabetes mellitus     History of psychiatric hospitalization     Hx of psychiatric care     Hyperlipidemia     Psychiatric problem     Schizophrenia     Therapy     Thyroid disease     thyroid surgery     Past Surgical History:   Procedure Laterality Date    BREAST BIOPSY      BREAST LUMPECTOMY      BREAST SURGERY      THYROIDECTOMY  2005     Family History   Problem Relation Name Age of Onset    Cancer Neg Hx       Social History     Tobacco Use    Smoking status:  Every Day     Current packs/day: 1.00     Average packs/day: 1 pack/day for 37.0 years (37.0 ttl pk-yrs)     Types: Cigarettes    Smokeless tobacco: Former     Quit date: 12/27/2014   Substance Use Topics    Alcohol use: Not Currently     Comment: occasionally    Drug use: Not Currently     Types: Marijuana     Comment: denies current drug     Review of Systems   Reason unable to perform ROS: Patient currently not responsive and answering questions.       Physical Exam     Initial Vitals [11/06/24 1501]   BP Pulse Resp Temp SpO2   (!) 159/65 (!) 116 20 98.4 °F (36.9 °C) (!) 90 %      MAP       --         Physical Exam    Constitutional: She appears well-developed and well-nourished. She appears lethargic. She is not diaphoretic.   HENT:   Head: Normocephalic and atraumatic.   Nose: Nose normal.   Neck:   Normal range of motion.  Cardiovascular:  Normal rate.           Pulmonary/Chest: Breath sounds normal. No respiratory distress. She has no wheezes.   Abdominal: Abdomen is soft. She exhibits no distension.   Musculoskeletal:         General: Normal range of motion.      Cervical back: Normal range of motion.     Neurological: She appears lethargic. GCS eye subscore is 2. GCS verbal subscore is 2. GCS motor subscore is 4.   Skin: Skin is warm and dry. Capillary refill takes less than 2 seconds.   Psychiatric: She has a normal mood and affect.         ED Course   Procedures  Labs Reviewed   CBC W/ AUTO DIFFERENTIAL - Abnormal       Result Value    WBC 4.85      RBC 4.63      Hemoglobin 12.5      Hematocrit 38.8      MCV 84      MCH 27.0      MCHC 32.2      RDW 15.9 (*)     Platelets 336      MPV 9.6      Immature Granulocytes 0.4      Gran # (ANC) 2.1      Immature Grans (Abs) 0.02      Lymph # 2.2      Mono # 0.4      Eos # 0.1      Baso # 0.03      nRBC 0      Gran % 43.8      Lymph % 45.8      Mono % 8.2      Eosinophil % 1.2      Basophil % 0.6      Differential Method Automated     URINALYSIS, REFLEX TO URINE  CULTURE - Abnormal    Specimen UA Urine, Catheterized      Color, UA Yellow      Appearance, UA Clear      pH, UA 7.0      Specific Gravity, UA 1.015      Protein, UA Negative      Glucose, UA Negative      Ketones, UA Negative      Bilirubin (UA) Negative      Occult Blood UA Negative      Nitrite, UA Negative      Urobilinogen, UA 2.0-3.0 (*)     Leukocytes, UA Negative      Narrative:     Specimen Source->Urine   SALICYLATE LEVEL - Abnormal    Salicylate Lvl <5.0 (*)    ACETAMINOPHEN LEVEL - Abnormal    Acetaminophen (Tylenol), Serum <3.0 (*)    POCT GLUCOSE - Abnormal    POCT Glucose 112 (*)    CULTURE, BLOOD    Blood Culture, Routine No Growth to date      Narrative:     Aerobic and anaerobic   CULTURE, BLOOD    Blood Culture, Routine No Growth to date      Narrative:     Aerobic and anaerobic   COMPREHENSIVE METABOLIC PANEL    Sodium 143      Potassium 5.0      Chloride 107      CO2 25      Glucose 102      BUN 8      Creatinine 0.8      Calcium 9.8      Total Protein 8.4      Albumin 3.7      Total Bilirubin 0.3      Alkaline Phosphatase 92      AST 21      ALT 21      eGFR >60      Anion Gap 11     ALCOHOL,MEDICAL (ETHANOL)    Alcohol, Serum <10     LACTIC ACID, PLASMA    Lactate (Lactic Acid) 0.9     DRUG SCREEN PANEL, URINE EMERGENCY    Benzodiazepines Negative      Methadone metabolites Negative      Cocaine (Metab.) Negative      Opiate Scrn, Ur Negative      Barbiturate Screen, Ur Negative      Amphetamine Screen, Ur Negative      THC Negative      Phencyclidine Negative      Creatinine, Urine 103.1      Toxicology Information SEE COMMENT      Narrative:     Specimen Source->Urine   AMMONIA    Ammonia 45     TROPONIN I   B-TYPE NATRIURETIC PEPTIDE   B-TYPE NATRIURETIC PEPTIDE    BNP <10     TROPONIN I    Troponin I <0.006     TSH    TSH 0.471     ISTAT PROCEDURE    POC PH 7.416      POC PCO2 39.2      POC PO2 58      POC HCO3 25.2      POC BE 1      POC SATURATED O2 90      POC TCO2 26      Sample  VENOUS      Site Other      Allens Test N/A     POCT GLUCOSE MONITORING CONTINUOUS          Imaging Results              CT Head Without Contrast (Final result)  Result time 11/06/24 17:11:33      Final result by Makayla Breaux MD (11/06/24 17:11:33)                   Impression:      No acute intracranial abnormality detected.      Electronically signed by: Makayla Breaux  Date:    11/06/2024  Time:    17:11               Narrative:    EXAMINATION:  CT OF THE HEAD WITHOUT    CLINICAL HISTORY:  Altered mental status, nontraumatic (Ped 0-18y);    TECHNIQUE:  5 mm unenhanced axial images were obtained from the skull base to the vertex.    COMPARISON:  11/08/2012    FINDINGS:  The ventricles, basal cisterns, and cortical sulci are within normal limits for patient's stated age. There is no acute intracranial hemorrhage, territorial infarct or mass effect, or midline shift. The visualized paranasal sinuses and mastoid air cells are clear.                                       X-Ray Chest AP Portable (Final result)  Result time 11/06/24 16:29:14      Final result by Clinton Graves MD (11/06/24 16:29:14)                   Impression:      Bilateral perihilar interstitial opacities primarily worrisome for pulmonary vascular congestion.      Electronically signed by: Clinton Graves MD  Date:    11/06/2024  Time:    16:29               Narrative:    EXAMINATION:  XR CHEST AP PORTABLE    CLINICAL HISTORY:  fatigue;    TECHNIQUE:  Single frontal view of the chest was performed.    COMPARISON:  07/08/2024    FINDINGS:  Cardiac silhouette is stable in size.  There are aortic calcifications.  There are bilateral perihilar interstitial opacities.  No pneumothorax or large pleural effusion.  There are surgical clips in the right axillary region.  Osseous structures show no acute abnormalities.                                       Medications   naloxone 0.4 mg/mL injection 0.4 mg (0.4 mg Nasal Given 11/6/24 1522)    cefTRIAXone injection 1 g (1 g Intravenous Given 11/7/24 0317)   azithromycin (ZITHROMAX) 500 mg in D5W 250 mL  IVPB (admixture device) (500 mg Intravenous New Bag 11/7/24 0327)   sodium chloride 0.9% flush 10 mL (has no administration in time range)   enoxaparin injection 40 mg (has no administration in time range)   acetaminophen tablet 650 mg (has no administration in time range)   polyethylene glycol packet 17 g (has no administration in time range)   ondansetron disintegrating tablet 4 mg (has no administration in time range)   albuterol-ipratropium 2.5 mg-0.5 mg/3 mL nebulizer solution 3 mL (has no administration in time range)   melatonin tablet 6 mg (has no administration in time range)   simethicone chewable tablet 80 mg (has no administration in time range)   aluminum-magnesium hydroxide-simethicone 200-200-20 mg/5 mL suspension 30 mL (has no administration in time range)     Medical Decision Making  Patient is a 64-year-old female past medical history of diabetes, thyroid disease, hyperlipidemia, schizophrenia is presenting for altered mental status.  .  Differential includes but not limited to overdose, head injury, arrhythmia.    Patient is lethargic.  Responsive to pain full stimuli.  Can follow commands- hand firmly with command.  Pupils fixed in mid dilation.  Narcan given with no response..    CBC negative for infection or anemia.  CMP remarkable for electrolyte abnormality, liver abnormality, or kidney injury pitting serum ethanol than normal limits.  Normal lactate.  Soon within levels and salicylate levels within normal limits.  Urine drug screen negative.  Blood cultures collected.  Chest x-ray reveals bilateral perihilar interstitial ocular disease, worrisome for pulmonary vascular congestion CT head negative for acute processes.    Patient care and signed out transferred to Dr. Radha MD.    Amount and/or Complexity of Data Reviewed  Labs: ordered.  Radiology: ordered.      Assumed  care patient at turnover, pending clinical improvement.  Patient appears comfortable, resting comfortably, responsive to physical stimuli, vital signs appear stable heart rate 71, blood pressure 151/71.  Lab work so far is unrevealing for possible source, negative tox workup.  Will continue to observe patient in the ER until mental status improves.  If it should not she will likely be admitted at that point.  Communicated this to family at bedside as well.               ED Course as of 11/07/24 0415   Wed Nov 06, 2024   2357 Patient was ambulatory to the restroom.  On evaluation she has no complaints, although she is not very forthcoming.  She does not know why she is here.  She knows her name and date of birth, she knows she is in a hospital.  Discussed current plan with daughter, she plans to have patient stay with her tonight if she is discharged.    Patient is still a bit drowsy, 88-90% on room air, improves with nasal cannula, will continue to monitor until she is more alert, more conversational. [SM]   u Nov 07, 2024   0007 Updated daughter with current plan, feels comfortable, agrees with current plan. [SM]   0300 Continues with SpO2 84-86% on room air, responds well to 2 L nasal cannula.  Continues to remain drowsy.  Chest x-ray with perihilar congestion, no dense peripheral lobar consolidation.  BNP normal.  No acute pleural effusions.  VBG pending.  No leukocytosis.  Has remained afebrile.  [SM]   0303 No history of thrombophilia.  No recent surgery.  No major trauma. No recent immobility.  No active cancer.  No exogenous estrogen. No history of percutaneous indwelling catheter. No previous DVT/PE.  No significant tachycardia.  No beta blockade.  PE felt less likely. [SM]   0303 Given persistent hypoxia, new pulmonary vascular congestion, will give dose of empiric antibiotics but pulmonary infection felt less likely at this time. CTA pending.  I do think this is continued drowsiness and hypoxia due to  polypharmacy, pending VBG results.  Will discuss with HM need for admission, continued close monitoring, re-evaluation of current plan. [SM]      ED Course User Index  [SM] Crow Hall PA-C                             Clinical Impression:  Final diagnoses:  [R41.82] AMS (altered mental status) (Primary)  [R09.02] Hypoxia          ED Disposition Condition    Observation                 Crow Hall PA-C  11/07/24 0416

## 2024-11-06 NOTE — ED TRIAGE NOTES
"Pt to the ED, BIB her grandson with reports of altered mental status for unknown time but grandson reports pt was speaking normally yesterday. Grandson reports pt has been "more agitated over the past week" but was coherent yesterday. Per grandson, pt takes a lot of pain medications and thinks she "may have taken too much". Pt very lethargic, responds for a very short amount of time to painful stimuli. SpO2 being maintained at 94% on room air.   "

## 2024-11-06 NOTE — ED NOTES
"Pt's sister at bedside made RN aware that she received a call from a family member who reported pt took "2 of her sleeping pills a few hours ago". Unsure which medication. MD notified. Pt maintaining SpO2 at 96% on room air. Pt still only responsive to pain.   "

## 2024-11-06 NOTE — LETTER
Patient: Jossie Crowley  YOB: 1960  Date: 11/7/2024 Time: 9:58 AM  Location: [facility]    Leaving the Hospital Against Medical Advice    Chart #:[id number]    This will certify that I, the undersigned,    ______________________________________________________________________    A patient in the above named medical center, having requested discharge and removal from the medical center against the advice of my attending physician(s), hereby release [facility], its physicians, officers and employees, severally and individually, from any and all liability of any nature whatsoever for any injury or harm or complication of any kind that may result directly or indirectly, by reason of my terminating my stay as a patient at [facility] and my departure from [facility], and hereby waive any and all rights of action I may now have or later acquire as a result of my voluntary departure from [facility] and the termination of my stay as a patient therein.    This release is made with the full knowledge of the danger that may result from the action which I am taking.      Date:_______________________                         ___________________________                                                                                    Patient/Legal Representative    Witness:        ____________________________                          ___________________________  Nurse                                                                        Physician

## 2024-11-07 VITALS
SYSTOLIC BLOOD PRESSURE: 138 MMHG | WEIGHT: 132 LBS | HEART RATE: 115 BPM | BODY MASS INDEX: 20.72 KG/M2 | TEMPERATURE: 98 F | RESPIRATION RATE: 37 BRPM | HEIGHT: 67 IN | DIASTOLIC BLOOD PRESSURE: 62 MMHG | OXYGEN SATURATION: 91 %

## 2024-11-07 PROBLEM — J18.9 PNEUMONIA DUE TO INFECTIOUS ORGANISM: Status: RESOLVED | Noted: 2022-04-09 | Resolved: 2024-11-07

## 2024-11-07 PROBLEM — M25.559 HIP PAIN: Status: RESOLVED | Noted: 2022-04-09 | Resolved: 2024-11-07

## 2024-11-07 PROBLEM — F29 PSYCHOSIS: Status: RESOLVED | Noted: 2020-06-23 | Resolved: 2024-11-07

## 2024-11-07 PROBLEM — A41.89 SEPSIS DUE TO COVID-19: Status: RESOLVED | Noted: 2021-12-27 | Resolved: 2024-11-07

## 2024-11-07 PROBLEM — R79.89 ELEVATED LFTS: Status: RESOLVED | Noted: 2020-06-24 | Resolved: 2024-11-07

## 2024-11-07 PROBLEM — I10 ESSENTIAL HYPERTENSION: Status: ACTIVE | Noted: 2024-11-07

## 2024-11-07 PROBLEM — J98.4 PNEUMONITIS: Status: ACTIVE | Noted: 2022-04-09

## 2024-11-07 PROBLEM — U07.1 SEPSIS DUE TO COVID-19: Status: RESOLVED | Noted: 2021-12-27 | Resolved: 2024-11-07

## 2024-11-07 LAB
ALBUMIN SERPL BCP-MCNC: 3.6 G/DL (ref 3.5–5.2)
ALLENS TEST: NORMAL
ALP SERPL-CCNC: 96 U/L (ref 40–150)
ALT SERPL W/O P-5'-P-CCNC: 21 U/L (ref 10–44)
ANION GAP SERPL CALC-SCNC: 10 MMOL/L (ref 8–16)
AST SERPL-CCNC: 15 U/L (ref 10–40)
BASOPHILS # BLD AUTO: 0.04 K/UL (ref 0–0.2)
BASOPHILS NFR BLD: 0.7 % (ref 0–1.9)
BILIRUB SERPL-MCNC: 0.4 MG/DL (ref 0.1–1)
BUN SERPL-MCNC: 9 MG/DL (ref 8–23)
CALCIUM SERPL-MCNC: 9.8 MG/DL (ref 8.7–10.5)
CHLORIDE SERPL-SCNC: 106 MMOL/L (ref 95–110)
CO2 SERPL-SCNC: 24 MMOL/L (ref 23–29)
CREAT SERPL-MCNC: 0.7 MG/DL (ref 0.5–1.4)
DIFFERENTIAL METHOD BLD: ABNORMAL
EOSINOPHIL # BLD AUTO: 0.1 K/UL (ref 0–0.5)
EOSINOPHIL NFR BLD: 2.4 % (ref 0–8)
ERYTHROCYTE [DISTWIDTH] IN BLOOD BY AUTOMATED COUNT: 16.1 % (ref 11.5–14.5)
EST. GFR  (NO RACE VARIABLE): >60 ML/MIN/1.73 M^2
ESTIMATED AVG GLUCOSE: 128 MG/DL (ref 68–131)
GLUCOSE SERPL-MCNC: 107 MG/DL (ref 70–110)
HBA1C MFR BLD: 6.1 % (ref 4–5.6)
HCO3 UR-SCNC: 25.2 MMOL/L (ref 24–28)
HCT VFR BLD AUTO: 40.5 % (ref 37–48.5)
HGB BLD-MCNC: 13.4 G/DL (ref 12–16)
IMM GRANULOCYTES # BLD AUTO: 0.01 K/UL (ref 0–0.04)
IMM GRANULOCYTES NFR BLD AUTO: 0.2 % (ref 0–0.5)
INR PPP: 1 (ref 0.8–1.2)
LYMPHOCYTES # BLD AUTO: 2.9 K/UL (ref 1–4.8)
LYMPHOCYTES NFR BLD: 53.7 % (ref 18–48)
MAGNESIUM SERPL-MCNC: 2.4 MG/DL (ref 1.6–2.6)
MCH RBC QN AUTO: 28.4 PG (ref 27–31)
MCHC RBC AUTO-ENTMCNC: 33.1 G/DL (ref 32–36)
MCV RBC AUTO: 86 FL (ref 82–98)
MONOCYTES # BLD AUTO: 0.4 K/UL (ref 0.3–1)
MONOCYTES NFR BLD: 7.8 % (ref 4–15)
NEUTROPHILS # BLD AUTO: 1.9 K/UL (ref 1.8–7.7)
NEUTROPHILS NFR BLD: 35.2 % (ref 38–73)
NRBC BLD-RTO: 0 /100 WBC
OHS QRS DURATION: 74 MS
OHS QRS DURATION: 76 MS
OHS QTC CALCULATION: 442 MS
OHS QTC CALCULATION: 443 MS
PCO2 BLDA: 39.2 MMHG (ref 35–45)
PH SMN: 7.42 [PH] (ref 7.35–7.45)
PHOSPHATE SERPL-MCNC: 3.7 MG/DL (ref 2.7–4.5)
PLATELET # BLD AUTO: 345 K/UL (ref 150–450)
PMV BLD AUTO: 9.6 FL (ref 9.2–12.9)
PO2 BLDA: 58 MMHG (ref 40–60)
POC BE: 1 MMOL/L
POC SATURATED O2: 90 % (ref 95–100)
POC TCO2: 26 MMOL/L (ref 24–29)
POCT GLUCOSE: 105 MG/DL (ref 70–110)
POTASSIUM SERPL-SCNC: 4.3 MMOL/L (ref 3.5–5.1)
PROCALCITONIN SERPL IA-MCNC: 0.03 NG/ML
PROT SERPL-MCNC: 8.3 G/DL (ref 6–8.4)
PROTHROMBIN TIME: 10.5 SEC (ref 9–12.5)
RBC # BLD AUTO: 4.72 M/UL (ref 4–5.4)
SAMPLE: NORMAL
SITE: NORMAL
SODIUM SERPL-SCNC: 140 MMOL/L (ref 136–145)
TROPONIN I SERPL DL<=0.01 NG/ML-MCNC: 0.01 NG/ML (ref 0–0.03)
VIT B12 SERPL-MCNC: 321 PG/ML (ref 210–950)
WBC # BLD AUTO: 5.38 K/UL (ref 3.9–12.7)

## 2024-11-07 PROCEDURE — 82962 GLUCOSE BLOOD TEST: CPT

## 2024-11-07 PROCEDURE — 96365 THER/PROPH/DIAG IV INF INIT: CPT

## 2024-11-07 PROCEDURE — 96375 TX/PRO/DX INJ NEW DRUG ADDON: CPT

## 2024-11-07 PROCEDURE — 82803 BLOOD GASES ANY COMBINATION: CPT

## 2024-11-07 PROCEDURE — 84145 PROCALCITONIN (PCT): CPT | Performed by: INTERNAL MEDICINE

## 2024-11-07 PROCEDURE — 93005 ELECTROCARDIOGRAM TRACING: CPT

## 2024-11-07 PROCEDURE — 63600175 PHARM REV CODE 636 W HCPCS: Performed by: PHYSICIAN ASSISTANT

## 2024-11-07 PROCEDURE — 96366 THER/PROPH/DIAG IV INF ADDON: CPT

## 2024-11-07 PROCEDURE — 82607 VITAMIN B-12: CPT | Performed by: HOSPITALIST

## 2024-11-07 PROCEDURE — 84100 ASSAY OF PHOSPHORUS: CPT | Performed by: INTERNAL MEDICINE

## 2024-11-07 PROCEDURE — G0378 HOSPITAL OBSERVATION PER HR: HCPCS

## 2024-11-07 PROCEDURE — 83735 ASSAY OF MAGNESIUM: CPT | Performed by: INTERNAL MEDICINE

## 2024-11-07 PROCEDURE — 25000003 PHARM REV CODE 250: Performed by: PHYSICIAN ASSISTANT

## 2024-11-07 PROCEDURE — 83036 HEMOGLOBIN GLYCOSYLATED A1C: CPT | Performed by: INTERNAL MEDICINE

## 2024-11-07 PROCEDURE — 85610 PROTHROMBIN TIME: CPT | Performed by: INTERNAL MEDICINE

## 2024-11-07 PROCEDURE — 99900035 HC TECH TIME PER 15 MIN (STAT)

## 2024-11-07 PROCEDURE — 84484 ASSAY OF TROPONIN QUANT: CPT | Performed by: INTERNAL MEDICINE

## 2024-11-07 PROCEDURE — 25500020 PHARM REV CODE 255: Performed by: STUDENT IN AN ORGANIZED HEALTH CARE EDUCATION/TRAINING PROGRAM

## 2024-11-07 PROCEDURE — 80053 COMPREHEN METABOLIC PANEL: CPT | Performed by: INTERNAL MEDICINE

## 2024-11-07 PROCEDURE — 93010 ELECTROCARDIOGRAM REPORT: CPT | Mod: ,,, | Performed by: INTERNAL MEDICINE

## 2024-11-07 PROCEDURE — 85025 COMPLETE CBC W/AUTO DIFF WBC: CPT | Performed by: INTERNAL MEDICINE

## 2024-11-07 RX ORDER — NICOTINE 7MG/24HR
1 PATCH, TRANSDERMAL 24 HOURS TRANSDERMAL DAILY PRN
Status: DISCONTINUED | OUTPATIENT
Start: 2024-11-07 | End: 2024-11-07 | Stop reason: HOSPADM

## 2024-11-07 RX ORDER — IBUPROFEN 200 MG
16 TABLET ORAL
Status: DISCONTINUED | OUTPATIENT
Start: 2024-11-07 | End: 2024-11-07 | Stop reason: HOSPADM

## 2024-11-07 RX ORDER — QUETIAPINE FUMARATE 100 MG/1
100 TABLET, FILM COATED ORAL NIGHTLY PRN
Status: DISCONTINUED | OUTPATIENT
Start: 2024-11-07 | End: 2024-11-07 | Stop reason: HOSPADM

## 2024-11-07 RX ORDER — HALOPERIDOL 5 MG/ML
5 INJECTION INTRAMUSCULAR EVERY 6 HOURS PRN
Status: DISCONTINUED | OUTPATIENT
Start: 2024-11-07 | End: 2024-11-07 | Stop reason: HOSPADM

## 2024-11-07 RX ORDER — ALUMINUM HYDROXIDE, MAGNESIUM HYDROXIDE, AND SIMETHICONE 1200; 120; 1200 MG/30ML; MG/30ML; MG/30ML
30 SUSPENSION ORAL 4 TIMES DAILY PRN
Status: DISCONTINUED | OUTPATIENT
Start: 2024-11-07 | End: 2024-11-07 | Stop reason: HOSPADM

## 2024-11-07 RX ORDER — FLUOXETINE HYDROCHLORIDE 20 MG/1
40 CAPSULE ORAL DAILY
Status: DISCONTINUED | OUTPATIENT
Start: 2024-11-07 | End: 2024-11-07 | Stop reason: HOSPADM

## 2024-11-07 RX ORDER — INSULIN ASPART 100 [IU]/ML
0-5 INJECTION, SOLUTION INTRAVENOUS; SUBCUTANEOUS
Status: DISCONTINUED | OUTPATIENT
Start: 2024-11-07 | End: 2024-11-07 | Stop reason: HOSPADM

## 2024-11-07 RX ORDER — PANTOPRAZOLE SODIUM 40 MG/1
40 TABLET, DELAYED RELEASE ORAL DAILY
Status: DISCONTINUED | OUTPATIENT
Start: 2024-11-07 | End: 2024-11-07 | Stop reason: HOSPADM

## 2024-11-07 RX ORDER — ONDANSETRON 4 MG/1
4 TABLET, ORALLY DISINTEGRATING ORAL EVERY 6 HOURS PRN
Status: DISCONTINUED | OUTPATIENT
Start: 2024-11-07 | End: 2024-11-07 | Stop reason: HOSPADM

## 2024-11-07 RX ORDER — ENOXAPARIN SODIUM 100 MG/ML
40 INJECTION SUBCUTANEOUS EVERY 24 HOURS
Status: DISCONTINUED | OUTPATIENT
Start: 2024-11-07 | End: 2024-11-07 | Stop reason: HOSPADM

## 2024-11-07 RX ORDER — BENZTROPINE MESYLATE 1 MG/1
1 TABLET ORAL DAILY
Status: DISCONTINUED | OUTPATIENT
Start: 2024-11-07 | End: 2024-11-07 | Stop reason: HOSPADM

## 2024-11-07 RX ORDER — ACETAMINOPHEN 325 MG/1
650 TABLET ORAL EVERY 4 HOURS PRN
Status: DISCONTINUED | OUTPATIENT
Start: 2024-11-07 | End: 2024-11-07 | Stop reason: HOSPADM

## 2024-11-07 RX ORDER — SIMETHICONE 80 MG
1 TABLET,CHEWABLE ORAL 4 TIMES DAILY PRN
Status: DISCONTINUED | OUTPATIENT
Start: 2024-11-07 | End: 2024-11-07 | Stop reason: HOSPADM

## 2024-11-07 RX ORDER — CEFTRIAXONE 1 G/1
1 INJECTION, POWDER, FOR SOLUTION INTRAMUSCULAR; INTRAVENOUS
Status: DISCONTINUED | OUTPATIENT
Start: 2024-11-07 | End: 2024-11-07 | Stop reason: HOSPADM

## 2024-11-07 RX ORDER — GLUCAGON 1 MG
1 KIT INJECTION
Status: DISCONTINUED | OUTPATIENT
Start: 2024-11-07 | End: 2024-11-07 | Stop reason: HOSPADM

## 2024-11-07 RX ORDER — TALC
6 POWDER (GRAM) TOPICAL NIGHTLY PRN
Status: DISCONTINUED | OUTPATIENT
Start: 2024-11-07 | End: 2024-11-07 | Stop reason: HOSPADM

## 2024-11-07 RX ORDER — CETIRIZINE HYDROCHLORIDE 10 MG/1
10 TABLET ORAL DAILY
Status: DISCONTINUED | OUTPATIENT
Start: 2024-11-07 | End: 2024-11-07 | Stop reason: HOSPADM

## 2024-11-07 RX ORDER — ATORVASTATIN CALCIUM 10 MG/1
20 TABLET, FILM COATED ORAL NIGHTLY
Status: DISCONTINUED | OUTPATIENT
Start: 2024-11-07 | End: 2024-11-07 | Stop reason: HOSPADM

## 2024-11-07 RX ORDER — IBUPROFEN 200 MG
24 TABLET ORAL
Status: DISCONTINUED | OUTPATIENT
Start: 2024-11-07 | End: 2024-11-07 | Stop reason: HOSPADM

## 2024-11-07 RX ORDER — POLYETHYLENE GLYCOL 3350 17 G/17G
17 POWDER, FOR SOLUTION ORAL 2 TIMES DAILY PRN
Status: DISCONTINUED | OUTPATIENT
Start: 2024-11-07 | End: 2024-11-07 | Stop reason: HOSPADM

## 2024-11-07 RX ORDER — SODIUM CHLORIDE 0.9 % (FLUSH) 0.9 %
10 SYRINGE (ML) INJECTION EVERY 12 HOURS PRN
Status: DISCONTINUED | OUTPATIENT
Start: 2024-11-07 | End: 2024-11-07 | Stop reason: HOSPADM

## 2024-11-07 RX ORDER — HYDROXYZINE PAMOATE 25 MG/1
50 CAPSULE ORAL 3 TIMES DAILY PRN
Status: DISCONTINUED | OUTPATIENT
Start: 2024-11-07 | End: 2024-11-07 | Stop reason: HOSPADM

## 2024-11-07 RX ORDER — IPRATROPIUM BROMIDE AND ALBUTEROL SULFATE 2.5; .5 MG/3ML; MG/3ML
3 SOLUTION RESPIRATORY (INHALATION) EVERY 6 HOURS
Status: DISCONTINUED | OUTPATIENT
Start: 2024-11-07 | End: 2024-11-07 | Stop reason: HOSPADM

## 2024-11-07 RX ORDER — AMLODIPINE BESYLATE 5 MG/1
5 TABLET ORAL DAILY
Status: DISCONTINUED | OUTPATIENT
Start: 2024-11-07 | End: 2024-11-07 | Stop reason: HOSPADM

## 2024-11-07 RX ORDER — HYDRALAZINE HYDROCHLORIDE 20 MG/ML
5 INJECTION INTRAMUSCULAR; INTRAVENOUS EVERY 6 HOURS PRN
Status: DISCONTINUED | OUTPATIENT
Start: 2024-11-07 | End: 2024-11-07 | Stop reason: HOSPADM

## 2024-11-07 RX ADMIN — CEFTRIAXONE SODIUM 1 G: 1 INJECTION, POWDER, FOR SOLUTION INTRAMUSCULAR; INTRAVENOUS at 03:11

## 2024-11-07 RX ADMIN — AZITHROMYCIN MONOHYDRATE 500 MG: 500 INJECTION, POWDER, LYOPHILIZED, FOR SOLUTION INTRAVENOUS at 03:11

## 2024-11-07 RX ADMIN — IOHEXOL 75 ML: 350 INJECTION, SOLUTION INTRAVENOUS at 04:11

## 2024-11-07 NOTE — H&P
[facility] Emergency [facility] Medicine  History & Physical    Patient Name: Jossie Crowley  MRN: [id number]  Patient Class: OP- Observation  Admission Date: 11/6/2024  Attending Physician: Dr. Nara Brown   Primary Care Provider: [facility]    Patient information was obtained from patient, past medical records, and ER records.     Subjective:     Principal Problem: Over medication    Chief Complaint:   Chief Complaint   Patient presents with    over medicated     Pt BIB grandson stating his grandmother is probably over medicated. He says went to her home after a phone conversation and he couldn't understand with pt. He says pt is in a and out of sleep, has been vomiting since this am. Pt takes a lot of pain meds and grandson believes she has taken more than she should have. Denies knowing names of meds. Pt very lethargic in triage area. Pt does not open her eyes to answer questions and is only mumbling her words.         HPI: 64 y.o. AAF with h/o bipolar scizophrenia, Essential HTN, NIDDM type 2 (A1c 6.6 checked 7/2024), and HLD presents to the Er per family with concerns of AMS and possible overtaking vs. Over medicated on her her sleep meds. Pt. Is sleepy and unable to give me a full story. Does sluggishly follow commands. From what I can tell, family has noticed increased lethargy at home and slurring of her words. Increased N/V as well. Not fevers or chills. Takes Seroquel 100mg PO nightly 1-2 tabs.    Labs with normal WBC and Stable H/H. Lytes with normal LFTs and renal function. Troponin negative x2.  Pro esmer normal as well as lactic acid. TSH normal. UTODx negative, Tylenol and salicylate negative. UA not c/w UTI. VBG with pH 7.4.     CT head negative for mass or bleed.   CTA chest:   Impression:     1. No pulmonary embolism to the segmental level.  2. Findings suspicious for mild interstitial pulmonary edema or pneumonitis.  3. Stable mediastinal and hilar  lymphadenopathy.    Started on rocephin and azithromycin. No h/o CHF and BNP wnl. We have been consulted for further management.   Sats on RA dropped to <90 when she was asleep.     Past Medical History:   Diagnosis Date    Breast cancer     Diabetes mellitus     History of psychiatric hospitalization     Hx of psychiatric care     Hyperlipidemia     Psychiatric problem     Schizophrenia     Therapy     Thyroid disease     thyroid surgery       Past Surgical History:   Procedure Laterality Date    BREAST BIOPSY      BREAST LUMPECTOMY      BREAST SURGERY      THYROIDECTOMY  2005       Review of patient's allergies indicates:  No Known Allergies    No current facility-administered medications on file prior to encounter.     Current Outpatient Medications on File Prior to Encounter   Medication Sig    albuterol (PROVENTIL/VENTOLIN HFA) 90 mcg/actuation inhaler Inhale 1-2 puffs into the lungs every 6 (six) hours as needed for Wheezing or Shortness of Breath. Rescue (Patient not taking: Reported on 4/13/2022)    aluminum & magnesium hydroxide-simethicone (MAALOX MAXIMUM STRENGTH) 400-400-40 mg/5 mL suspension Take 15 mLs by mouth every 6 (six) hours as needed for Indigestion.    amLODIPine (NORVASC) 5 MG tablet Take 1 tablet (5 mg total) by mouth once daily.    ASA/acetaminophen/caffeine/pot (GOODY'S HEADACHE POWDER ORAL) Take by mouth.    atorvastatin (LIPITOR) 20 MG tablet Take 20 mg by mouth.    benztropine (COGENTIN) 1 MG tablet     cetirizine (ZYRTEC) 10 MG tablet Take 10 mg by mouth once daily.    cyanocobalamin 500 MCG tablet Take 500 mcg by mouth once daily.    diclofenac sodium (VOLTAREN TOP) Apply topically.    FLUoxetine 40 MG capsule Take 40 mg by mouth once daily.    haloperidoL (HALDOL) 10 MG tablet TAKE 2 TABLETS BY MOUTH ONCE DAILY AT BEDTIME    hydrOXYzine pamoate (VISTARIL) 50 MG Cap Take 50 mg by mouth 3 (three) times daily as needed.    hyoscyamine 0.125 mg Subl Place 1 tablet (0.125 mg total) under  the tongue every 4 (four) hours as needed (abdominal cramping).    ibuprofen (ADVIL,MOTRIN) 200 MG tablet Take 200 mg by mouth every 6 (six) hours as needed for Pain. Every 8 hours prn    meloxicam (MOBIC) 15 MG tablet Take 15 mg by mouth once daily.    metFORMIN (GLUCOPHAGE) 1000 MG tablet Take 500 mg by mouth daily with breakfast.    paliperidone palmitate (INVEGA SUSTENNA) 156 mg/mL Syrg injection Inject 1 mL (156 mg total) into the muscle every 28 days.    pantoprazole (PROTONIX) 40 MG tablet Take 1 tablet (40 mg total) by mouth once daily.    polyethylene glycol (GLYCOLAX) 17 gram/dose powder Take 17 g by mouth once daily.    pulse oximeter (PULSE OXIMETER) device by Apply Externally route 2 (two) times a day. Use twice daily at 8 AM and 3 PM and record the value in MyChart as directed.    QUEtiapine (SEROQUEL) 50 MG tablet Take 1 tablet (50 mg total) by mouth nightly as needed (insomnia).    risperiDONE (RISPERDAL) 3 MG Tab Take 1 tablet (3 mg total) by mouth 2 (two) times daily.    sucralfate (CARAFATE) 100 mg/mL suspension Take 10 mLs (1 g total) by mouth 4 (four) times daily.    traZODone (DESYREL) 150 MG tablet Take 1 tablet (150 mg total) by mouth every evening.     Family History    None       Tobacco Use    Smoking status: Every Day     Current packs/day: 1.00     Average packs/day: 1 pack/day for 37.0 years (37.0 ttl pk-yrs)     Types: Cigarettes    Smokeless tobacco: Former     Quit date: 12/27/2014   Substance and Sexual Activity    Alcohol use: Not Currently     Comment: occasionally    Drug use: Not Currently     Types: Marijuana     Comment: denies current drug    Sexual activity: Not Currently     Review of Systems   Unable to perform ROS: Mental status change     Objective:     Vital Signs (Most Recent):  Temp: 97.9 °F (36.6 °C) (11/07/24 0745)  Pulse: 79 (11/07/24 0635)  Resp: 15 (11/07/24 0635)  BP: 130/64 (11/07/24 0635)  SpO2: 95 % (11/07/24 0635) Vital Signs (24h Range):  Temp:  [97.9 °F  (36.6 °C)-98.4 °F (36.9 °C)] 97.9 °F (36.6 °C)  Pulse:  [] 79  Resp:  [12-20] 15  SpO2:  [90 %-97 %] 95 %  BP: (126-216)/(62-83) 130/64     Weight: 59.9 kg (132 lb)  Body mass index is 20.67 kg/m².     Physical Exam  Vitals and nursing note reviewed.   Constitutional:       General: She is not in acute distress.     Appearance: She is not ill-appearing, toxic-appearing or diaphoretic.      Comments: Groggy but does wake up and answers 1-2 words with each question. Overmedicated appearing   HENT:      Head: Normocephalic and atraumatic.      Nose: Nose normal. No congestion or rhinorrhea.      Mouth/Throat:      Mouth: Mucous membranes are dry.      Pharynx: Oropharynx is clear. No oropharyngeal exudate or posterior oropharyngeal erythema.   Eyes:      General: No scleral icterus.     Extraocular Movements: Extraocular movements intact.      Conjunctiva/sclera: Conjunctivae normal.      Pupils: Pupils are equal, round, and reactive to light.   Neck:      Vascular: No carotid bruit.      Comments: No JVD  Cardiovascular:      Rate and Rhythm: Normal rate and regular rhythm.      Pulses: Normal pulses.      Heart sounds: No murmur heard.     No friction rub. No gallop.   Pulmonary:      Effort: Pulmonary effort is normal.      Breath sounds: Rhonchi and rales present. No wheezing.   Abdominal:      General: Bowel sounds are normal. There is no distension.      Palpations: Abdomen is soft.      Tenderness: There is abdominal tenderness. There is rebound. There is no guarding.   Musculoskeletal:         General: No swelling. Normal range of motion.      Cervical back: Normal range of motion and neck supple.      Right lower leg: No edema.      Left lower leg: No edema.   Skin:     General: Skin is warm.      Capillary Refill: Capillary refill takes less than 2 seconds.      Coloration: Skin is not pale.   Neurological:      Mental Status: She is disoriented.      Cranial Nerves: No cranial nerve deficit.       Motor: No weakness.      Coordination: Coordination normal.   Psychiatric:      Comments: Sluggish          Recent Results (from the past 24 hours)   POCT glucose    Collection Time: 11/06/24  3:30 PM   Result Value Ref Range    POCT Glucose 112 (H) 70 - 110 mg/dL   CBC auto differential    Collection Time: 11/06/24  3:58 PM   Result Value Ref Range    WBC 4.85 3.90 - 12.70 K/uL    RBC 4.63 4.00 - 5.40 M/uL    Hemoglobin 12.5 12.0 - 16.0 g/dL    Hematocrit 38.8 37.0 - 48.5 %    MCV 84 82 - 98 fL    MCH 27.0 27.0 - 31.0 pg    MCHC 32.2 32.0 - 36.0 g/dL    RDW 15.9 (H) 11.5 - 14.5 %    Platelets 336 150 - 450 K/uL    MPV 9.6 9.2 - 12.9 fL    Immature Granulocytes 0.4 0.0 - 0.5 %    Gran # (ANC) 2.1 1.8 - 7.7 K/uL    Immature Grans (Abs) 0.02 0.00 - 0.04 K/uL    Lymph # 2.2 1.0 - 4.8 K/uL    Mono # 0.4 0.3 - 1.0 K/uL    Eos # 0.1 0.0 - 0.5 K/uL    Baso # 0.03 0.00 - 0.20 K/uL    nRBC 0 0 /100 WBC    Gran % 43.8 38.0 - 73.0 %    Lymph % 45.8 18.0 - 48.0 %    Mono % 8.2 4.0 - 15.0 %    Eosinophil % 1.2 0.0 - 8.0 %    Basophil % 0.6 0.0 - 1.9 %    Differential Method Automated    Comprehensive metabolic panel    Collection Time: 11/06/24  3:58 PM   Result Value Ref Range    Sodium 143 136 - 145 mmol/L    Potassium 5.0 3.5 - 5.1 mmol/L    Chloride 107 95 - 110 mmol/L    CO2 25 23 - 29 mmol/L    Glucose 102 70 - 110 mg/dL    BUN 8 8 - 23 mg/dL    Creatinine 0.8 0.5 - 1.4 mg/dL    Calcium 9.8 8.7 - 10.5 mg/dL    Total Protein 8.4 6.0 - 8.4 g/dL    Albumin 3.7 3.5 - 5.2 g/dL    Total Bilirubin 0.3 0.1 - 1.0 mg/dL    Alkaline Phosphatase 92 40 - 150 U/L    AST 21 10 - 40 U/L    ALT 21 10 - 44 U/L    eGFR >60 >60 mL/min/1.73 m^2    Anion Gap 11 8 - 16 mmol/L   Ethanol    Collection Time: 11/06/24  3:58 PM   Result Value Ref Range    Alcohol, Serum <10 <10 mg/dL   Lactic acid, plasma #1    Collection Time: 11/06/24  3:58 PM   Result Value Ref Range    Lactate (Lactic Acid) 0.9 0.5 - 2.2 mmol/L   BNP    Collection Time: 11/06/24   3:58 PM   Result Value Ref Range    BNP <10 0 - 99 pg/mL   Troponin I    Collection Time: 11/06/24  3:58 PM   Result Value Ref Range    Troponin I <0.006 0.000 - 0.026 ng/mL   Blood culture x two cultures. Draw prior to antibiotics.    Collection Time: 11/06/24  4:01 PM    Specimen: Peripheral, Forearm, Left; Blood   Result Value Ref Range    Blood Culture, Routine No Growth to date    Blood culture x two cultures. Draw prior to antibiotics.    Collection Time: 11/06/24  4:01 PM    Specimen: Peripheral, Antecubital, Left; Blood   Result Value Ref Range    Blood Culture, Routine No Growth to date    Salicylate level    Collection Time: 11/06/24  4:01 PM   Result Value Ref Range    Salicylate Lvl <5.0 (L) 15.0 - 30.0 mg/dL   Acetaminophen level    Collection Time: 11/06/24  4:01 PM   Result Value Ref Range    Acetaminophen (Tylenol), Serum <3.0 (L) 10.0 - 20.0 ug/mL   Ammonia    Collection Time: 11/06/24  4:01 PM   Result Value Ref Range    Ammonia 45 10 - 50 umol/L   Urinalysis, Reflex to Urine Culture Urine, Clean Catch    Collection Time: 11/06/24  6:01 PM    Specimen: Urine   Result Value Ref Range    Specimen UA Urine, Catheterized     Color, UA Yellow Yellow, Straw, Sri    Appearance, UA Clear Clear    pH, UA 7.0 5.0 - 8.0    Specific Gravity, UA 1.015 1.005 - 1.030    Protein, UA Negative Negative    Glucose, UA Negative Negative    Ketones, UA Negative Negative    Bilirubin (UA) Negative Negative    Occult Blood UA Negative Negative    Nitrite, UA Negative Negative    Urobilinogen, UA 2.0-3.0 (A) <2.0 EU/dL    Leukocytes, UA Negative Negative   Drug screen panel, emergency    Collection Time: 11/06/24  6:01 PM   Result Value Ref Range    Benzodiazepines Negative Negative    Methadone metabolites Negative Negative    Cocaine (Metab.) Negative Negative    Opiate Scrn, Ur Negative Negative    Barbiturate Screen, Ur Negative Negative    Amphetamine Screen, Ur Negative Negative    THC Negative Negative     Phencyclidine Negative Negative    Creatinine, Urine 103.1 15.0 - 325.0 mg/dL    Toxicology Information SEE COMMENT    TSH    Collection Time: 11/06/24 10:04 PM   Result Value Ref Range    TSH 0.471 0.400 - 4.000 uIU/mL   ISTAT PROCEDURE    Collection Time: 11/07/24  3:41 AM   Result Value Ref Range    POC PH 7.416 7.35 - 7.45    POC PCO2 39.2 35 - 45 mmHg    POC PO2 58 40 - 60 mmHg    POC HCO3 25.2 24 - 28 mmol/L    POC BE 1 -2 to 2 mmol/L    POC SATURATED O2 90 95 - 100 %    POC TCO2 26 24 - 29 mmol/L    Sample VENOUS     Site Other     Allens Test N/A    Comprehensive metabolic panel    Collection Time: 11/07/24  5:26 AM   Result Value Ref Range    Sodium 140 136 - 145 mmol/L    Potassium 4.3 3.5 - 5.1 mmol/L    Chloride 106 95 - 110 mmol/L    CO2 24 23 - 29 mmol/L    Glucose 107 70 - 110 mg/dL    BUN 9 8 - 23 mg/dL    Creatinine 0.7 0.5 - 1.4 mg/dL    Calcium 9.8 8.7 - 10.5 mg/dL    Total Protein 8.3 6.0 - 8.4 g/dL    Albumin 3.6 3.5 - 5.2 g/dL    Total Bilirubin 0.4 0.1 - 1.0 mg/dL    Alkaline Phosphatase 96 40 - 150 U/L    AST 15 10 - 40 U/L    ALT 21 10 - 44 U/L    eGFR >60 >60 mL/min/1.73 m^2    Anion Gap 10 8 - 16 mmol/L   Troponin I    Collection Time: 11/07/24  5:26 AM   Result Value Ref Range    Troponin I 0.009 0.000 - 0.026 ng/mL   Magnesium    Collection Time: 11/07/24  5:26 AM   Result Value Ref Range    Magnesium 2.4 1.6 - 2.6 mg/dL   Phosphorus    Collection Time: 11/07/24  5:26 AM   Result Value Ref Range    Phosphorus 3.7 2.7 - 4.5 mg/dL   Protime-INR    Collection Time: 11/07/24  5:26 AM   Result Value Ref Range    Prothrombin Time 10.5 9.0 - 12.5 sec    INR 1.0 0.8 - 1.2   Procalcitonin    Collection Time: 11/07/24  5:26 AM   Result Value Ref Range    Procalcitonin 0.03 <0.25 ng/mL   CBC Auto Differential    Collection Time: 11/07/24  5:27 AM   Result Value Ref Range    WBC 5.38 3.90 - 12.70 K/uL    RBC 4.72 4.00 - 5.40 M/uL    Hemoglobin 13.4 12.0 - 16.0 g/dL    Hematocrit 40.5 37.0 - 48.5 %     MCV 86 82 - 98 fL    MCH 28.4 27.0 - 31.0 pg    MCHC 33.1 32.0 - 36.0 g/dL    RDW 16.1 (H) 11.5 - 14.5 %    Platelets 345 150 - 450 K/uL    MPV 9.6 9.2 - 12.9 fL    Immature Granulocytes 0.2 0.0 - 0.5 %    Gran # (ANC) 1.9 1.8 - 7.7 K/uL    Immature Grans (Abs) 0.01 0.00 - 0.04 K/uL    Lymph # 2.9 1.0 - 4.8 K/uL    Mono # 0.4 0.3 - 1.0 K/uL    Eos # 0.1 0.0 - 0.5 K/uL    Baso # 0.04 0.00 - 0.20 K/uL    nRBC 0 0 /100 WBC    Gran % 35.2 (L) 38.0 - 73.0 %    Lymph % 53.7 (H) 18.0 - 48.0 %    Mono % 7.8 4.0 - 15.0 %    Eosinophil % 2.4 0.0 - 8.0 %    Basophil % 0.7 0.0 - 1.9 %    Differential Method Automated    POCT glucose    Collection Time: 11/07/24  7:36 AM   Result Value Ref Range    POCT Glucose 105 70 - 110 mg/dL       Microbiology Results (last 7 days)       Procedure Component Value Units Date/Time    Blood culture x two cultures. Draw prior to antibiotics. [[id number]] Collected: 11/06/24 1601    Order Status: Completed Specimen: Blood from Peripheral, Antecubital, Left Updated: 11/06/24 2312     Blood Culture, Routine No Growth to date    Narrative:      Aerobic and anaerobic    Blood culture x two cultures. Draw prior to antibiotics. [[id number]] Collected: 11/06/24 1601    Order Status: Completed Specimen: Blood from Peripheral, Forearm, Left Updated: 11/06/24 2312     Blood Culture, Routine No Growth to date    Narrative:      Aerobic and anaerobic            Imaging Results              CTA Chest Non-Coronary (PE Studies) (Final result)  Result time 11/07/24 05:03:18      Final result by Nathaniel Patel DO (11/07/24 05:03:18)                   Impression:      1. No pulmonary embolism to the segmental level.  2. Findings suspicious for mild interstitial pulmonary edema or pneumonitis.  3. Stable mediastinal and hilar lymphadenopathy.      Electronically signed by: Nathaniel Patel  Date:    11/07/2024  Time:    05:03               Narrative:    EXAMINATION:  CTA CHEST NON CORONARY (PE  STUDIES)    CLINICAL HISTORY:  Pulmonary embolism (PE) suspected, high prob;    TECHNIQUE:  Low dose axial images, sagittal and coronal reformations were obtained from the thoracic inlet to the lung bases following the IV administration of 75 mL of Omnipaque 350.  Contrast timing was optimized to evaluate the pulmonary arteries.  Maximum intensity projection images were provided for review.    COMPARISON:  CT abdomen and pelvis from 07/15/2024.  CTA chest from 10/09/2021.    FINDINGS:  Pulmonary vasculature: Satisfactory opacification of the pulmonary arterial system with no filling defect to the segmental level.    Aorta: Left-sided aortic arch.  There is no aneurysm.  There is moderate atherosclerosis.    Base of Neck: No significant abnormality.    Thoracic soft tissues: There is a stable cystic lesion with branching calcifications in the right breast, correlate with dedicated breast imaging.    Heart: Normal size. No effusion.    Mireya/Mediastinum: There is mediastinal and hilar lymphadenopathy, stable from prior.  Index right hilar node measures 1 cm (series 2, image 222).  Index subcarinal node measures 1.1 cm (series 2, image 230).  Index left hilar node measures 0.9 cm (series 2, image 220).    Airways: The large airways are patent. No foci of endobronchial filling.    Lungs/Pleura: Limited evaluation of the lungs due to motion artifact.  There is peripheral subpleural reticulation which can be seen with senescent changes or chronic interstitial lung disease.  There is interlobular septal thickening, bronchial wall thickening, and hazy ground-glass attenuation, suspicious for pulmonary edema.  No pleural effusion or thickening.    Esophagus: Normal.    Upper Abdomen: No abnormality of the partially imaged upper abdomen.    Bones: No acute fracture. No suspicious lytic or sclerotic lesions.                                       CT Head Without Contrast (Final result)  Result time 11/06/24 17:11:33      Final  result by Makayla Breaux MD (11/06/24 17:11:33)                   Impression:      No acute intracranial abnormality detected.      Electronically signed by: Makayla Breaux  Date:    11/06/2024  Time:    17:11               Narrative:    EXAMINATION:  CT OF THE HEAD WITHOUT    CLINICAL HISTORY:  Altered mental status, nontraumatic (Ped 0-18y);    TECHNIQUE:  5 mm unenhanced axial images were obtained from the skull base to the vertex.    COMPARISON:  11/08/2012    FINDINGS:  The ventricles, basal cisterns, and cortical sulci are within normal limits for patient's stated age. There is no acute intracranial hemorrhage, territorial infarct or mass effect, or midline shift. The visualized paranasal sinuses and mastoid air cells are clear.                                       X-Ray Chest AP Portable (Final result)  Result time 11/06/24 16:29:14      Final result by Clinton Graves MD (11/06/24 16:29:14)                   Impression:      Bilateral perihilar interstitial opacities primarily worrisome for pulmonary vascular congestion.      Electronically signed by: Clinton Graves MD  Date:    11/06/2024  Time:    16:29               Narrative:    EXAMINATION:  XR CHEST AP PORTABLE    CLINICAL HISTORY:  fatigue;    TECHNIQUE:  Single frontal view of the chest was performed.    COMPARISON:  07/08/2024    FINDINGS:  Cardiac silhouette is stable in size.  There are aortic calcifications.  There are bilateral perihilar interstitial opacities.  No pneumothorax or large pleural effusion.  There are surgical clips in the right axillary region.  Osseous structures show no acute abnormalities.                                        Assessment/Plan:     Pneumonitis  ?aspiration due to somnolence. Cont. IV abx and f/u on repeat labs. Noted mention of possible pulmonary congestion. F/u on echo and further tx pending results. Wean oxygen as able.Donebs placed.       Paranoid schizophrenia  Suspect possible over taking sleep  aids with no intention to harm herself. However, possible underlying PNA as well that could be making her somnulance worse. Will cont with Abd and resume her home medications. Interms of sleep aids will adjust does or make PRN.       Type 2 diabetes mellitus, without long-term current use of insulin  Patient's FSGs are controlled on current medication regimen.  Last A1c reviewed-   Lab Results   Component Value Date    HGBA1C 6.2 (H) 04/09/2022     Most recent fingerstick glucose reviewed-   Recent Labs   Lab 11/06/24  1530 11/07/24  0736   POCTGLUCOSE 112* 105     Current correctional scale  Low  Maintain anti-hyperglycemic dose as follows-   Antihyperglycemics (From admission, onward)      Start     Stop Route Frequency Ordered    11/07/24 0521  insulin aspart U-100 pen 0-5 Units         -- SubQ Before meals & nightly PRN 11/07/24 0421          Hold Oral hypoglycemics while patient is in the hospital.      Essential hypertension  Patients blood pressure range in the last 24 hours was: BP  Min: 126/68  Max: 216/83.The patient's inpatient anti-hypertensive regimen is listed below:  Current Antihypertensives  hydrALAZINE injection 5 mg, Every 6 hours PRN, Intravenous    Plan  - BP is uncontrolled, will adjust as follows: Will resume home meds and adjust diet. PRN Hydralazine and further adjustment in her meds pending f/u BP . States no longer on norvasc but will resume with hold parameters.       Tobacco use disorder  Nicotine patch PRN      Hyperlipidemia  Resume statin         VTE Risk Mitigation (From admission, onward)           Ordered     enoxaparin injection 40 mg  Daily         11/07/24 0410     IP VTE HIGH RISK PATIENT  Once         11/07/24 0410     Place sequential compression device  Until discontinued         11/07/24 0410                  CODE STATUS: FULL CODE        On 11/07/2024, patient should be placed in hospital observation services under my care.             Nara Brown MD  Department of  [facility] Medicine  [facility] [place] - Emergency Dept

## 2024-11-07 NOTE — SUBJECTIVE & OBJECTIVE
Past Medical History:   Diagnosis Date    Breast cancer     Diabetes mellitus     History of psychiatric hospitalization     Hx of psychiatric care     Hyperlipidemia     Psychiatric problem     Schizophrenia     Therapy     Thyroid disease     thyroid surgery       Past Surgical History:   Procedure Laterality Date    BREAST BIOPSY      BREAST LUMPECTOMY      BREAST SURGERY      THYROIDECTOMY  2005       Review of patient's allergies indicates:  No Known Allergies    No current facility-administered medications on file prior to encounter.     Current Outpatient Medications on File Prior to Encounter   Medication Sig    albuterol (PROVENTIL/VENTOLIN HFA) 90 mcg/actuation inhaler Inhale 1-2 puffs into the lungs every 6 (six) hours as needed for Wheezing or Shortness of Breath. Rescue (Patient not taking: Reported on 4/13/2022)    aluminum & magnesium hydroxide-simethicone (MAALOX MAXIMUM STRENGTH) 400-400-40 mg/5 mL suspension Take 15 mLs by mouth every 6 (six) hours as needed for Indigestion.    amLODIPine (NORVASC) 5 MG tablet Take 1 tablet (5 mg total) by mouth once daily.    ASA/acetaminophen/caffeine/pot (GOODY'S HEADACHE POWDER ORAL) Take by mouth.    atorvastatin (LIPITOR) 20 MG tablet Take 20 mg by mouth.    benztropine (COGENTIN) 1 MG tablet     cetirizine (ZYRTEC) 10 MG tablet Take 10 mg by mouth once daily.    cyanocobalamin 500 MCG tablet Take 500 mcg by mouth once daily.    diclofenac sodium (VOLTAREN TOP) Apply topically.    FLUoxetine 40 MG capsule Take 40 mg by mouth once daily.    haloperidoL (HALDOL) 10 MG tablet TAKE 2 TABLETS BY MOUTH ONCE DAILY AT BEDTIME    hydrOXYzine pamoate (VISTARIL) 50 MG Cap Take 50 mg by mouth 3 (three) times daily as needed.    hyoscyamine 0.125 mg Subl Place 1 tablet (0.125 mg total) under the tongue every 4 (four) hours as needed (abdominal cramping).    ibuprofen (ADVIL,MOTRIN) 200 MG tablet Take 200 mg by mouth every 6 (six) hours as needed for Pain. Every 8 hours  prn    meloxicam (MOBIC) 15 MG tablet Take 15 mg by mouth once daily.    metFORMIN (GLUCOPHAGE) 1000 MG tablet Take 500 mg by mouth daily with breakfast.    paliperidone palmitate (INVEGA SUSTENNA) 156 mg/mL Syrg injection Inject 1 mL (156 mg total) into the muscle every 28 days.    pantoprazole (PROTONIX) 40 MG tablet Take 1 tablet (40 mg total) by mouth once daily.    polyethylene glycol (GLYCOLAX) 17 gram/dose powder Take 17 g by mouth once daily.    pulse oximeter (PULSE OXIMETER) device by Apply Externally route 2 (two) times a day. Use twice daily at 8 AM and 3 PM and [facility].    QUEtiapine (SEROQUEL) 50 MG tablet Take 1 tablet (50 mg total) by mouth nightly as needed (insomnia).    risperiDONE (RISPERDAL) 3 MG Tab Take 1 tablet (3 mg total) by mouth 2 (two) times daily.    sucralfate (CARAFATE) 100 mg/mL suspension Take 10 mLs (1 g total) by mouth 4 (four) times daily.    traZODone (DESYREL) 150 MG tablet Take 1 tablet (150 mg total) by mouth every evening.     Family History    None       Tobacco Use    Smoking status: Every Day     Current packs/day: 1.00     Average packs/day: 1 pack/day for 37.0 years (37.0 ttl pk-yrs)     Types: Cigarettes    Smokeless tobacco: Former     Quit date: 12/27/2014   Substance and Sexual Activity    Alcohol use: Not Currently     Comment: occasionally    Drug use: Not Currently     Types: Marijuana     Comment: denies current drug    Sexual activity: Not Currently     Review of Systems   Unable to perform ROS: Mental status change     Objective:     Vital Signs (Most Recent):  Temp: 97.9 °F (36.6 °C) (11/07/24 0745)  Pulse: 79 (11/07/24 0635)  Resp: 15 (11/07/24 0635)  BP: 130/64 (11/07/24 0635)  SpO2: 95 % (11/07/24 0635) Vital Signs (24h Range):  Temp:  [97.9 °F (36.6 °C)-98.4 °F (36.9 °C)] 97.9 °F (36.6 °C)  Pulse:  [] 79  Resp:  [12-20] 15  SpO2:  [90 %-97 %] 95 %  BP: (126-216)/(62-83) 130/64     Weight: 59.9 kg (132 lb)  Body mass  index is 20.67 kg/m².     Physical Exam  Vitals and nursing note reviewed.   Constitutional:       General: She is not in acute distress.     Appearance: She is not ill-appearing, toxic-appearing or diaphoretic.      Comments: Groggy but does wake up and answers 1-2 words with each question. Overmedicated appearing   HENT:      Head: Normocephalic and atraumatic.      Nose: Nose normal. No congestion or rhinorrhea.      Mouth/Throat:      Mouth: Mucous membranes are dry.      Pharynx: Oropharynx is clear. No oropharyngeal exudate or posterior oropharyngeal erythema.   Eyes:      General: No scleral icterus.     Extraocular Movements: Extraocular movements intact.      Conjunctiva/sclera: Conjunctivae normal.      Pupils: Pupils are equal, round, and reactive to light.   Neck:      Vascular: No carotid bruit.      Comments: No JVD  Cardiovascular:      Rate and Rhythm: Normal rate and regular rhythm.      Pulses: Normal pulses.      Heart sounds: No murmur heard.     No friction rub. No gallop.   Pulmonary:      Effort: Pulmonary effort is normal.      Breath sounds: Rhonchi and rales present. No wheezing.   Abdominal:      General: Bowel sounds are normal. There is no distension.      Palpations: Abdomen is soft.      Tenderness: There is abdominal tenderness. There is rebound. There is no guarding.   Musculoskeletal:         General: No swelling. Normal range of motion.      Cervical back: Normal range of motion and neck supple.      Right lower leg: No edema.      Left lower leg: No edema.   Skin:     General: Skin is warm.      Capillary Refill: Capillary refill takes less than 2 seconds.      Coloration: Skin is not pale.   Neurological:      Mental Status: She is disoriented.      Cranial Nerves: No cranial nerve deficit.      Motor: No weakness.      Coordination: Coordination normal.   Psychiatric:      Comments: Sluggish          Recent Results (from the past 24 hours)   POCT glucose    Collection Time:  11/06/24  3:30 PM   Result Value Ref Range    POCT Glucose 112 (H) 70 - 110 mg/dL   CBC auto differential    Collection Time: 11/06/24  3:58 PM   Result Value Ref Range    WBC 4.85 3.90 - 12.70 K/uL    RBC 4.63 4.00 - 5.40 M/uL    Hemoglobin 12.5 12.0 - 16.0 g/dL    Hematocrit 38.8 37.0 - 48.5 %    MCV 84 82 - 98 fL    MCH 27.0 27.0 - 31.0 pg    MCHC 32.2 32.0 - 36.0 g/dL    RDW 15.9 (H) 11.5 - 14.5 %    Platelets 336 150 - 450 K/uL    MPV 9.6 9.2 - 12.9 fL    Immature Granulocytes 0.4 0.0 - 0.5 %    Gran # (ANC) 2.1 1.8 - 7.7 K/uL    Immature Grans (Abs) 0.02 0.00 - 0.04 K/uL    Lymph # 2.2 1.0 - 4.8 K/uL    Mono # 0.4 0.3 - 1.0 K/uL    Eos # 0.1 0.0 - 0.5 K/uL    Baso # 0.03 0.00 - 0.20 K/uL    nRBC 0 0 /100 WBC    Gran % 43.8 38.0 - 73.0 %    Lymph % 45.8 18.0 - 48.0 %    Mono % 8.2 4.0 - 15.0 %    Eosinophil % 1.2 0.0 - 8.0 %    Basophil % 0.6 0.0 - 1.9 %    Differential Method Automated    Comprehensive metabolic panel    Collection Time: 11/06/24  3:58 PM   Result Value Ref Range    Sodium 143 136 - 145 mmol/L    Potassium 5.0 3.5 - 5.1 mmol/L    Chloride 107 95 - 110 mmol/L    CO2 25 23 - 29 mmol/L    Glucose 102 70 - 110 mg/dL    BUN 8 8 - 23 mg/dL    Creatinine 0.8 0.5 - 1.4 mg/dL    Calcium 9.8 8.7 - 10.5 mg/dL    Total Protein 8.4 6.0 - 8.4 g/dL    Albumin 3.7 3.5 - 5.2 g/dL    Total Bilirubin 0.3 0.1 - 1.0 mg/dL    Alkaline Phosphatase 92 40 - 150 U/L    AST 21 10 - 40 U/L    ALT 21 10 - 44 U/L    eGFR >60 >60 mL/min/1.73 m^2    Anion Gap 11 8 - 16 mmol/L   Ethanol    Collection Time: 11/06/24  3:58 PM   Result Value Ref Range    Alcohol, Serum <10 <10 mg/dL   Lactic acid, plasma #1    Collection Time: 11/06/24  3:58 PM   Result Value Ref Range    Lactate (Lactic Acid) 0.9 0.5 - 2.2 mmol/L   BNP    Collection Time: 11/06/24  3:58 PM   Result Value Ref Range    BNP <10 0 - 99 pg/mL   Troponin I    Collection Time: 11/06/24  3:58 PM   Result Value Ref Range    Troponin I <0.006 0.000 - 0.026 ng/mL   Blood  culture x two cultures. Draw prior to antibiotics.    Collection Time: 11/06/24  4:01 PM    Specimen: Peripheral, Forearm, Left; Blood   Result Value Ref Range    Blood Culture, Routine No Growth to date    Blood culture x two cultures. Draw prior to antibiotics.    Collection Time: 11/06/24  4:01 PM    Specimen: Peripheral, Antecubital, Left; Blood   Result Value Ref Range    Blood Culture, Routine No Growth to date    Salicylate level    Collection Time: 11/06/24  4:01 PM   Result Value Ref Range    Salicylate Lvl <5.0 (L) 15.0 - 30.0 mg/dL   Acetaminophen level    Collection Time: 11/06/24  4:01 PM   Result Value Ref Range    Acetaminophen (Tylenol), Serum <3.0 (L) 10.0 - 20.0 ug/mL   Ammonia    Collection Time: 11/06/24  4:01 PM   Result Value Ref Range    Ammonia 45 10 - 50 umol/L   Urinalysis, Reflex to Urine Culture Urine, Clean Catch    Collection Time: 11/06/24  6:01 PM    Specimen: Urine   Result Value Ref Range    Specimen UA Urine, Catheterized     Color, UA Yellow Yellow, Straw, Sri    Appearance, UA Clear Clear    pH, UA 7.0 5.0 - 8.0    Specific Gravity, UA 1.015 1.005 - 1.030    Protein, UA Negative Negative    Glucose, UA Negative Negative    Ketones, UA Negative Negative    Bilirubin (UA) Negative Negative    Occult Blood UA Negative Negative    Nitrite, UA Negative Negative    Urobilinogen, UA 2.0-3.0 (A) <2.0 EU/dL    Leukocytes, UA Negative Negative   Drug screen panel, emergency    Collection Time: 11/06/24  6:01 PM   Result Value Ref Range    Benzodiazepines Negative Negative    Methadone metabolites Negative Negative    Cocaine (Metab.) Negative Negative    Opiate Scrn, Ur Negative Negative    Barbiturate Screen, Ur Negative Negative    Amphetamine Screen, Ur Negative Negative    THC Negative Negative    Phencyclidine Negative Negative    Creatinine, Urine 103.1 15.0 - 325.0 mg/dL    Toxicology Information SEE COMMENT    TSH    Collection Time: 11/06/24 10:04 PM   Result Value Ref Range     TSH 0.471 0.400 - 4.000 uIU/mL   ISTAT PROCEDURE    Collection Time: 11/07/24  3:41 AM   Result Value Ref Range    POC PH 7.416 7.35 - 7.45    POC PCO2 39.2 35 - 45 mmHg    POC PO2 58 40 - 60 mmHg    POC HCO3 25.2 24 - 28 mmol/L    POC BE 1 -2 to 2 mmol/L    POC SATURATED O2 90 95 - 100 %    POC TCO2 26 24 - 29 mmol/L    Sample VENOUS     Site Other     Allens Test N/A    Comprehensive metabolic panel    Collection Time: 11/07/24  5:26 AM   Result Value Ref Range    Sodium 140 136 - 145 mmol/L    Potassium 4.3 3.5 - 5.1 mmol/L    Chloride 106 95 - 110 mmol/L    CO2 24 23 - 29 mmol/L    Glucose 107 70 - 110 mg/dL    BUN 9 8 - 23 mg/dL    Creatinine 0.7 0.5 - 1.4 mg/dL    Calcium 9.8 8.7 - 10.5 mg/dL    Total Protein 8.3 6.0 - 8.4 g/dL    Albumin 3.6 3.5 - 5.2 g/dL    Total Bilirubin 0.4 0.1 - 1.0 mg/dL    Alkaline Phosphatase 96 40 - 150 U/L    AST 15 10 - 40 U/L    ALT 21 10 - 44 U/L    eGFR >60 >60 mL/min/1.73 m^2    Anion Gap 10 8 - 16 mmol/L   Troponin I    Collection Time: 11/07/24  5:26 AM   Result Value Ref Range    Troponin I 0.009 0.000 - 0.026 ng/mL   Magnesium    Collection Time: 11/07/24  5:26 AM   Result Value Ref Range    Magnesium 2.4 1.6 - 2.6 mg/dL   Phosphorus    Collection Time: 11/07/24  5:26 AM   Result Value Ref Range    Phosphorus 3.7 2.7 - 4.5 mg/dL   Protime-INR    Collection Time: 11/07/24  5:26 AM   Result Value Ref Range    Prothrombin Time 10.5 9.0 - 12.5 sec    INR 1.0 0.8 - 1.2   Procalcitonin    Collection Time: 11/07/24  5:26 AM   Result Value Ref Range    Procalcitonin 0.03 <0.25 ng/mL   CBC Auto Differential    Collection Time: 11/07/24  5:27 AM   Result Value Ref Range    WBC 5.38 3.90 - 12.70 K/uL    RBC 4.72 4.00 - 5.40 M/uL    Hemoglobin 13.4 12.0 - 16.0 g/dL    Hematocrit 40.5 37.0 - 48.5 %    MCV 86 82 - 98 fL    MCH 28.4 27.0 - 31.0 pg    MCHC 33.1 32.0 - 36.0 g/dL    RDW 16.1 (H) 11.5 - 14.5 %    Platelets 345 150 - 450 K/uL    MPV 9.6 9.2 - 12.9 fL    Immature Granulocytes  0.2 0.0 - 0.5 %    Gran # (ANC) 1.9 1.8 - 7.7 K/uL    Immature Grans (Abs) 0.01 0.00 - 0.04 K/uL    Lymph # 2.9 1.0 - 4.8 K/uL    Mono # 0.4 0.3 - 1.0 K/uL    Eos # 0.1 0.0 - 0.5 K/uL    Baso # 0.04 0.00 - 0.20 K/uL    nRBC 0 0 /100 WBC    Gran % 35.2 (L) 38.0 - 73.0 %    Lymph % 53.7 (H) 18.0 - 48.0 %    Mono % 7.8 4.0 - 15.0 %    Eosinophil % 2.4 0.0 - 8.0 %    Basophil % 0.7 0.0 - 1.9 %    Differential Method Automated    POCT glucose    Collection Time: 11/07/24  7:36 AM   Result Value Ref Range    POCT Glucose 105 70 - 110 mg/dL       Microbiology Results (last 7 days)       Procedure Component Value Units Date/Time    Blood culture x two cultures. Draw prior to antibiotics. [[id number]] Collected: 11/06/24 1601    Order Status: Completed Specimen: Blood from Peripheral, Antecubital, Left Updated: 11/06/24 2312     Blood Culture, Routine No Growth to date    Narrative:      Aerobic and anaerobic    Blood culture x two cultures. Draw prior to antibiotics. [[id number]] Collected: 11/06/24 1601    Order Status: Completed Specimen: Blood from Peripheral, Forearm, Left Updated: 11/06/24 2312     Blood Culture, Routine No Growth to date    Narrative:      Aerobic and anaerobic            Imaging Results              CTA Chest Non-Coronary (PE Studies) (Final result)  Result time 11/07/24 05:03:18      Final result by Nathaniel Patel DO (11/07/24 05:03:18)                   Impression:      1. No pulmonary embolism to the segmental level.  2. Findings suspicious for mild interstitial pulmonary edema or pneumonitis.  3. Stable mediastinal and hilar lymphadenopathy.      Electronically signed by: Nathaniel Patel  Date:    11/07/2024  Time:    05:03               Narrative:    EXAMINATION:  CTA CHEST NON CORONARY (PE STUDIES)    CLINICAL HISTORY:  Pulmonary embolism (PE) suspected, high prob;    TECHNIQUE:  Low dose axial images, sagittal and coronal reformations were obtained from the thoracic inlet to the lung bases  following the IV administration of 75 mL of Omnipaque 350.  Contrast timing was optimized to evaluate the pulmonary arteries.  Maximum intensity projection images were provided for review.    COMPARISON:  CT abdomen and pelvis from 07/15/2024.  CTA chest from 10/09/2021.    FINDINGS:  Pulmonary vasculature: Satisfactory opacification of the pulmonary arterial system with no filling defect to the segmental level.    Aorta: Left-sided aortic arch.  There is no aneurysm.  There is moderate atherosclerosis.    Base of Neck: No significant abnormality.    Thoracic soft tissues: There is a stable cystic lesion with branching calcifications in the right breast, correlate with dedicated breast imaging.    Heart: Normal size. No effusion.    Mireya/Mediastinum: There is mediastinal and hilar lymphadenopathy, stable from prior.  Index right hilar node measures 1 cm (series 2, image 222).  Index subcarinal node measures 1.1 cm (series 2, image 230).  Index left hilar node measures 0.9 cm (series 2, image 220).    Airways: The large airways are patent. No foci of endobronchial filling.    Lungs/Pleura: Limited evaluation of the lungs due to motion artifact.  There is peripheral subpleural reticulation which can be seen with senescent changes or chronic interstitial lung disease.  There is interlobular septal thickening, bronchial wall thickening, and hazy ground-glass attenuation, suspicious for pulmonary edema.  No pleural effusion or thickening.    Esophagus: Normal.    Upper Abdomen: No abnormality of the partially imaged upper abdomen.    Bones: No acute fracture. No suspicious lytic or sclerotic lesions.                                       CT Head Without Contrast (Final result)  Result time 11/06/24 17:11:33      Final result by Makayla Breaux MD (11/06/24 17:11:33)                   Impression:      No acute intracranial abnormality detected.      Electronically signed by: Makayla  Saeid  Date:    11/06/2024  Time:    17:11               Narrative:    EXAMINATION:  CT OF THE HEAD WITHOUT    CLINICAL HISTORY:  Altered mental status, nontraumatic (Ped 0-18y);    TECHNIQUE:  5 mm unenhanced axial images were obtained from the skull base to the vertex.    COMPARISON:  11/08/2012    FINDINGS:  The ventricles, basal cisterns, and cortical sulci are within normal limits for patient's stated age. There is no acute intracranial hemorrhage, territorial infarct or mass effect, or midline shift. The visualized paranasal sinuses and mastoid air cells are clear.                                       X-Ray Chest AP Portable (Final result)  Result time 11/06/24 16:29:14      Final result by Clinton Graves MD (11/06/24 16:29:14)                   Impression:      Bilateral perihilar interstitial opacities primarily worrisome for pulmonary vascular congestion.      Electronically signed by: Clinton Graves MD  Date:    11/06/2024  Time:    16:29               Narrative:    EXAMINATION:  XR CHEST AP PORTABLE    CLINICAL HISTORY:  fatigue;    TECHNIQUE:  Single frontal view of the chest was performed.    COMPARISON:  07/08/2024    FINDINGS:  Cardiac silhouette is stable in size.  There are aortic calcifications.  There are bilateral perihilar interstitial opacities.  No pneumothorax or large pleural effusion.  There are surgical clips in the right axillary region.  Osseous structures show no acute abnormalities.

## 2024-11-07 NOTE — ED NOTES
Report received from ALEX Wayne     Pt aroused to verbal response and able to answer questions appropriately. Oriented to person, place, and time

## 2024-11-07 NOTE — ASSESSMENT & PLAN NOTE
Patients blood pressure range in the last 24 hours was: BP  Min: 126/68  Max: 216/83.The patient's inpatient anti-hypertensive regimen is listed below:  Current Antihypertensives  hydrALAZINE injection 5 mg, Every 6 hours PRN, Intravenous    Plan  - BP is uncontrolled, will adjust as follows: Will resume home meds and adjust diet. PRN Hydralazine and further adjustment in her meds pending f/u BP . States no longer on norvasc but will resume with hold parameters.

## 2024-11-07 NOTE — HOSPITAL COURSE
Admitted for unintentional overdose on sleeping meds vs ?Pneumonitis       Patient now awake, alert, oriented x3, states she wants to leave AMA. Patient educated on risk of leaving AMA. Patient signed AMA form.

## 2024-11-07 NOTE — ASSESSMENT & PLAN NOTE
Suspect possible over taking sleep aids with no intention to harm herself. However, possible underlying PNA as well that could be making her somnulance worse. Will cont with Abd and resume her home medications. Interms of sleep aids will adjust does or make PRN.

## 2024-11-07 NOTE — PT/OT/SLP PROGRESS
Physical Therapy      Patient Name:  Jossie Crowley   MRN:  [id number]    Patient not seen today for PT eval secondary to Patient discharged prior to initiation of evaluation (Per ED nurse, pt left AMA.).

## 2024-11-07 NOTE — NURSING
Pt becoming agitated. Ambulating in room, pulling at tubing. Demanding to leave AMA. ALP notified. AMA form signed. Peripheral IV removed. Refusing all medications, evaluation, and/ or treatment. Family (daughter and sister) present at bedside. No further concerns voiced. Pt and family educated on availability of services as needed. Pt intent on leaving. Family escorted pt off of unit. Pt ambulatory and in no apparent physical distress.

## 2024-11-07 NOTE — ASSESSMENT & PLAN NOTE
Patient's FSGs are controlled on current medication regimen.  Last A1c reviewed-   Lab Results   Component Value Date    HGBA1C 6.2 (H) 04/09/2022     Most recent fingerstick glucose reviewed-   Recent Labs   Lab 11/06/24  1530 11/07/24  0736   POCTGLUCOSE 112* 105     Current correctional scale  Low  Maintain anti-hyperglycemic dose as follows-   Antihyperglycemics (From admission, onward)      Start     Stop Route Frequency Ordered    11/07/24 0521  insulin aspart U-100 pen 0-5 Units         -- SubQ Before meals & nightly PRN 11/07/24 0421          Hold Oral hypoglycemics while patient is in the hospital.

## 2024-11-07 NOTE — DISCHARGE SUMMARY
[facility] Emergency Dept  [facility] Medicine  Discharge Summary      Patient Name: Jossie Crowley  MRN: [id number]  RAHEEM: [id number]  Patient Class: OP- Observation  Admission Date: 11/6/2024  Hospital Length of Stay: 0 days  Discharge Date and Time:  11/07/2024 10:34 AM  Attending Physician: Jefe Acosta, *   Discharging Provider: Rock Reyes NP  Primary Care Provider: [facility]    Primary Care Team:  [facility]    HPI:   64 y.o. AAF with h/o bipolar scizophrenia, Essential HTN, NIDDM type 2 (A1c 6.6 checked 7/2024), and HLD presents to the Er per family with concerns of AMS and possible overtaking vs. Over medicated on her her sleep meds. Pt. Is sleepy and unable to give me a full story. Does sluggishly follow commands. From what I can tell, family has noticed increased lethargy at home and slurring of her words. Increased N/V as well. Not fevers or chills. Takes Seroquel 100mg PO nightly 1-2 tabs.    Labs with normal WBC and Stable H/H. Lytes with normal LFTs and renal function. Troponin negative x2.  Pro esmer normal as well as lactic acid. TSH normal. UTODx negative, Tylenol and salicylate negative. UA not c/w UTI. VBG with pH 7.4.     CT head negative for mass or bleed.   CTA chest:   Impression:     1. No pulmonary embolism to the segmental level.  2. Findings suspicious for mild interstitial pulmonary edema or pneumonitis.  3. Stable mediastinal and hilar lymphadenopathy.    Started on rocephin and azithromycin. No h/o CHF and BNP wnl. We have been consulted for further management.   Sats on RA dropped to <90 when she was asleep.     * No surgery found *      Hospital Course:   Admitted for unintentional overdose on sleeping meds vs ?Pneumonitis       Patient now awake, alert, oriented x3, states she wants to leave AMA. Patient educated on risk of leaving AMA. Patient signed AMA form.      Goals of Care Treatment Preferences:  Code Status: Full Code         Consults:  "    No new Assessment & Plan notes have been filed under this hospital service since the last note was generated.  Service: Hospital Medicine    Final Active Diagnoses:    Diagnosis Date Noted POA    Essential hypertension [I10] 11/07/2024 Yes    Pneumonitis [J98.4] 04/09/2022 Yes    Type 2 diabetes mellitus, without long-term current use of insulin [E11.9] 06/24/2020 Yes    Paranoid schizophrenia [F20.0] 01/04/2015 Yes    Hyperlipidemia [E78.5]  Yes    Tobacco use disorder [F17.200] 11/08/2012 Yes      Problems Resolved During this Admission:    Diagnosis Date Noted Date Resolved POA    Schizophrenia [F20.9]  11/07/2024 Yes       Discharged Condition: stable    Disposition: left against medical advice    Follow Up:    Patient Instructions:   No discharge procedures on file.    Significant Diagnostic Studies: Labs: BMP:   Recent Labs   Lab 11/06/24  1558 11/07/24  0526    107    140   K 5.0 4.3    106   CO2 25 24   BUN 8 9   CREATININE 0.8 0.7   CALCIUM 9.8 9.8   MG  --  2.4   , CMP   Recent Labs   Lab 11/06/24  1558 11/07/24  0526    140   K 5.0 4.3    106   CO2 25 24    107   BUN 8 9   CREATININE 0.8 0.7   CALCIUM 9.8 9.8   PROT 8.4 8.3   ALBUMIN 3.7 3.6   BILITOT 0.3 0.4   ALKPHOS 92 96   AST 21 15   ALT 21 21   ANIONGAP 11 10   , CBC   Recent Labs   Lab 11/06/24  1558 11/07/24  0527   WBC 4.85 5.38   HGB 12.5 13.4   HCT 38.8 40.5    345   , INR   Lab Results   Component Value Date    INR 1.0 11/07/2024    INR 1.0 01/04/2015    INR 1.0 01/03/2015   , Lipid Panel   Lab Results   Component Value Date    CHOL 204 (H) 01/05/2015    HDL 14 (L) 01/05/2015    LDLCALC 128.6 01/05/2015    TRIG 307 (H) 01/05/2015    CHOLHDL 6.9 (L) 01/05/2015   , Troponin   Recent Labs   Lab 11/06/24  1558 11/07/24  0526   TROPONINI <0.006 0.009   , A1C: No results for input(s): "HGBA1C" in the last 4320 hours., and All labs within the past 24 hours have been reviewed    Pending Diagnostic " Studies:       Procedure Component Value Units Date/Time    Hemoglobin A1c [[id number]] Collected: 11/07/24 0526    Order Status: Sent Lab Status: No result     Specimen: Blood     Vitamin B12 [[id number]] Collected: 11/07/24 0630    Order Status: Sent Lab Status: No result     Specimen: Blood            Medications:  Reconciled Home Medications:      Medication List        ASK your doctor about these medications      albuterol 90 mcg/actuation inhaler  Commonly known as: PROVENTIL/VENTOLIN HFA  Inhale 1-2 puffs into the lungs every 6 (six) hours as needed for Wheezing or Shortness of Breath. Rescue     aluminum & magnesium hydroxide-simethicone 400-400-40 mg/5 mL suspension  Commonly known as: MAALOX MAXIMUM STRENGTH  Take 15 mLs by mouth every 6 (six) hours as needed for Indigestion.     amLODIPine 5 MG tablet  Commonly known as: NORVASC  Take 1 tablet (5 mg total) by mouth once daily.     atorvastatin 20 MG tablet  Commonly known as: LIPITOR  Take 20 mg by mouth.     benztropine 1 MG tablet  Commonly known as: COGENTIN     cetirizine 10 MG tablet  Commonly known as: ZYRTEC  Take 10 mg by mouth once daily.     cyanocobalamin 500 MCG tablet  Take 500 mcg by mouth once daily.     FLUoxetine 40 MG capsule  Take 40 mg by mouth once daily.     GOODY'S HEADACHE POWDER ORAL  Take by mouth.     haloperidoL 10 MG tablet  Commonly known as: HALDOL  TAKE 2 TABLETS BY MOUTH ONCE DAILY AT BEDTIME     hydrOXYzine pamoate 50 MG Cap  Commonly known as: VISTARIL  Take 50 mg by mouth 3 (three) times daily as needed.     hyoscyamine 0.125 mg Subl  Place 1 tablet (0.125 mg total) under the tongue every 4 (four) hours as needed (abdominal cramping).     ibuprofen 200 MG tablet  Commonly known as: ADVIL,MOTRIN  Take 200 mg by mouth every 6 (six) hours as needed for Pain. Every 8 hours prn     meloxicam 15 MG tablet  Commonly known as: MOBIC  Take 15 mg by mouth once daily.     metFORMIN 1000 MG tablet  Commonly known as:  GLUCOPHAGE  Take 500 mg by mouth daily with breakfast.     paliperidone palmitate 156 mg/mL Syrg injection  Commonly known as: INVEGA SUSTENNA  Inject 1 mL (156 mg total) into the muscle every 28 days.     pantoprazole 40 MG tablet  Commonly known as: PROTONIX  Take 1 tablet (40 mg total) by mouth once daily.     polyethylene glycol 17 gram/dose powder  Commonly known as: GLYCOLAX  Take 17 g by mouth once daily.     pulse oximeter device  Commonly known as: pulse oximeter  by Apply Externally route 2 (two) times a day. Use twice daily at 8 AM and 3 PM and [facility] as [facility].     QUEtiapine 50 MG tablet  Commonly known as: SEROQUEL  Take 1 tablet (50 mg total) by mouth nightly as needed (insomnia).     risperiDONE 3 MG Tab  Commonly known as: RISPERDAL  Take 1 tablet (3 mg total) by mouth 2 (two) times daily.     sucralfate 100 mg/mL suspension  Commonly known as: CARAFATE  Take 10 mLs (1 g total) by mouth 4 (four) times daily.     traZODone 150 MG tablet  Commonly known as: DESYREL  Take 1 tablet (150 mg total) by mouth every evening.     VOLTAREN TOP  Apply topically.              Indwelling Lines/Drains at time of discharge:   Lines/Drains/Airways       None                   Time spent on the discharge of patient: 30 minutes         Rock Reyes NP  Department of Hospital Medicine  [facility] - Emergency Dept

## 2024-11-07 NOTE — HPI
64 y.o. AAF with h/o bipolar scizophrenia, Essential HTN, NIDDM type 2 (A1c 6.6 checked 7/2024), and HLD presents to the Er per family with concerns of AMS and possible overtaking vs. Over medicated on her her sleep meds. Pt. Is sleepy and unable to give me a full story. Does sluggishly follow commands. From what I can tell, family has noticed increased lethargy at home and slurring of her words. Increased N/V as well. Not fevers or chills. Takes Seroquel 100mg PO nightly 1-2 tabs.    Labs with normal WBC and Stable H/H. Lytes with normal LFTs and renal function. Troponin negative x2.  Pro esmer normal as well as lactic acid. TSH normal. UTODx negative, Tylenol and salicylate negative. UA not c/w UTI. VBG with pH 7.4.     CT head negative for mass or bleed.   CTA chest:   Impression:     1. No pulmonary embolism to the segmental level.  2. Findings suspicious for mild interstitial pulmonary edema or pneumonitis.  3. Stable mediastinal and hilar lymphadenopathy.    Started on rocephin and azithromycin. No h/o CHF and BNP wnl. We have been consulted for further management.   Sats on RA dropped to <90 when she was asleep.    27-Feb[birth year]2018

## 2024-11-07 NOTE — PLAN OF CARE
SW attempted discharge planning assessment.  Patient sitting up in bed.  When asked to verify name and date of birth, patient smiles and holds up fingers to indicate 9th month and 14th day.  She whisper to  -- to not talk loud because she didn't want anyone to hear.  Patient stated that she lived with her sister Sindi.  She then told  that she was going home today.  Seeing that patient was possibly altered, social ended assessment attempt.  SW to followup with family.

## 2024-11-07 NOTE — ASSESSMENT & PLAN NOTE
?aspiration due to somnolence. Cont. IV abx and f/u on repeat labs. Noted mention of possible pulmonary congestion. F/u on echo and further tx pending results. Wean oxygen as able.Donebs placed.

## 2024-11-10 LAB
BACTERIA BLD CULT: NORMAL
BACTERIA BLD CULT: NORMAL

## 2025-05-26 NOTE — ASSESSMENT & PLAN NOTE
Continue home medication regimen; patient's baseline unclear at this time. She is overall calm and can cooperate but affect is blunted.   multiple medical complaints